# Patient Record
Sex: FEMALE | Race: WHITE | NOT HISPANIC OR LATINO | Employment: OTHER | URBAN - METROPOLITAN AREA
[De-identification: names, ages, dates, MRNs, and addresses within clinical notes are randomized per-mention and may not be internally consistent; named-entity substitution may affect disease eponyms.]

---

## 2017-01-19 ENCOUNTER — GENERIC CONVERSION - ENCOUNTER (OUTPATIENT)
Dept: OTHER | Facility: OTHER | Age: 77
End: 2017-01-19

## 2017-01-21 ENCOUNTER — APPOINTMENT (OUTPATIENT)
Dept: LAB | Facility: HOSPITAL | Age: 77
End: 2017-01-21
Payer: MEDICARE

## 2017-01-21 ENCOUNTER — TRANSCRIBE ORDERS (OUTPATIENT)
Dept: ADMINISTRATIVE | Facility: HOSPITAL | Age: 77
End: 2017-01-21

## 2017-01-21 DIAGNOSIS — E78.5 HYPERLIPIDEMIA: ICD-10-CM

## 2017-01-21 DIAGNOSIS — I25.10 ATHEROSCLEROTIC HEART DISEASE OF NATIVE CORONARY ARTERY WITHOUT ANGINA PECTORIS: ICD-10-CM

## 2017-01-21 DIAGNOSIS — E04.1 NONTOXIC SINGLE THYROID NODULE: ICD-10-CM

## 2017-01-21 DIAGNOSIS — I10 ESSENTIAL (PRIMARY) HYPERTENSION: ICD-10-CM

## 2017-01-21 LAB
ALBUMIN SERPL BCP-MCNC: 3.7 G/DL (ref 3.5–5)
ALP SERPL-CCNC: 43 U/L (ref 46–116)
ALT SERPL W P-5'-P-CCNC: 23 U/L (ref 12–78)
ANION GAP SERPL CALCULATED.3IONS-SCNC: 9 MMOL/L (ref 4–13)
AST SERPL W P-5'-P-CCNC: 29 U/L (ref 5–45)
BASOPHILS # BLD AUTO: 0.03 THOUSANDS/ΜL (ref 0–0.1)
BASOPHILS NFR BLD AUTO: 0 % (ref 0–1)
BILIRUB SERPL-MCNC: 0.4 MG/DL (ref 0.2–1)
BUN SERPL-MCNC: 21 MG/DL (ref 5–25)
CALCIUM SERPL-MCNC: 9.4 MG/DL (ref 8.3–10.1)
CHLORIDE SERPL-SCNC: 104 MMOL/L (ref 100–108)
CHOLEST SERPL-MCNC: 213 MG/DL (ref 50–200)
CO2 SERPL-SCNC: 29 MMOL/L (ref 21–32)
CREAT SERPL-MCNC: 0.97 MG/DL (ref 0.6–1.3)
EOSINOPHIL # BLD AUTO: 0.32 THOUSAND/ΜL (ref 0–0.61)
EOSINOPHIL NFR BLD AUTO: 5 % (ref 0–6)
ERYTHROCYTE [DISTWIDTH] IN BLOOD BY AUTOMATED COUNT: 13.7 % (ref 11.6–15.1)
GFR SERPL CREATININE-BSD FRML MDRD: 56.1 ML/MIN/1.73SQ M
GLUCOSE SERPL-MCNC: 80 MG/DL (ref 65–140)
HCT VFR BLD AUTO: 45.8 % (ref 34.8–46.1)
HDLC SERPL-MCNC: 56 MG/DL (ref 40–60)
HGB BLD-MCNC: 14.5 G/DL (ref 11.5–15.4)
LDLC SERPL CALC-MCNC: 129 MG/DL (ref 0–100)
LYMPHOCYTES # BLD AUTO: 1.69 THOUSANDS/ΜL (ref 0.6–4.47)
LYMPHOCYTES NFR BLD AUTO: 24 % (ref 14–44)
MCH RBC QN AUTO: 28.5 PG (ref 26.8–34.3)
MCHC RBC AUTO-ENTMCNC: 31.7 G/DL (ref 31.4–37.4)
MCV RBC AUTO: 90 FL (ref 82–98)
MONOCYTES # BLD AUTO: 0.76 THOUSAND/ΜL (ref 0.17–1.22)
MONOCYTES NFR BLD AUTO: 11 % (ref 4–12)
NEUTROPHILS # BLD AUTO: 4.29 THOUSANDS/ΜL (ref 1.85–7.62)
NEUTS SEG NFR BLD AUTO: 60 % (ref 43–75)
PLATELET # BLD AUTO: 252 THOUSANDS/UL (ref 149–390)
PMV BLD AUTO: 10 FL (ref 8.9–12.7)
POTASSIUM SERPL-SCNC: 4.5 MMOL/L (ref 3.5–5.3)
PROT SERPL-MCNC: 6.6 G/DL (ref 6.4–8.2)
RBC # BLD AUTO: 5.08 MILLION/UL (ref 3.81–5.12)
SODIUM SERPL-SCNC: 142 MMOL/L (ref 136–145)
TRIGL SERPL-MCNC: 140 MG/DL
TSH SERPL DL<=0.05 MIU/L-ACNC: 3.87 UIU/ML (ref 0.36–3.74)
WBC # BLD AUTO: 7.09 THOUSAND/UL (ref 4.31–10.16)

## 2017-01-21 PROCEDURE — 80061 LIPID PANEL: CPT

## 2017-01-21 PROCEDURE — 80053 COMPREHEN METABOLIC PANEL: CPT

## 2017-01-21 PROCEDURE — 85025 COMPLETE CBC W/AUTO DIFF WBC: CPT

## 2017-01-21 PROCEDURE — 84443 ASSAY THYROID STIM HORMONE: CPT

## 2017-01-21 PROCEDURE — 36415 COLL VENOUS BLD VENIPUNCTURE: CPT

## 2017-01-22 ENCOUNTER — GENERIC CONVERSION - ENCOUNTER (OUTPATIENT)
Dept: OTHER | Facility: OTHER | Age: 77
End: 2017-01-22

## 2017-01-23 ENCOUNTER — ALLSCRIPTS OFFICE VISIT (OUTPATIENT)
Dept: OTHER | Facility: OTHER | Age: 77
End: 2017-01-23

## 2017-01-23 DIAGNOSIS — M79.89 OTHER SPECIFIED SOFT TISSUE DISORDERS: ICD-10-CM

## 2017-01-23 DIAGNOSIS — Z00.00 ENCOUNTER FOR GENERAL ADULT MEDICAL EXAMINATION WITHOUT ABNORMAL FINDINGS: ICD-10-CM

## 2017-01-23 DIAGNOSIS — E78.5 HYPERLIPIDEMIA: ICD-10-CM

## 2017-01-23 DIAGNOSIS — K21.9 GASTRO-ESOPHAGEAL REFLUX DISEASE WITHOUT ESOPHAGITIS: ICD-10-CM

## 2017-01-23 DIAGNOSIS — I10 ESSENTIAL (PRIMARY) HYPERTENSION: ICD-10-CM

## 2017-02-03 ENCOUNTER — GENERIC CONVERSION - ENCOUNTER (OUTPATIENT)
Dept: OTHER | Facility: OTHER | Age: 77
End: 2017-02-03

## 2017-02-08 ENCOUNTER — GENERIC CONVERSION - ENCOUNTER (OUTPATIENT)
Dept: OTHER | Facility: OTHER | Age: 77
End: 2017-02-08

## 2017-02-21 ENCOUNTER — GENERIC CONVERSION - ENCOUNTER (OUTPATIENT)
Dept: OTHER | Facility: OTHER | Age: 77
End: 2017-02-21

## 2017-04-18 ENCOUNTER — GENERIC CONVERSION - ENCOUNTER (OUTPATIENT)
Dept: OTHER | Facility: OTHER | Age: 77
End: 2017-04-18

## 2017-05-21 ENCOUNTER — TRANSCRIBE ORDERS (OUTPATIENT)
Dept: ADMINISTRATIVE | Facility: HOSPITAL | Age: 77
End: 2017-05-21

## 2017-05-21 ENCOUNTER — APPOINTMENT (OUTPATIENT)
Dept: LAB | Facility: HOSPITAL | Age: 77
End: 2017-05-21
Payer: MEDICARE

## 2017-05-21 DIAGNOSIS — M79.89 OTHER DISORDERS OF SOFT TISSUE: ICD-10-CM

## 2017-05-21 DIAGNOSIS — Z00.00 ENCOUNTER FOR GENERAL ADULT MEDICAL EXAMINATION WITHOUT ABNORMAL FINDINGS: ICD-10-CM

## 2017-05-21 DIAGNOSIS — I10 ESSENTIAL (PRIMARY) HYPERTENSION: ICD-10-CM

## 2017-05-21 DIAGNOSIS — K21.9 GASTRO-ESOPHAGEAL REFLUX DISEASE WITHOUT ESOPHAGITIS: ICD-10-CM

## 2017-05-21 DIAGNOSIS — E78.5 HYPERLIPIDEMIA: ICD-10-CM

## 2017-05-21 LAB
ALBUMIN SERPL BCP-MCNC: 3.9 G/DL (ref 3.5–5)
ALP SERPL-CCNC: 50 U/L (ref 46–116)
ALT SERPL W P-5'-P-CCNC: 30 U/L (ref 12–78)
ANION GAP SERPL CALCULATED.3IONS-SCNC: 8 MMOL/L (ref 4–13)
AST SERPL W P-5'-P-CCNC: 34 U/L (ref 5–45)
BASOPHILS # BLD AUTO: 0.03 THOUSANDS/ΜL (ref 0–0.1)
BASOPHILS NFR BLD AUTO: 0 % (ref 0–1)
BILIRUB SERPL-MCNC: 0.4 MG/DL (ref 0.2–1)
BUN SERPL-MCNC: 23 MG/DL (ref 5–25)
CALCIUM SERPL-MCNC: 9.4 MG/DL (ref 8.3–10.1)
CHLORIDE SERPL-SCNC: 105 MMOL/L (ref 100–108)
CHOLEST SERPL-MCNC: 189 MG/DL (ref 50–200)
CO2 SERPL-SCNC: 29 MMOL/L (ref 21–32)
CREAT SERPL-MCNC: 0.9 MG/DL (ref 0.6–1.3)
EOSINOPHIL # BLD AUTO: 0.27 THOUSAND/ΜL (ref 0–0.61)
EOSINOPHIL NFR BLD AUTO: 3 % (ref 0–6)
ERYTHROCYTE [DISTWIDTH] IN BLOOD BY AUTOMATED COUNT: 13.5 % (ref 11.6–15.1)
GFR SERPL CREATININE-BSD FRML MDRD: >60 ML/MIN/1.73SQ M
GLUCOSE P FAST SERPL-MCNC: 91 MG/DL (ref 65–99)
HCT VFR BLD AUTO: 47.5 % (ref 34.8–46.1)
HDLC SERPL-MCNC: 66 MG/DL (ref 40–60)
HGB BLD-MCNC: 15.2 G/DL (ref 11.5–15.4)
LDLC SERPL CALC-MCNC: 106 MG/DL (ref 0–100)
LYMPHOCYTES # BLD AUTO: 1.66 THOUSANDS/ΜL (ref 0.6–4.47)
LYMPHOCYTES NFR BLD AUTO: 21 % (ref 14–44)
MCH RBC QN AUTO: 29.4 PG (ref 26.8–34.3)
MCHC RBC AUTO-ENTMCNC: 32 G/DL (ref 31.4–37.4)
MCV RBC AUTO: 92 FL (ref 82–98)
MONOCYTES # BLD AUTO: 0.78 THOUSAND/ΜL (ref 0.17–1.22)
MONOCYTES NFR BLD AUTO: 10 % (ref 4–12)
NEUTROPHILS # BLD AUTO: 5.24 THOUSANDS/ΜL (ref 1.85–7.62)
NEUTS SEG NFR BLD AUTO: 66 % (ref 43–75)
PLATELET # BLD AUTO: 243 THOUSANDS/UL (ref 149–390)
PMV BLD AUTO: 9.6 FL (ref 8.9–12.7)
POTASSIUM SERPL-SCNC: 4 MMOL/L (ref 3.5–5.3)
PROT SERPL-MCNC: 6.9 G/DL (ref 6.4–8.2)
RBC # BLD AUTO: 5.17 MILLION/UL (ref 3.81–5.12)
SODIUM SERPL-SCNC: 142 MMOL/L (ref 136–145)
TRIGL SERPL-MCNC: 86 MG/DL
TSH SERPL DL<=0.05 MIU/L-ACNC: 3.38 UIU/ML (ref 0.36–3.74)
WBC # BLD AUTO: 7.98 THOUSAND/UL (ref 4.31–10.16)

## 2017-05-21 PROCEDURE — 84443 ASSAY THYROID STIM HORMONE: CPT

## 2017-05-21 PROCEDURE — 80061 LIPID PANEL: CPT

## 2017-05-21 PROCEDURE — 85025 COMPLETE CBC W/AUTO DIFF WBC: CPT

## 2017-05-21 PROCEDURE — 80053 COMPREHEN METABOLIC PANEL: CPT

## 2017-05-21 PROCEDURE — 36415 COLL VENOUS BLD VENIPUNCTURE: CPT

## 2017-05-22 ENCOUNTER — GENERIC CONVERSION - ENCOUNTER (OUTPATIENT)
Dept: OTHER | Facility: OTHER | Age: 77
End: 2017-05-22

## 2017-05-23 ENCOUNTER — ALLSCRIPTS OFFICE VISIT (OUTPATIENT)
Dept: OTHER | Facility: OTHER | Age: 77
End: 2017-05-23

## 2017-05-23 DIAGNOSIS — K21.9 GASTRO-ESOPHAGEAL REFLUX DISEASE WITHOUT ESOPHAGITIS: ICD-10-CM

## 2017-05-23 DIAGNOSIS — I10 ESSENTIAL (PRIMARY) HYPERTENSION: ICD-10-CM

## 2017-05-23 DIAGNOSIS — E78.5 HYPERLIPIDEMIA: ICD-10-CM

## 2017-09-18 DIAGNOSIS — E78.5 HYPERLIPIDEMIA: ICD-10-CM

## 2017-09-18 DIAGNOSIS — K21.9 GASTRO-ESOPHAGEAL REFLUX DISEASE WITHOUT ESOPHAGITIS: ICD-10-CM

## 2017-09-18 DIAGNOSIS — I10 ESSENTIAL (PRIMARY) HYPERTENSION: ICD-10-CM

## 2017-09-18 DIAGNOSIS — Z12.31 ENCOUNTER FOR SCREENING MAMMOGRAM FOR MALIGNANT NEOPLASM OF BREAST: ICD-10-CM

## 2017-09-24 ENCOUNTER — TRANSCRIBE ORDERS (OUTPATIENT)
Dept: ADMINISTRATIVE | Facility: HOSPITAL | Age: 77
End: 2017-09-24

## 2017-09-24 ENCOUNTER — APPOINTMENT (OUTPATIENT)
Dept: LAB | Facility: HOSPITAL | Age: 77
End: 2017-09-24
Payer: MEDICARE

## 2017-09-24 ENCOUNTER — GENERIC CONVERSION - ENCOUNTER (OUTPATIENT)
Dept: OTHER | Facility: OTHER | Age: 77
End: 2017-09-24

## 2017-09-24 DIAGNOSIS — E78.5 HYPERLIPIDEMIA: ICD-10-CM

## 2017-09-24 DIAGNOSIS — I10 ESSENTIAL (PRIMARY) HYPERTENSION: ICD-10-CM

## 2017-09-24 DIAGNOSIS — K21.9 GASTRO-ESOPHAGEAL REFLUX DISEASE WITHOUT ESOPHAGITIS: ICD-10-CM

## 2017-09-24 LAB
ALBUMIN SERPL BCP-MCNC: 3.7 G/DL (ref 3.5–5)
ALP SERPL-CCNC: 44 U/L (ref 46–116)
ALT SERPL W P-5'-P-CCNC: 27 U/L (ref 12–78)
ANION GAP SERPL CALCULATED.3IONS-SCNC: 10 MMOL/L (ref 4–13)
AST SERPL W P-5'-P-CCNC: 29 U/L (ref 5–45)
BASOPHILS # BLD AUTO: 0.04 THOUSANDS/ΜL (ref 0–0.1)
BASOPHILS NFR BLD AUTO: 1 % (ref 0–1)
BILIRUB SERPL-MCNC: 0.4 MG/DL (ref 0.2–1)
BUN SERPL-MCNC: 23 MG/DL (ref 5–25)
CALCIUM SERPL-MCNC: 9.1 MG/DL (ref 8.3–10.1)
CHLORIDE SERPL-SCNC: 103 MMOL/L (ref 100–108)
CHOLEST SERPL-MCNC: 175 MG/DL (ref 50–200)
CO2 SERPL-SCNC: 28 MMOL/L (ref 21–32)
CREAT SERPL-MCNC: 0.93 MG/DL (ref 0.6–1.3)
EOSINOPHIL # BLD AUTO: 0.29 THOUSAND/ΜL (ref 0–0.61)
EOSINOPHIL NFR BLD AUTO: 4 % (ref 0–6)
ERYTHROCYTE [DISTWIDTH] IN BLOOD BY AUTOMATED COUNT: 13.9 % (ref 11.6–15.1)
GFR SERPL CREATININE-BSD FRML MDRD: 60 ML/MIN/1.73SQ M
GLUCOSE P FAST SERPL-MCNC: 95 MG/DL (ref 65–99)
HCT VFR BLD AUTO: 45.1 % (ref 34.8–46.1)
HDLC SERPL-MCNC: 61 MG/DL (ref 40–60)
HGB BLD-MCNC: 14.5 G/DL (ref 11.5–15.4)
LDLC SERPL CALC-MCNC: 94 MG/DL (ref 0–100)
LYMPHOCYTES # BLD AUTO: 1.88 THOUSANDS/ΜL (ref 0.6–4.47)
LYMPHOCYTES NFR BLD AUTO: 28 % (ref 14–44)
MCH RBC QN AUTO: 29 PG (ref 26.8–34.3)
MCHC RBC AUTO-ENTMCNC: 32.2 G/DL (ref 31.4–37.4)
MCV RBC AUTO: 90 FL (ref 82–98)
MONOCYTES # BLD AUTO: 0.85 THOUSAND/ΜL (ref 0.17–1.22)
MONOCYTES NFR BLD AUTO: 13 % (ref 4–12)
NEUTROPHILS # BLD AUTO: 3.61 THOUSANDS/ΜL (ref 1.85–7.62)
NEUTS SEG NFR BLD AUTO: 54 % (ref 43–75)
PLATELET # BLD AUTO: 241 THOUSANDS/UL (ref 149–390)
PMV BLD AUTO: 9.1 FL (ref 8.9–12.7)
POTASSIUM SERPL-SCNC: 4 MMOL/L (ref 3.5–5.3)
PROT SERPL-MCNC: 6.5 G/DL (ref 6.4–8.2)
RBC # BLD AUTO: 5 MILLION/UL (ref 3.81–5.12)
SODIUM SERPL-SCNC: 141 MMOL/L (ref 136–145)
TRIGL SERPL-MCNC: 100 MG/DL
WBC # BLD AUTO: 6.67 THOUSAND/UL (ref 4.31–10.16)

## 2017-09-24 PROCEDURE — 80061 LIPID PANEL: CPT

## 2017-09-24 PROCEDURE — 80053 COMPREHEN METABOLIC PANEL: CPT

## 2017-09-24 PROCEDURE — 85025 COMPLETE CBC W/AUTO DIFF WBC: CPT

## 2017-09-24 PROCEDURE — 36415 COLL VENOUS BLD VENIPUNCTURE: CPT

## 2017-09-25 ENCOUNTER — ALLSCRIPTS OFFICE VISIT (OUTPATIENT)
Dept: OTHER | Facility: OTHER | Age: 77
End: 2017-09-25

## 2018-01-09 ENCOUNTER — ALLSCRIPTS OFFICE VISIT (OUTPATIENT)
Dept: OTHER | Facility: OTHER | Age: 78
End: 2018-01-09

## 2018-01-09 DIAGNOSIS — M85.80 OTHER SPECIFIED DISORDERS OF BONE DENSITY AND STRUCTURE, UNSPECIFIED SITE (CODE): ICD-10-CM

## 2018-01-09 DIAGNOSIS — I10 ESSENTIAL (PRIMARY) HYPERTENSION: ICD-10-CM

## 2018-01-09 DIAGNOSIS — K21.9 GASTRO-ESOPHAGEAL REFLUX DISEASE WITHOUT ESOPHAGITIS: ICD-10-CM

## 2018-01-09 DIAGNOSIS — E78.5 HYPERLIPIDEMIA: ICD-10-CM

## 2018-01-09 DIAGNOSIS — I25.10 ATHEROSCLEROTIC HEART DISEASE OF NATIVE CORONARY ARTERY WITHOUT ANGINA PECTORIS: ICD-10-CM

## 2018-01-10 NOTE — RESULT NOTES
Verified Results  (1) CBC/PLT/DIFF [id number] 07:42AM Eugene Delgado Order Number: [id number]     Test Name Result Flag Reference   WBC COUNT 7 33 Thousand/uL  4 31-10 16   RBC COUNT 4 82 Million/uL  3 81-5 12   HEMOGLOBIN 14 0 g/dL  11 5-15 4   HEMATOCRIT 43 8 %  34 8-46  1   MCV 91 fL  82-98   MCH 29 0 pg  26 8-34 3   MCHC 32 0 g/dL  31 4-37 4   RDW 13 6 %  11 6-15 1   MPV 9 1 fL  8 9-12 7   PLATELET COUNT 489 Thousands/uL  149-390   NEUTROPHILS RELATIVE PERCENT 60 %  43-75   LYMPHOCYTES RELATIVE PERCENT 29 %  14-44   MONOCYTES RELATIVE PERCENT 7 %  4-12   EOSINOPHILS RELATIVE PERCENT 3 %  0-6   BASOPHILS RELATIVE PERCENT 1 %  0-1   NEUTROPHILS ABSOLUTE COUNT 4 39 Thousands/?L  1 85-7 62   LYMPHOCYTES ABSOLUTE COUNT 2 12 Thousands/?L  0 60-4 47   MONOCYTES ABSOLUTE COUNT 0 53 Thousand/?L  0 17-1 22   EOSINOPHILS ABSOLUTE COUNT 0 25 Thousand/?L  0 00-0 61   BASOPHILS ABSOLUTE COUNT 0 04 Thousands/?L  0 00-0 10     (1) COMPREHENSIVE METABOLIC PANEL [id number] 22:04ZB Eugene Delgado Order Number: [id number]     Test Name Result Flag Reference   GLUCOSE,RANDM 81 mg/dL     If the patient is fasting, the ADA then defines impaired fasting glucose as > 100 mg/dL and diabetes as > or equal to 123 mg/dL     SODIUM 140 mmol/L  136-145   POTASSIUM 3 8 mmol/L  3 5-5 3   CHLORIDE 103 mmol/L  100-108   CARBON DIOXIDE 29 mmol/L  21-32   ANION GAP (CALC) 8 mmol/L  4-13   BLOOD UREA NITROGEN 24 mg/dL  5-25   CREATININE 0 88 mg/dL  0 60-1 30   Standardized to IDMS reference method   CALCIUM 9 2 mg/dL  8 3-10 1   BILI, TOTAL 0 50 mg/dL  0 20-1 00   ALK PHOSPHATAS 47 U/L     ALT (SGPT) 30 U/L  12-78   AST(SGOT) 32 U/L  5-45   ALBUMIN 3 6 g/dL  3 5-5 0   TOTAL PROTEIN 6 5 g/dL  6 4-8 2   eGFR Non-African American      >60 0 ml/min/1 73sq m   [place] [employer] Disease Education Program recommendations are as follows:  GFR calculation is accurate only with a steady state creatinine  Chronic Kidney disease less than 60 ml/min/1 73 sq  meters  Kidney failure less than 15 ml/min/1 73 sq  meters  (1) LIPID PANEL, FASTING [id number] 07:42AM Mike Leslie Order Number: [id number]     Test Name Result Flag Reference   CHOLESTEROL 179 mg/dL     HDL,DIRECT 59 mg/dL  40-60   Specimen collection should occur prior to Metamizole administration due to the potential for falsely depressed results  LDL CHOLESTEROL CALCULATED 102 mg/dL H 0-100   Triglyceride:         Normal              <150 mg/dl       Borderline High    150-199 mg/dl       High               200-499 mg/dl       Very High          >499 mg/dl  Cholesterol:         Desirable        <200 mg/dl      Borderline High  200-239 mg/dl      High             >239 mg/dl  HDL Cholesterol:        High    >59 mg/dL      Low     <41 mg/dL  LDL CALCULATED:    This screening LDL is a calculated result  It does not have the accuracy of the Direct Measured LDL in the monitoring of patients with hyperlipidemia and/or statin therapy  Direct Measure LDL (DXP455) must be ordered separately in these patients  TRIGLYCERIDES 92 mg/dL  <=150   Specimen collection should occur prior to N-Acetylcysteine or Metamizole administration due to the potential for falsely depressed results

## 2018-01-12 NOTE — RESULT NOTES
Verified Results  (1) URINALYSIS w URINE C/S REFLEX (will reflex a microscopy if leukocytes, occult blood, or nitrites are not within normal limits) [id number] 01:26PM Salome Long Order Number: [id number]     Test Name Result Flag Reference   COLOR Yellow     CLARITY Cloudy     PH UA 6 0  4 5-8 0   LEUKOCYTE ESTERASE UA Large A Negative   NITRITE UA Negative  Negative   PROTEIN UA Negative mg/dl  Negative   GLUCOSE UA Negative mg/dl  Negative   KETONES UA Negative mg/dl  Negative   UROBILINOGEN UA 0 2 E U /dl  0 2, 1 0 E U /dl   BILIRUBIN UA Negative  Negative   BLOOD UA Moderate A Negative, Trace-Intact   SPECIFIC GRAVITY UA 1 015  1 003-1 030   BACTERIA Moderate /hpf A None Seen, Occasional   EPITHELIAL CELLS Occasional /hpf  None Seen, Occasional   RBC UA 2-4 /hpf A None Seen   WBC UA 20-30 /hpf A None Seen   CLINICAL REPORT (Report)     Test:        Urine culture  Specimen Type:   Urine  Specimen Date:   12/8/2016 1:26 PM  Result Date:    12/10/2016 10:34 AM  Result Status:   Final result  Resulting Lab:   [address] [address]            Tel: [phone]      CULTURE                                       ------------------                                   >100,000 cfu/ml Escherichia coli      SUSCEPTIBILITY                                   ------------------                                                       Escherichia coli  METHOD                 NIMCO  -------------------------------------  --------------------------  AMPICILLIN ($$)             >16 00 ug/ml Resistant  AMPICILLIN + SULBACTAM ($)       16/8 ug/ml  Intermediate  AZTREONAM ($$$)             <=8 ug/ml   Susceptible  CEFAZOLIN ($)              16 00 ug/ml  Intermediate  CEFOTAXIME ($)             <=2 ug/ml   Susceptible  CEFTAZIDIME ($$)            <=1 ug/ml   Susceptible  CEFTRIAXONE ($$)            <=8 00 ug/ml Susceptible  CEFUROXIME ($$)             16 ug/ml   Intermediate  CIPROFLOXACIN ($)            <=1 00 ug/ml Susceptible  GENTAMICIN ($$)             <=4 ug/ml   Susceptible  LEVOFLOXACIN ($)            <=2 00 ug/ml Susceptible  NITROFURANTOIN             <=32 ug/ml  Susceptible  PIPERACILLIN + TAZOBACTAM ($$$)     <=16 ug/ml  Susceptible  TETRACYCLINE              <=4 ug/ml   Susceptible  TOBRAMYCIN ($)             <=4 ug/ml   Susceptible  TRIMETHOPRIM + SULFAMETHOXAZOLE ($$$)  <=2/38 ug/ml Susceptible

## 2018-01-13 VITALS
DIASTOLIC BLOOD PRESSURE: 64 MMHG | RESPIRATION RATE: 16 BRPM | TEMPERATURE: 97.7 F | HEIGHT: 63 IN | HEART RATE: 54 BPM | WEIGHT: 128 LBS | BODY MASS INDEX: 22.68 KG/M2 | SYSTOLIC BLOOD PRESSURE: 110 MMHG | OXYGEN SATURATION: 94 %

## 2018-01-13 VITALS
WEIGHT: 129 LBS | SYSTOLIC BLOOD PRESSURE: 140 MMHG | HEIGHT: 63 IN | BODY MASS INDEX: 22.86 KG/M2 | DIASTOLIC BLOOD PRESSURE: 70 MMHG | HEART RATE: 75 BPM | TEMPERATURE: 98.2 F | OXYGEN SATURATION: 97 % | RESPIRATION RATE: 16 BRPM

## 2018-01-13 NOTE — RESULT NOTES
Verified Results  (1) URINALYSIS w URINE C/S REFLEX (will reflex a microscopy if leukocytes, occult blood, or nitrites are not within normal limits) [id number] 01:26PM Eugene Delgado Order Number: [id number]     Test Name Result Flag Reference   COLOR Yellow     CLARITY Cloudy     PH UA 6 0  4 5-8 0   LEUKOCYTE ESTERASE UA Large A Negative   NITRITE UA Negative  Negative   PROTEIN UA Negative mg/dl  Negative   GLUCOSE UA Negative mg/dl  Negative   KETONES UA Negative mg/dl  Negative   UROBILINOGEN UA 0 2 E U /dl  0 2, 1 0 E U /dl   BILIRUBIN UA Negative  Negative   BLOOD UA Moderate A Negative, Trace-Intact   SPECIFIC GRAVITY UA 1 015  1 003-1 030   BACTERIA Moderate /hpf A None Seen, Occasional   EPITHELIAL CELLS Occasional /hpf  None Seen, Occasional   RBC UA 2-4 /hpf A None Seen   WBC UA 20-30 /hpf A None Seen       Plan  Urinary tract infection    · Ciprofloxacin HCl - 500 MG Oral Tablet; TAKE 1 TABLET TWICE DAILY

## 2018-01-13 NOTE — RESULT NOTES
Verified Results  (1) CBC/PLT/DIFF [id number] 09:28AM [employer] Order Number: [id number]   Order Number: [id number]     Test Name Result Flag Reference   WBC COUNT 6 95 Thousand/uL  4 31-10 16   RBC COUNT 4 92 Million/uL  3 81-5 12   HEMOGLOBIN 14 1 g/dL  11 5-15 4   HEMATOCRIT 44 1 %  34 8-46  1   MCV 90 fL  82-98   MCH 28 7 pg  26 8-34 3   MCHC 32 0 g/dL  31 4-37 4   RDW 13 5 %  11 6-15 1   MPV 9 3 fL  8 9-12 7   PLATELET COUNT 125 Thousands/uL  149-390   NEUTROPHILS RELATIVE PERCENT 66 %  43-75   LYMPHOCYTES RELATIVE PERCENT 20 %  14-44   MONOCYTES RELATIVE PERCENT 11 %  4-12   EOSINOPHILS RELATIVE PERCENT 3 %  0-6   BASOPHILS RELATIVE PERCENT 0 %  0-1   NEUTROPHILS ABSOLUTE COUNT 4 61 Thousands/?L  1 85-7 62   LYMPHOCYTES ABSOLUTE COUNT 1 36 Thousands/?L  0 60-4 47   MONOCYTES ABSOLUTE COUNT 0 76 Thousand/?L  0 17-1 22   EOSINOPHILS ABSOLUTE COUNT 0 19 Thousand/?L  0 00-0 61   BASOPHILS ABSOLUTE COUNT 0 03 Thousands/?L  0 00-0 10     (1) COMPREHENSIVE METABOLIC PANEL [id number] 97:47QR [employer] Order Number: [id number]   Order Number: [id number] Order Number: [id number]     Test Name Result Flag Reference   GLUCOSE,RANDM 85 mg/dL     If the patient is fasting, the ADA then defines impaired fasting glucose as > 100 mg/dL and diabetes as > or equal to 123 mg/dL     SODIUM 140 mmol/L  136-145   POTASSIUM 4 3 mmol/L  3 5-5 3   CHLORIDE 105 mmol/L  100-108   CARBON DIOXIDE 29 mmol/L  21-32   ANION GAP (CALC) 6 mmol/L  4-13   BLOOD UREA NITROGEN 23 mg/dL  5-25   CREATININE 1 20 mg/dL  0 60-1 30   Standardized to IDMS reference method   CALCIUM 9 1 mg/dL  8 3-10 1   BILI, TOTAL 0 40 mg/dL  0 20-1 00   ALK PHOSPHATAS 47 U/L     ALT (SGPT) 31 U/L  12-78   AST(SGOT) 30 U/L  5-45   ALBUMIN 3 5 g/dL  3 5-5 0   TOTAL PROTEIN 6 2 g/dL L 6 4-8 2   eGFR Non-African American 43 9 ml/min/1 73sq m     [facility] [employer] Disease Education Program recommendations are as follows:  GFR calculation is accurate only with a steady state creatinine  Chronic Kidney disease less than 60 ml/min/1 73 sq  meters  Kidney failure less than 15 ml/min/1 73 sq  meters  (1) TSH [id number] 09:28AM [employer] [facility] Order Number: [id number]  TW Order Number: [id number] Order Number: [id number]     Test Name Result Flag Reference   TSH 2 615 uIU/mL  0 358-3 740   The recommended reference ranges for TSH during pregnancy are as follows:  First trimester 0 1 to 2 5 uIU/mL  Second trimester  0 2 to 3 0 uIU/mL  Third trimester 0 3 to 3 0 uIU/m     (1) LIPID PANEL, FASTING [id number] 09:28AM "1,2,3 List[employer]" [facility] Order Number: [id number] Order Number: [id number]     Test Name Result Flag Reference   CHOLESTEROL 170 mg/dL     HDL,DIRECT 52 mg/dL  40-60   Specimen collection should occur prior to Metamizole administration due to the potential for falsely depressed results  LDL CHOLESTEROL CALCULATED 100 mg/dL  0-100   Triglyceride:         Normal              <150 mg/dl       Borderline High    150-199 mg/dl       High               200-499 mg/dl       Very High          >499 mg/dl  Cholesterol:         Desirable        <200 mg/dl      Borderline High  200-239 mg/dl      High             >239 mg/dl  HDL Cholesterol:        High    >59 mg/dL      Low     <41 mg/dL  LDL CALCULATED:    This screening LDL is a calculated result  It does not have the accuracy of the Direct Measured LDL in the monitoring of patients with hyperlipidemia and/or statin therapy  Direct Measure LDL (EYQ285) must be ordered separately in these patients  TRIGLYCERIDES 91 mg/dL  <=150   Specimen collection should occur prior to N-Acetylcysteine or Metamizole administration due to the potential for falsely depressed results       (1) VITAMIN D 25-HYDROXY [id number] 09:28AM "1,2,3 Listo" [facility] Order Number: [id number]  TW Order Number: [id number]     Test Name Result Flag Reference   VIT D 25-HYDROX 37 9 ng/mL 30 0-100 0

## 2018-01-14 VITALS
HEART RATE: 81 BPM | SYSTOLIC BLOOD PRESSURE: 124 MMHG | HEIGHT: 63 IN | WEIGHT: 129 LBS | DIASTOLIC BLOOD PRESSURE: 80 MMHG | OXYGEN SATURATION: 98 % | TEMPERATURE: 97.7 F | BODY MASS INDEX: 22.86 KG/M2

## 2018-01-15 NOTE — RESULT NOTES
Verified Results  (1) CBC/PLT/DIFF [id number] 09:19AM [employer] Order Number: [id number]     Test Name Result Flag Reference   WBC COUNT 7 12 Thousand/uL  4 31-10 16   RBC COUNT 5 18 Million/uL H 3 81-5 12   HEMOGLOBIN 15 0 g/dL  11 5-15 4   HEMATOCRIT 46 3 % H 34 8-46  1   MCV 89 fL  82-98   MCH 29 0 pg  26 8-34 3   MCHC 32 4 g/dL  31 4-37 4   RDW 13 5 %  11 6-15 1   MPV 9 6 fL  8 9-12 7   PLATELET COUNT 219 Thousands/uL  149-390   - Patient Instructions: This bloodwork is non-fasting  Please drink two glasses of water morning of bloodwork  - Patient Instructions: This bloodwork is non-fasting  Please drink two glasses of water morning of bloodwork  This is an appended report  These results have been appended to a previously verified report  NEUTROPHILS - REL 81 % H 43-75   BANDS - REL 1 %  0-8   LYMPHOCYTES - REL 6 % L 14-44   MONOCYTES - REL 6 %  4-12   EOSINOPHILS - REL 6 %  0-6   BASOPHILS - REL 0 %  0-1   NEUTROPHILS ABS 5 84 Thousand/uL  1 85-7 62   LYMPHOTCYTES ABS 0 43 Thousand/uL L 0 60-4 47   MONOCYTES ABS 0 43 Thousand/uL  0 00-1 22   EOSINOPHILS ABS 0 43 Thousand/uL H 0 00-0 40   BASOPHILS ABS 0 00 Thousand/uL  0 00-0 10   TOTAL COUNTED 100     PLT ESTIMATE Adequate  Adequate   Large Platelets Present     - Patient Instructions: This bloodwork is non-fasting  Please drink two glasses of water morning of bloodwork  - Patient Instructions: This bloodwork is non-fasting  Please drink two glasses of water morning of bloodwork  (1) COMPREHENSIVE METABOLIC PANEL [id number] 31:98ZY [employer] Order Number: [id number]     Test Name Result Flag Reference   GLUCOSE,RANDM 90 mg/dL     If the patient is fasting, the ADA then defines impaired fasting glucose as > 100 mg/dL and diabetes as > or equal to 123 mg/dL     SODIUM 136 mmol/L  136-145   POTASSIUM 4 0 mmol/L  3 5-5 3   CHLORIDE 99 mmol/L L 100-108   CARBON DIOXIDE 28 mmol/L  21-32   ANION GAP (CALC) 9 mmol/L  4-13   BLOOD UREA NITROGEN 15 mg/dL  5-25   CREATININE 1 12 mg/dL  0 60-1 30   Standardized to IDMS reference method   CALCIUM 9 1 mg/dL  8 3-10 1   BILI, TOTAL 0 40 mg/dL  0 20-1 00   ALK PHOSPHATAS 57 U/L     ALT (SGPT) 36 U/L  12-78   AST(SGOT) 36 U/L  5-45   ALBUMIN 2 8 g/dL L 3 5-5 0   TOTAL PROTEIN 6 6 g/dL  6 4-8 2   eGFR Non-African American 47 6 ml/min/1 73sq m     [facility] Disease Education Program recommendations are as follows:  GFR calculation is accurate only with a steady state creatinine  Chronic Kidney disease less than 60 ml/min/1 73 sq  meters  Kidney failure less than 15 ml/min/1 73 sq  meters  (1) CK (CPK) [id number] 09:19AM [facility] Order Number: [id number]     Test Name Result Flag Reference   CK (CPK) 58 U/L       (1) C-REACTIVE PROTEIN [id number] 09:19[facility] Order Number: [id number]     Test Name Result Flag Reference   C-REACT PROTEIN 67 9 mg/L H <3 0     (1) LYME ANTIBODY PROFILE [facility] TO WESTERN BLOT [id number] 09:19AM [facility] Order Number: [id number]     Test Name Result Flag Reference   LYME IGG 0 35  0 00-0 79   NEGATIVE(0 00-0 79)-Absence of detectable Borrelia IgG Antibodies  A negative result does not exclude the possibility of Borrelia infection  If early Lyme disease is suspected,a second sample should be collected & tested 4 weeks after initial testing  LYME IGM 0 15  0 00-0 79   NEGATIVE (0 00-0 79)-Absence of detectable Borrelia IgM antibodies  A negative result does not exclude the possibility of Borrelia infection   If early lyme disease is suspected, a second sample should be collected & tested 4 weeks after initial testing      (1) Shari Pont VIRUS [id number] 09:19AM [facility] Order Number: [id number]     Test Name Result Flag Reference   EBV EARLY ANTIGEN AB, IGG 32 7 U/mL H 0 0 - 8 9   Hepatitis A, Hepatitis C and HIV antibodies may cross-react  with this assay  Negative        < 9 0                                   Equivocal  9 0 - 10 9                                   Positive        >10 9   DILLON-BARR VCA IGG >600 0 U/mL H 0 0 - 17 9   Negative        <18 0                                   Equivocal 18 0 - 21 9                                   Positive        >21 9   DILLON-BARR VCA IGM <36 0 U/mL  0 0 - 35 9   Negative        <36 0                                   Equivocal 36 0 - 43 9                                   Positive        >43 9   DILLON-KWONG NUCLEAR ANTIGEN AB IGG 41 8 U/mL H 0 0 - 17 9   Negative        <18 0                                   Equivocal 18 0 - 21 9                                   Positive        >21 9   EBV INTERPRETATION Comment     EBV Interpretation Chart  Interpretation   EBV-IgM  EA(D)-IgG  VCA-IgG  EBNA-IgG  EBV Seronegative    -        -         -          -  Early Phase         +        -         -          -  Acute Primary       +       +or-       +          -  Infection  Convalescence/Past  -       +or-       +          +  Infection  Reactivated        +or-      +         +          +  Infection         + Antibody Present      - Antibody Absent    Performed at:  [facility] [address]  [id number]  : Peggy Adhikari MD, Phone:  [phone]     (1) SED RATE [id number] 09:19AM Jerry Guzman Order Number: [id number]     Test Name Result Flag Reference   SED RATE 34 mm/hour H 0-20     * XR CHEST PA & LATERAL [id number] 09:17AM Jerry Guzman Order Number: [id number]     Test Name Result Flag Reference   XR CHEST PA & LATERAL (Report)     CHEST      INDICATION: Right rib pain     COMPARISON: 9/18/2014     VIEWS: Frontal and lateral projections; 2 images     FINDINGS:        Cardiomediastinal silhouette appears unremarkable  The lungs are clear   No pneumothorax or pleural effusion  Surgical clips in the left axillary region  Visualized osseous structures appear within normal limits for the patient's age  IMPRESSION:     No active pulmonary disease  Workstation performed: [id number]     Signed by:   Shaylee Echavarria MD   12/16/16     XR RIBS 2 VIEW RIGHT [id number] 09:17AM Lloyd Hernandez Order Number: [id number]     Test Name Result Flag Reference   XR RIBS 2 VW RIGHT (Report)     RIGHT RIBS     INDICATION: Right rib pain  COMPARISON: None     VIEWS: 3 views right hemithorax; 4 images     FINDINGS:     There is no fracture or pathologic bone lesion  Soft tissues are unremarkable  The visualized right hemithorax is unremarkable  There is no pneumothorax  IMPRESSION:     1  No fracture         Workstation performed: [id number]     Signed by:   Shaylee Echavarria MD   12/16/16

## 2018-01-15 NOTE — RESULT NOTES
Verified Results  * DXA BONE DENSITY SPINE HIP AND PELVIS [id number] 10:25AM Josias Delgado Order Number: [id number]     Test Name Result Flag Reference   DXA BONE DENSITY SPINE HIP AND PELVIS (Report)     CENTRAL DXA SCAN     CLINICAL HISTORY:  78year old post-menopausal  female risk factors include estrogen deficient, follow-up     TECHNIQUE: Bone densitometry was performed using a [employer] A bone densitometer  Regions of interest appear properly placed  There are no obvious fractures or other confounding variables which could limit the study  COMPARISON: 2014     RESULTS:    LUMBAR SPINE: L1-L4:   BMD 1 127 gm/cm2   T-score 0 7   Z-score 3 1     LEFT TOTAL HIP:   BMD 0 843 gm/cm2   T-score -0 8   Z-score 0 9     LEFT FEMORAL NECK:   BMD 0 672 gm/cm2   T-score -1 6   Z-score 0 4     LEFT FOREARM :   BMD 0 616 gm/sq-cm,   T-score is -1 2   Z-score is 1 4        ASSESSMENT:   1  Based on the WHO classification, the T-score of -1 6 in the left femoral neck is consistent with low bone mineral density  2  Since the prior study, there has been an insignificant increase of the BMD in the lumbar spine of 0 005 gm/cm2 or 0 5%  In the left hip, there has been an insignificant increase in BMD of 0 001 gm/cm2 or 0 1%  This increase in the left femoral neck   and lumbar spine does not exceed our own least significant change and, therefore, is not statistically significant within 95% confidence level  3  The 10 year risk of hip fracture is 3 3 %, with the 10 year risk of major osteoporotic fracture being 15 %, as calculated by the WHO fracture risk assessment tool (FRAX)  The current NOF guidelines recommend treating patients with FRAX 10 year risk   score of >3% for hip fracture and >20% for major osteoporotic fracture  4  Any secondary causes of low bone mineral density should be excluded prior to treatment, if clinically indicated     5  A daily intake of at least 1200 mg calcium and 800 to 1000 IU of Vitamin D, as well as weight bearing and muscle strengthening exercise, fall prevention and avoidance of tobacco and excessive alcohol intake as basic preventive measures are suggested  6  Repeat DXA scan in 18-24 months as clinically indicated           WHO CLASSIFICATION:   Normal (a T-score of -1 0 or higher)   Low bone mineral density (a T-score of less than -1 0 but higher than -2 5)   Osteoporosis (a T-score of -2 5 or less)   Severe osteoporosis (a T-score of -2 5 or less with a fragility fracture)             Workstation performed: [id number]

## 2018-01-16 NOTE — RESULT NOTES
Verified Results  (1) CBC/PLT/DIFF [id number] 08:39AM [employer] Order Number: [id number]     Test Name Result Flag Reference   WBC COUNT 6 67 Thousand/uL  4 31-10 16   RBC COUNT 5 00 Million/uL  3 81-5 12   HEMOGLOBIN 14 5 g/dL  11 5-15 4   HEMATOCRIT 45 1 %  34 8-46  1   MCV 90 fL  82-98   MCH 29 0 pg  26 8-34 3   MCHC 32 2 g/dL  31 4-37 4   RDW 13 9 %  11 6-15 1   MPV 9 1 fL  8 9-12 7   PLATELET COUNT 506 Thousands/uL  149-390   NEUTROPHILS RELATIVE PERCENT 54 %  43-75   LYMPHOCYTES RELATIVE PERCENT 28 %  14-44   MONOCYTES RELATIVE PERCENT 13 % H 4-12   EOSINOPHILS RELATIVE PERCENT 4 %  0-6   BASOPHILS RELATIVE PERCENT 1 %  0-1   NEUTROPHILS ABSOLUTE COUNT 3 61 Thousands/? ??L  1 85-7 62   LYMPHOCYTES ABSOLUTE COUNT 1 88 Thousands/? ??L  0 60-4 47   MONOCYTES ABSOLUTE COUNT 0 85 Thousand/? ??L  0 17-1 22   EOSINOPHILS ABSOLUTE COUNT 0 29 Thousand/? ??L  0 00-0 61   BASOPHILS ABSOLUTE COUNT 0 04 Thousands/? ??L  0 00-0 10     (1) COMPREHENSIVE METABOLIC PANEL [id number] 97:37EZ [employer] Order Number: [id number]     Test Name Result Flag Reference   SODIUM 141 mmol/L  136-145   POTASSIUM 4 0 mmol/L  3 5-5 3   CHLORIDE 103 mmol/L  100-108   CARBON DIOXIDE 28 mmol/L  21-32   ANION GAP (CALC) 10 mmol/L  4-13   BLOOD UREA NITROGEN 23 mg/dL  5-25   CREATININE 0 93 mg/dL  0 60-1 30   Standardized to IDMS reference method   CALCIUM 9 1 mg/dL  8 3-10 1   BILI, TOTAL 0 40 mg/dL  0 20-1 00   ALK PHOSPHATAS 44 U/L L    ALT (SGPT) 27 U/L  12-78   Specimen collection should occur prior to Sulfasalazine administration due to the potential for falsely depressed results  AST(SGOT) 29 U/L  5-45   Specimen collection should occur prior to Sulfasalazine administration due to the potential for falsely depressed results     ALBUMIN 3 7 g/dL  3 5-5 0   TOTAL PROTEIN 6 5 g/dL  6 4-8 2   eGFR 60 ml/min/1 73sq m     [employer] Disease Education Program recommendations are as follows:  GFR calculation is accurate only with a steady state creatinine  Chronic Kidney disease less than 60 ml/min/1 73 sq  meters  Kidney failure less than 15 ml/min/1 73 sq  meters  GLUCOSE FASTING 95 mg/dL  65-99   Specimen collection should occur prior to Sulfasalazine administration due to the potential for falsely depressed results  Specimen collection should occur prior to Sulfapyridine administration due to the potential for falsely elevated results  (1) LIPID PANEL, FASTING [id number] 08:39AM [facility] Order Number: [id number]     Test Name Result Flag Reference   CHOLESTEROL 175 mg/dL     HDL,DIRECT 61 mg/dL H 40-60   Specimen collection should occur prior to Metamizole administration due to the potential for falsley depressed results  LDL CHOLESTEROL CALCULATED 94 mg/dL  0-100   Triglyceride:        Normal <150 mg/dl   Borderline High 150-199 mg/dl   High 200-499 mg/dl   Very High >499 mg/dl      Cholesterol:       Desirable <200 mg/dl    Borderline High 200-239 mg/dl    High >239 mg/dl      HDL Cholesterol:       High>59 mg/dL    Low <41 mg/dL      This screening LDL is a calculated result  It does not have the accuracy of the Direct Measured LDL in the monitoring of patients with hyperlipidemia and/or statin therapy  Direct Measure LDL (CEB649) must be ordered separately in these patients  TRIGLYCERIDES 100 mg/dL  <=150   Specimen collection should occur prior to N-Acetylcysteine or Metamizole administration due to the potential for falsely depressed results

## 2018-01-17 NOTE — RESULT NOTES
Verified Results  US CHEST (LUNG/PLEURAL CAVITY) [id number] 01:37PM Rosi Iverson Order Number: [id number]   Performing Comments: Left upper chest area close to the axillary area with swelling and soreness;  h/o breast cancer s/p bilateral mastectomy   - Patient Instructions: To schedule this appointment, please contact Central Scheduling at [phone]   Order Number: [id number]   Performing Comments: Left upper chest area close to the axillary area with swelling and soreness;  h/o breast cancer s/p bilateral mastectomy   - Patient Instructions: To schedule this appointment, please contact Central Scheduling at [phone]  Test Name Result Flag Reference   US CHEST (Report)     LEFT UPPER CHEST WALL/ANTERIOR AXILLARY SOFT TISSUE ULTRASOUND     INDICATION: Swelling and soreness left upper chest wall/anterior left axillary region status post bilateral mastectomy       COMPARISON: None  TECHNIQUE:  Real-time ultrasound of the left anterior chest wall/left anterior axillary region was performed with a linear transducer with both volumetric sweeps and still imaging techniques  FINDINGS: Minimal asymmetry of soft tissues noted in the left axilla compared to the right but no discrete mass, focal soft tissue swelling or focal fluid collection is appreciated  IMPRESSION:     Mild soft tissue asymmetry, may be postsurgical in origin  Negative for mass or focal fluid collection detection  Workstation performed: [id number]     Signed by:   Amina Puri MD   11/7/16

## 2018-01-17 NOTE — RESULT NOTES
Verified Results  (1) CBC/PLT/DIFF 21Jan2017 08:39AM Alvar[facility] Islas Order Number: [id number]     Test Name Result Flag Reference   WBC COUNT 7 09 Thousand/uL  4 31-10 16   RBC COUNT 5 08 Million/uL  3 81-5 12   HEMOGLOBIN 14 5 g/dL  11 5-15 4   HEMATOCRIT 45 8 %  34 8-46  1   MCV 90 fL  82-98   MCH 28 5 pg  26 8-34 3   MCHC 31 7 g/dL  31 4-37 4   RDW 13 7 %  11 6-15 1   MPV 10 0 fL  8 9-12 7   PLATELET COUNT 812 Thousands/uL  149-390   NEUTROPHILS RELATIVE PERCENT 60 %  43-75   LYMPHOCYTES RELATIVE PERCENT 24 %  14-44   MONOCYTES RELATIVE PERCENT 11 %  4-12   EOSINOPHILS RELATIVE PERCENT 5 %  0-6   BASOPHILS RELATIVE PERCENT 0 %  0-1   NEUTROPHILS ABSOLUTE COUNT 4 29 Thousands/?L  1 85-7 62   LYMPHOCYTES ABSOLUTE COUNT 1 69 Thousands/?L  0 60-4 47   MONOCYTES ABSOLUTE COUNT 0 76 Thousand/?L  0 17-1 22   EOSINOPHILS ABSOLUTE COUNT 0 32 Thousand/?L  0 00-0 61   BASOPHILS ABSOLUTE COUNT 0 03 Thousands/?L  0 00-0 10   - Patient Instructions: This bloodwork is non-fasting  Please drink two glasses of water morning of bloodwork  - Patient Instructions: This bloodwork is non-fasting  Please drink two glasses of water morning of bloodwork  (1) COMPREHENSIVE METABOLIC PANEL [id number] 78:33CF Alvarado Islas Order Number: [id number]     Test Name Result Flag Reference   GLUCOSE,RANDM 80 mg/dL     If the patient is fasting, the ADA then defines impaired fasting glucose as > 100 mg/dL and diabetes as > or equal to 123 mg/dL     SODIUM 142 mmol/L  136-145   POTASSIUM 4 5 mmol/L  3 5-5 3   CHLORIDE 104 mmol/L  100-108   CARBON DIOXIDE 29 mmol/L  21-32   ANION GAP (CALC) 9 mmol/L  4-13   BLOOD UREA NITROGEN 21 mg/dL  5-25   CREATININE 0 97 mg/dL  0 60-1 30   Standardized to IDMS reference method   CALCIUM 9 4 mg/dL  8 3-10 1   BILI, TOTAL 0 40 mg/dL  0 20-1 00   ALK PHOSPHATAS 43 U/L L    ALT (SGPT) 23 U/L  12-78   AST(SGOT) 29 U/L  5-45   ALBUMIN 3 7 g/dL  3 5-5 0   TOTAL PROTEIN 6 6 g/dL  6 4-8 2   eGFR Non-African American 56 1 ml/min/1 73sq m     - Patient Instructions: This is a fasting blood test  Water,black tea or black  coffee only after 9:00pm the night before test Drink 2 glasses of water the morning of test - Patient Instructions: This bloodwork is non-fasting  Please drink two glasses of   water morning of bloodwork  National Kidney Disease Education Program recommendations are as follows:  GFR calculation is accurate only with a steady state creatinine  Chronic Kidney disease less than 60 ml/min/1 73 sq  meters  Kidney failure less than 15 ml/min/1 73 sq  meters  (1) LIPID PANEL, FASTING 21Jan2017 08:39AM [facility] Order Number: [id number]     Test Name Result Flag Reference   CHOLESTEROL 213 mg/dL H    HDL,DIRECT 56 mg/dL  40-60   Specimen collection should occur prior to Metamizole administration due to the potential for falsely depressed results  LDL CHOLESTEROL CALCULATED 129 mg/dL H 0-100   - Patient Instructions: This is a fasting blood test  Water,black tea or black  coffee only after 9:00pm the night before test   Drink 2 glasses of water the morning of test     - Patient Instructions: This is a fasting blood test  Water,black tea or black  coffee only after 9:00pm the night before test Drink 2 glasses of water the morning of test - Patient Instructions: This bloodwork is non-fasting  Please drink two glasses of   water morning of bloodwork  Triglyceride:         Normal              <150 mg/dl       Borderline High    150-199 mg/dl       High               200-499 mg/dl       Very High          >499 mg/dl  Cholesterol:         Desirable        <200 mg/dl      Borderline High  200-239 mg/dl      High             >239 mg/dl  HDL Cholesterol:        High    >59 mg/dL      Low     <41 mg/dL  LDL CALCULATED:    This screening LDL is a calculated result    It does not have the accuracy of the Direct Measured LDL in the monitoring of patients with hyperlipidemia and/or statin therapy  Direct Measure LDL (PGA610) must be ordered separately in these patients  TRIGLYCERIDES 140 mg/dL  <=150   Specimen collection should occur prior to N-Acetylcysteine or Metamizole administration due to the potential for falsely depressed results  (1) TSH 21Jan2017 08:39AM Rosi Iverson Order Number: [id number]     Test Name Result Flag Reference   TSH 3 872 uIU/mL H 0 358-3 740   - Patient Instructions: This bloodwork is non-fasting  Please drink two glasses of water morning of bloodwork  - Patient Instructions: This is a fasting blood test  Water,black tea or black  coffee only after 9:00pm the night before test Drink 2 glasses of water the morning of test - Patient Instructions: This bloodwork is non-fasting  Please drink two glasses of   water morning of bloodwork  Patients undergoing fluorescein dye angiography may retain small amounts of fluorescein in the body for 48-72 hours post procedure  Samples containing fluorescein can produce falsely depressed TSH values  If the patient had this procedure,a specimen should be resubmitted post fluorescein clearance            The recommended reference ranges for TSH during pregnancy are as follows:  First trimester 0 1 to 2 5 uIU/mL  Second trimester  0 2 to 3 0 uIU/mL  Third trimester 0 3 to 3 0 uIU/m

## 2018-01-18 NOTE — RESULT NOTES
Verified Results  (1) CBC/PLT/DIFF [id number] 08:39AM [facility] Order Number: [id number]     Test Name Result Flag Reference   WBC COUNT 7 98 Thousand/uL  4 31-10 16   RBC COUNT 5 17 Million/uL H 3 81-5 12   HEMOGLOBIN 15 2 g/dL  11 5-15 4   HEMATOCRIT 47 5 % H 34 8-46  1   MCV 92 fL  82-98   MCH 29 4 pg  26 8-34 3   MCHC 32 0 g/dL  31 4-37 4   RDW 13 5 %  11 6-15 1   MPV 9 6 fL  8 9-12 7   PLATELET COUNT 251 Thousands/uL  149-390   NEUTROPHILS RELATIVE PERCENT 66 %  43-75   LYMPHOCYTES RELATIVE PERCENT 21 %  14-44   MONOCYTES RELATIVE PERCENT 10 %  4-12   EOSINOPHILS RELATIVE PERCENT 3 %  0-6   BASOPHILS RELATIVE PERCENT 0 %  0-1   NEUTROPHILS ABSOLUTE COUNT 5 24 Thousands/? ??L  1 85-7 62   LYMPHOCYTES ABSOLUTE COUNT 1 66 Thousands/? ??L  0 60-4 47   MONOCYTES ABSOLUTE COUNT 0 78 Thousand/? ??L  0 17-1 22   EOSINOPHILS ABSOLUTE COUNT 0 27 Thousand/? ??L  0 00-0 61   BASOPHILS ABSOLUTE COUNT 0 03 Thousands/? ??L  0 00-0 10   - Patient Instructions: This bloodwork is non-fasting  Please drink two glasses of water morning of bloodwork       (1) COMPREHENSIVE METABOLIC PANEL [id number] 43:62BT [facility] Order Number: [id number]     Test Name Result Flag Reference   SODIUM 142 mmol/L  136-145   POTASSIUM 4 0 mmol/L  3 5-5 3   CHLORIDE 105 mmol/L  100-108   CARBON DIOXIDE 29 mmol/L  21-32   ANION GAP (CALC) 8 mmol/L  4-13   BLOOD UREA NITROGEN 23 mg/dL  5-25   CREATININE 0 90 mg/dL  0 60-1 30   Standardized to IDMS reference method   CALCIUM 9 4 mg/dL  8 3-10 1   BILI, TOTAL 0 40 mg/dL  0 20-1 00   ALK PHOSPHATAS 50 U/L     ALT (SGPT) 30 U/L  12-78   AST(SGOT) 34 U/L  5-45   ALBUMIN 3 9 g/dL  3 5-5 0   TOTAL PROTEIN 6 9 g/dL  6 4-8 2   eGFR Non-African American      >60 0 ml/min/1 73sq m   [facility] Disease Education Program recommendations are as follows:  GFR calculation is accurate only with a steady state creatinine  Chronic Kidney disease less than 60 ml/min/1 73 sq  meters  Kidney failure less than 15 ml/min/1 73 sq  meters  GLUCOSE FASTING 91 mg/dL  65-99     (1) LIPID PANEL, FASTING [id number] 08:39AM [facility] Order Number: [id number]     Test Name Result Flag Reference   CHOLESTEROL 189 mg/dL     HDL,DIRECT 66 mg/dL H 40-60   Specimen collection should occur prior to Metamizole administration due to the potential for falsely depressed results  LDL CHOLESTEROL CALCULATED 106 mg/dL H 0-100   - Patient Instructions: This is a fasting blood test  Water,black tea or black  coffee only after 9:00pm the night before test   Drink 2 glasses of water the morning of test       Triglyceride:         Normal              <150 mg/dl       Borderline High    150-199 mg/dl       High               200-499 mg/dl       Very High          >499 mg/dl  Cholesterol:         Desirable        <200 mg/dl      Borderline High  200-239 mg/dl      High             >239 mg/dl  HDL Cholesterol:        High    >59 mg/dL      Low     <41 mg/dL  LDL CALCULATED:    This screening LDL is a calculated result  It does not have the accuracy of the Direct Measured LDL in the monitoring of patients with hyperlipidemia and/or statin therapy  Direct Measure LDL (SWS032) must be ordered separately in these patients  TRIGLYCERIDES 86 mg/dL  <=150   Specimen collection should occur prior to N-Acetylcysteine or Metamizole administration due to the potential for falsely depressed results  (1) TSH [id number] 08:39AM [facility] Order Number: [id number]     Test Name Result Flag Reference   TSH 3 382 uIU/mL  0 358-3 740   - Patient Instructions: This bloodwork is non-fasting  Please drink two glasses of water morning of bloodwork  Patients undergoing fluorescein dye angiography may retain small amounts of fluorescein in the body for 48-72 hours post procedure  Samples containing fluorescein can produce falsely depressed TSH values   If the patient had this procedure,a specimen should be resubmitted post fluorescein clearance            The recommended reference ranges for TSH during pregnancy are as follows:  First trimester 0 1 to 2 5 uIU/mL  Second trimester  0 2 to 3 0 uIU/mL  Third trimester 0 3 to 3 0 uIU/m

## 2018-01-22 VITALS
WEIGHT: 126.5 LBS | TEMPERATURE: 93.1 F | HEART RATE: 81 BPM | SYSTOLIC BLOOD PRESSURE: 124 MMHG | BODY MASS INDEX: 22.41 KG/M2 | DIASTOLIC BLOOD PRESSURE: 76 MMHG | HEIGHT: 63 IN | OXYGEN SATURATION: 97 %

## 2018-03-09 DIAGNOSIS — E78.5 HYPERLIPIDEMIA, UNSPECIFIED HYPERLIPIDEMIA TYPE: Primary | ICD-10-CM

## 2018-03-09 RX ORDER — FENOFIBRATE 134 MG/1
134 CAPSULE ORAL
Qty: 90 CAPSULE | Refills: 3 | Status: SHIPPED | OUTPATIENT
Start: 2018-03-09 | End: 2019-03-18 | Stop reason: SDUPTHER

## 2018-05-09 DIAGNOSIS — K21.9 GASTROESOPHAGEAL REFLUX DISEASE, ESOPHAGITIS PRESENCE NOT SPECIFIED: Primary | ICD-10-CM

## 2018-05-09 RX ORDER — OMEPRAZOLE 20 MG/1
1 CAPSULE, DELAYED RELEASE ORAL DAILY
COMMUNITY
End: 2018-05-09 | Stop reason: SDUPTHER

## 2018-05-09 RX ORDER — OMEPRAZOLE 20 MG/1
20 CAPSULE, DELAYED RELEASE ORAL DAILY
Qty: 90 CAPSULE | Refills: 3 | Status: SHIPPED | OUTPATIENT
Start: 2018-05-09 | End: 2019-05-02 | Stop reason: SDUPTHER

## 2018-06-10 ENCOUNTER — APPOINTMENT (OUTPATIENT)
Dept: LAB | Facility: HOSPITAL | Age: 78
End: 2018-06-10
Attending: INTERNAL MEDICINE
Payer: MEDICARE

## 2018-06-10 ENCOUNTER — TRANSCRIBE ORDERS (OUTPATIENT)
Dept: ADMINISTRATIVE | Facility: HOSPITAL | Age: 78
End: 2018-06-10

## 2018-06-10 DIAGNOSIS — I10 ESSENTIAL (PRIMARY) HYPERTENSION: ICD-10-CM

## 2018-06-10 DIAGNOSIS — M85.80 OTHER SPECIFIED DISORDERS OF BONE DENSITY AND STRUCTURE, UNSPECIFIED SITE (CODE): ICD-10-CM

## 2018-06-10 DIAGNOSIS — E78.5 HYPERLIPIDEMIA: ICD-10-CM

## 2018-06-10 DIAGNOSIS — K21.9 GASTRO-ESOPHAGEAL REFLUX DISEASE WITHOUT ESOPHAGITIS: ICD-10-CM

## 2018-06-10 DIAGNOSIS — I25.10 ATHEROSCLEROTIC HEART DISEASE OF NATIVE CORONARY ARTERY WITHOUT ANGINA PECTORIS: ICD-10-CM

## 2018-06-10 LAB
25(OH)D3 SERPL-MCNC: 36.4 NG/ML (ref 30–100)
ALBUMIN SERPL BCP-MCNC: 3.6 G/DL (ref 3.5–5)
ALP SERPL-CCNC: 54 U/L (ref 46–116)
ALT SERPL W P-5'-P-CCNC: 35 U/L (ref 12–78)
ANION GAP SERPL CALCULATED.3IONS-SCNC: 6 MMOL/L (ref 4–13)
AST SERPL W P-5'-P-CCNC: 33 U/L (ref 5–45)
BASOPHILS # BLD AUTO: 0.04 THOUSANDS/ΜL (ref 0–0.1)
BASOPHILS NFR BLD AUTO: 1 % (ref 0–1)
BILIRUB SERPL-MCNC: 0.4 MG/DL (ref 0.2–1)
BUN SERPL-MCNC: 21 MG/DL (ref 5–25)
CALCIUM SERPL-MCNC: 9 MG/DL (ref 8.3–10.1)
CHLORIDE SERPL-SCNC: 103 MMOL/L (ref 100–108)
CO2 SERPL-SCNC: 30 MMOL/L (ref 21–32)
CREAT SERPL-MCNC: 0.93 MG/DL (ref 0.6–1.3)
EOSINOPHIL # BLD AUTO: 0.18 THOUSAND/ΜL (ref 0–0.61)
EOSINOPHIL NFR BLD AUTO: 3 % (ref 0–6)
ERYTHROCYTE [DISTWIDTH] IN BLOOD BY AUTOMATED COUNT: 13.8 % (ref 11.6–15.1)
GFR SERPL CREATININE-BSD FRML MDRD: 60 ML/MIN/1.73SQ M
GLUCOSE P FAST SERPL-MCNC: 82 MG/DL (ref 65–99)
HCT VFR BLD AUTO: 45.5 % (ref 34.8–46.1)
HGB BLD-MCNC: 15 G/DL (ref 11.5–15.4)
LDLC SERPL DIRECT ASSAY-MCNC: 112 MG/DL (ref 0–100)
LYMPHOCYTES # BLD AUTO: 1.53 THOUSANDS/ΜL (ref 0.6–4.47)
LYMPHOCYTES NFR BLD AUTO: 27 % (ref 14–44)
MCH RBC QN AUTO: 29.5 PG (ref 26.8–34.3)
MCHC RBC AUTO-ENTMCNC: 33 G/DL (ref 31.4–37.4)
MCV RBC AUTO: 90 FL (ref 82–98)
MONOCYTES # BLD AUTO: 0.67 THOUSAND/ΜL (ref 0.17–1.22)
MONOCYTES NFR BLD AUTO: 12 % (ref 4–12)
NEUTROPHILS # BLD AUTO: 3.31 THOUSANDS/ΜL (ref 1.85–7.62)
NEUTS SEG NFR BLD AUTO: 58 % (ref 43–75)
PLATELET # BLD AUTO: 268 THOUSANDS/UL (ref 149–390)
PMV BLD AUTO: 9.8 FL (ref 8.9–12.7)
POTASSIUM SERPL-SCNC: 4.1 MMOL/L (ref 3.5–5.3)
PROT SERPL-MCNC: 6.7 G/DL (ref 6.4–8.2)
RBC # BLD AUTO: 5.08 MILLION/UL (ref 3.81–5.12)
SODIUM SERPL-SCNC: 139 MMOL/L (ref 136–145)
WBC # BLD AUTO: 5.73 THOUSAND/UL (ref 4.31–10.16)

## 2018-06-10 PROCEDURE — 36415 COLL VENOUS BLD VENIPUNCTURE: CPT

## 2018-06-10 PROCEDURE — 85025 COMPLETE CBC W/AUTO DIFF WBC: CPT

## 2018-06-10 PROCEDURE — 83721 ASSAY OF BLOOD LIPOPROTEIN: CPT

## 2018-06-10 PROCEDURE — 82306 VITAMIN D 25 HYDROXY: CPT

## 2018-06-10 PROCEDURE — 80053 COMPREHEN METABOLIC PANEL: CPT

## 2018-06-12 ENCOUNTER — OFFICE VISIT (OUTPATIENT)
Dept: INTERNAL MEDICINE CLINIC | Facility: CLINIC | Age: 78
End: 2018-06-12
Payer: MEDICARE

## 2018-06-12 VITALS
HEART RATE: 75 BPM | OXYGEN SATURATION: 97 % | HEIGHT: 63 IN | TEMPERATURE: 97.5 F | BODY MASS INDEX: 23.23 KG/M2 | WEIGHT: 131.13 LBS

## 2018-06-12 DIAGNOSIS — Z00.00 MEDICARE ANNUAL WELLNESS VISIT, SUBSEQUENT: Primary | ICD-10-CM

## 2018-06-12 DIAGNOSIS — Z12.39 SCREENING FOR MALIGNANT NEOPLASM OF BREAST: ICD-10-CM

## 2018-06-12 DIAGNOSIS — Z13.820 SCREENING FOR OSTEOPOROSIS: ICD-10-CM

## 2018-06-12 PROCEDURE — G0439 PPPS, SUBSEQ VISIT: HCPCS | Performed by: INTERNAL MEDICINE

## 2018-06-12 NOTE — PROGRESS NOTES
Assessment and Plan:    Problem List Items Addressed This Visit     Medicare annual wellness visit, subsequent      Other Visit Diagnoses     Screening for osteoporosis    -  Primary    Relevant Orders    DXA bone density spine hip and pelvis    Screening for malignant neoplasm of breast          Labs reviewed with patient, no changes  Continue present rx  Increase hydration especially when outdoors  rto 6 month  Health Maintenance Due   Topic Date Due    PNEUMOCOCCAL POLYSACCHARIDE VACCINE AGE 72 AND OVER  01/26/2007    Annual Wellnes Visit (AWV)  01/23/2018    DXA SCAN  05/18/2018         HPI:  Azra East is a 70 y o  female here for her Subsequent Wellness Visit  Pt doing well  No chest pain or sob  No falls      Patient Active Problem List   Diagnosis    Arteriosclerotic heart disease    Esophageal reflux    Hyperlipidemia    Hypertension    Nontoxic single thyroid nodule    Osteopenia    Medicare annual wellness visit, subsequent     Past Medical History:   Diagnosis Date    Arteriosclerotic heart disease     LAST ASSESSED: [id number]    Esophageal reflux     LAST ASSESSED: [id number]    GERD (gastroesophageal reflux disease)     Hyperlipidemia     LAST ASSESSED: [id number]    Hypertension     LAST ASSESSED: [id number]    Nontoxic single thyroid nodule     LAST ASSESSED: [id number]    Osteopenia     LAST ASSESSED: [id number]     Past Surgical History:   Procedure Laterality Date    CYSTOSCOPY  12/04/2013    DIAGNOSTIC    ESOPHAGOGASTRODUODENOSCOPY      MASTECTOMY Bilateral     cancer    OOPHORECTOMY      SALPINGOOPHORECTOMY Bilateral      Family History   Problem Relation Age of Onset    Alzheimer's disease Mother     Hypertension Father     Stroke Father      SYNDROME    Colon cancer Brother      History   Smoking Status    Never Smoker   Smokeless Tobacco    Never Used     History   Alcohol Use No      History   Drug Use No       Current Outpatient Prescriptions   Medication Sig Dispense Refill    amLODIPine (NORVASC) 2 5 mg tablet Take 2 5 mg by mouth daily      aspirin 81 MG tablet Take 81 mg by mouth daily      atorvastatin (LIPITOR) 20 mg tablet Take 20 mg by mouth daily      Calcium Carbonate-Vit D-Min (CALCIUM 1200 PO) Take 2,400 mg by mouth daily      Cholecalciferol (VITAMIN D) 2000 UNITS tablet Take 2,000 Units by mouth daily      fenofibrate micronized (LOFIBRA) 134 MG capsule Take 1 capsule (134 mg total) by mouth daily with breakfast 90 capsule 3    hyoscyamine (ANASPAZ) 0 125 mg Take 0 125 mg by mouth daily      Multiple Vitamins-Minerals (MULTIVITAMIN WOMEN) TABS Take by mouth      omeprazole (PriLOSEC) 20 mg delayed release capsule Take 1 capsule (20 mg total) by mouth daily 90 capsule 3     No current facility-administered medications for this visit        Allergies   Allergen Reactions    Ciprofloxacin Other (See Comments)     Muscle pain    Doxycycline     Nitrofurantoin      Immunization History   Administered Date(s) Administered    Influenza Split High Dose Preservative Free IM 11/07/2016       Patient Care Team:  Alondra Mendoza DO as PCP - Drake Lipa, DO Deadra Spore, MD Tally Leventhal, MD      Medicare Screening Tests and Risk Assessments:  AWV Clinical     ISAR:   Previous hospitalizations?:  No       Once in a Lifetime Medicare Screening:   EKG performed?:  No    AAA screening performed? (if performed, please add date to Health Maintenance):  No       Medicare Screening Tests and Risk Assessment:   AAA Risk Assessment    None Indicated:  Yes    Osteoporosis Risk Assessment    None indicated:  Yes    HIV Risk Assessment    None indicated:  Yes        Drug and Alcohol Use:   Tobacco use    Cigarettes:  never smoker    Smokeless:  never used smokeless tobacco    Tobacco use duration    Tobacco Cessation Readiness    Alcohol use    Alcohol use:  never    Alcohol Treatment Readiness   Illicit Drug Use    Drug use:  never    Drug type: no sedative use       Diet & Exercise:   Diet   What is your diet?:  Regular   How many servings a day of the following:   Fruits and Vegetables:  3-4 Meat:  1-2   Whole Grains:  2 Simple Carbs:  1   Dairy:  2 Soda:  0   Coffee:  0 Tea:  2   Exercise    Do you currently exercise?:  yes    Frequency:  daily   Times per week:  7     Type of exercise:  walking       Cognitive Impairment Screening:   Depression screening preformed:  Yes Depression screen score:  0    PHQ-9 Depression scale score:  0   Depression screening results:  negative for symptoms   Cognitive Impairment Screening    Do you have difficulty learning or retaining new information?:  No Do you have difficulty handling new tasks?:  No   Do you have difficulty with reasoning?:  No Do you have difficulty with spatial ability and orientation?:  No   Do you have difficulty with language?:  No Do you have difficulty with behavior?:  No       Functional Ability/Level of Safety:   Hearing    Hearing difficulties:  Yes    Left:  normal Right:  slightly decreased   Hearing aid:  No    Hearing Impairment Assessment    Hearing status:  Hearing loss in right ear   Do your family members ever complain that you turn on the radio or [employer]  too loudly?:  Yes Do you find that other people have to repeat themselves when talking to you?:  Yes   Do you have difficulty hearing while talking on the phone?:  Yes Has anyone ever told you that you are speaking too loudly when talking with them?:  No   Do you have trouble hearing the doorbell or phone ringing?:  No Do you have difficulty hearing such that you feel frustrated talking to people?:  No   Do you feel sad because you cannot hear well?:  No Do you feel inconvienced due to your hearing problem?:  No   Do you think you would be a happier person if you could hear better?:  No Would you be willing to go for a hearing aid fitting if suggested?:  Yes   Current Activities    Status:  unlimited ADL's, unlimited driving, unlimited IADL's, unlimited social activities   Help needed with the folllowing:    Using the phone:  No Transportation:  No   Shopping:  No Preparing Meals:  No   Doing Housework:  No Doing Laundry:  No   Managing Medications:  No Managing Money:  No   ADL    Feeding:  Independant   Oral hygiene and Facial grooming:  Independant   Bathing:  Independant   Upper Body Dressing:  Independant   Lower Body Dressing:  Independant   Toileting:  Independant   Bed Mobility:  Independant   Fall Risk   Have you fallen in the last 12 months?:  No Are you unsteady on your feet?:  No    Are you taking any medications that may cause fatigue or dizziness?:  No    Do you rush to the bathroom potentially risking a fall?:  No   Injury History   Polypharmacy:  No Antidepressant Use:  No   Sedative Use:  No Antihypertensive Use:  No   Previous Fall:  No Alcohol Use:  No   Deconditioning:  No Visual Impairment:  No   Cogitive Impairment:  No Mmobility Impairment:  No   Postural Hypotension:  No Urinary Incontinence:  No       Home Safety:   Are there hazards in your environment?:  No   If you fell, would you need help to get back up from the ground?:  No Do you have problems or concerns getting in/out of a bed, chair, tub, or toilet?:  No   Do you feel unsteady when walking?:  No Is your activity limited by pain?:  No   Do you have handrails and grab-bars in the home?:  Yes Are emergency numbers kept by the phone and regularly updated?:  Yes   Are you and/or family members aware of the dangers of smoking in bed?:  Yes    Do you have working smoke alarms and fire extinguisher?:  Yes Do all household members know how to use them?:  Yes   Have you left the stove on unsupervised?:  No    Home Safety Risk Factors   Unfamilar with surroundings:  No Uneven floors:  No   Stairs or handrail saftey risk:  No Loose rugs:  No   Household clutter:  No Poor household lighting:  No   No grab bars in bathroom:  No Further evaluation needed:  No Advanced Directives:   Advanced Directives    Living Will:  Yes Durable POA for healthcare: Yes   Advanced directive:  Yes    Patient's End of Life Decisions        Urinary Incontinence:   Do you have urinary incontinence?:  No Do you have incomplete emptying?:  No   Do you urinate frequently?:  No Do you have urinary urgency?:  No   Do you have urinary hesitancy?:  No Do you have dysuria (painful and/or difficult urination)?:  No   Do you have nocturia (waking up to urinate)?:  No Do you strain when urinating (have to push to urinate)?:  No   Do you have a weak stream when urinating?:  No Do you have intermittent streaming when urinating?:  No   Do you dribble urine after finishing?:  No    Do you have vaginal pressure?:  No Do you have vaginal dryness?:  No       Glaucoma:            Provider Screening     Preventative Screening/Counseling:   Cardiovascular Screening/Counseling:   (Labs Q5 years, EKG optional one-time)    Counseling:  Healthy Diet, Healthy Weight          Diabetes Screening/Counseling:   (2 tests/year if Pre-Diabetes or 1 test/year if no Diabetes)    Counseling:  Healthy Diet, Healthy Weight          Colorectal Cancer Screening/Counseling:   (FOBT Q1 yr; Flex Sig Q4 yrs or Q10 yrs after Screening Colonoscopy; Screening Colonoscpy Q2 yrs High Risk or Q10 yrs Low Risk; Barium Enema Q2 yrs High Risk or Q4 yrs Low Risk)   General:  Screening Not Indicated Counseling:  high fiber diet          Prostate Cancer Screening/Counseling:   (Annual)          Breast Cancer Screening/Counseling:   (Baseline Age 28 - 43; Annual Age 36+)   General:  Patient Declines          Cervical Cancer Screening/Counseling:   (Annual for High Risk or Childbearing Age with Abnormal Pap in Last 3 yrs;  Every 2 all others)    Due for: Studies:  Cervical Pap Smear, Vaginal Pap Smear          Osteoporosis Screening/Counseling:   (Every 2 Yrs if at risk or more if medically necessary)    Counseling:  Calcium and Vitamin D Intake, Regular Weightbearing Exercise Due for: Studies:  DXA Axial         AAA Screening/Counseling:   (Once per Lifetime with risk factors)    General:  Screening Not Indicated           Glaucoma Screening/Counseling:   (Annual)   General:  Screening Current          HIV Screening/Counseling:   (Voluntary;  Once annually for high risk OR 3 times for Pregnancy at diagnosis of IUP; 3rd trimester; and at Labor   General:  Screening Not Indicated           Hepatitis C Screening:             Immunizations:   Influenza (annual):  Patient Declines   Pneumococcal (Once in a Lifetime):  Patient Declines   Hepatitis B Series (low risk patients):  Series Not Indicated   Zostavax (Medicare D Coverage, Pt >64 yo):  Patient Declines   TD (Non-Medicare Wellness  Visit required):  Vaccine Status Unknown   Tdap (Non-Medicare Wellness Visit required):  Vaccine Status Unknown       Other Preventative Couseling (Non-Medicare Wellness Visit Required):   Skin self-exam counseling given, Increased physical activity counseling given       Referrals (Non-Medicare Wellness Visit Required):       Medical Equipment/Suppliers:

## 2018-06-18 DIAGNOSIS — K58.9 IRRITABLE BOWEL SYNDROME, UNSPECIFIED TYPE: Primary | ICD-10-CM

## 2018-06-18 RX ORDER — HYOSCYAMINE SULFATE 0.125 MG
0.12 TABLET,DISINTEGRATING ORAL DAILY
Qty: 90 TABLET | Refills: 0 | Status: SHIPPED | OUTPATIENT
Start: 2018-06-18 | End: 2018-09-17 | Stop reason: SDUPTHER

## 2018-07-24 ENCOUNTER — HOSPITAL ENCOUNTER (OUTPATIENT)
Dept: BONE DENSITY | Facility: HOSPITAL | Age: 78
Discharge: HOME | End: 2018-07-24
Attending: INTERNAL MEDICINE
Payer: MEDICARE

## 2018-07-24 DIAGNOSIS — Z13.820 SCREENING FOR OSTEOPOROSIS: ICD-10-CM

## 2018-07-24 PROCEDURE — 77080 DXA BONE DENSITY AXIAL: CPT

## 2018-07-27 DIAGNOSIS — E78.5 HYPERLIPIDEMIA, UNSPECIFIED HYPERLIPIDEMIA TYPE: Primary | ICD-10-CM

## 2018-07-27 RX ORDER — ATORVASTATIN CALCIUM 20 MG/1
20 TABLET, FILM COATED ORAL DAILY
Qty: 90 TABLET | Refills: 3 | Status: SHIPPED | OUTPATIENT
Start: 2018-07-27 | End: 2019-07-29 | Stop reason: SDUPTHER

## 2018-08-07 DIAGNOSIS — I10 HYPERTENSION, UNSPECIFIED TYPE: Primary | ICD-10-CM

## 2018-08-07 RX ORDER — AMLODIPINE BESYLATE 2.5 MG/1
2.5 TABLET ORAL DAILY
Qty: 90 TABLET | Refills: 3 | Status: SHIPPED | OUTPATIENT
Start: 2018-08-07 | End: 2019-07-29 | Stop reason: SDUPTHER

## 2018-08-13 ENCOUNTER — TELEPHONE (OUTPATIENT)
Dept: INTERNAL MEDICINE CLINIC | Facility: CLINIC | Age: 78
End: 2018-08-13

## 2018-08-13 ENCOUNTER — OFFICE VISIT (OUTPATIENT)
Dept: INTERNAL MEDICINE CLINIC | Facility: CLINIC | Age: 78
End: 2018-08-13
Payer: MEDICARE

## 2018-08-13 VITALS
BODY MASS INDEX: 22.57 KG/M2 | TEMPERATURE: 99.1 F | HEART RATE: 83 BPM | HEIGHT: 63 IN | WEIGHT: 127.4 LBS | OXYGEN SATURATION: 97 %

## 2018-08-13 DIAGNOSIS — R19.5 MUCOUS IN STOOLS: ICD-10-CM

## 2018-08-13 DIAGNOSIS — R14.0 ABDOMINAL DISTENSION: Primary | ICD-10-CM

## 2018-08-13 PROCEDURE — 99214 OFFICE O/P EST MOD 30 MIN: CPT | Performed by: INTERNAL MEDICINE

## 2018-08-13 RX ORDER — DICYCLOMINE HYDROCHLORIDE 10 MG/1
10 CAPSULE ORAL
Qty: 20 CAPSULE | Refills: 0 | Status: SHIPPED | OUTPATIENT
Start: 2018-08-13 | End: 2018-12-21 | Stop reason: ALTCHOICE

## 2018-08-13 RX ORDER — AMOXICILLIN AND CLAVULANATE POTASSIUM 875; 125 MG/1; MG/1
1 TABLET, FILM COATED ORAL 2 TIMES DAILY
Qty: 14 TABLET | Refills: 0 | Status: SHIPPED | OUTPATIENT
Start: 2018-08-13 | End: 2018-08-20

## 2018-08-13 NOTE — PROGRESS NOTES
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 and would not want him to be alone               Patient ID: Radha Lopez is a 70 y o  female  HPI   Pt has abdominal distension and discomfort with mucous in stools  No blood  Last normal BM was Saturday  She is eating ok  No n/v  No diarrhea  Tolerating liquids  She said hx of diverticulitis years ago  No fever or chills  Feels lower abdominal pressure  No dysuria  No hematuria or melena  She notes mucous in most stools  No travel or change in diet  Pain is interfering with sleep  She is belching and passing more flatus    Review of Systems   Constitutional: Negative  HENT: Negative  Eyes: Negative  Respiratory: Negative  Cardiovascular: Negative  Gastrointestinal: Positive for abdominal distention and abdominal pain  Negative for anal bleeding, blood in stool, constipation, diarrhea and nausea  Endocrine: Negative  Genitourinary: Negative for difficulty urinating and dysuria  Musculoskeletal: Positive for arthralgias  Skin: Negative  Allergic/Immunologic: Negative  Neurological: Negative  Hematological: Negative  Psychiatric/Behavioral: Negative        Past Medical History:   Diagnosis Date    Arteriosclerotic heart disease     LAST ASSESSED: [id number]    Esophageal reflux     LAST ASSESSED: [id number]    GERD (gastroesophageal reflux disease)     Hyperlipidemia     LAST ASSESSED: [id number]    Hypertension     LAST ASSESSED: [id number]    Nontoxic single thyroid nodule     LAST ASSESSED: 07HXE0904    Osteopenia     LAST ASSESSED: [id number]     Past Surgical History:   Procedure Laterality Date    CYSTOSCOPY  12/04/2013    DIAGNOSTIC    ESOPHAGOGASTRODUODENOSCOPY      MASTECTOMY Bilateral     cancer    OOPHORECTOMY      SALPINGOOPHORECTOMY Bilateral      Social History     Social History    Marital status:      Spouse name: N/A    Number of children: N/A    Years of education: N/A     Occupational History    Not on file  Social History Main Topics    Smoking status: Never Smoker    Smokeless tobacco: Never Used    Alcohol use No    Drug use: No    Sexual activity: Not on file     Other Topics Concern    Not on file     Social History Narrative    Always uses seat belt    Dental care, regularly         Allergies   Allergen Reactions    Ciprofloxacin Other (See Comments)     Muscle pain    Doxycycline     Nitrofurantoin        Vitals:    08/13/18 1538   Pulse: 83   Temp: 99 1 °F (37 3 °C)   TempSrc: Tympanic   SpO2: 97%   Weight: 57 8 kg (127 lb 6 4 oz)   Height: 5' 3" (1 6 m)   /70           Physical Exam   Constitutional: She is oriented to person, place, and time  She appears well-developed and well-nourished  No distress  HENT:   Head: Normocephalic and atraumatic  Mouth/Throat: No oropharyngeal exudate  Eyes: Conjunctivae and EOM are normal  Pupils are equal, round, and reactive to light  No scleral icterus  Neck: Normal range of motion  Neck supple  No JVD present  Cardiovascular: Normal rate and regular rhythm  Pulmonary/Chest: Effort normal and breath sounds normal    Abdominal: Soft  She exhibits distension  She exhibits no mass  There is tenderness  There is no rebound and no guarding  Hypoactive bowels sounds thruout   Musculoskeletal: Normal range of motion  She exhibits no edema, tenderness or deformity     Lymphadenopathy:     She has no cervical adenopathy  Neurological: She is alert and oriented to person, place, and time  She has normal reflexes  She displays normal reflexes  No cranial nerve deficit  She exhibits normal muscle tone  Coordination normal    Skin: Skin is warm and dry  She is not diaphoretic  Psychiatric: She has a normal mood and affect  Her behavior is normal  Judgment and thought content normal    Nursing note and vitals reviewed

## 2018-08-14 ENCOUNTER — HOSPITAL ENCOUNTER (OUTPATIENT)
Dept: CT IMAGING | Facility: HOSPITAL | Age: 78
Discharge: HOME | End: 2018-08-14
Attending: INTERNAL MEDICINE
Payer: MEDICARE

## 2018-08-14 ENCOUNTER — APPOINTMENT (OUTPATIENT)
Dept: LAB | Facility: HOSPITAL | Age: 78
End: 2018-08-14
Attending: INTERNAL MEDICINE
Payer: MEDICARE

## 2018-08-14 DIAGNOSIS — R19.5 MUCOUS IN STOOLS: ICD-10-CM

## 2018-08-14 DIAGNOSIS — R14.0 ABDOMINAL DISTENSION: ICD-10-CM

## 2018-08-14 LAB
ANION GAP SERPL CALCULATED.3IONS-SCNC: 7 MMOL/L (ref 4–13)
BACTERIA UR QL AUTO: ABNORMAL /HPF
BASOPHILS # BLD AUTO: 0.03 THOUSANDS/ΜL (ref 0–0.1)
BASOPHILS NFR BLD AUTO: 0 % (ref 0–1)
BILIRUB UR QL STRIP: NEGATIVE
BUN SERPL-MCNC: 18 MG/DL (ref 5–25)
CALCIUM SERPL-MCNC: 9.6 MG/DL (ref 8.3–10.1)
CHLORIDE SERPL-SCNC: 103 MMOL/L (ref 100–108)
CLARITY UR: CLEAR
CO2 SERPL-SCNC: 29 MMOL/L (ref 21–32)
COLOR UR: YELLOW
CREAT SERPL-MCNC: 1.04 MG/DL (ref 0.6–1.3)
EOSINOPHIL # BLD AUTO: 0.17 THOUSAND/ΜL (ref 0–0.61)
EOSINOPHIL NFR BLD AUTO: 2 % (ref 0–6)
ERYTHROCYTE [DISTWIDTH] IN BLOOD BY AUTOMATED COUNT: 13.2 % (ref 11.6–15.1)
GFR SERPL CREATININE-BSD FRML MDRD: 52 ML/MIN/1.73SQ M
GLUCOSE P FAST SERPL-MCNC: 95 MG/DL (ref 65–99)
GLUCOSE UR STRIP-MCNC: NEGATIVE MG/DL
HCT VFR BLD AUTO: 46.9 % (ref 34.8–46.1)
HGB BLD-MCNC: 14.9 G/DL (ref 11.5–15.4)
HGB UR QL STRIP.AUTO: ABNORMAL
IMM GRANULOCYTES # BLD AUTO: 0.05 THOUSAND/UL (ref 0–0.2)
IMM GRANULOCYTES NFR BLD AUTO: 1 % (ref 0–2)
KETONES UR STRIP-MCNC: ABNORMAL MG/DL
LEUKOCYTE ESTERASE UR QL STRIP: ABNORMAL
LYMPHOCYTES # BLD AUTO: 1.67 THOUSANDS/ΜL (ref 0.6–4.47)
LYMPHOCYTES NFR BLD AUTO: 18 % (ref 14–44)
MCH RBC QN AUTO: 28.3 PG (ref 26.8–34.3)
MCHC RBC AUTO-ENTMCNC: 31.8 G/DL (ref 31.4–37.4)
MCV RBC AUTO: 89 FL (ref 82–98)
MONOCYTES # BLD AUTO: 0.89 THOUSAND/ΜL (ref 0.17–1.22)
MONOCYTES NFR BLD AUTO: 10 % (ref 4–12)
NEUTROPHILS # BLD AUTO: 6.25 THOUSANDS/ΜL (ref 1.85–7.62)
NEUTS SEG NFR BLD AUTO: 69 % (ref 43–75)
NITRITE UR QL STRIP: NEGATIVE
NON-SQ EPI CELLS URNS QL MICRO: ABNORMAL /HPF
NRBC BLD AUTO-RTO: 0 /100 WBCS
PH UR STRIP.AUTO: 5.5 [PH] (ref 4.5–8)
PLATELET # BLD AUTO: 280 THOUSANDS/UL (ref 149–390)
PMV BLD AUTO: 9.1 FL (ref 8.9–12.7)
POTASSIUM SERPL-SCNC: 4.2 MMOL/L (ref 3.5–5.3)
PROT UR STRIP-MCNC: NEGATIVE MG/DL
RBC # BLD AUTO: 5.26 MILLION/UL (ref 3.81–5.12)
RBC #/AREA URNS AUTO: ABNORMAL /HPF
SODIUM SERPL-SCNC: 139 MMOL/L (ref 136–145)
SP GR UR STRIP.AUTO: 1.02 (ref 1–1.03)
UROBILINOGEN UR QL STRIP.AUTO: 0.2 E.U./DL
WBC # BLD AUTO: 9.06 THOUSAND/UL (ref 4.31–10.16)
WBC #/AREA URNS AUTO: ABNORMAL /HPF

## 2018-08-14 PROCEDURE — 85025 COMPLETE CBC W/AUTO DIFF WBC: CPT

## 2018-08-14 PROCEDURE — 80048 BASIC METABOLIC PNL TOTAL CA: CPT

## 2018-08-14 PROCEDURE — 36415 COLL VENOUS BLD VENIPUNCTURE: CPT

## 2018-08-14 PROCEDURE — 81001 URINALYSIS AUTO W/SCOPE: CPT

## 2018-08-14 PROCEDURE — 74176 CT ABD & PELVIS W/O CONTRAST: CPT

## 2018-08-16 ENCOUNTER — TELEPHONE (OUTPATIENT)
Dept: INTERNAL MEDICINE CLINIC | Facility: CLINIC | Age: 78
End: 2018-08-16

## 2018-09-17 DIAGNOSIS — K58.9 IRRITABLE BOWEL SYNDROME, UNSPECIFIED TYPE: ICD-10-CM

## 2018-09-17 RX ORDER — HYOSCYAMINE SULFATE 0.125 MG
0.12 TABLET,DISINTEGRATING ORAL DAILY
Qty: 90 TABLET | Refills: 3 | Status: SHIPPED | OUTPATIENT
Start: 2018-09-17 | End: 2020-09-24 | Stop reason: SDUPTHER

## 2018-09-18 DIAGNOSIS — K58.9 IRRITABLE BOWEL SYNDROME, UNSPECIFIED TYPE: ICD-10-CM

## 2018-09-18 RX ORDER — HYOSCYAMINE SULFATE 0.125 MG
TABLET ORAL
Qty: 90 TABLET | Refills: 0 | Status: SHIPPED | OUTPATIENT
Start: 2018-09-18 | End: 2018-09-19 | Stop reason: SDUPTHER

## 2018-09-19 DIAGNOSIS — K58.9 IRRITABLE BOWEL SYNDROME, UNSPECIFIED TYPE: ICD-10-CM

## 2018-09-19 RX ORDER — HYOSCYAMINE SULFATE 0.125 MG
125 TABLET ORAL DAILY
Qty: 90 TABLET | Refills: 3 | Status: SHIPPED | OUTPATIENT
Start: 2018-09-19 | End: 2018-12-21 | Stop reason: SDUPTHER

## 2018-09-19 NOTE — TELEPHONE ENCOUNTER
Pt states that the pharmacy never received the rx yesterday so I called the pharmacy and they confirmed that they did not receive the rx  I recent it

## 2018-12-21 ENCOUNTER — OFFICE VISIT (OUTPATIENT)
Dept: INTERNAL MEDICINE CLINIC | Facility: CLINIC | Age: 78
End: 2018-12-21
Payer: MEDICARE

## 2018-12-21 VITALS
TEMPERATURE: 98.4 F | SYSTOLIC BLOOD PRESSURE: 124 MMHG | HEIGHT: 63 IN | HEART RATE: 80 BPM | OXYGEN SATURATION: 99 % | DIASTOLIC BLOOD PRESSURE: 72 MMHG | WEIGHT: 125.5 LBS | BODY MASS INDEX: 22.24 KG/M2

## 2018-12-21 DIAGNOSIS — K21.9 GASTROESOPHAGEAL REFLUX DISEASE, ESOPHAGITIS PRESENCE NOT SPECIFIED: ICD-10-CM

## 2018-12-21 DIAGNOSIS — I25.10 ARTERIOSCLEROTIC HEART DISEASE: ICD-10-CM

## 2018-12-21 DIAGNOSIS — I10 ESSENTIAL HYPERTENSION: Primary | ICD-10-CM

## 2018-12-21 DIAGNOSIS — E55.9 VITAMIN D DEFICIENCY: ICD-10-CM

## 2018-12-21 PROCEDURE — 99214 OFFICE O/P EST MOD 30 MIN: CPT | Performed by: INTERNAL MEDICINE

## 2018-12-21 RX ORDER — AMLODIPINE BESYLATE 2.5 MG/1
TABLET ORAL
COMMUNITY
Start: 2018-11-05 | End: 2019-07-29 | Stop reason: SDUPTHER

## 2018-12-21 NOTE — PROGRESS NOTES
Assessment/Plan:         Diagnoses and all orders for this visit:    Essential hypertension  Continue present rx'no added salt diet  Patient is active at home    Arteriosclerotic heart disease  Rx for fbw next visit  Continue present medications, pt stable    Gastroesophageal reflux disease, esophagitis presence not specified  She is fairly well controlled on daily ppi but occasionally has sxs which she feels are diet related  GErd diet     Other orders  -     amLODIPine (NORVASC) 2 5 mg tablet;       Rto 6months with labs     Patient ID: Ronak Chavarria is a 70 y o  female  HPI   Pt feels well  Offers no complaints  No recurrent bowel sxs or pain Bowels are regular without assistance  She is active at home No falls  No chest pain or sob and she does her own yardwork and snow removal   No uri sxs  Vision stable,eye exam utd        Review of Systems   Constitutional: Negative  HENT: Negative  Eyes: Negative  Respiratory: Negative  Cardiovascular: Negative  Gastrointestinal: Negative  Endocrine: Negative  Genitourinary: Negative  Musculoskeletal: Positive for arthralgias  Skin: Negative  Neurological: Negative  Hematological: Negative  Psychiatric/Behavioral: Negative        Past Medical History:   Diagnosis Date    Arteriosclerotic heart disease     LAST ASSESSED: [id number]    Esophageal reflux     LAST ASSESSED: [id number]    GERD (gastroesophageal reflux disease)     Hyperlipidemia     LAST ASSESSED: 10QNH9847    Hypertension     LAST ASSESSED: [id number]    Nontoxic single thyroid nodule     LAST ASSESSED: [id number]    Osteopenia     LAST ASSESSED: [id number]     Past Surgical History:   Procedure Laterality Date    CYSTOSCOPY  12/04/2013    DIAGNOSTIC    ESOPHAGOGASTRODUODENOSCOPY      MASTECTOMY Bilateral     cancer    OOPHORECTOMY      SALPINGOOPHORECTOMY Bilateral      Allergies   Allergen Reactions    Ciprofloxacin Other (See Comments)     Muscle pain    Doxycycline     Nitrofurantoin      Social History     Social History    Marital status:      Spouse name: N/A    Number of children: N/A    Years of education: N/A     Occupational History    Not on file  Social History Main Topics    Smoking status: Never Smoker    Smokeless tobacco: Never Used    Alcohol use No    Drug use: No    Sexual activity: Not on file     Other Topics Concern    Not on file     Social History Narrative    Always uses seat belt    Dental care, regularly             /72   Pulse 80   Temp 98 4 °F (36 9 °C) (Tympanic)   Ht 5' 3" (1 6 m)   Wt 56 9 kg (125 lb 8 oz)   SpO2 99%   BMI 22 23 kg/m²          Physical Exam   Constitutional: She is oriented to person, place, and time  She appears well-developed and well-nourished  No distress  HENT:   Head: Normocephalic and atraumatic  Right Ear: External ear normal    Left Ear: External ear normal    Nose: Nose normal    Mouth/Throat: Oropharynx is clear and moist  No oropharyngeal exudate  Eyes: Pupils are equal, round, and reactive to light  Conjunctivae and EOM are normal  No scleral icterus  Neck: Normal range of motion  Neck supple  No JVD present  Cardiovascular: Normal rate, regular rhythm, normal heart sounds and intact distal pulses  No murmur heard  Pulmonary/Chest: Breath sounds normal  No respiratory distress  She has no wheezes  She has no rales  Abdominal: Soft  Bowel sounds are normal    Musculoskeletal: Normal range of motion  She exhibits tenderness and deformity  She exhibits no edema  Lymphadenopathy:     She has no cervical adenopathy  Neurological: She is alert and oriented to person, place, and time  She displays abnormal reflex  No cranial nerve deficit  She exhibits abnormal muscle tone  Coordination normal    Skin: Skin is warm and dry  She is not diaphoretic  Psychiatric: She has a normal mood and affect   Her behavior is normal  Judgment and thought content normal  Nursing note and vitals reviewed

## 2019-03-18 DIAGNOSIS — E78.5 HYPERLIPIDEMIA, UNSPECIFIED HYPERLIPIDEMIA TYPE: ICD-10-CM

## 2019-03-18 RX ORDER — FENOFIBRATE 134 MG/1
CAPSULE ORAL
Qty: 90 CAPSULE | Refills: 3 | Status: SHIPPED | OUTPATIENT
Start: 2019-03-18 | End: 2020-03-10 | Stop reason: SDUPTHER

## 2019-05-02 DIAGNOSIS — K21.9 GASTROESOPHAGEAL REFLUX DISEASE, ESOPHAGITIS PRESENCE NOT SPECIFIED: ICD-10-CM

## 2019-05-02 RX ORDER — OMEPRAZOLE 20 MG/1
20 CAPSULE, DELAYED RELEASE ORAL DAILY
Qty: 90 CAPSULE | Refills: 3 | Status: SHIPPED | OUTPATIENT
Start: 2019-05-02 | End: 2020-04-20 | Stop reason: SDUPTHER

## 2019-06-18 ENCOUNTER — APPOINTMENT (OUTPATIENT)
Dept: LAB | Facility: HOSPITAL | Age: 79
End: 2019-06-18
Attending: INTERNAL MEDICINE
Payer: MEDICARE

## 2019-06-18 DIAGNOSIS — I25.10 ARTERIOSCLEROTIC HEART DISEASE: ICD-10-CM

## 2019-06-18 DIAGNOSIS — I10 ESSENTIAL HYPERTENSION: ICD-10-CM

## 2019-06-18 DIAGNOSIS — E55.9 VITAMIN D DEFICIENCY: ICD-10-CM

## 2019-06-18 LAB
25(OH)D3 SERPL-MCNC: 37.7 NG/ML (ref 30–100)
ALBUMIN SERPL BCP-MCNC: 3.6 G/DL (ref 3.5–5)
ALP SERPL-CCNC: 54 U/L (ref 46–116)
ALT SERPL W P-5'-P-CCNC: 26 U/L (ref 12–78)
ANION GAP SERPL CALCULATED.3IONS-SCNC: 7 MMOL/L (ref 4–13)
AST SERPL W P-5'-P-CCNC: 31 U/L (ref 5–45)
BILIRUB SERPL-MCNC: 0.6 MG/DL (ref 0.2–1)
BUN SERPL-MCNC: 19 MG/DL (ref 5–25)
CALCIUM SERPL-MCNC: 9.4 MG/DL (ref 8.3–10.1)
CHLORIDE SERPL-SCNC: 105 MMOL/L (ref 100–108)
CHOLEST SERPL-MCNC: 190 MG/DL (ref 50–200)
CO2 SERPL-SCNC: 28 MMOL/L (ref 21–32)
CREAT SERPL-MCNC: 1 MG/DL (ref 0.6–1.3)
GFR SERPL CREATININE-BSD FRML MDRD: 54 ML/MIN/1.73SQ M
GLUCOSE P FAST SERPL-MCNC: 94 MG/DL (ref 65–99)
HDLC SERPL-MCNC: 56 MG/DL (ref 40–60)
LDLC SERPL CALC-MCNC: 116 MG/DL (ref 0–100)
NONHDLC SERPL-MCNC: 134 MG/DL
POTASSIUM SERPL-SCNC: 3.8 MMOL/L (ref 3.5–5.3)
PROT SERPL-MCNC: 7.1 G/DL (ref 6.4–8.2)
SODIUM SERPL-SCNC: 140 MMOL/L (ref 136–145)
TRIGL SERPL-MCNC: 91 MG/DL

## 2019-06-18 PROCEDURE — 82306 VITAMIN D 25 HYDROXY: CPT

## 2019-06-18 PROCEDURE — 80053 COMPREHEN METABOLIC PANEL: CPT

## 2019-06-18 PROCEDURE — 80061 LIPID PANEL: CPT

## 2019-06-18 PROCEDURE — 36415 COLL VENOUS BLD VENIPUNCTURE: CPT

## 2019-06-21 ENCOUNTER — OFFICE VISIT (OUTPATIENT)
Dept: INTERNAL MEDICINE CLINIC | Facility: CLINIC | Age: 79
End: 2019-06-21
Payer: MEDICARE

## 2019-06-21 VITALS
WEIGHT: 126 LBS | OXYGEN SATURATION: 97 % | HEIGHT: 63 IN | DIASTOLIC BLOOD PRESSURE: 74 MMHG | TEMPERATURE: 98 F | HEART RATE: 87 BPM | BODY MASS INDEX: 22.32 KG/M2 | SYSTOLIC BLOOD PRESSURE: 126 MMHG

## 2019-06-21 DIAGNOSIS — Z00.00 MEDICARE ANNUAL WELLNESS VISIT, SUBSEQUENT: Primary | ICD-10-CM

## 2019-06-21 PROCEDURE — G0439 PPPS, SUBSEQ VISIT: HCPCS | Performed by: INTERNAL MEDICINE

## 2019-07-29 DIAGNOSIS — I10 HYPERTENSION, UNSPECIFIED TYPE: ICD-10-CM

## 2019-07-29 DIAGNOSIS — E78.5 HYPERLIPIDEMIA, UNSPECIFIED HYPERLIPIDEMIA TYPE: ICD-10-CM

## 2019-07-29 RX ORDER — AMLODIPINE BESYLATE 2.5 MG/1
2.5 TABLET ORAL DAILY
Qty: 90 TABLET | Refills: 3 | Status: SHIPPED | OUTPATIENT
Start: 2019-07-29 | End: 2020-08-31 | Stop reason: SDUPTHER

## 2019-07-29 RX ORDER — ATORVASTATIN CALCIUM 20 MG/1
20 TABLET, FILM COATED ORAL DAILY
Qty: 90 TABLET | Refills: 3 | Status: SHIPPED | OUTPATIENT
Start: 2019-07-29 | End: 2020-08-24 | Stop reason: SDUPTHER

## 2019-09-23 DIAGNOSIS — K58.9 IRRITABLE BOWEL SYNDROME, UNSPECIFIED TYPE: ICD-10-CM

## 2019-09-23 RX ORDER — HYOSCYAMINE SULFATE 0.125 MG
TABLET ORAL
Qty: 90 TABLET | Refills: 3 | Status: SHIPPED | OUTPATIENT
Start: 2019-09-23 | End: 2020-06-22 | Stop reason: SDUPTHER

## 2019-10-25 ENCOUNTER — APPOINTMENT (OUTPATIENT)
Dept: RADIOLOGY | Facility: MEDICAL CENTER | Age: 79
End: 2019-10-25
Payer: MEDICARE

## 2019-10-25 ENCOUNTER — OFFICE VISIT (OUTPATIENT)
Dept: OBGYN CLINIC | Facility: CLINIC | Age: 79
End: 2019-10-25
Payer: MEDICARE

## 2019-10-25 VITALS
BODY MASS INDEX: 22.63 KG/M2 | SYSTOLIC BLOOD PRESSURE: 146 MMHG | HEIGHT: 62 IN | WEIGHT: 123 LBS | HEART RATE: 86 BPM | DIASTOLIC BLOOD PRESSURE: 78 MMHG

## 2019-10-25 DIAGNOSIS — M17.12 PRIMARY LOCALIZED OSTEOARTHRITIS OF LEFT KNEE: Primary | ICD-10-CM

## 2019-10-25 DIAGNOSIS — M25.562 ACUTE PAIN OF LEFT KNEE: ICD-10-CM

## 2019-10-25 PROCEDURE — 99203 OFFICE O/P NEW LOW 30 MIN: CPT | Performed by: ORTHOPAEDIC SURGERY

## 2019-10-25 PROCEDURE — 20610 DRAIN/INJ JOINT/BURSA W/O US: CPT | Performed by: ORTHOPAEDIC SURGERY

## 2019-10-25 PROCEDURE — 73564 X-RAY EXAM KNEE 4 OR MORE: CPT

## 2019-10-25 RX ORDER — BETAMETHASONE SODIUM PHOSPHATE AND BETAMETHASONE ACETATE 3; 3 MG/ML; MG/ML
6 INJECTION, SUSPENSION INTRA-ARTICULAR; INTRALESIONAL; INTRAMUSCULAR; SOFT TISSUE
Status: COMPLETED | OUTPATIENT
Start: 2019-10-25 | End: 2019-10-25

## 2019-10-25 RX ORDER — LIDOCAINE HYDROCHLORIDE 10 MG/ML
5 INJECTION, SOLUTION INFILTRATION; PERINEURAL
Status: COMPLETED | OUTPATIENT
Start: 2019-10-25 | End: 2019-10-25

## 2019-10-25 RX ADMIN — BETAMETHASONE SODIUM PHOSPHATE AND BETAMETHASONE ACETATE 6 MG: 3; 3 INJECTION, SUSPENSION INTRA-ARTICULAR; INTRALESIONAL; INTRAMUSCULAR; SOFT TISSUE at 09:50

## 2019-10-25 RX ADMIN — LIDOCAINE HYDROCHLORIDE 5 ML: 10 INJECTION, SOLUTION INFILTRATION; PERINEURAL at 09:50

## 2019-10-25 NOTE — PROGRESS NOTES
Assessment:     1  Acute pain of right knee    2  Acute pain of left knee          Plan:     Problem List Items Addressed This Visit        Musculoskeletal and Integument    Primary localized osteoarthritis of left knee - Primary     63-year-old female with moderate to severe osteoarthritis of the left knee with an acute exacerbation  I discussed the diagnosis and reviewed the radiographs with her  We discussed treatment options  I recommended a cortisone injection for her at this time  Please refer to the procedure note  She will follow up with me as needed  Relevant Orders    XR knee 4+ vw left injury           Patient ID: Fior Beebe is a 70 y o  female  Chief Complaint:  Left knee pain    HPI:  63-year-old female here today for evaluation of her left knee  She has been having pain in the knee for approximately a week  She states that it started out anterior medial aspect of the knee but now it is also in the posterior part of the knee  Bothers her with standing or if she twists it  She has trouble going up and down stairs  Walking bothers her the most   She has been using some Aspercreme and taking Tylenol  She had no specific injury  She has not been using ice or heat  Sitting does not bother her  She has not had any injections in the knee  She has not had prior issues with the knee    Patient's medical intake was reviewed        Allergy:  Allergies   Allergen Reactions    Ciprofloxacin Other (See Comments)     Muscle pain    Doxycycline     Nitrofurantoin        Medications:  all current active meds have been reviewed    Past Medical History:  Past Medical History:   Diagnosis Date    Arteriosclerotic heart disease     LAST ASSESSED: 06MNS1251    Esophageal reflux     LAST ASSESSED: [id number]    GERD (gastroesophageal reflux disease)     Hyperlipidemia     LAST ASSESSED: 06QAH9193    Hypertension     LAST ASSESSED: [id number]    Nontoxic single thyroid nodule     LAST ASSESSED: 12CZR0020    Osteopenia     LAST ASSESSED: [id number]       Past Surgical History:  Past Surgical History:   Procedure Laterality Date    CYSTOSCOPY  12/04/2013    DIAGNOSTIC    ESOPHAGOGASTRODUODENOSCOPY      MASTECTOMY Bilateral     cancer    OOPHORECTOMY      SALPINGOOPHORECTOMY Bilateral        Family History:  Family History   Problem Relation Age of Onset    Alzheimer's disease Mother     Hypertension Father     Stroke Father         SYNDROME    Colon cancer Brother        Social History:  Social History     Substance and Sexual Activity   Alcohol Use No     Social History     Substance and Sexual Activity   Drug Use No     Social History     Tobacco Use   Smoking Status Never Smoker   Smokeless Tobacco Never Used           ROS:  Review of Systems   Constitutional: Negative  HENT: Negative  Eyes: Negative  Respiratory: Negative  Cardiovascular: Negative  Gastrointestinal: Negative  Endocrine: Negative  Genitourinary: Negative  Musculoskeletal: Positive for arthralgias  Skin: Negative  Allergic/Immunologic: Negative  Neurological: Negative  Hematological: Negative  Psychiatric/Behavioral: Negative  Objective:  BP Readings from Last 1 Encounters:   10/25/19 146/78      Wt Readings from Last 1 Encounters:   10/25/19 55 8 kg (123 lb)        BMI:   Estimated body mass index is 22 5 kg/m² as calculated from the following:    Height as of this encounter: 5' 2" (1 575 m)  Weight as of this encounter: 55 8 kg (123 lb)  EXAM:   Physical Exam   Constitutional: She appears well-developed and well-nourished  No distress  HENT:   Head: Normocephalic and atraumatic  Eyes: Right eye exhibits no discharge  Left eye exhibits no discharge  Neck: Normal range of motion  Neck supple  No tracheal deviation present  Cardiovascular: Normal rate and regular rhythm  Pulmonary/Chest: Effort normal and breath sounds normal  No respiratory distress   She exhibits no tenderness  Abdominal: Soft  She exhibits no distension  There is no tenderness  Musculoskeletal:        Right knee: She exhibits no effusion  Left knee: She exhibits no effusion  Neurological: She is alert  Skin: Skin is warm and dry  She is not diaphoretic  No erythema  Psychiatric: She has a normal mood and affect  Her behavior is normal    Vitals reviewed  Right Knee Exam   Right knee exam is normal     Muscle Strength   The patient has normal right knee strength  Tenderness   The patient is experiencing no tenderness  Range of Motion   The patient has normal right knee ROM  Tests   Yakov:  Medial - negative Lateral - negative  Varus: negative Valgus: negative  Lachman:  Anterior - negative      Drawer:  Posterior - negative  Pivot shift: negative  Patellar apprehension: negative    Other   Erythema: absent  Sensation: normal  Pulse: present  Swelling: none  Effusion: no effusion present      Left Knee Exam     Muscle Strength   The patient has normal left knee strength  Tenderness   The patient is experiencing tenderness in the medial joint line and patella  Range of Motion   Extension: 5   Flexion: 120     Tests   Yakov:  Medial - negative Lateral - negative  Varus: negative Valgus: negative  Lachman:  Anterior - negative      Drawer:  Posterior - negative  Patellar apprehension: negative    Other   Erythema: absent  Sensation: normal  Pulse: present  Swelling: none  Effusion: no effusion present    Comments:  Crepitus with knee range of motion              Radiographs:  I have personally reviewed pertinent films in PACS and my interpretation is Near complete medial joint space loss with osteophytes the patella and medial compartment, subchondral sclerosis      Large joint arthrocentesis: L knee  Date/Time: 10/25/2019 9:50 AM  Consent given by: patient  Timeout: Immediately prior to procedure a time out was called to verify the correct patient, procedure, equipment, support staff and site/side marked as required   Supporting Documentation  Indications: pain   Procedure Details  Location: knee - L knee  Needle size: 22 G  Ultrasound guidance: no  Approach: superior  Medications administered: 5 mL lidocaine 1 %; 6 mg betamethasone acetate-betamethasone sodium phosphate 6 (3-3) mg/mL    Patient tolerance: patient tolerated the procedure well with no immediate complications  Dressing:  Sterile dressing applied

## 2019-10-25 NOTE — ASSESSMENT & PLAN NOTE
82-year-old female with moderate to severe osteoarthritis of the left knee with an acute exacerbation  I discussed the diagnosis and reviewed the radiographs with her  We discussed treatment options  I recommended a cortisone injection for her at this time  Please refer to the procedure note  She will follow up with me as needed

## 2020-01-24 ENCOUNTER — OFFICE VISIT (OUTPATIENT)
Dept: OBGYN CLINIC | Facility: CLINIC | Age: 80
End: 2020-01-24
Payer: MEDICARE

## 2020-01-24 VITALS
WEIGHT: 123.8 LBS | BODY MASS INDEX: 22.78 KG/M2 | DIASTOLIC BLOOD PRESSURE: 91 MMHG | HEIGHT: 62 IN | SYSTOLIC BLOOD PRESSURE: 170 MMHG | HEART RATE: 82 BPM

## 2020-01-24 DIAGNOSIS — M17.12 PRIMARY LOCALIZED OSTEOARTHRITIS OF LEFT KNEE: Primary | ICD-10-CM

## 2020-01-24 PROCEDURE — 99213 OFFICE O/P EST LOW 20 MIN: CPT | Performed by: ORTHOPAEDIC SURGERY

## 2020-01-24 NOTE — PROGRESS NOTES
Assessment:     1  Primary localized osteoarthritis of left knee          Plan:     Problem List Items Addressed This Visit        Musculoskeletal and Integument    Primary localized osteoarthritis of left knee - Primary     72-year-old female with left knee osteoarthritis now with a Baker cyst   I read discussed the diagnosis with her  We discussed signs and symptoms to monitor and watch for  Her symptoms are already better today and I think she will do very well  Will be happy to see her back in the future if she has any further issues with the cyst or the arthritis  Patient ID: Latosha Neal is a 70 y o  female  Chief Complaint:  Knee mass and pain    Subjective:  72-year-old female with osteoarthritis of her left knee  About three months ago she was given a cortisone injection and has been doing very well following the injection  Yesterday she noticed swelling on the posterior aspect of her knee and had some tingling and swelling of the lower leg  She was worried about a blood clot and came in today for evaluation  Allergy:  Allergies   Allergen Reactions    Ciprofloxacin Other (See Comments)     Muscle pain    Doxycycline     Nitrofurantoin        Medications:  all current active meds have been reviewed    ROS:  Review of Systems   Musculoskeletal: Positive for joint swelling  All other systems reviewed and are negative  Objective:  BP Readings from Last 1 Encounters:   01/24/20 170/91      Wt Readings from Last 1 Encounters:   01/24/20 56 2 kg (123 lb 12 8 oz)        Exam:   Physical Exam  Left Knee Exam     Comments:  Patient's left knee has a well-defined, nontender 7 x 5 cm cyst in the posterior medial aspect of the knee  There is no swelling of the calf or ankle and no tenderness along the calf  The knee is full range of motion with no instability  No effusion of the knee    Flexion and extension of the toes and the ankles intact

## 2020-01-24 NOTE — ASSESSMENT & PLAN NOTE
73-year-old female with left knee osteoarthritis now with a Baker cyst   I read discussed the diagnosis with her  We discussed signs and symptoms to monitor and watch for  Her symptoms are already better today and I think she will do very well  Will be happy to see her back in the future if she has any further issues with the cyst or the arthritis

## 2020-03-10 DIAGNOSIS — E78.5 HYPERLIPIDEMIA, UNSPECIFIED HYPERLIPIDEMIA TYPE: ICD-10-CM

## 2020-03-10 RX ORDER — FENOFIBRATE 134 MG/1
134 CAPSULE ORAL
Qty: 90 CAPSULE | Refills: 3 | Status: SHIPPED | OUTPATIENT
Start: 2020-03-10 | End: 2021-03-29 | Stop reason: SDUPTHER

## 2020-03-19 DIAGNOSIS — J32.9 SINUSITIS, UNSPECIFIED CHRONICITY, UNSPECIFIED LOCATION: Primary | ICD-10-CM

## 2020-03-19 RX ORDER — AMOXICILLIN 500 MG/1
500 CAPSULE ORAL EVERY 8 HOURS SCHEDULED
Qty: 21 CAPSULE | Refills: 0 | Status: SHIPPED | OUTPATIENT
Start: 2020-03-19 | End: 2020-03-26

## 2020-03-19 NOTE — TELEPHONE ENCOUNTER
Pt called with sore throat, heaviness in her chest, No fever, pt hasnt been around anyone sick, no cough, no travel either or around anyone whom has  She has a brother that she cares for in her home and is just worried       Allergies  Ciprofloxacin Other (See Comments) Not Specified  12/16/2016    Muscle pain      Doxycycline  Not Specified  1/23/2017    Nitrofurantoin  Not Specified            [facility]

## 2020-04-20 DIAGNOSIS — K21.9 GASTROESOPHAGEAL REFLUX DISEASE, ESOPHAGITIS PRESENCE NOT SPECIFIED: ICD-10-CM

## 2020-04-20 RX ORDER — OMEPRAZOLE 20 MG/1
20 CAPSULE, DELAYED RELEASE ORAL DAILY
Qty: 90 CAPSULE | Refills: 3 | Status: SHIPPED | OUTPATIENT
Start: 2020-04-20 | End: 2021-05-27 | Stop reason: SDUPTHER

## 2020-06-22 ENCOUNTER — OFFICE VISIT (OUTPATIENT)
Dept: INTERNAL MEDICINE CLINIC | Facility: CLINIC | Age: 80
End: 2020-06-22
Payer: MEDICARE

## 2020-06-22 ENCOUNTER — APPOINTMENT (OUTPATIENT)
Dept: RADIOLOGY | Facility: MEDICAL CENTER | Age: 80
End: 2020-06-22
Payer: MEDICARE

## 2020-06-22 ENCOUNTER — APPOINTMENT (OUTPATIENT)
Dept: LAB | Facility: MEDICAL CENTER | Age: 80
End: 2020-06-22
Payer: MEDICARE

## 2020-06-22 VITALS
SYSTOLIC BLOOD PRESSURE: 124 MMHG | WEIGHT: 124.13 LBS | BODY MASS INDEX: 22.84 KG/M2 | DIASTOLIC BLOOD PRESSURE: 78 MMHG | TEMPERATURE: 99 F | HEART RATE: 96 BPM | HEIGHT: 62 IN | OXYGEN SATURATION: 98 %

## 2020-06-22 DIAGNOSIS — Z00.00 MEDICARE ANNUAL WELLNESS VISIT, SUBSEQUENT: ICD-10-CM

## 2020-06-22 DIAGNOSIS — Z00.00 MEDICARE ANNUAL WELLNESS VISIT, SUBSEQUENT: Primary | ICD-10-CM

## 2020-06-22 DIAGNOSIS — M25.551 HIP PAIN, ACUTE, RIGHT: ICD-10-CM

## 2020-06-22 LAB
25(OH)D3 SERPL-MCNC: 45.6 NG/ML (ref 30–100)
ALBUMIN SERPL BCP-MCNC: 3.7 G/DL (ref 3.5–5)
ALP SERPL-CCNC: 50 U/L (ref 46–116)
ALT SERPL W P-5'-P-CCNC: 25 U/L (ref 12–78)
ANION GAP SERPL CALCULATED.3IONS-SCNC: 7 MMOL/L (ref 4–13)
AST SERPL W P-5'-P-CCNC: 30 U/L (ref 5–45)
BILIRUB SERPL-MCNC: 0.59 MG/DL (ref 0.2–1)
BUN SERPL-MCNC: 21 MG/DL (ref 5–25)
CALCIUM SERPL-MCNC: 10.1 MG/DL (ref 8.3–10.1)
CHLORIDE SERPL-SCNC: 104 MMOL/L (ref 100–108)
CHOLEST SERPL-MCNC: 195 MG/DL (ref 50–200)
CO2 SERPL-SCNC: 27 MMOL/L (ref 21–32)
CREAT SERPL-MCNC: 1.01 MG/DL (ref 0.6–1.3)
GFR SERPL CREATININE-BSD FRML MDRD: 53 ML/MIN/1.73SQ M
GLUCOSE P FAST SERPL-MCNC: 95 MG/DL (ref 65–99)
HDLC SERPL-MCNC: 55 MG/DL
LDLC SERPL CALC-MCNC: 114 MG/DL (ref 0–100)
NONHDLC SERPL-MCNC: 140 MG/DL
POTASSIUM SERPL-SCNC: 3.8 MMOL/L (ref 3.5–5.3)
PROT SERPL-MCNC: 7.4 G/DL (ref 6.4–8.2)
SODIUM SERPL-SCNC: 138 MMOL/L (ref 136–145)
TRIGL SERPL-MCNC: 131 MG/DL

## 2020-06-22 PROCEDURE — 80061 LIPID PANEL: CPT

## 2020-06-22 PROCEDURE — 3074F SYST BP LT 130 MM HG: CPT | Performed by: INTERNAL MEDICINE

## 2020-06-22 PROCEDURE — 73502 X-RAY EXAM HIP UNI 2-3 VIEWS: CPT

## 2020-06-22 PROCEDURE — 1160F RVW MEDS BY RX/DR IN RCRD: CPT | Performed by: INTERNAL MEDICINE

## 2020-06-22 PROCEDURE — G0439 PPPS, SUBSEQ VISIT: HCPCS | Performed by: INTERNAL MEDICINE

## 2020-06-22 PROCEDURE — 1036F TOBACCO NON-USER: CPT | Performed by: INTERNAL MEDICINE

## 2020-06-22 PROCEDURE — 82306 VITAMIN D 25 HYDROXY: CPT

## 2020-06-22 PROCEDURE — 3078F DIAST BP <80 MM HG: CPT | Performed by: INTERNAL MEDICINE

## 2020-06-22 PROCEDURE — 36415 COLL VENOUS BLD VENIPUNCTURE: CPT

## 2020-06-22 PROCEDURE — 1123F ACP DISCUSS/DSCN MKR DOCD: CPT | Performed by: INTERNAL MEDICINE

## 2020-06-22 PROCEDURE — 3008F BODY MASS INDEX DOCD: CPT | Performed by: INTERNAL MEDICINE

## 2020-06-22 PROCEDURE — 80053 COMPREHEN METABOLIC PANEL: CPT

## 2020-06-22 PROCEDURE — 1125F AMNT PAIN NOTED PAIN PRSNT: CPT | Performed by: INTERNAL MEDICINE

## 2020-06-22 PROCEDURE — 1170F FXNL STATUS ASSESSED: CPT | Performed by: INTERNAL MEDICINE

## 2020-06-22 RX ORDER — BACLOFEN 10 MG/1
10 TABLET ORAL
Qty: 10 TABLET | Refills: 0 | Status: SHIPPED | OUTPATIENT
Start: 2020-06-22 | End: 2021-06-25 | Stop reason: ALTCHOICE

## 2020-07-13 ENCOUNTER — OFFICE VISIT (OUTPATIENT)
Dept: OBGYN CLINIC | Facility: CLINIC | Age: 80
End: 2020-07-13
Payer: MEDICARE

## 2020-07-13 VITALS
BODY MASS INDEX: 22.35 KG/M2 | TEMPERATURE: 98.5 F | WEIGHT: 122.2 LBS | SYSTOLIC BLOOD PRESSURE: 163 MMHG | HEART RATE: 85 BPM | DIASTOLIC BLOOD PRESSURE: 83 MMHG

## 2020-07-13 DIAGNOSIS — M25.551 PAIN IN RIGHT HIP: ICD-10-CM

## 2020-07-13 DIAGNOSIS — M16.11 PRIMARY OSTEOARTHRITIS OF ONE HIP, RIGHT: Primary | ICD-10-CM

## 2020-07-13 PROCEDURE — 1160F RVW MEDS BY RX/DR IN RCRD: CPT | Performed by: ORTHOPAEDIC SURGERY

## 2020-07-13 PROCEDURE — 1170F FXNL STATUS ASSESSED: CPT | Performed by: ORTHOPAEDIC SURGERY

## 2020-07-13 PROCEDURE — 3077F SYST BP >= 140 MM HG: CPT | Performed by: ORTHOPAEDIC SURGERY

## 2020-07-13 PROCEDURE — 1036F TOBACCO NON-USER: CPT | Performed by: ORTHOPAEDIC SURGERY

## 2020-07-13 PROCEDURE — 99213 OFFICE O/P EST LOW 20 MIN: CPT | Performed by: ORTHOPAEDIC SURGERY

## 2020-07-13 PROCEDURE — 3079F DIAST BP 80-89 MM HG: CPT | Performed by: ORTHOPAEDIC SURGERY

## 2020-07-13 NOTE — PROGRESS NOTES
Assessment:     1  Primary osteoarthritis of one hip, right    2  Pain in right hip          Plan:     Problem List Items Addressed This Visit        Other    Pain in right hip      Other Visit Diagnoses     Primary osteoarthritis of one hip, right    -  Primary           Patient had mild right hip osteoarthritis with possible labrum tear   Reviewed x-ray for the right with the patient today   Suggested patient to have right IA hip steroid injection: Patient to declined to have injection done   Recommended patient to use a cane when walking  Demonstrated to the patient how to use a cane   Continue taking OTC NSAIDS and Tylenol prn for pain   Patient will call the office she she would like Right IA hip steroid injection under [facility]   Follow up PRN           Patient ID: Ayaka Gayle is a 68 y o  female  Chief Complaint:  Right hip pain      HPI:  68year old female here today for right hip pain  She was referred today by her PCP Dr Edmond Grande  Patient states she has been having right groin pain for 2-3 weeks  Denies any falls or trauma  She is having constant achy pain in the right groin region and radiating soreness down to the anterior knee  Denies leg length discrepancy  Pain is worse with lateral movements, bending forward , and going up and down steps  Denies any numbness or tingling  Denies any low back pain  She is taking Tylenol 500 mg three times a day  She has no history having injections, physical therapy or surgeries on the right hip         Allergy:  Allergies   Allergen Reactions    Ciprofloxacin Other (See Comments)     Muscle pain    Doxycycline     Nitrofurantoin        Medications:  all current active meds have been reviewed    Past Medical History:  Past Medical History:   Diagnosis Date    Arteriosclerotic heart disease     LAST ASSESSED: [id number]    Esophageal reflux     LAST ASSESSED: [id number]    GERD (gastroesophageal reflux disease)     Hyperlipidemia     LAST ASSESSED: [id number]  Hypertension     LAST ASSESSED: 25HMP5451    Nontoxic single thyroid nodule     LAST ASSESSED: [id number]    Osteopenia     LAST ASSESSED: 42CFX2863       Past Surgical History:  Past Surgical History:   Procedure Laterality Date    CYSTOSCOPY  12/04/2013    DIAGNOSTIC    ESOPHAGOGASTRODUODENOSCOPY      MASTECTOMY Bilateral     cancer    OOPHORECTOMY      SALPINGOOPHORECTOMY Bilateral        Family History:  Family History   Problem Relation Age of Onset    Alzheimer's disease Mother     Hypertension Father     Stroke Father         SYNDROME    Colon cancer Brother        Social History:  Social History     Substance and Sexual Activity   Alcohol Use No     Social History     Substance and Sexual Activity   Drug Use No     Social History     Tobacco Use   Smoking Status Never Smoker   Smokeless Tobacco Never Used           ROS:  Review of Systems   Constitutional: Negative  Negative for chills, fever and unexpected weight change  HENT: Negative  Negative for hearing loss, nosebleeds and postnasal drip  Eyes: Negative  Negative for pain, redness and visual disturbance  Respiratory: Negative  Negative for cough, shortness of breath and wheezing  Cardiovascular: Negative  Negative for chest pain, palpitations and leg swelling  Gastrointestinal: Negative  Negative for abdominal pain, nausea and vomiting  Endocrine: Negative for polydipsia and polyuria  Genitourinary: Negative for dysuria  Musculoskeletal: Positive for arthralgias  Skin: Negative for rash and wound  Neurological: Negative for dizziness, numbness and headaches  Hematological: Negative  Psychiatric/Behavioral: Negative for decreased concentration and suicidal ideas  The patient is not nervous/anxious          Objective:  BP Readings from Last 1 Encounters:   07/13/20 163/83      Wt Readings from Last 1 Encounters:   07/13/20 55 4 kg (122 lb 3 2 oz)        BMI:   Estimated body mass index is 22 35 kg/m² as calculated from the following:    Height as of 6/22/20: 5' 2" (1 575 m)  Weight as of this encounter: 55 4 kg (122 lb 3 2 oz)  EXAM:   Physical Exam   Constitutional: She is oriented to person, place, and time  She appears well-developed and well-nourished  HENT:   Head: Normocephalic and atraumatic  Right Ear: External ear normal    Left Ear: External ear normal    Eyes: Pupils are equal, round, and reactive to light  Conjunctivae and EOM are normal    Neck: Normal range of motion  Neck supple  Cardiovascular: Normal rate and regular rhythm  Pulmonary/Chest: Effort normal and breath sounds normal    Musculoskeletal: Normal range of motion  Neurological: She is alert and oriented to person, place, and time  Skin: Skin is warm and dry  Psychiatric: She has a normal mood and affect  Her behavior is normal  Thought content normal      Right Hip Exam     Tenderness   The patient is experiencing no tenderness  Range of Motion   Flexion: 90   External rotation: 40   Internal rotation: 20     Muscle Strength   The patient has normal right hip strength  Other   Erythema: absent  Scars: absent  Sensation: normal  Pulse: present    Comments:  Pain at extremes of motion  Neg SLR              Radiographs:  I have personally reviewed pertinent films in PACS and my interpretation is x-ray right hip shows mild degerative joint space narrowing        Scribe Attestation    I,:   Angel Se am acting as a scribe while in the presence of the attending physician :        I,:   Carmen Arreola MD personally performed the services described in this documentation    as scribed in my presence :

## 2020-08-24 DIAGNOSIS — E78.5 HYPERLIPIDEMIA, UNSPECIFIED HYPERLIPIDEMIA TYPE: ICD-10-CM

## 2020-08-24 RX ORDER — ATORVASTATIN CALCIUM 20 MG/1
20 TABLET, FILM COATED ORAL DAILY
Qty: 90 TABLET | Refills: 3 | Status: SHIPPED | OUTPATIENT
Start: 2020-08-24 | End: 2021-08-16 | Stop reason: SDUPTHER

## 2020-08-31 DIAGNOSIS — I10 HYPERTENSION, UNSPECIFIED TYPE: ICD-10-CM

## 2020-08-31 RX ORDER — AMLODIPINE BESYLATE 2.5 MG/1
2.5 TABLET ORAL DAILY
Qty: 90 TABLET | Refills: 3 | Status: SHIPPED | OUTPATIENT
Start: 2020-08-31 | End: 2021-08-24

## 2020-09-24 DIAGNOSIS — K58.9 IRRITABLE BOWEL SYNDROME, UNSPECIFIED TYPE: ICD-10-CM

## 2020-09-24 RX ORDER — HYOSCYAMINE SULFATE 0.125 MG
0.12 TABLET,DISINTEGRATING ORAL DAILY
Qty: 90 TABLET | Refills: 3 | Status: SHIPPED | OUTPATIENT
Start: 2020-09-24 | End: 2021-09-27

## 2020-12-31 ENCOUNTER — OFFICE VISIT (OUTPATIENT)
Dept: INTERNAL MEDICINE CLINIC | Facility: CLINIC | Age: 80
End: 2020-12-31
Payer: MEDICARE

## 2020-12-31 VITALS
HEART RATE: 91 BPM | OXYGEN SATURATION: 98 % | HEIGHT: 62 IN | BODY MASS INDEX: 23.55 KG/M2 | DIASTOLIC BLOOD PRESSURE: 78 MMHG | TEMPERATURE: 97.5 F | WEIGHT: 128 LBS | SYSTOLIC BLOOD PRESSURE: 126 MMHG

## 2020-12-31 DIAGNOSIS — E55.9 VITAMIN D DEFICIENCY: ICD-10-CM

## 2020-12-31 DIAGNOSIS — R07.9 CHEST PAIN, UNSPECIFIED TYPE: ICD-10-CM

## 2020-12-31 DIAGNOSIS — I25.10 ARTERIOSCLEROTIC HEART DISEASE: ICD-10-CM

## 2020-12-31 DIAGNOSIS — I10 ESSENTIAL HYPERTENSION: ICD-10-CM

## 2020-12-31 DIAGNOSIS — E78.2 MIXED HYPERLIPIDEMIA: ICD-10-CM

## 2020-12-31 DIAGNOSIS — K21.9 GASTROESOPHAGEAL REFLUX DISEASE, UNSPECIFIED WHETHER ESOPHAGITIS PRESENT: Primary | ICD-10-CM

## 2020-12-31 PROCEDURE — 99214 OFFICE O/P EST MOD 30 MIN: CPT | Performed by: INTERNAL MEDICINE

## 2021-01-04 DIAGNOSIS — Z12.11 COLON CANCER SCREENING: Primary | ICD-10-CM

## 2021-01-12 ENCOUNTER — HOSPITAL ENCOUNTER (OUTPATIENT)
Dept: NON INVASIVE DIAGNOSTICS | Facility: HOSPITAL | Age: 81
Discharge: HOME | End: 2021-01-12
Attending: INTERNAL MEDICINE
Payer: MEDICARE

## 2021-01-12 DIAGNOSIS — R07.9 CHEST PAIN, UNSPECIFIED TYPE: ICD-10-CM

## 2021-01-12 PROCEDURE — 93005 ELECTROCARDIOGRAM TRACING: CPT

## 2021-01-13 LAB
ATRIAL RATE: 84 BPM
P AXIS: 85 DEGREES
PR INTERVAL: 146 MS
QRS AXIS: 75 DEGREES
QRSD INTERVAL: 124 MS
QT INTERVAL: 396 MS
QTC INTERVAL: 467 MS
T WAVE AXIS: 91 DEGREES
VENTRICULAR RATE: 84 BPM

## 2021-01-13 PROCEDURE — 93010 ELECTROCARDIOGRAM REPORT: CPT | Performed by: INTERNAL MEDICINE

## 2021-02-12 ENCOUNTER — IMMUNIZATIONS (OUTPATIENT)
Dept: FAMILY MEDICINE CLINIC | Facility: HOSPITAL | Age: 81
End: 2021-02-12

## 2021-02-12 DIAGNOSIS — Z23 ENCOUNTER FOR IMMUNIZATION: Primary | ICD-10-CM

## 2021-02-12 PROCEDURE — 91301 SARS-COV-2 / COVID-19 MRNA VACCINE (MODERNA) 100 MCG: CPT

## 2021-02-12 PROCEDURE — 0011A SARS-COV-2 / COVID-19 MRNA VACCINE (MODERNA) 100 MCG: CPT

## 2021-03-10 ENCOUNTER — IMMUNIZATIONS (OUTPATIENT)
Dept: FAMILY MEDICINE CLINIC | Facility: HOSPITAL | Age: 81
End: 2021-03-10

## 2021-03-10 DIAGNOSIS — Z23 ENCOUNTER FOR IMMUNIZATION: Primary | ICD-10-CM

## 2021-03-10 PROCEDURE — 91301 SARS-COV-2 / COVID-19 MRNA VACCINE (MODERNA) 100 MCG: CPT

## 2021-03-10 PROCEDURE — 0012A SARS-COV-2 / COVID-19 MRNA VACCINE (MODERNA) 100 MCG: CPT

## 2021-03-29 DIAGNOSIS — E78.5 HYPERLIPIDEMIA, UNSPECIFIED HYPERLIPIDEMIA TYPE: ICD-10-CM

## 2021-03-29 RX ORDER — FENOFIBRATE 134 MG/1
134 CAPSULE ORAL
Qty: 90 CAPSULE | Refills: 3 | Status: SHIPPED | OUTPATIENT
Start: 2021-03-29 | End: 2022-03-28

## 2021-05-27 DIAGNOSIS — K21.9 GASTROESOPHAGEAL REFLUX DISEASE: ICD-10-CM

## 2021-05-27 RX ORDER — OMEPRAZOLE 20 MG/1
20 CAPSULE, DELAYED RELEASE ORAL DAILY
Qty: 90 CAPSULE | Refills: 3 | Status: SHIPPED | OUTPATIENT
Start: 2021-05-27 | End: 2022-05-17

## 2021-06-23 ENCOUNTER — APPOINTMENT (OUTPATIENT)
Dept: LAB | Facility: HOSPITAL | Age: 81
End: 2021-06-23
Attending: INTERNAL MEDICINE
Payer: MEDICARE

## 2021-06-23 DIAGNOSIS — I10 ESSENTIAL HYPERTENSION: ICD-10-CM

## 2021-06-23 DIAGNOSIS — I25.10 ARTERIOSCLEROTIC HEART DISEASE: ICD-10-CM

## 2021-06-23 DIAGNOSIS — E55.9 VITAMIN D DEFICIENCY: ICD-10-CM

## 2021-06-23 LAB
25(OH)D3 SERPL-MCNC: 37.6 NG/ML (ref 30–100)
ALBUMIN SERPL BCP-MCNC: 3.9 G/DL (ref 3.5–5)
ALP SERPL-CCNC: 55 U/L (ref 46–116)
ALT SERPL W P-5'-P-CCNC: 22 U/L (ref 12–78)
ANION GAP SERPL CALCULATED.3IONS-SCNC: 7 MMOL/L (ref 4–13)
AST SERPL W P-5'-P-CCNC: 29 U/L (ref 5–45)
BILIRUB SERPL-MCNC: 0.49 MG/DL (ref 0.2–1)
BUN SERPL-MCNC: 17 MG/DL (ref 5–25)
CALCIUM SERPL-MCNC: 9.6 MG/DL (ref 8.3–10.1)
CHLORIDE SERPL-SCNC: 106 MMOL/L (ref 100–108)
CHOLEST SERPL-MCNC: 203 MG/DL (ref 50–200)
CO2 SERPL-SCNC: 28 MMOL/L (ref 21–32)
CREAT SERPL-MCNC: 1.06 MG/DL (ref 0.6–1.3)
GFR SERPL CREATININE-BSD FRML MDRD: 50 ML/MIN/1.73SQ M
GLUCOSE P FAST SERPL-MCNC: 107 MG/DL (ref 65–99)
HDLC SERPL-MCNC: 53 MG/DL
LDLC SERPL CALC-MCNC: 119 MG/DL (ref 0–100)
NONHDLC SERPL-MCNC: 150 MG/DL
POTASSIUM SERPL-SCNC: 4.4 MMOL/L (ref 3.5–5.3)
PROT SERPL-MCNC: 7.3 G/DL (ref 6.4–8.2)
SODIUM SERPL-SCNC: 141 MMOL/L (ref 136–145)
TRIGL SERPL-MCNC: 157 MG/DL

## 2021-06-23 PROCEDURE — 80061 LIPID PANEL: CPT

## 2021-06-23 PROCEDURE — 36415 COLL VENOUS BLD VENIPUNCTURE: CPT

## 2021-06-23 PROCEDURE — 80053 COMPREHEN METABOLIC PANEL: CPT

## 2021-06-23 PROCEDURE — 82306 VITAMIN D 25 HYDROXY: CPT

## 2021-06-25 ENCOUNTER — OFFICE VISIT (OUTPATIENT)
Dept: INTERNAL MEDICINE CLINIC | Facility: CLINIC | Age: 81
End: 2021-06-25
Payer: MEDICARE

## 2021-06-25 VITALS
HEART RATE: 85 BPM | WEIGHT: 130.13 LBS | TEMPERATURE: 97.7 F | SYSTOLIC BLOOD PRESSURE: 126 MMHG | BODY MASS INDEX: 23.95 KG/M2 | HEIGHT: 62 IN | DIASTOLIC BLOOD PRESSURE: 78 MMHG | OXYGEN SATURATION: 98 %

## 2021-06-25 DIAGNOSIS — Z13.820 ENCOUNTER FOR OSTEOPOROSIS SCREENING IN ASYMPTOMATIC POSTMENOPAUSAL PATIENT: ICD-10-CM

## 2021-06-25 DIAGNOSIS — Z00.00 MEDICARE ANNUAL WELLNESS VISIT, SUBSEQUENT: Primary | ICD-10-CM

## 2021-06-25 DIAGNOSIS — Z78.0 ENCOUNTER FOR OSTEOPOROSIS SCREENING IN ASYMPTOMATIC POSTMENOPAUSAL PATIENT: ICD-10-CM

## 2021-06-25 PROCEDURE — G0439 PPPS, SUBSEQ VISIT: HCPCS | Performed by: INTERNAL MEDICINE

## 2021-06-25 PROCEDURE — 1123F ACP DISCUSS/DSCN MKR DOCD: CPT | Performed by: INTERNAL MEDICINE

## 2021-06-25 NOTE — PROGRESS NOTES
Assessment and Plan:     Problem List Items Addressed This Visit        Other    Medicare annual wellness visit, subsequent - Primary      Other Visit Diagnoses     Encounter for osteoporosis screening in asymptomatic postmenopausal patient        Relevant Orders    DXA bone density spine hip and pelvis      Rx for Dexa and pt will schedule   Increase hydration  Exercise encouraged  GERD diet   Rto 6 months      Preventive health issues were discussed with patient, and age appropriate screening tests were ordered as noted in patient's After Visit Summary  Personalized health advice and appropriate referrals for health education or preventive services given if needed, as noted in patient's After Visit Summary       History of Present Illness:     Patient presents for Medicare Annual Wellness visit    Patient Care Team:  Donnell Stone DO as PCP - DO Rock John Cruz MD Loa Solomons, MD     Problem List:     Patient Active Problem List   Diagnosis    Arteriosclerotic heart disease    Esophageal reflux    Hyperlipidemia    Hypertension    Nontoxic single thyroid nodule    Osteopenia    Medicare annual wellness visit, subsequent    Mucous in stools    Primary localized osteoarthritis of left knee    Pain in right hip      Past Medical and Surgical History:     Past Medical History:   Diagnosis Date    Arteriosclerotic heart disease     LAST ASSESSED: [id number]    Esophageal reflux     LAST ASSESSED: [id number]    GERD (gastroesophageal reflux disease)     Hyperlipidemia     LAST ASSESSED: [id number]    Hypertension     LAST ASSESSED: [id number]    Nontoxic single thyroid nodule     LAST ASSESSED: [id number]    Osteopenia     LAST ASSESSED: [id number]     Past Surgical History:   Procedure Laterality Date    CYSTOSCOPY  12/04/2013    DIAGNOSTIC    ESOPHAGOGASTRODUODENOSCOPY      MASTECTOMY Bilateral     cancer    OOPHORECTOMY      SALPINGOOPHORECTOMY Bilateral Family History:     Family History   Problem Relation Age of Onset    Alzheimer's disease Mother     Hypertension Father     Stroke Father         SYNDROME    Colon cancer Brother       Social History:     Social History     Socioeconomic History    Marital status:      Spouse name: None    Number of children: None    Years of education: None    Highest education level: None   Occupational History    None   Tobacco Use    Smoking status: Never Smoker    Smokeless tobacco: Never Used   Vaping Use    Vaping Use: Never used   Substance and Sexual Activity    Alcohol use: No    Drug use: No    Sexual activity: None   Other Topics Concern    None   Social History Narrative    Always uses seat belt    Dental care, regularly     Social Determinants of Health     Financial Resource Strain:     Difficulty of Paying Living Expenses:    Food Insecurity:     Worried About Running Out of Food in the Last Year:     Ran Out of Food in the Last Year:    Transportation Needs:     Lack of Transportation (Medical):      Lack of Transportation (Non-Medical):    Physical Activity:     Days of Exercise per Week:     Minutes of Exercise per Session:    Stress:     Feeling of Stress :    Social Connections:     Frequency of Communication with Friends and Family:     Frequency of Social Gatherings with Friends and Family:     Attends Buddhist Services:     Active Member of Clubs or Organizations:     Attends Club or Organization Meetings:     Marital Status:    Intimate Partner Violence:     Fear of Current or Ex-Partner:     Emotionally Abused:     Physically Abused:     Sexually Abused:       Medications and Allergies:     Current Outpatient Medications   Medication Sig Dispense Refill    amLODIPine (NORVASC) 2 5 mg tablet Take 1 tablet (2 5 mg total) by mouth daily 90 tablet 3    aspirin 81 MG tablet Take 81 mg by mouth daily      atorvastatin (LIPITOR) 20 mg tablet Take 1 tablet (20 mg total) by mouth daily 90 tablet 3    Calcium Carbonate-Vit D-Min (CALCIUM 1200 PO) Take 2,400 mg by mouth daily      Cholecalciferol (VITAMIN D) 2000 UNITS tablet Take 2,000 Units by mouth daily      fenofibrate micronized (LOFIBRA) 134 MG capsule Take 1 capsule (134 mg total) by mouth daily with breakfast 90 capsule 3    hyoscyamine (ANASPAZ) 0 125 mg Take 1 tablet (0 125 mg total) by mouth daily 90 tablet 3    Multiple Vitamins-Minerals (MULTIVITAMIN WOMEN) TABS Take by mouth      omeprazole (PriLOSEC) 20 mg delayed release capsule Take 1 capsule (20 mg total) by mouth daily 90 capsule 3     No current facility-administered medications for this visit  Allergies   Allergen Reactions    Ciprofloxacin Other (See Comments)     Muscle pain    Doxycycline     Nitrofurantoin       Immunizations:     Immunization History   Administered Date(s) Administered    Influenza Split High Dose Preservative Free IM 11/07/2016    SARS-CoV-2 / COVID-19 mRNA IM ([facility]) 02/12/2021, 03/10/2021      Health Maintenance:         Topic Date Due    Hepatitis C Screening  Never done    DXA SCAN  07/24/2020    Colorectal Cancer Screening  02/03/2021         Topic Date Due    DTaP,Tdap,and Td Vaccines (1 - Tdap) Never done    Pneumococcal Vaccine: 65+ Years (1 of 1 - PPSV23) Never done    Influenza Vaccine (Season Ended) 09/01/2021      Medicare Health Risk Assessment:     /78   Pulse 85   Temp 97 7 °F (36 5 °C) (Temporal)   Ht 5' 2" (1 575 m)   Wt 59 kg (130 lb 2 oz)   SpO2 98%   BMI 23 80 kg/m²      Josue Presume is here for her Subsequent Wellness visit  Last Medicare Wellness visit information reviewed, patient interviewed and updates made to the record today  Health Risk Assessment:   Patient rates overall health as good  Patient feels that their physical health rating is same  Patient is satisfied with their life  Eyesight was rated as same  Hearing was rated as same   Patient feels that their emotional and mental health rating is same  Patients states they are never, rarely angry  Patient states they are sometimes unusually tired/fatigued  Pain experienced in the last 7 days has been none  Patient states that she has experienced no weight loss or gain in last 6 months  Depression Screening:   PHQ-2 Score: 0      Fall Risk Screening: In the past year, patient has experienced: no history of falling in past year      Urinary Incontinence Screening:   Patient has not leaked urine accidently in the last six months  Home Safety:  Patient has trouble with stairs inside or outside of their home  Patient has working smoke alarms and has working carbon monoxide detector  Home safety hazards include: none  Nutrition:   Current diet is Regular  Medications:   Patient is currently taking over-the-counter supplements  OTC medications include: see medication list  Patient is able to manage medications  Activities of Daily Living (ADLs)/Instrumental Activities of Daily Living (IADLs):   Walk and transfer into and out of bed and chair?: Yes  Dress and groom yourself?: Yes    Bathe or shower yourself?: Yes    Feed yourself? Yes  Do your laundry/housekeeping?: Yes  Manage your money, pay your bills and track your expenses?: Yes  Make your own meals?: Yes    Do your own shopping?: Yes    Previous Hospitalizations:   Any hospitalizations or ED visits within the last 12 months?: No      Advance Care Planning:   Living will: Yes    Durable POA for healthcare:  Yes    Advanced directive: Yes    End of Life Decisions reviewed with patient: Yes    Provider agrees with end of life decisions: Yes      Cognitive Screening:   Provider or family/friend/caregiver concerned regarding cognition?: No    PREVENTIVE SCREENINGS      Cardiovascular Screening:    General: Screening Not Indicated and History Lipid Disorder      Diabetes Screening:     General: Screening Current      Breast Cancer Screening:     General: Screening Not Indicated      Cervical Cancer Screening:    General: Screening Not Indicated      Osteoporosis Screening:    General: Risks and Benefits Discussed    Due for: DXA Axial      Abdominal Aortic Aneurysm (AAA) Screening:        General: Screening Not Indicated      Lung Cancer Screening:     General: Screening Not Indicated      Hepatitis C Screening:    General: Risks and Benefits Discussed    Hep C Screening Accepted: Yes      Screening, Brief Intervention, and Referral to Treatment (SBIRT)    Screening  Typical number of drinks in a day: 0  Typical number of drinks in a week: 0  Interpretation: Low risk drinking behavior  AUDIT-C Screenin) How often did you have a drink containing alcohol in the past year? never  2) How many drinks did you have on a typical day when you were drinking in the past year? 0  3) How often did you have 6 or more drinks on one occasion in the past year? never    AUDIT-C Score: 0  Interpretation: Score 0-2 (female): Negative screen for alcohol misuse    Single Item Drug Screening:  How often have you used an illegal drug (including marijuana) or a prescription medication for non-medical reasons in the past year? never    Single Item Drug Screen Score: 0  Interpretation: Negative screen for possible drug use disorder    Brief Intervention  Alcohol & drug use screenings were reviewed  No concerns regarding substance use disorder identified  Other Counseling Topics:   Regular weightbearing exercise and calcium and vitamin D intake         Aram Daugherty, DO

## 2021-06-30 ENCOUNTER — HOSPITAL ENCOUNTER (OUTPATIENT)
Dept: BONE DENSITY | Facility: HOSPITAL | Age: 81
Discharge: HOME | End: 2021-06-30
Attending: INTERNAL MEDICINE
Payer: MEDICARE

## 2021-06-30 DIAGNOSIS — Z78.0 ENCOUNTER FOR OSTEOPOROSIS SCREENING IN ASYMPTOMATIC POSTMENOPAUSAL PATIENT: ICD-10-CM

## 2021-06-30 DIAGNOSIS — Z13.820 ENCOUNTER FOR OSTEOPOROSIS SCREENING IN ASYMPTOMATIC POSTMENOPAUSAL PATIENT: ICD-10-CM

## 2021-06-30 PROCEDURE — 77080 DXA BONE DENSITY AXIAL: CPT

## 2021-08-16 DIAGNOSIS — E78.5 HYPERLIPIDEMIA, UNSPECIFIED HYPERLIPIDEMIA TYPE: ICD-10-CM

## 2021-08-16 RX ORDER — ATORVASTATIN CALCIUM 20 MG/1
20 TABLET, FILM COATED ORAL DAILY
Qty: 90 TABLET | Refills: 3 | Status: SHIPPED | OUTPATIENT
Start: 2021-08-16 | End: 2022-08-09

## 2022-01-14 ENCOUNTER — OFFICE VISIT (OUTPATIENT)
Dept: FAMILY MEDICINE CLINIC | Facility: CLINIC | Age: 82
End: 2022-01-14
Payer: MEDICARE

## 2022-01-14 VITALS — TEMPERATURE: 97.7 F | HEIGHT: 62 IN | WEIGHT: 129.8 LBS | BODY MASS INDEX: 23.89 KG/M2

## 2022-01-14 DIAGNOSIS — E55.9 VITAMIN D DEFICIENCY: ICD-10-CM

## 2022-01-14 DIAGNOSIS — M17.12 PRIMARY LOCALIZED OSTEOARTHRITIS OF LEFT KNEE: ICD-10-CM

## 2022-01-14 DIAGNOSIS — I10 PRIMARY HYPERTENSION: Primary | ICD-10-CM

## 2022-01-14 DIAGNOSIS — E78.2 MIXED HYPERLIPIDEMIA: ICD-10-CM

## 2022-01-14 PROBLEM — R19.5 MUCOUS IN STOOLS: Status: RESOLVED | Noted: 2018-08-13 | Resolved: 2022-01-14

## 2022-01-14 PROBLEM — M25.551 PAIN IN RIGHT HIP: Status: RESOLVED | Noted: 2020-07-13 | Resolved: 2022-01-14

## 2022-01-14 PROCEDURE — 99214 OFFICE O/P EST MOD 30 MIN: CPT | Performed by: INTERNAL MEDICINE

## 2022-01-14 NOTE — PROGRESS NOTES
Assessment/Plan:         Diagnoses and all orders for this visit:    Primary hypertension  Pt bp stable and she is compliant   Lo sodium diet She cooks for herself generally and does not eat out     Vitamin D deficiency  -     Vitamin D 25 hydroxy; Future  She continues on supplement and will get labs next visit     Mixed hyperlipidemia  -     Comprehensive metabolic panel; Future  -     Lipid panel; Future  Low fat diet She stays active managing her house     Primary localized osteoarthritis of left knee  Sxs stable and she is able to do her regular activities without limitation       Pt did have covid booster      Rto 6months/awv      Patient ID: Foster Sherwood is a 80 y o  female  HPI        Review of Systems   Constitutional: Negative for activity change, chills and fever  HENT: Negative  Eyes: Negative for visual disturbance  No recent eye exam   Respiratory: Negative for cough and shortness of breath  Cardiovascular: Negative for chest pain, palpitations and leg swelling  Gastrointestinal: Negative for abdominal distention, abdominal pain, constipation and diarrhea  Genitourinary: Negative  Musculoskeletal: Negative for arthralgias, gait problem and joint swelling  Neurological: Negative for dizziness, light-headedness and headaches  Psychiatric/Behavioral: Negative for sleep disturbance  The patient is not nervous/anxious          Past Medical History:   Diagnosis Date    Arteriosclerotic heart disease     LAST ASSESSED: [id number]    Esophageal reflux     LAST ASSESSED: [id number]    GERD (gastroesophageal reflux disease)     Hyperlipidemia     LAST ASSESSED: [id number]    Hypertension     LAST ASSESSED: [id number]    Nontoxic single thyroid nodule     LAST ASSESSED: [id number]    Osteopenia     LAST ASSESSED: [id number]     Past Surgical History:   Procedure Laterality Date    CYSTOSCOPY  12/04/2013    DIAGNOSTIC    ESOPHAGOGASTRODUODENOSCOPY      MASTECTOMY Bilateral cancer    OOPHORECTOMY      SALPINGOOPHORECTOMY Bilateral      Social History     Socioeconomic History    Marital status:      Spouse name: Not on file    Number of children: Not on file    Years of education: Not on file    Highest education level: Not on file   Occupational History    Not on file   Tobacco Use    Smoking status: Never Smoker    Smokeless tobacco: Never Used   Vaping Use    Vaping Use: Never used   Substance and Sexual Activity    Alcohol use: No    Drug use: No    Sexual activity: Not on file   Other Topics Concern    Not on file   Social History Narrative    Always uses seat belt    Dental care, regularly     Social Determinants of Health     Financial Resource Strain: Not on file   Food Insecurity: Not on file   Transportation Needs: Not on file   Physical Activity: Not on file   Stress: Not on file   Social Connections: Not on file   Intimate Partner Violence: Not on file   Housing Stability: Not on file     Allergies   Allergen Reactions    Ciprofloxacin Other (See Comments)     Muscle pain    Doxycycline     Nitrofurantoin            Temp 97 7 °F (36 5 °C) (Temporal)   Ht 5' 2" (1 575 m)   Wt 58 9 kg (129 lb 12 8 oz)   BMI 23 74 kg/m²          Physical Exam  Constitutional:       General: She is not in acute distress  Appearance: Normal appearance  She is not ill-appearing, toxic-appearing or diaphoretic  HENT:      Head: Normocephalic and atraumatic  Right Ear: External ear normal       Left Ear: External ear normal       Nose: Nose normal       Mouth/Throat:      Mouth: Mucous membranes are dry  Eyes:      General: No scleral icterus  Extraocular Movements: Extraocular movements intact  Conjunctiva/sclera: Conjunctivae normal       Pupils: Pupils are equal, round, and reactive to light  Cardiovascular:      Rate and Rhythm: Normal rate and regular rhythm  Pulses: Normal pulses  Heart sounds: Normal heart sounds     Pulmonary: Effort: Pulmonary effort is normal  No respiratory distress  Breath sounds: Normal breath sounds  No wheezing  Abdominal:      General: Bowel sounds are normal  There is no distension  Palpations: Abdomen is soft  Tenderness: There is no abdominal tenderness  Musculoskeletal:      Cervical back: Normal range of motion and neck supple  No rigidity  Right lower leg: No edema  Left lower leg: No edema  Lymphadenopathy:      Cervical: No cervical adenopathy  Skin:     General: Skin is dry  Coloration: Skin is not jaundiced or pale  Neurological:      General: No focal deficit present  Mental Status: She is alert and oriented to person, place, and time  Mental status is at baseline  Psychiatric:         Mood and Affect: Mood normal          Behavior: Behavior normal          Thought Content:  Thought content normal          Judgment: Judgment normal

## 2022-03-28 ENCOUNTER — TELEPHONE (OUTPATIENT)
Dept: FAMILY MEDICINE CLINIC | Facility: CLINIC | Age: 82
End: 2022-03-28

## 2022-07-06 ENCOUNTER — RA CDI HCC (OUTPATIENT)
Dept: OTHER | Facility: HOSPITAL | Age: 82
End: 2022-07-06

## 2022-07-06 ENCOUNTER — APPOINTMENT (OUTPATIENT)
Dept: LAB | Facility: HOSPITAL | Age: 82
End: 2022-07-06
Attending: INTERNAL MEDICINE
Payer: MEDICARE

## 2022-07-06 DIAGNOSIS — E55.9 VITAMIN D DEFICIENCY: ICD-10-CM

## 2022-07-06 DIAGNOSIS — E78.2 MIXED HYPERLIPIDEMIA: ICD-10-CM

## 2022-07-06 LAB
25(OH)D3 SERPL-MCNC: 57 NG/ML (ref 30–100)
ALBUMIN SERPL BCP-MCNC: 3.8 G/DL (ref 3.5–5)
ALP SERPL-CCNC: 53 U/L (ref 46–116)
ALT SERPL W P-5'-P-CCNC: 25 U/L (ref 12–78)
ANION GAP SERPL CALCULATED.3IONS-SCNC: 6 MMOL/L (ref 4–13)
AST SERPL W P-5'-P-CCNC: 25 U/L (ref 5–45)
BILIRUB SERPL-MCNC: 0.54 MG/DL (ref 0.2–1)
BUN SERPL-MCNC: 18 MG/DL (ref 5–25)
CALCIUM SERPL-MCNC: 9.3 MG/DL (ref 8.3–10.1)
CHLORIDE SERPL-SCNC: 104 MMOL/L (ref 100–108)
CHOLEST SERPL-MCNC: 192 MG/DL
CO2 SERPL-SCNC: 30 MMOL/L (ref 21–32)
CREAT SERPL-MCNC: 1.05 MG/DL (ref 0.6–1.3)
GFR SERPL CREATININE-BSD FRML MDRD: 50 ML/MIN/1.73SQ M
GLUCOSE P FAST SERPL-MCNC: 107 MG/DL (ref 65–99)
HDLC SERPL-MCNC: 56 MG/DL
LDLC SERPL CALC-MCNC: 112 MG/DL (ref 0–100)
NONHDLC SERPL-MCNC: 136 MG/DL
POTASSIUM SERPL-SCNC: 3.9 MMOL/L (ref 3.5–5.3)
PROT SERPL-MCNC: 7.1 G/DL (ref 6.4–8.2)
SODIUM SERPL-SCNC: 140 MMOL/L (ref 136–145)
TRIGL SERPL-MCNC: 120 MG/DL

## 2022-07-06 PROCEDURE — 80061 LIPID PANEL: CPT

## 2022-07-06 PROCEDURE — 36415 COLL VENOUS BLD VENIPUNCTURE: CPT

## 2022-07-06 PROCEDURE — 82306 VITAMIN D 25 HYDROXY: CPT

## 2022-07-06 PROCEDURE — 80053 COMPREHEN METABOLIC PANEL: CPT

## 2022-07-06 NOTE — PROGRESS NOTES
[address]  coding opportunities       Chart reviewed, no opportunity found: CHART REVIEWED, NO OPPORTUNITY FOUND        Patients Insurance     Medicare Insurance: Medicare

## 2022-07-08 ENCOUNTER — OFFICE VISIT (OUTPATIENT)
Dept: FAMILY MEDICINE CLINIC | Facility: CLINIC | Age: 82
End: 2022-07-08
Payer: MEDICARE

## 2022-07-08 VITALS
DIASTOLIC BLOOD PRESSURE: 70 MMHG | TEMPERATURE: 97.6 F | WEIGHT: 129.2 LBS | BODY MASS INDEX: 23.77 KG/M2 | HEIGHT: 62 IN | RESPIRATION RATE: 18 BRPM | HEART RATE: 78 BPM | SYSTOLIC BLOOD PRESSURE: 128 MMHG

## 2022-07-08 DIAGNOSIS — Z00.00 MEDICARE ANNUAL WELLNESS VISIT, SUBSEQUENT: Primary | ICD-10-CM

## 2022-07-08 DIAGNOSIS — N18.31 STAGE 3A CHRONIC KIDNEY DISEASE (HCC): ICD-10-CM

## 2022-07-08 DIAGNOSIS — R07.89 CHEST TIGHTNESS: ICD-10-CM

## 2022-07-08 PROCEDURE — G0439 PPPS, SUBSEQ VISIT: HCPCS | Performed by: INTERNAL MEDICINE

## 2022-07-08 NOTE — PROGRESS NOTES
Assessment and Plan:     Problem List Items Addressed This Visit        Genitourinary    Stage 3[facility] chronic kidney disease [facility]       Other    Medicare annual wellness visit, subsequent - Primary      Other Visit Diagnoses     Chest tightness        Relevant Orders    Stress test only, exercise          Depression Screening and Follow-up Plan: Patient was screened for depression during today's encounter  They screened negative with a PHQ-2 score of 0  Preventive health issues were discussed with patient, and age appropriate screening tests were ordered as noted in patient's After Visit Summary  Personalized health advice and appropriate referrals for health education or preventive services given if needed, as noted in patient's After Visit Summary  History of Present Illness:     Patient presents for a Medicare Wellness Visit    HPI   Patient Care Team:  Josemanuel Lanier DO as PCP - MD Megan Hirsch MD     Review of Systems:     Review of Systems   Constitutional: Negative for chills and fever  HENT: Negative  Respiratory: Negative for cough and shortness of breath  Cardiovascular: Positive for chest pain  Gastrointestinal: Negative for abdominal distention and abdominal pain  Genitourinary: Negative  Musculoskeletal: Negative  Neurological: Negative for dizziness, light-headedness and headaches  Psychiatric/Behavioral: Negative for sleep disturbance  The patient is not nervous/anxious           Problem List:     Patient Active Problem List   Diagnosis    Arteriosclerotic heart disease    Esophageal reflux    Hyperlipidemia    Hypertension    Nontoxic single thyroid nodule    Osteopenia    Medicare annual wellness visit, subsequent    Primary localized osteoarthritis of left knee    Vitamin D deficiency    Stage 3a chronic kidney disease [facility]      Past Medical and Surgical History:     Past Medical History:   Diagnosis Date  Arteriosclerotic heart disease     LAST ASSESSED: [id number]    Esophageal reflux     LAST ASSESSED: [id number]    GERD (gastroesophageal reflux disease)     Hyperlipidemia     LAST ASSESSED: [id number]    Hypertension     LAST ASSESSED: [id number]    Nontoxic single thyroid nodule     LAST ASSESSED: [id number]    Osteopenia     LAST ASSESSED: [id number]     Past Surgical History:   Procedure Laterality Date    CYSTOSCOPY  12/04/2013    DIAGNOSTIC    ESOPHAGOGASTRODUODENOSCOPY      MASTECTOMY Bilateral     cancer    OOPHORECTOMY      SALPINGOOPHORECTOMY Bilateral       Family History:     Family History   Problem Relation Age of Onset    Alzheimer's disease Mother     Hypertension Father     Stroke Father         SYNDROME    Colon cancer Brother       Social History:     Social History     Socioeconomic History    Marital status:       Spouse name: None    Number of children: None    Years of education: None    Highest education level: None   Occupational History    None   Tobacco Use    Smoking status: Never Smoker    Smokeless tobacco: Never Used   Vaping Use    Vaping Use: Never used   Substance and Sexual Activity    Alcohol use: No    Drug use: No    Sexual activity: None   Other Topics Concern    None   Social History Narrative    Always uses seat belt    Dental care, regularly     Social Determinants of Health     Financial Resource Strain: Not on file   Food Insecurity: Not on file   Transportation Needs: Not on file   Physical Activity: Not on file   Stress: Not on file   Social Connections: Not on file   Intimate Partner Violence: Not on file   Housing Stability: Not on file      Medications and Allergies:     Current Outpatient Medications   Medication Sig Dispense Refill    amLODIPine (NORVASC) 2 5 mg tablet Take 1 tablet by mouth once daily 90 tablet 3    aspirin 81 MG tablet Take 81 mg by mouth daily      atorvastatin (LIPITOR) 20 mg tablet Take 1 tablet (20 mg total) by mouth daily 90 tablet 3    Calcium Carbonate-Vit D-Min (CALCIUM 1200 PO) Take 2,400 mg by mouth daily      Cholecalciferol (VITAMIN D) 2000 UNITS tablet Take 2,000 Units by mouth daily      fenofibrate micronized (LOFIBRA) 134 MG capsule TAKE 1 CAPSULE BY MOUTH ONCE DAILY WITH BREAKFAST 90 capsule 3    hyoscyamine (ANASPAZ,LEVSIN) 0 125 MG tablet Take 1 tablet by mouth once daily 90 tablet 3    Multiple Vitamins-Minerals (MULTIVITAMIN WOMEN) TABS Take by mouth      omeprazole (PriLOSEC) 20 mg delayed release capsule Take 1 capsule by mouth once daily 90 capsule 3     No current facility-administered medications for this visit  Allergies   Allergen Reactions    Ciprofloxacin Other (See Comments)     Muscle pain    Doxycycline     Nitrofurantoin       Immunizations:     Immunization History   Administered Date(s) Administered    COVID-19 MODERNA VACC 0 5 ML IM 02/12/2021, 03/10/2021    Influenza Split High Dose Preservative Free IM 11/07/2016      Health Maintenance:         Topic Date Due    Colorectal Cancer Screening  02/03/2021    DXA SCAN  06/30/2023         Topic Date Due    DTaP,Tdap,and Td Vaccines (1 - Tdap) Never done    Pneumococcal Vaccine: 65+ Years (1 - PCV) Never done    COVID-19 Vaccine (3 - Booster for Moderna series) 08/10/2021    Influenza Vaccine (1) 09/01/2022      Medicare Screening Tests and Risk Assessments:     Jose Roth is here for her Subsequent Wellness visit  Last Medicare Wellness visit information reviewed, patient interviewed, no change since last AWV  Health Risk Assessment:   Patient rates overall health as good  Patient feels that their physical health rating is same  Patient is very satisfied with their life  Eyesight was rated as same  Hearing was rated as same  Patient feels that their emotional and mental health rating is same  Patients states they are never, rarely angry  Patient states they are never, rarely unusually tired/fatigued   Pain experienced in the last 7 days has been none  Patient states that she has experienced no weight loss or gain in last 6 months  Depression Screening:   PHQ-2 Score: 0      Fall Risk Screening: In the past year, patient has experienced: no history of falling in past year      Urinary Incontinence Screening:   Patient has not leaked urine accidently in the last six months  Home Safety:  Patient does not have trouble with stairs inside or outside of their home  Patient has working smoke alarms and has working carbon monoxide detector  Home safety hazards include: none  Nutrition:   Current diet is Regular  Medications:   Patient is currently taking over-the-counter supplements  OTC medications include: see medication list  Patient is able to manage medications  Activities of Daily Living (ADLs)/Instrumental Activities of Daily Living (IADLs):   Walk and transfer into and out of bed and chair?: Yes  Dress and groom yourself?: Yes    Bathe or shower yourself?: Yes    Feed yourself? Yes  Do your laundry/housekeeping?: Yes  Manage your money, pay your bills and track your expenses?: Yes  Make your own meals?: Yes    Do your own shopping?: Yes    Previous Hospitalizations:   Any hospitalizations or ED visits within the last 12 months?: No      Advance Care Planning:   Living will: Yes    Durable POA for healthcare:  Yes    Advanced directive: Yes    End of Life Decisions reviewed with patient: Yes    Provider agrees with end of life decisions: Yes      Cognitive Screening:   Provider or family/friend/caregiver concerned regarding cognition?: No    PREVENTIVE SCREENINGS      Cardiovascular Screening:    General: History Lipid Disorder and Screening Current      Diabetes Screening:     General: Screening Current      Colorectal Cancer Screening:     General: Patient Declines      Breast Cancer Screening:     General: Screening Not Indicated      Cervical Cancer Screening:    General: Screening Not Indicated Osteoporosis Screening:    General: Screening Current      Abdominal Aortic Aneurysm (AAA) Screening:        General: Screening Not Indicated      Lung Cancer Screening:     General: Screening Not Indicated      Hepatitis C Screening:    General: Screening Not Indicated    Screening, Brief Intervention, and Referral to Treatment (SBIRT)    Screening  Typical number of drinks in a day: 0  Typical number of drinks in a week: 0  Interpretation: Low risk drinking behavior  AUDIT-C Screenin) How often did you have a drink containing alcohol in the past year? never  2) How many drinks did you have on a typical day when you were drinking in the past year? 0  3) How often did you have 6 or more drinks on one occasion in the past year? never    AUDIT-C Score: 0  Interpretation: Score 0-2 (female): Negative screen for alcohol misuse    Single Item Drug Screening:  How often have you used an illegal drug (including marijuana) or a prescription medication for non-medical reasons in the past year? never    Single Item Drug Screen Score: 0  Interpretation: Negative screen for possible drug use disorder    Brief Intervention  Alcohol & drug use screenings were reviewed  No concerns regarding substance use disorder identified  Other Counseling Topics:   Calcium and vitamin D intake and regular weightbearing exercise  No exam data present     Physical Exam:     /70   Pulse 78   Temp 97 6 °F (36 4 °C) (Temporal)   Resp 18   Ht 5' 2" (1 575 m)   Wt 58 6 kg (129 lb 3 2 oz)   BMI 23 63 kg/m²     Physical Exam  Vitals reviewed  Constitutional:       General: She is not in acute distress  Appearance: Normal appearance  She is not ill-appearing, toxic-appearing or diaphoretic  HENT:      Head: Normocephalic and atraumatic  Right Ear: External ear normal       Left Ear: External ear normal       Nose: Nose normal       Mouth/Throat:      Mouth: Mucous membranes are dry     Eyes:      General: No scleral icterus  Extraocular Movements: Extraocular movements intact  Conjunctiva/sclera: Conjunctivae normal       Pupils: Pupils are equal, round, and reactive to light  Cardiovascular:      Rate and Rhythm: Normal rate and regular rhythm  Pulses: Normal pulses  Heart sounds: Normal heart sounds  Pulmonary:      Effort: Pulmonary effort is normal  No respiratory distress  Breath sounds: Normal breath sounds  No wheezing  Abdominal:      General: Bowel sounds are normal  There is no distension  Palpations: Abdomen is soft  Tenderness: There is no abdominal tenderness  Musculoskeletal:      Cervical back: Normal range of motion and neck supple  No rigidity  Right lower leg: No edema  Left lower leg: No edema  Lymphadenopathy:      Cervical: No cervical adenopathy  Skin:     General: Skin is dry  Coloration: Skin is not jaundiced or pale  Neurological:      General: No focal deficit present  Mental Status: She is alert and oriented to person, place, and time  Mental status is at baseline  Cranial Nerves: No cranial nerve deficit  Psychiatric:         Mood and Affect: Mood normal          Behavior: Behavior normal          Thought Content:  Thought content normal          Judgment: Judgment normal           Pervis Primrose, DO

## 2022-07-08 NOTE — PATIENT INSTRUCTIONS
Medicare Preventive Visit Patient Instructions  Thank you for completing your Welcome to Medicare Visit or Medicare Annual Wellness Visit today  Your next wellness visit will be due in one year (7/9/2023)  The screening/preventive services that you may require over the next 5-10 years are detailed below  Some tests may not apply to you based off risk factors and/or age  Screening tests ordered at today's visit but not completed yet may show as past due  Also, please note that scanned in results may not display below  Preventive Screenings:  Service Recommendations Previous Testing/Comments   Colorectal Cancer Screening  * Colonoscopy    * Fecal Occult Blood Test (FOBT)/Fecal Immunochemical Test (FIT)  * Fecal DNA/Cologuard Test  * Flexible Sigmoidoscopy Age: 54-65 years old   Colonoscopy: every 10 years (may be performed more frequently if at higher risk)  OR  FOBT/FIT: every 1 year  OR  Cologuard: every 3 years  OR  Sigmoidoscopy: every 5 years  Screening may be recommended earlier than age 48 if at higher risk for colorectal cancer  Also, an individualized decision between you and your healthcare provider will decide whether screening between the ages of 74-80 would be appropriate  Colonoscopy: 02/03/2017  FOBT/FIT: Not on file  Cologuard: Not on file  Sigmoidoscopy: Not on file          Breast Cancer Screening Age: 36 years old  Frequency: every 1-2 years  Not required if history of left and right mastectomy Mammogram: Not on file    Screening Not Indicated   Cervical Cancer Screening Between the ages of 21-29, pap smear recommended once every 3 years  Between the ages of 33-67, can perform pap smear with HPV co-testing every 5 years     Recommendations may differ for women with a history of total hysterectomy, cervical cancer, or abnormal pap smears in past  Pap Smear: Not on file    Screening Not Indicated   Hepatitis C Screening Once for adults born between 1945 and 1965  More frequently in patients at high risk for Hepatitis C Hep C Antibody: Not on file        Diabetes Screening 1-2 times per year if you're at risk for diabetes or have pre-diabetes Fasting glucose: 107 mg/dL   A1C: No results in last 5 years    Screening Current   Cholesterol Screening Once every 5 years if you don't have a lipid disorder  May order more often based on risk factors  Lipid panel: 07/06/2022    Screening Not Indicated  History Lipid Disorder     Other Preventive Screenings Covered by Medicare:  1  Abdominal Aortic Aneurysm (AAA) Screening: covered once if your at risk  You're considered to be at risk if you have a family history of AAA  2  Lung Cancer Screening: covers low dose CT scan once per year if you meet all of the following conditions: (1) Age 50-69; (2) No signs or symptoms of lung cancer; (3) Current smoker or have quit smoking within the last 15 years; (4) You have a tobacco smoking history of at least 30 pack years (packs per day multiplied by number of years you smoked); (5) You get a written order from a healthcare provider  3  Glaucoma Screening: covered annually if you're considered high risk: (1) You have diabetes OR (2) Family history of glaucoma OR (3)  aged 48 and older OR (3)  American aged 72 and older  3  Osteoporosis Screening: covered every 2 years if you meet one of the following conditions: (1) You're estrogen deficient and at risk for osteoporosis based off medical history and other findings; (2) Have a vertebral abnormality; (3) On glucocorticoid therapy for more than 3 months; (4) Have primary hyperparathyroidism; (5) On osteoporosis medications and need to assess response to drug therapy  · Last bone density test (DXA Scan): 06/30/2021   5  HIV Screening: covered annually if you're between the age of 15-65  Also covered annually if you are younger than 13 and older than 72 with risk factors for HIV infection   For pregnant patients, it is covered up to 3 times per pregnancy  Immunizations:  Immunization Recommendations   Influenza Vaccine Annual influenza vaccination during flu season is recommended for all persons aged >= 6 months who do not have contraindications   Pneumococcal Vaccine (Prevnar and Pneumovax)  * Prevnar = PCV13  * Pneumovax = PPSV23   Adults 25-60 years old: 1-3 doses may be recommended based on certain risk factors  Adults 72 years old: Prevnar (PCV13) vaccine recommended followed by Pneumovax (PPSV23) vaccine  If already received PPSV23 since turning 65, then PCV13 recommended at least one year after PPSV23 dose  Hepatitis B Vaccine 3 dose series if at intermediate or high risk (ex: diabetes, end stage renal disease, liver disease)   Tetanus (Td) Vaccine - COST NOT COVERED BY MEDICARE PART B Following completion of primary series, a booster dose should be given every 10 years to maintain immunity against tetanus  Td may also be given as tetanus wound prophylaxis  Tdap Vaccine - COST NOT COVERED BY MEDICARE PART B Recommended at least once for all adults  For pregnant patients, recommended with each pregnancy  Shingles Vaccine (Shingrix) - COST NOT COVERED BY MEDICARE PART B  2 shot series recommended in those aged 48 and above     Health Maintenance Due:      Topic Date Due    Colorectal Cancer Screening  02/03/2021    DXA SCAN  06/30/2023     Immunizations Due:      Topic Date Due    DTaP,Tdap,and Td Vaccines (1 - Tdap) Never done    Pneumococcal Vaccine: 65+ Years (1 - PCV) Never done    COVID-19 Vaccine (3 - Booster for Moderna series) 08/10/2021    Influenza Vaccine (1) 09/01/2022     Advance Directives   What are advance directives? Advance directives are legal documents that state your wishes and plans for medical care  These plans are made ahead of time in case you lose your ability to make decisions for yourself   Advance directives can apply to any medical decision, such as the treatments you want, and if you want to donate organs  What are the types of advance directives? There are many types of advance directives, and each state has rules about how to use them  You may choose a combination of any of the following:  · Living will: This is a written record of the treatment you want  You can also choose which treatments you do not want, which to limit, and which to stop at a certain time  This includes surgery, medicine, IV fluid, and tube feedings  · Durable power of  for healthcare [facility]): This is a written record that states who you want to make healthcare choices for you when you are unable to make them for yourself  This person, called a proxy, is usually a family member or a friend  You may choose more than 1 proxy  · Do not resuscitate (DNR) order:  A DNR order is used in case your heart stops beating or you stop breathing  It is a request not to have certain forms of treatment, such as CPR  A DNR order may be included in other types of advance directives  · Medical directive: This covers the care that you want if you are in a coma, near death, or unable to make decisions for yourself  You can list the treatments you want for each condition  Treatment may include pain medicine, surgery, blood transfusions, dialysis, IV or tube feedings, and a ventilator (breathing machine)  · Values history: This document has questions about your views, beliefs, and how you feel and think about life  This information can help others choose the care that you would choose  Why are advance directives important? An advance directive helps you control your care  Although spoken wishes may be used, it is better to have your wishes written down  Spoken wishes can be misunderstood, or not followed  Treatments may be given even if you do not want them  An advance directive may make it easier for your family to make difficult choices about your care        © Copyright [employer] 2018 Information is for End User's use only and may not be sold, redistributed or otherwise used for commercial purposes   All illustrations and images included in CareNotes® are the copyrighted property of A D A M , Inc  or [facility] Yenifer Herrera

## 2022-07-13 ENCOUNTER — TELEPHONE (OUTPATIENT)
Dept: FAMILY MEDICINE CLINIC | Facility: CLINIC | Age: 82
End: 2022-07-13

## 2022-07-13 NOTE — TELEPHONE ENCOUNTER
Pt was looking through her discharge summary, asking where the stage 3 chronic kidney disease came from? Said she doesn't remember if this was mentioned during the visit

## 2022-07-13 NOTE — TELEPHONE ENCOUNTER
Mentioned to her to increase fluids - age and hydration will decrease kidney function  She is not in a danger zone there are 5 phases and she is close to the higher level but the computer gets her labs and generates that code

## 2022-07-14 ENCOUNTER — HOSPITAL ENCOUNTER (OUTPATIENT)
Dept: NON INVASIVE DIAGNOSTICS | Facility: HOSPITAL | Age: 82
Discharge: HOME | End: 2022-07-14
Attending: INTERNAL MEDICINE

## 2022-07-14 ENCOUNTER — DOCUMENTATION (OUTPATIENT)
Dept: CARDIOLOGY CLINIC | Facility: HOSPITAL | Age: 82
End: 2022-07-14

## 2022-07-14 DIAGNOSIS — R07.89 CHEST TIGHTNESS: ICD-10-CM

## 2022-07-14 DIAGNOSIS — R06.02 SOB (SHORTNESS OF BREATH): ICD-10-CM

## 2022-07-14 DIAGNOSIS — I44.7 LBBB (LEFT BUNDLE BRANCH BLOCK): Primary | ICD-10-CM

## 2022-07-14 NOTE — PROGRESS NOTES
Pt scheduled for EST as ordered by Dr King Drivers  Resting EKG found to be in a LBBB pattern  Explained to pt that EST cannot be done with this type of rhythm     EKG faxed to Dr Sophie Abdi' Office with request to order a Lexiscan Myoview Stress Test

## 2022-07-15 ENCOUNTER — TELEPHONE (OUTPATIENT)
Dept: FAMILY MEDICINE CLINIC | Facility: CLINIC | Age: 82
End: 2022-07-15

## 2022-08-10 ENCOUNTER — HOSPITAL ENCOUNTER (OUTPATIENT)
Dept: NUCLEAR MEDICINE | Facility: HOSPITAL | Age: 82
Discharge: HOME | End: 2022-08-10
Attending: INTERNAL MEDICINE
Payer: MEDICARE

## 2022-08-10 DIAGNOSIS — R06.02 SOB (SHORTNESS OF BREATH): ICD-10-CM

## 2022-08-10 DIAGNOSIS — I44.7 LBBB (LEFT BUNDLE BRANCH BLOCK): ICD-10-CM

## 2022-08-10 LAB
CHEST PAIN STATEMENT: NORMAL
MAX DIASTOLIC BP: 74 MMHG
MAX HEART RATE: 102 BPM
MAX HR PERCENT: 72 %
MAX HR: 102 BPM
MAX PREDICTED HEART RATE: 140 BPM
MAX. SYSTOLIC BP: 140 MMHG
NUC STRESS EJECTION FRACTION: 57 %
PROTOCOL NAME: NORMAL
RATE PRESSURE PRODUCT: NORMAL
REASON FOR TERMINATION: NORMAL
SL CV REST NUCLEAR ISOTOPE DOSE: 11 MCI
SL CV STRESS NUCLEAR ISOTOPE DOSE: 32.8 MCI
STRESS ANGINA INDEX: 0
STRESS BASELINE HR: 77 BPM
STRESS PEAK HR: 102 BPM
STRESS POST PEAK BP: 134 MMHG
STRESS ST DEPRESSION: 0 MM
STRESS/REST PERFUSION RATIO: 1.27
TARGET HR FORMULA: NORMAL
TEST INDICATION: NORMAL
TIME IN EXERCISE PHASE: NORMAL

## 2022-08-10 PROCEDURE — 93016 CV STRESS TEST SUPVJ ONLY: CPT | Performed by: INTERNAL MEDICINE

## 2022-08-10 PROCEDURE — 93018 CV STRESS TEST I&R ONLY: CPT | Performed by: INTERNAL MEDICINE

## 2022-08-10 PROCEDURE — 78452 HT MUSCLE IMAGE SPECT MULT: CPT | Performed by: INTERNAL MEDICINE

## 2022-08-10 PROCEDURE — 93017 CV STRESS TEST TRACING ONLY: CPT

## 2022-08-10 PROCEDURE — 78452 HT MUSCLE IMAGE SPECT MULT: CPT

## 2022-08-10 PROCEDURE — A9502 TC99M TETROFOSMIN: HCPCS

## 2022-08-10 RX ADMIN — REGADENOSON 0.4 MG: 0.08 INJECTION, SOLUTION INTRAVENOUS at 09:55

## 2022-11-04 ENCOUNTER — APPOINTMENT (OUTPATIENT)
Dept: RADIOLOGY | Facility: MEDICAL CENTER | Age: 82
End: 2022-11-04

## 2022-11-04 ENCOUNTER — OFFICE VISIT (OUTPATIENT)
Dept: OBGYN CLINIC | Facility: CLINIC | Age: 82
End: 2022-11-04

## 2022-11-04 VITALS
HEIGHT: 62 IN | BODY MASS INDEX: 23.92 KG/M2 | SYSTOLIC BLOOD PRESSURE: 143 MMHG | WEIGHT: 130 LBS | DIASTOLIC BLOOD PRESSURE: 86 MMHG | HEART RATE: 99 BPM

## 2022-11-04 DIAGNOSIS — M25.562 LEFT KNEE PAIN, UNSPECIFIED CHRONICITY: ICD-10-CM

## 2022-11-04 DIAGNOSIS — M25.561 RIGHT KNEE PAIN, UNSPECIFIED CHRONICITY: ICD-10-CM

## 2022-11-04 DIAGNOSIS — M17.0 PRIMARY OSTEOARTHRITIS OF BOTH KNEES: Primary | ICD-10-CM

## 2022-11-04 RX ORDER — LIDOCAINE HYDROCHLORIDE 10 MG/ML
3 INJECTION, SOLUTION INFILTRATION; PERINEURAL
Status: COMPLETED | OUTPATIENT
Start: 2022-11-04 | End: 2022-11-04

## 2022-11-04 RX ORDER — TRIAMCINOLONE ACETONIDE 40 MG/ML
40 INJECTION, SUSPENSION INTRA-ARTICULAR; INTRAMUSCULAR
Status: COMPLETED | OUTPATIENT
Start: 2022-11-04 | End: 2022-11-04

## 2022-11-04 RX ADMIN — LIDOCAINE HYDROCHLORIDE 3 ML: 10 INJECTION, SOLUTION INFILTRATION; PERINEURAL at 09:22

## 2022-11-04 RX ADMIN — TRIAMCINOLONE ACETONIDE 40 MG: 40 INJECTION, SUSPENSION INTRA-ARTICULAR; INTRAMUSCULAR at 09:22

## 2022-11-04 RX ADMIN — TRIAMCINOLONE ACETONIDE 40 MG: 40 INJECTION, SUSPENSION INTRA-ARTICULAR; INTRAMUSCULAR at 09:21

## 2022-11-04 RX ADMIN — LIDOCAINE HYDROCHLORIDE 3 ML: 10 INJECTION, SOLUTION INFILTRATION; PERINEURAL at 09:21

## 2022-11-04 NOTE — PROGRESS NOTES
Large joint arthrocentesis: L knee  Universal Protocol:  Consent: Verbal consent obtained  Risks and benefits: risks, benefits and alternatives were discussed  Consent given by: patient    Supporting Documentation  Indications: pain   Procedure Details  Location: knee - L knee  Needle size: 22 G  Ultrasound guidance: no  Approach: anterolateral  Medications administered: 3 mL lidocaine 1 %; 40 mg triamcinolone acetonide 40 mg/mL    Patient tolerance: patient tolerated the procedure well with no immediate complications    Risks and benefits of CSI were discussed with patient extensively  Risks were highlighted which included but were not limited to infection, pain, local site swelling, and chance that injection may not be effective  Patient was also counseled regarding glucose elevation days after receiving CSI and to be mindful of diet and check sugars daily  Patient agreeable to proceed with CSI after counseling

## 2022-11-04 NOTE — PROGRESS NOTES
Large joint arthrocentesis: R knee  Universal Protocol:  Consent: Verbal consent obtained  Risks and benefits: risks, benefits and alternatives were discussed  Consent given by: patient    Supporting Documentation  Indications: pain   Procedure Details  Location: knee - R knee  Needle size: 22 G  Ultrasound guidance: no  Approach: anterolateral  Medications administered: 3 mL lidocaine 1 %; 40 mg triamcinolone acetonide 40 mg/mL    Patient tolerance: patient tolerated the procedure well with no immediate complications    Risks and benefits of CSI were discussed with patient extensively  Risks were highlighted which included but were not limited to infection, pain, local site swelling, and chance that injection may not be effective  Patient was also counseled regarding glucose elevation days after receiving CSI and to be mindful of diet and check sugars daily  Patient agreeable to proceed with CSI after counseling

## 2022-11-04 NOTE — PROGRESS NOTES
Subjective:    Chief Complaint   Patient presents with   • Left Knee - Pain   • Right Knee - Pain       David Henley is a [de-identified] y o  female complains of bilateral knee pain  Onset of the symptoms was several weeks ago  Mechanism of injury: none  Aggravating factors: direct pressure, walking  and weight bearing  Treatment to date: corticosteroid injection which was effective  Symptoms have gradually worsened  The following portions of the patient's history were reviewed and updated as appropriate: allergies, current medications, past family history, past medical history, past social history, past surgical history and problem list     Occupation:   retired    Review of Systems   Constitutional: Negative for fever  Respiratory: Negative for cough and shortness of breath  Cardiovascular: Negative for leg swelling and palpitations  Gastrointestinal: Negative for constipation and diarrhea  Genitourinary: Negative for bladder incontinence and difficulty urinating  Skin: Negative for rash and ulcer  Neurological: Negative for dizziness and headaches  Hem/Lymph/Immuno: Negative for blood clots  Does not bruise/bleed easily  Psychiatric/Behavioral: Negative for confusion  Objective:  /86   Pulse 99   Ht 5' 2" (1 575 m)   Wt 59 kg (130 lb)   BMI 23 78 kg/m²   Skin: no rashes, lesions, skin discolorations, lacerations  Vasculature: normal popliteal and pedal pulse, normal skin color, normal capillary refill in extremity, no lower extremity edema  Neurologic: Neurologic exam is normal throughout lower extremities, Awake, alert, and oriented x3, no apparent distress  Musculoskeletal: Bilateral KNEE EXAM  Gait: limping gait positive   Inspection:No erythema, no induration  There is no gross deformity  Palpation: Swelling: negative  Effusion: negative   Medial joint line TTP:  Positive  Lateral joint line TTP: negative  ROM: Full flexion and extension  Special tests: Jayden's: negative  Instability to varus/valgus stress: negative  Anterior Drawer: negative Lachman's test: negative  Posterior Drawer: negative  Crepitus:  Positive        Imaging:  See final report     Assessment/Plan:  1  Left knee pain, unspecified chronicity    - XR knee 3 vw left non injury; Future    2  Right knee pain, unspecified chronicity    - XR knee 3 vw right non injury; Future    3  Primary osteoarthritis of both knees          > 40 min devoted to review of previous, pertinent medical records, imaging, discussion of treatment options, counseling and documentation  Bilateral knee radiographs independently reviewed and discussed with patient  Moderate degenerative changes noted, no acute fractures appreciated  Follow-up official reading  We discussed the nature of knee OA at length and detailed the treatment approach  Directed to ice the area daily for 20 minutes at a time using a barrier to protect the skin- stressed specifically icing after activity to address inflammation  CSI performed in office today without complication for bilateral knees  Reading information provided for Euflexxa injections    Advised to return if pain recurs

## 2023-01-10 ENCOUNTER — OFFICE VISIT (OUTPATIENT)
Dept: FAMILY MEDICINE CLINIC | Facility: CLINIC | Age: 83
End: 2023-01-10

## 2023-01-10 VITALS
HEART RATE: 78 BPM | SYSTOLIC BLOOD PRESSURE: 124 MMHG | WEIGHT: 128.6 LBS | DIASTOLIC BLOOD PRESSURE: 78 MMHG | TEMPERATURE: 97.5 F | RESPIRATION RATE: 18 BRPM | HEIGHT: 62 IN | BODY MASS INDEX: 23.66 KG/M2

## 2023-01-10 DIAGNOSIS — I10 PRIMARY HYPERTENSION: ICD-10-CM

## 2023-01-10 DIAGNOSIS — E78.2 MIXED HYPERLIPIDEMIA: ICD-10-CM

## 2023-01-10 DIAGNOSIS — E55.9 VITAMIN D DEFICIENCY: ICD-10-CM

## 2023-01-10 DIAGNOSIS — K21.9 GASTROESOPHAGEAL REFLUX DISEASE, UNSPECIFIED WHETHER ESOPHAGITIS PRESENT: Primary | ICD-10-CM

## 2023-01-10 NOTE — PROGRESS NOTES
Name: Beatrice Gomez      : 1940      MRN: [id number]  Encounter Provider: Alia Araya DO  Encounter Date: 1/10/2023   Encounter department: [address] [place] [address] Road     1  Gastroesophageal reflux disease, unspecified whether esophagitis present    2  Primary hypertension    3  Mixed hyperlipidemia  -     Lipid panel; Future  -     Comprehensive metabolic panel; Future    4  Vitamin D deficiency  -     Vitamin D 25 hydroxy; Future        Depression Screening and Follow-up Plan: Patient was screened for depression during today's encounter  They screened negative with a PHQ-2 score of 0  Subjective      HPI  Review of Systems   Constitutional: Negative for chills and fever  HENT: Negative  Eyes: Negative for visual disturbance  Respiratory: Negative for cough and shortness of breath  Cardiovascular: Negative for chest pain, palpitations and leg swelling  Gastrointestinal: Negative for abdominal distention and abdominal pain  Genitourinary: Negative  Musculoskeletal: Positive for arthralgias and gait problem  Neurological: Negative for dizziness, light-headedness and headaches  Psychiatric/Behavioral: Negative for sleep disturbance  The patient is not nervous/anxious          Current Outpatient Medications on File Prior to Visit   Medication Sig   • amLODIPine (NORVASC) 2 5 mg tablet Take 1 tablet by mouth once daily   • aspirin 81 MG tablet Take 81 mg by mouth daily   • atorvastatin (LIPITOR) 20 mg tablet Take 1 tablet by mouth once daily   • Calcium Carbonate-Vit D-Min (CALCIUM 1200 PO) Take 2,400 mg by mouth daily   • Cholecalciferol (VITAMIN D) 2000 UNITS tablet Take 2,000 Units by mouth daily   • fenofibrate micronized (LOFIBRA) 134 MG capsule TAKE 1 CAPSULE BY MOUTH ONCE DAILY WITH BREAKFAST   • hyoscyamine (ANASPAZ,LEVSIN) 0 125 MG tablet Take 1 tablet by mouth once daily   • Multiple Vitamins-Minerals (MULTIVITAMIN WOMEN) TABS Take by mouth   • omeprazole (PriLOSEC) 20 mg delayed release capsule Take 1 capsule by mouth once daily       Objective     /78   Pulse 78   Temp 97 5 °F (36 4 °C) (Temporal)   Resp 18   Ht 5' 2" (1 575 m)   Wt 58 3 kg (128 lb 9 6 oz)   BMI 23 52 kg/m²     Physical Exam  Vitals reviewed  Constitutional:       General: She is not in acute distress  Appearance: Normal appearance  She is not ill-appearing, toxic-appearing or diaphoretic  HENT:      Head: Normocephalic and atraumatic  Right Ear: External ear normal       Left Ear: External ear normal       Nose: Nose normal       Mouth/Throat:      Mouth: Mucous membranes are dry  Eyes:      General: No scleral icterus  Extraocular Movements: Extraocular movements intact  Conjunctiva/sclera: Conjunctivae normal       Pupils: Pupils are equal, round, and reactive to light  Cardiovascular:      Rate and Rhythm: Normal rate and regular rhythm  Pulses: Normal pulses  Heart sounds: Normal heart sounds  Pulmonary:      Effort: Pulmonary effort is normal  No respiratory distress  Breath sounds: Normal breath sounds  No wheezing  Abdominal:      General: Bowel sounds are normal  There is no distension  Palpations: Abdomen is soft  Tenderness: There is no abdominal tenderness  Musculoskeletal:      Cervical back: Normal range of motion and neck supple  Right lower leg: No edema  Left lower leg: No edema  Skin:     General: Skin is dry  Coloration: Skin is not jaundiced or pale  Neurological:      General: No focal deficit present  Mental Status: She is alert and oriented to person, place, and time  Mental status is at baseline  Psychiatric:         Mood and Affect: Mood normal          Behavior: Behavior normal          Thought Content:  Thought content normal          Judgment: Judgment normal        Kelsey Dela Cruz DO

## 2023-02-04 DIAGNOSIS — E78.5 HYPERLIPIDEMIA, UNSPECIFIED HYPERLIPIDEMIA TYPE: ICD-10-CM

## 2023-02-05 RX ORDER — FENOFIBRATE 134 MG/1
CAPSULE ORAL
Qty: 90 CAPSULE | Refills: 0 | Status: SHIPPED | OUTPATIENT
Start: 2023-02-05

## 2023-02-20 ENCOUNTER — OFFICE VISIT (OUTPATIENT)
Dept: FAMILY MEDICINE CLINIC | Facility: CLINIC | Age: 83
End: 2023-02-20

## 2023-02-20 VITALS
DIASTOLIC BLOOD PRESSURE: 62 MMHG | BODY MASS INDEX: 23.74 KG/M2 | WEIGHT: 129 LBS | HEART RATE: 79 BPM | SYSTOLIC BLOOD PRESSURE: 116 MMHG | OXYGEN SATURATION: 98 % | HEIGHT: 62 IN | TEMPERATURE: 97.2 F

## 2023-02-20 DIAGNOSIS — R35.0 URINARY FREQUENCY: Primary | ICD-10-CM

## 2023-02-20 DIAGNOSIS — R30.9 PAINFUL URINATION: ICD-10-CM

## 2023-02-20 LAB
SL AMB  POCT GLUCOSE, UA: NEGATIVE
SL AMB LEUKOCYTE ESTERASE,UA: NEGATIVE
SL AMB POCT BILIRUBIN,UA: NEGATIVE
SL AMB POCT BLOOD,UA: ABNORMAL
SL AMB POCT CLARITY,UA: CLEAR
SL AMB POCT COLOR,UA: YELLOW
SL AMB POCT KETONES,UA: NEGATIVE
SL AMB POCT NITRITE,UA: NEGATIVE
SL AMB POCT PH,UA: 6
SL AMB POCT SPECIFIC GRAVITY,UA: 1.02
SL AMB POCT URINE PROTEIN: NEGATIVE
SL AMB POCT UROBILINOGEN: 0.2

## 2023-02-20 RX ORDER — SULFAMETHOXAZOLE AND TRIMETHOPRIM 800; 160 MG/1; MG/1
1 TABLET ORAL EVERY 12 HOURS SCHEDULED
Qty: 10 TABLET | Refills: 0 | Status: SHIPPED | OUTPATIENT
Start: 2023-02-20 | End: 2023-02-25

## 2023-02-20 NOTE — PROGRESS NOTES
Assessment/Plan:    Problem List Items Addressed This Visit    None  Visit Diagnoses     Urinary frequency    -  Primary    Relevant Medications    sulfamethoxazole-trimethoprim (BACTRIM DS) 800-160 mg per tablet    Other Relevant Orders    Urine culture    POCT urine dip (Completed)    Painful urination        Relevant Medications    sulfamethoxazole-trimethoprim (BACTRIM DS) 800-160 mg per tablet           Diagnoses and all orders for this visit:    Urinary frequency  -     Urine culture; Future  -     POCT urine dip  -     Cancel: Urine culture  -     sulfamethoxazole-trimethoprim (BACTRIM DS) 800-160 mg per tablet; Take 1 tablet by mouth every 12 (twelve) hours for 5 days    Painful urination  -     sulfamethoxazole-trimethoprim (BACTRIM DS) 800-160 mg per tablet; Take 1 tablet by mouth every 12 (twelve) hours for 5 days      Pt likely with UTI due to findings of hematuria  She did not leave enough of a urine sample to send for urine culture  I did let her know that if her symptoms persist beyond taking the antibiotic, she will need to go to the lab and leave another sample for culture  Advised to increase water intake  Subjective:      Patient ID: Ronak Chavarria is a 82 y o  female  Cortez Bhat is here today complaining of painful, frequent urination since yesterday  The following portions of the patient's history were reviewed and updated as appropriate:   She has a past medical history of Arteriosclerotic heart disease, Esophageal reflux, GERD (gastroesophageal reflux disease), Hyperlipidemia, Hypertension, Nontoxic single thyroid nodule, and Osteopenia ,  does not have any pertinent problems on file  ,   has a past surgical history that includes Oophorectomy; Mastectomy (Bilateral); Cystoscopy (12/04/2013); Esophagogastroduodenoscopy; and Salpingoophorectomy (Bilateral)  ,  family history includes Alzheimer's disease in her mother; Colon cancer in her brother; Hypertension in her father; Stroke in her father  ,   reports that she has never smoked  She has never used smokeless tobacco  She reports that she does not drink alcohol and does not use drugs  ,  is allergic to ciprofloxacin, doxycycline, and nitrofurantoin     Current Outpatient Medications   Medication Sig Dispense Refill   • amLODIPine (NORVASC) 2 5 mg tablet Take 1 tablet by mouth once daily 90 tablet 3   • aspirin 81 MG tablet Take 81 mg by mouth daily     • atorvastatin (LIPITOR) 20 mg tablet Take 1 tablet by mouth once daily 90 tablet 3   • Calcium Carbonate-Vit D-Min (CALCIUM 1200 PO) Take 2,400 mg by mouth daily     • Cholecalciferol (VITAMIN D) 2000 UNITS tablet Take 2,000 Units by mouth daily     • fenofibrate micronized (LOFIBRA) 134 MG capsule TAKE 1 CAPSULE BY MOUTH ONCE DAILY WITH BREAKFAST 90 capsule 0   • hyoscyamine (ANASPAZ,LEVSIN) 0 125 MG tablet Take 1 tablet by mouth once daily 90 tablet 3   • Multiple Vitamins-Minerals (MULTIVITAMIN WOMEN) TABS Take by mouth     • omeprazole (PriLOSEC) 20 mg delayed release capsule Take 1 capsule by mouth once daily 90 capsule 3   • sulfamethoxazole-trimethoprim (BACTRIM DS) 800-160 mg per tablet Take 1 tablet by mouth every 12 (twelve) hours for 5 days 10 tablet 0     No current facility-administered medications for this visit  Review of Systems   Constitutional: Negative for activity change, appetite change, chills, diaphoresis, fatigue, fever and unexpected weight change  HENT: Negative for congestion, ear pain, postnasal drip, rhinorrhea, sinus pressure, sinus pain, sneezing, sore throat, tinnitus and voice change  Eyes: Negative for pain, redness and visual disturbance  Respiratory: Negative for cough, chest tightness, shortness of breath and wheezing  Cardiovascular: Negative for chest pain, palpitations and leg swelling  Gastrointestinal: Negative for abdominal pain, blood in stool, constipation, diarrhea, nausea and vomiting     Genitourinary: Positive for frequency and urgency  Negative for difficulty urinating, dysuria and hematuria  +pain with urination   Musculoskeletal: Negative for arthralgias, back pain, gait problem, joint swelling, myalgias, neck pain and neck stiffness  Skin: Negative for color change, pallor, rash and wound  Neurological: Negative for dizziness, tremors, weakness, light-headedness and headaches  Psychiatric/Behavioral: Negative for dysphoric mood, self-injury, sleep disturbance and suicidal ideas  The patient is not nervous/anxious  Objective:  Vitals:    02/20/23 1317   BP: 116/62   Pulse: 79   Temp: (!) 97 2 °F (36 2 °C)   SpO2: 98%   Weight: 58 5 kg (129 lb)   Height: 5' 2" (1 575 m)     Body mass index is 23 59 kg/m²  Physical Exam  Vitals reviewed  Constitutional:       General: She is not in acute distress  Appearance: She is well-developed  She is not diaphoretic  HENT:      Head: Normocephalic and atraumatic  Right Ear: Hearing, tympanic membrane, ear canal and external ear normal       Left Ear: Hearing, tympanic membrane, ear canal and external ear normal       Mouth/Throat:      Pharynx: Uvula midline  No oropharyngeal exudate  Eyes:      General: No scleral icterus  Right eye: No discharge  Left eye: No discharge  Conjunctiva/sclera: Conjunctivae normal    Neck:      Thyroid: No thyromegaly  Vascular: No carotid bruit  Cardiovascular:      Rate and Rhythm: Normal rate and regular rhythm  Heart sounds: Normal heart sounds  No murmur heard  Pulmonary:      Effort: Pulmonary effort is normal  No respiratory distress  Breath sounds: Normal breath sounds  No wheezing  Abdominal:      General: Bowel sounds are normal  There is no distension  Palpations: Abdomen is soft  There is no mass  Tenderness: There is no abdominal tenderness  There is no guarding or rebound  Musculoskeletal:         General: No tenderness  Normal range of motion        Cervical back: Neck supple  Lymphadenopathy:      Cervical: No cervical adenopathy  Skin:     General: Skin is warm and dry  Findings: No erythema or rash  Neurological:      Mental Status: She is alert and oriented to person, place, and time  Psychiatric:         Behavior: Behavior normal          Thought Content:  Thought content normal          Judgment: Judgment normal

## 2023-02-27 ENCOUNTER — OFFICE VISIT (OUTPATIENT)
Dept: FAMILY MEDICINE CLINIC | Facility: CLINIC | Age: 83
End: 2023-02-27

## 2023-02-27 ENCOUNTER — TELEPHONE (OUTPATIENT)
Dept: FAMILY MEDICINE CLINIC | Facility: CLINIC | Age: 83
End: 2023-02-27

## 2023-02-27 ENCOUNTER — APPOINTMENT (OUTPATIENT)
Dept: LAB | Facility: MEDICAL CENTER | Age: 83
End: 2023-02-27

## 2023-02-27 VITALS — BODY MASS INDEX: 23.59 KG/M2 | TEMPERATURE: 97 F | HEIGHT: 62 IN | WEIGHT: 128.2 LBS

## 2023-02-27 DIAGNOSIS — R30.0 DYSURIA: Primary | ICD-10-CM

## 2023-02-27 DIAGNOSIS — N76.1 SUBACUTE VAGINITIS: ICD-10-CM

## 2023-02-27 LAB
BACTERIA UR QL AUTO: ABNORMAL /HPF
BILIRUB UR QL STRIP: NEGATIVE
CLARITY UR: CLEAR
COLOR UR: YELLOW
GLUCOSE UR STRIP-MCNC: NEGATIVE MG/DL
HGB UR QL STRIP.AUTO: NEGATIVE
KETONES UR STRIP-MCNC: NEGATIVE MG/DL
LEUKOCYTE ESTERASE UR QL STRIP: ABNORMAL
NITRITE UR QL STRIP: NEGATIVE
NON-SQ EPI CELLS URNS QL MICRO: ABNORMAL /HPF
PH UR STRIP.AUTO: 6 [PH]
PROT UR STRIP-MCNC: NEGATIVE MG/DL
RBC #/AREA URNS AUTO: ABNORMAL /HPF
SP GR UR STRIP.AUTO: 1.01 (ref 1–1.03)
UROBILINOGEN UR STRIP-ACNC: <2 MG/DL
WBC #/AREA URNS AUTO: ABNORMAL /HPF

## 2023-02-27 RX ORDER — CLOBETASOL PROPIONATE 0.5 MG/G
OINTMENT TOPICAL 2 TIMES DAILY
Qty: 30 G | Refills: 0 | Status: SHIPPED | OUTPATIENT
Start: 2023-02-27

## 2023-02-27 NOTE — PROGRESS NOTES
Name: Yuriy Pryor      : 1940      MRN: [id number]  Encounter Provider: Wanda oRbert DO  Encounter Date: 2023   Encounter department: [address] [place] [address] Road     1  Dysuria  Pt will submit urine for culture today  Stay hydrated Limit caffeine  Await culture results for possible antibx    2  Subacute vaginitis  -     clobetasol (TEMOVATE) 0 05 % ointment; Apply topically 2 (two) times a day  Use externally prn itch/discomfort        Depression Screening and Follow-up Plan: Patient was screened for depression during today's encounter  They screened negative with a PHQ-2 score of 0  Subjective      HPI   Pt was seen by PA for dysuria and at that time was unable to give urine specimen Pt states dysuria improved after the antibiotic but still has itch and external discomfort No n/v No fever or chills No diarrhea Denies abdominal pain No hematuria Pt states can give urine sample today   Review of Systems   Constitutional: Negative for chills and fever  HENT: Negative  Eyes: Negative for visual disturbance  Respiratory: Negative for cough and shortness of breath  Cardiovascular: Negative for chest pain, palpitations and leg swelling  Gastrointestinal: Positive for abdominal distention  Negative for abdominal pain  Genitourinary: Negative for hematuria  Musculoskeletal: Positive for arthralgias  Neurological: Negative for dizziness, light-headedness and headaches  Psychiatric/Behavioral: Negative for sleep disturbance  The patient is not nervous/anxious          Current Outpatient Medications on File Prior to Visit   Medication Sig   • amLODIPine (NORVASC) 2 5 mg tablet Take 1 tablet by mouth once daily   • aspirin 81 MG tablet Take 81 mg by mouth daily   • atorvastatin (LIPITOR) 20 mg tablet Take 1 tablet by mouth once daily   • Calcium Carbonate-Vit D-Min (CALCIUM 1200 PO) Take 2,400 mg by mouth daily   • Cholecalciferol (VITAMIN D) 2000 UNITS tablet Take 2,000 Units by mouth daily   • fenofibrate micronized (LOFIBRA) 134 MG capsule TAKE 1 CAPSULE BY MOUTH ONCE DAILY WITH BREAKFAST   • hyoscyamine (ANASPAZ,LEVSIN) 0 125 MG tablet Take 1 tablet by mouth once daily   • Multiple Vitamins-Minerals (MULTIVITAMIN WOMEN) TABS Take by mouth   • omeprazole (PriLOSEC) 20 mg delayed release capsule Take 1 capsule by mouth once daily       Objective     Temp (!) 97 °F (36 1 °C) (Temporal)   Ht 5' 2" (1 575 m)   Wt 58 2 kg (128 lb 3 2 oz)   BMI 23 45 kg/m²     Physical Exam  Valiant DO Trino

## 2023-03-22 ENCOUNTER — APPOINTMENT (EMERGENCY)
Dept: CT IMAGING | Facility: HOSPITAL | Age: 83
End: 2023-03-22

## 2023-03-22 ENCOUNTER — HOSPITAL ENCOUNTER (OUTPATIENT)
Facility: HOSPITAL | Age: 83
Setting detail: OBSERVATION
LOS: 1 days | Discharge: HOME | End: 2023-03-23
Attending: EMERGENCY MEDICINE | Admitting: INTERNAL MEDICINE

## 2023-03-22 DIAGNOSIS — I25.10 ARTERIOSCLEROTIC HEART DISEASE: ICD-10-CM

## 2023-03-22 DIAGNOSIS — R10.9 ABDOMINAL PAIN: Primary | ICD-10-CM

## 2023-03-22 DIAGNOSIS — E55.9 VITAMIN D DEFICIENCY: ICD-10-CM

## 2023-03-22 DIAGNOSIS — K52.9 COLITIS: ICD-10-CM

## 2023-03-22 DIAGNOSIS — R79.89 ELEVATED TSH: ICD-10-CM

## 2023-03-22 DIAGNOSIS — E03.9 HYPOTHYROIDISM: ICD-10-CM

## 2023-03-22 DIAGNOSIS — K57.92 ACUTE DIVERTICULITIS: ICD-10-CM

## 2023-03-22 DIAGNOSIS — I10 ACCELERATED HYPERTENSION: ICD-10-CM

## 2023-03-22 LAB
ALBUMIN SERPL BCP-MCNC: 4.2 G/DL (ref 3.5–5)
ALP SERPL-CCNC: 46 U/L (ref 34–104)
ALT SERPL W P-5'-P-CCNC: 15 U/L (ref 7–52)
ANION GAP SERPL CALCULATED.3IONS-SCNC: 8 MMOL/L (ref 4–13)
AST SERPL W P-5'-P-CCNC: 29 U/L (ref 13–39)
BACTERIA UR QL AUTO: NORMAL /HPF
BASOPHILS # BLD AUTO: 0.05 THOUSANDS/ÂΜL (ref 0–0.1)
BASOPHILS NFR BLD AUTO: 1 % (ref 0–1)
BILIRUB DIRECT SERPL-MCNC: 0.13 MG/DL (ref 0–0.2)
BILIRUB SERPL-MCNC: 0.57 MG/DL (ref 0.2–1)
BILIRUB UR QL STRIP: NEGATIVE
BUN SERPL-MCNC: 25 MG/DL (ref 5–25)
CALCIUM SERPL-MCNC: 9.8 MG/DL (ref 8.4–10.2)
CARDIAC TROPONIN I PNL SERPL HS: 8 NG/L (ref 8–18)
CHLORIDE SERPL-SCNC: 103 MMOL/L (ref 96–108)
CLARITY UR: CLEAR
CO2 SERPL-SCNC: 26 MMOL/L (ref 21–32)
COLOR UR: YELLOW
CREAT SERPL-MCNC: 1.1 MG/DL (ref 0.6–1.3)
EOSINOPHIL # BLD AUTO: 0.12 THOUSAND/ÂΜL (ref 0–0.61)
EOSINOPHIL NFR BLD AUTO: 1 % (ref 0–6)
ERYTHROCYTE [DISTWIDTH] IN BLOOD BY AUTOMATED COUNT: 13.1 % (ref 11.6–15.1)
FLUAV RNA RESP QL NAA+PROBE: NEGATIVE
FLUBV RNA RESP QL NAA+PROBE: NEGATIVE
GFR SERPL CREATININE-BSD FRML MDRD: 47 ML/MIN/1.73SQ M
GLUCOSE SERPL-MCNC: 158 MG/DL (ref 65–140)
GLUCOSE UR STRIP-MCNC: NEGATIVE MG/DL
HCT VFR BLD AUTO: 47.3 % (ref 34.8–46.1)
HGB BLD-MCNC: 15.4 G/DL (ref 11.5–15.4)
HGB UR QL STRIP.AUTO: ABNORMAL
IMM GRANULOCYTES # BLD AUTO: 0.09 THOUSAND/UL (ref 0–0.2)
IMM GRANULOCYTES NFR BLD AUTO: 1 % (ref 0–2)
KETONES UR STRIP-MCNC: NEGATIVE MG/DL
LACTATE SERPL-SCNC: 1.3 MMOL/L (ref 0.5–2)
LEUKOCYTE ESTERASE UR QL STRIP: ABNORMAL
LIPASE SERPL-CCNC: 59 U/L (ref 11–82)
LYMPHOCYTES # BLD AUTO: 1.72 THOUSANDS/ÂΜL (ref 0.6–4.47)
LYMPHOCYTES NFR BLD AUTO: 19 % (ref 14–44)
MAGNESIUM SERPL-MCNC: 2.3 MG/DL (ref 1.9–2.7)
MCH RBC QN AUTO: 30 PG (ref 26.8–34.3)
MCHC RBC AUTO-ENTMCNC: 32.6 G/DL (ref 31.4–37.4)
MCV RBC AUTO: 92 FL (ref 82–98)
MONOCYTES # BLD AUTO: 0.8 THOUSAND/ÂΜL (ref 0.17–1.22)
MONOCYTES NFR BLD AUTO: 9 % (ref 4–12)
NEUTROPHILS # BLD AUTO: 6.49 THOUSANDS/ÂΜL (ref 1.85–7.62)
NEUTS SEG NFR BLD AUTO: 69 % (ref 43–75)
NITRITE UR QL STRIP: NEGATIVE
NON-SQ EPI CELLS URNS QL MICRO: NORMAL /HPF
NRBC BLD AUTO-RTO: 0 /100 WBCS
PH UR STRIP.AUTO: 7 [PH]
PLATELET # BLD AUTO: 299 THOUSANDS/UL (ref 149–390)
PMV BLD AUTO: 9.3 FL (ref 8.9–12.7)
POTASSIUM SERPL-SCNC: 3.9 MMOL/L (ref 3.5–5.3)
PROT SERPL-MCNC: 6.4 G/DL (ref 6.4–8.4)
PROT UR STRIP-MCNC: NEGATIVE MG/DL
RBC # BLD AUTO: 5.14 MILLION/UL (ref 3.81–5.12)
RBC #/AREA URNS AUTO: NORMAL /HPF
RSV RNA RESP QL NAA+PROBE: NEGATIVE
SARS-COV-2 RNA RESP QL NAA+PROBE: NEGATIVE
SODIUM SERPL-SCNC: 137 MMOL/L (ref 135–147)
SP GR UR STRIP.AUTO: 1.01 (ref 1–1.03)
TSH SERPL DL<=0.05 MIU/L-ACNC: 9.16 UIU/ML (ref 0.45–4.5)
UROBILINOGEN UR QL STRIP.AUTO: 0.2 E.U./DL
WBC # BLD AUTO: 9.27 THOUSAND/UL (ref 4.31–10.16)
WBC #/AREA URNS AUTO: NORMAL /HPF

## 2023-03-22 RX ORDER — FENTANYL CITRATE 50 UG/ML
25 INJECTION, SOLUTION INTRAMUSCULAR; INTRAVENOUS ONCE
Status: COMPLETED | OUTPATIENT
Start: 2023-03-22 | End: 2023-03-22

## 2023-03-22 RX ORDER — CEFTRIAXONE 1 G/50ML
1000 INJECTION, SOLUTION INTRAVENOUS ONCE
Status: COMPLETED | OUTPATIENT
Start: 2023-03-22 | End: 2023-03-23

## 2023-03-22 RX ORDER — METRONIDAZOLE 500 MG/100ML
500 INJECTION, SOLUTION INTRAVENOUS ONCE
Status: COMPLETED | OUTPATIENT
Start: 2023-03-22 | End: 2023-03-23

## 2023-03-22 RX ORDER — ONDANSETRON 2 MG/ML
4 INJECTION INTRAMUSCULAR; INTRAVENOUS ONCE
Status: COMPLETED | OUTPATIENT
Start: 2023-03-22 | End: 2023-03-22

## 2023-03-22 RX ORDER — SODIUM CHLORIDE, SODIUM LACTATE, POTASSIUM CHLORIDE, CALCIUM CHLORIDE 600; 310; 30; 20 MG/100ML; MG/100ML; MG/100ML; MG/100ML
125 INJECTION, SOLUTION INTRAVENOUS CONTINUOUS
Status: DISCONTINUED | OUTPATIENT
Start: 2023-03-22 | End: 2023-03-23

## 2023-03-22 RX ADMIN — FENTANYL CITRATE 25 MCG: 50 INJECTION INTRAMUSCULAR; INTRAVENOUS at 20:58

## 2023-03-22 RX ADMIN — IOHEXOL 85 ML: 350 INJECTION, SOLUTION INTRAVENOUS at 21:22

## 2023-03-22 RX ADMIN — SODIUM CHLORIDE, SODIUM LACTATE, POTASSIUM CHLORIDE, AND CALCIUM CHLORIDE 125 ML/HR: .6; .31; .03; .02 INJECTION, SOLUTION INTRAVENOUS at 21:03

## 2023-03-22 RX ADMIN — ONDANSETRON 4 MG: 2 INJECTION INTRAMUSCULAR; INTRAVENOUS at 20:34

## 2023-03-23 ENCOUNTER — TRANSITIONAL CARE MANAGEMENT (OUTPATIENT)
Dept: FAMILY MEDICINE CLINIC | Facility: CLINIC | Age: 83
End: 2023-03-23

## 2023-03-23 VITALS
DIASTOLIC BLOOD PRESSURE: 93 MMHG | HEART RATE: 93 BPM | TEMPERATURE: 98.3 F | SYSTOLIC BLOOD PRESSURE: 194 MMHG | OXYGEN SATURATION: 95 % | RESPIRATION RATE: 18 BRPM

## 2023-03-23 PROBLEM — Q61.02 MULTIPLE RENAL CYSTS: Status: ACTIVE | Noted: 2023-03-23

## 2023-03-23 PROBLEM — E27.8 ADRENAL HYPERPLASIA (HCC): Status: ACTIVE | Noted: 2023-03-23

## 2023-03-23 PROBLEM — R10.9 ABDOMINAL PAIN: Status: ACTIVE | Noted: 2023-03-23

## 2023-03-23 PROBLEM — K80.20 CHOLELITHIASIS: Status: ACTIVE | Noted: 2023-03-23

## 2023-03-23 PROBLEM — K52.9 COLITIS: Status: ACTIVE | Noted: 2023-03-23

## 2023-03-23 PROBLEM — R79.89 ELEVATED TSH: Status: ACTIVE | Noted: 2023-03-23

## 2023-03-23 PROBLEM — I44.7 LBBB (LEFT BUNDLE BRANCH BLOCK): Status: ACTIVE | Noted: 2023-03-23

## 2023-03-23 PROBLEM — K57.92 ACUTE DIVERTICULITIS: Status: ACTIVE | Noted: 2023-03-23

## 2023-03-23 PROBLEM — E03.9 HYPOTHYROIDISM: Status: ACTIVE | Noted: 2023-03-23

## 2023-03-23 PROBLEM — N18.32 STAGE 3B CHRONIC KIDNEY DISEASE (HCC): Status: ACTIVE | Noted: 2022-07-08

## 2023-03-23 LAB
ATRIAL RATE: 82 BPM
P AXIS: 40 DEGREES
PLATELET # BLD AUTO: 255 THOUSANDS/UL (ref 149–390)
PMV BLD AUTO: 9.3 FL (ref 8.9–12.7)
PR INTERVAL: 154 MS
PROCALCITONIN SERPL-MCNC: <0.05 NG/ML
QRS AXIS: 54 DEGREES
QRSD INTERVAL: 140 MS
QT INTERVAL: 426 MS
QTC INTERVAL: 497 MS
T WAVE AXIS: 110 DEGREES
T4 FREE SERPL-MCNC: 1.09 NG/DL (ref 0.76–1.46)
VENTRICULAR RATE: 82 BPM

## 2023-03-23 RX ORDER — METRONIDAZOLE 500 MG/100ML
500 INJECTION, SOLUTION INTRAVENOUS EVERY 8 HOURS
Status: DISCONTINUED | OUTPATIENT
Start: 2023-03-23 | End: 2023-03-23 | Stop reason: HOSPADM

## 2023-03-23 RX ORDER — ATORVASTATIN CALCIUM 40 MG/1
20 TABLET, FILM COATED ORAL
Status: DISCONTINUED | OUTPATIENT
Start: 2023-03-23 | End: 2023-03-23 | Stop reason: HOSPADM

## 2023-03-23 RX ORDER — AMLODIPINE BESYLATE 2.5 MG/1
2.5 TABLET ORAL DAILY
Status: DISCONTINUED | OUTPATIENT
Start: 2023-03-23 | End: 2023-03-23

## 2023-03-23 RX ORDER — METRONIDAZOLE 500 MG/1
500 TABLET ORAL EVERY 12 HOURS SCHEDULED
Qty: 10 TABLET | Refills: 0 | Status: SHIPPED | OUTPATIENT
Start: 2023-03-23 | End: 2023-03-28

## 2023-03-23 RX ORDER — ACETAMINOPHEN 325 MG/1
650 TABLET ORAL EVERY 6 HOURS PRN
Status: DISCONTINUED | OUTPATIENT
Start: 2023-03-23 | End: 2023-03-23 | Stop reason: HOSPADM

## 2023-03-23 RX ORDER — LANOLIN ALCOHOL/MO/W.PET/CERES
2 CREAM (GRAM) TOPICAL
Status: DISCONTINUED | OUTPATIENT
Start: 2023-03-23 | End: 2023-03-23 | Stop reason: HOSPADM

## 2023-03-23 RX ORDER — ACETAMINOPHEN 325 MG/1
650 TABLET ORAL EVERY 6 HOURS PRN
Refills: 0
Start: 2023-03-23

## 2023-03-23 RX ORDER — ACETAMINOPHEN 500 MG
1 TABLET ORAL DAILY
Refills: 0
Start: 2023-03-23

## 2023-03-23 RX ORDER — CEFTRIAXONE 1 G/50ML
1000 INJECTION, SOLUTION INTRAVENOUS EVERY 24 HOURS
Status: DISCONTINUED | OUTPATIENT
Start: 2023-03-23 | End: 2023-03-23 | Stop reason: HOSPADM

## 2023-03-23 RX ORDER — AMLODIPINE BESYLATE 5 MG/1
5 TABLET ORAL DAILY
Qty: 30 TABLET | Refills: 0 | Status: SHIPPED | OUTPATIENT
Start: 2023-03-23

## 2023-03-23 RX ORDER — MAGNESIUM HYDROXIDE/ALUMINUM HYDROXICE/SIMETHICONE 120; 1200; 1200 MG/30ML; MG/30ML; MG/30ML
30 SUSPENSION ORAL EVERY 6 HOURS PRN
Status: DISCONTINUED | OUTPATIENT
Start: 2023-03-23 | End: 2023-03-23 | Stop reason: HOSPADM

## 2023-03-23 RX ORDER — LEVOTHYROXINE SODIUM 0.03 MG/1
25 TABLET ORAL
Qty: 30 TABLET | Refills: 0 | Status: SHIPPED | OUTPATIENT
Start: 2023-03-23

## 2023-03-23 RX ORDER — SODIUM CHLORIDE, SODIUM LACTATE, POTASSIUM CHLORIDE, CALCIUM CHLORIDE 600; 310; 30; 20 MG/100ML; MG/100ML; MG/100ML; MG/100ML
100 INJECTION, SOLUTION INTRAVENOUS CONTINUOUS
Status: DISCONTINUED | OUTPATIENT
Start: 2023-03-23 | End: 2023-03-23

## 2023-03-23 RX ORDER — MELATONIN
2000 DAILY
Status: DISCONTINUED | OUTPATIENT
Start: 2023-03-23 | End: 2023-03-23 | Stop reason: HOSPADM

## 2023-03-23 RX ORDER — PANTOPRAZOLE SODIUM 20 MG/1
20 TABLET, DELAYED RELEASE ORAL
Status: DISCONTINUED | OUTPATIENT
Start: 2023-03-23 | End: 2023-03-23 | Stop reason: HOSPADM

## 2023-03-23 RX ORDER — FENOFIBRATE 145 MG/1
145 TABLET, COATED ORAL DAILY
Status: DISCONTINUED | OUTPATIENT
Start: 2023-03-23 | End: 2023-03-23 | Stop reason: HOSPADM

## 2023-03-23 RX ORDER — ASPIRIN 81 MG/1
81 TABLET ORAL DAILY
Status: DISCONTINUED | OUTPATIENT
Start: 2023-03-23 | End: 2023-03-23 | Stop reason: HOSPADM

## 2023-03-23 RX ORDER — AMLODIPINE BESYLATE 5 MG/1
5 TABLET ORAL DAILY
Status: DISCONTINUED | OUTPATIENT
Start: 2023-03-23 | End: 2023-03-23 | Stop reason: HOSPADM

## 2023-03-23 RX ORDER — CEFPODOXIME PROXETIL 200 MG/1
200 TABLET, FILM COATED ORAL DAILY
Qty: 5 TABLET | Refills: 0 | Status: SHIPPED | OUTPATIENT
Start: 2023-03-23 | End: 2023-03-28

## 2023-03-23 RX ORDER — ENOXAPARIN SODIUM 100 MG/ML
40 INJECTION SUBCUTANEOUS DAILY
Status: DISCONTINUED | OUTPATIENT
Start: 2023-03-23 | End: 2023-03-23 | Stop reason: HOSPADM

## 2023-03-23 RX ORDER — ONDANSETRON 2 MG/ML
4 INJECTION INTRAMUSCULAR; INTRAVENOUS EVERY 6 HOURS PRN
Status: DISCONTINUED | OUTPATIENT
Start: 2023-03-23 | End: 2023-03-23 | Stop reason: HOSPADM

## 2023-03-23 RX ADMIN — METRONIDAZOLE 500 MG: 500 INJECTION, SOLUTION INTRAVENOUS at 00:41

## 2023-03-23 RX ADMIN — AMLODIPINE BESYLATE 5 MG: 5 TABLET ORAL at 11:45

## 2023-03-23 RX ADMIN — CEFTRIAXONE 1000 MG: 1 INJECTION, SOLUTION INTRAVENOUS at 00:05

## 2023-03-23 RX ADMIN — ASPIRIN 81 MG: 81 TABLET, COATED ORAL at 08:14

## 2023-03-23 RX ADMIN — MORPHINE SULFATE 2 MG: 2 INJECTION, SOLUTION INTRAMUSCULAR; INTRAVENOUS at 00:03

## 2023-03-23 RX ADMIN — MULTIPLE VITAMINS W/ MINERALS TAB 1 TABLET: TAB at 08:14

## 2023-03-23 RX ADMIN — FENOFIBRATE 145 MG: 145 TABLET, FILM COATED ORAL at 08:14

## 2023-03-23 RX ADMIN — PANTOPRAZOLE SODIUM 20 MG: 20 TABLET, DELAYED RELEASE ORAL at 05:15

## 2023-03-23 RX ADMIN — Medication 2 TABLET: at 08:14

## 2023-03-23 RX ADMIN — AMLODIPINE BESYLATE 2.5 MG: 2.5 TABLET ORAL at 08:13

## 2023-03-23 RX ADMIN — METRONIDAZOLE 500 MG: 500 INJECTION, SOLUTION INTRAVENOUS at 06:11

## 2023-03-23 RX ADMIN — ENOXAPARIN SODIUM 40 MG: 40 INJECTION SUBCUTANEOUS at 08:16

## 2023-03-23 RX ADMIN — SODIUM CHLORIDE, SODIUM LACTATE, POTASSIUM CHLORIDE, AND CALCIUM CHLORIDE 100 ML/HR: .6; .31; .03; .02 INJECTION, SOLUTION INTRAVENOUS at 08:19

## 2023-03-23 RX ADMIN — Medication 2000 UNITS: at 08:13

## 2023-03-23 NOTE — ASSESSMENT & PLAN NOTE
· Outpatient surveillance imaging with PCP    CT scan of the abdomen/pelvis (03/22/2023):  KIDNEYS/URETERS:  No hydronephrosis or urinary tract calculus  One or more sharply circumscribed subcentimeter renal hypodensities are present, too small to accurately characterize, and statistically most likely benign findings  According to recent literature (Radiology 2019) no further workup of these findings is recommended

## 2023-03-23 NOTE — ASSESSMENT & PLAN NOTE
· Continue aspirin 81 mg PO Qdaily and atorvastatin 20 mg PO Qdaily  · Outpatient Cardiology evaluation

## 2023-03-23 NOTE — ASSESSMENT & PLAN NOTE
· Initiate levothyroxine 25 mcg PO Qdaily in the early morning  · Recheck a TSH level in 4-6 weeks with her PCP  · Outpatient Endocrinology evaluation     Latest Reference Range & Units 03/22/23 20:37   TSH 3RD GENERATON 0 450 - 4 500 uIU/mL 9 161 (H)   Free T4 0 76 - 1 46 ng/dL 1 09   (H): Data is abnormally high

## 2023-03-23 NOTE — ED PROVIDER NOTES
History  Chief Complaint   Patient presents with   • Abdominal Pain     Patient reports abd pain starting about an hour ago and also has some rectal pressure  Patient states her last bowel movement was today  83 yo female with history of hypertension hyperlipidemia GERD chronic kidney disease osteoarthritis presents by ambulance with abdominal pain which she describes as a heaviness to the suprapubic region that is nonradiating that started an hour prior to arrival to triage she also complained of rectal pressure she had 2 normal bowel movements today denies any melena or hematochezia but has had dry heaves she has persistent nausea  She does not feel bloated  There is no dysuria or increased urinary frequency no fever chills no sick contacts no dysuria or increased urinary frequency  She denies any chest pain shortness of breath or lightheadedness she was slightly clammy when she was lightheaded  She has no pleuritic pain  No leg swelling  No fever chills cough or upper respiratory complaints  No falls or trauma  No prior hx of this type of pain          Prior to Admission Medications   Prescriptions Last Dose Informant Patient Reported? Taking?    Calcium Carbonate-Vit D-Min (CALCIUM 1200 PO)   Yes No   Sig: Take 2,400 mg by mouth daily   Cholecalciferol (VITAMIN D) 2000 UNITS tablet   Yes No   Sig: Take 2,000 Units by mouth daily   Multiple Vitamins-Minerals (MULTIVITAMIN WOMEN) TABS   Yes No   Sig: Take by mouth   amLODIPine (NORVASC) 2 5 mg tablet   No No   Sig: Take 1 tablet by mouth once daily   aspirin 81 MG tablet   Yes No   Sig: Take 81 mg by mouth daily   atorvastatin (LIPITOR) 20 mg tablet   No No   Sig: Take 1 tablet by mouth once daily   clobetasol (TEMOVATE) 0 05 % ointment   No No   Sig: Apply topically 2 (two) times a day   fenofibrate micronized (LOFIBRA) 134 MG capsule   No No   Sig: TAKE 1 CAPSULE BY MOUTH ONCE DAILY WITH BREAKFAST   hyoscyamine (ANASPAZ,LEVSIN) 0 125 MG tablet   No No Sig: Take 1 tablet by mouth once daily   omeprazole (PriLOSEC) 20 mg delayed release capsule   No No   Sig: Take 1 capsule by mouth once daily      Facility-Administered Medications: None       Past Medical History:   Diagnosis Date   • Arteriosclerotic heart disease     LAST ASSESSED: [id number]   • Esophageal reflux     LAST ASSESSED: [id number]   • GERD (gastroesophageal reflux disease)    • Hyperlipidemia     LAST ASSESSED: [id number]   • Hypertension     LAST ASSESSED: [id number]   • Nontoxic single thyroid nodule     LAST ASSESSED: [id number]   • Osteopenia     LAST ASSESSED: [id number]       Past Surgical History:   Procedure Laterality Date   • CYSTOSCOPY  12/04/2013    DIAGNOSTIC   • ESOPHAGOGASTRODUODENOSCOPY     • MASTECTOMY Bilateral     cancer   • OOPHORECTOMY     • SALPINGOOPHORECTOMY Bilateral        Family History   Problem Relation Age of Onset   • Alzheimer's disease Mother    • Hypertension Father    • Stroke Father         SYNDROME   • Colon cancer Brother      I have reviewed and agree with the history as documented  E-Cigarette/Vaping   • E-Cigarette Use Never User      E-Cigarette/Vaping Substances   • Nicotine No    • THC No    • CBD No    • Flavoring No    • Other No    • Unknown No      Social History     Tobacco Use   • Smoking status: Never   • Smokeless tobacco: Never   Vaping Use   • Vaping Use: Never used   Substance Use Topics   • Alcohol use: No   • Drug use: No       Review of Systems   Constitutional: Positive for activity change and appetite change  Negative for chills, diaphoresis and fever  HENT: Negative for congestion, ear pain, rhinorrhea, sneezing and sore throat  Eyes: Negative for discharge  Respiratory: Negative for cough and shortness of breath  Cardiovascular: Negative for chest pain and leg swelling  Gastrointestinal: Positive for abdominal pain, nausea and vomiting  Negative for blood in stool, constipation and diarrhea  Endocrine: Negative for polyuria  Genitourinary: Negative for difficulty urinating, dysuria, flank pain, frequency and urgency  Musculoskeletal: Negative for back pain, myalgias, neck pain and neck stiffness  Skin: Negative for rash  Neurological: Negative for dizziness, weakness, light-headedness, numbness and headaches  Hematological: Negative for adenopathy  Psychiatric/Behavioral: Negative for confusion  All other systems reviewed and are negative  Physical Exam  Physical Exam  Vitals and nursing note reviewed  Exam conducted with a chaperone present  Constitutional:       General: She is in acute distress (nausea)  Appearance: She is well-developed  She is not ill-appearing, toxic-appearing or diaphoretic  Comments: Appears younger than stated age   HENT:      Head: Normocephalic and atraumatic  Right Ear: Tympanic membrane and external ear normal       Left Ear: Tympanic membrane and external ear normal       Nose: Nose normal       Mouth/Throat:      Mouth: Mucous membranes are moist       Pharynx: No oropharyngeal exudate or posterior oropharyngeal erythema  Eyes:      General:         Right eye: No discharge  Left eye: No discharge  Extraocular Movements: Extraocular movements intact  Conjunctiva/sclera: Conjunctivae normal       Pupils: Pupils are equal, round, and reactive to light  Neck:      Comments: No midline or paraspinous tenderness  Cardiovascular:      Rate and Rhythm: Normal rate and regular rhythm  Pulses: Normal pulses  Heart sounds: Normal heart sounds  Pulmonary:      Effort: Pulmonary effort is normal  No respiratory distress  Breath sounds: Normal breath sounds  Abdominal:      General: Bowel sounds are normal  There is no distension  Palpations: Abdomen is soft  Tenderness: There is abdominal tenderness (suprapubic)  There is no right CVA tenderness, left CVA tenderness, guarding or rebound        Comments: Hyperactive BS Back no midline T or L spine tenderness   Genitourinary:     Comments: Rectal exam: chaparoned by Kimberli Delaney RN external hemorroids non thrombosed; normal tone; rectal vault without fecal impaction scant brown tool on guiac neg controls intact  Musculoskeletal:         General: No tenderness or deformity  Normal range of motion  Cervical back: Normal range of motion and neck supple  No rigidity or tenderness  Right lower leg: No edema  Left lower leg: No edema  Skin:     General: Skin is warm and dry  Capillary Refill: Capillary refill takes less than 2 seconds  Neurological:      Mental Status: She is alert and oriented to person, place, and time  Cranial Nerves: No cranial nerve deficit  Sensory: No sensory deficit  Motor: No weakness or abnormal muscle tone        Coordination: Coordination normal    Psychiatric:         Mood and Affect: Mood normal          Vital Signs  ED Triage Vitals   Temperature Pulse Respirations Blood Pressure SpO2   03/22/23 2017 03/22/23 2017 03/22/23 2017 03/22/23 2017 03/22/23 2017   98 3 °F (36 8 °C) 80 18 (!) 194/88 97 %      Temp src Heart Rate Source Patient Position - Orthostatic VS BP Location FiO2 (%)   -- 03/22/23 2017 03/22/23 2017 03/22/23 2017 --    Monitor Lying Right arm       Pain Score       03/22/23 2058       9           Vitals:    03/22/23 2017 03/22/23 2330 03/23/23 0230   BP: (!) 194/88 (!) 187/79 (!) 173/72   Pulse: 80 83 75   Patient Position - Orthostatic VS: Lying           Visual Acuity      ED Medications  Medications   lactated ringers infusion (125 mL/hr Intravenous New Bag 3/22/23 2103)   cefTRIAXone (ROCEPHIN) IVPB (premix in dextrose) 1,000 mg 50 mL (has no administration in time range)   metroNIDAZOLE (FLAGYL) IVPB (premix) 500 mg 100 mL (has no administration in time range)   ondansetron (ZOFRAN) injection 4 mg (has no administration in time range)   acetaminophen (TYLENOL) tablet 650 mg (has no administration in time range) enoxaparin (LOVENOX) subcutaneous injection 40 mg (has no administration in time range)   aluminum-magnesium hydroxide-simethicone (MYLANTA) oral suspension 30 mL (has no administration in time range)   amLODIPine (NORVASC) tablet 2 5 mg (has no administration in time range)   aspirin (ECOTRIN LOW STRENGTH) EC tablet 81 mg (has no administration in time range)   atorvastatin (LIPITOR) tablet 20 mg (has no administration in time range)   calcium carbonate-vitamin D 500 mg-5 mcg tablet 2 tablet (has no administration in time range)   cholecalciferol (VITAMIN D3) tablet 2,000 Units (has no administration in time range)   fenofibrate (TRICOR) tablet 145 mg (has no administration in time range)   multivitamin-minerals (CENTRUM) tablet 1 tablet (has no administration in time range)   pantoprazole (PROTONIX) EC tablet 20 mg (20 mg Oral Given 3/23/23 0515)   morphine injection 2 mg (has no administration in time range)   ondansetron (ZOFRAN) injection 4 mg (4 mg Intravenous Given 3/22/23 2034)   fentanyl citrate (PF) 100 MCG/2ML 25 mcg (25 mcg Intravenous Given 3/22/23 2058)   iohexol (OMNIPAQUE) 350 MG/ML injection (SINGLE-DOSE) 85 mL (85 mL Intravenous Given 3/22/23 2122)   cefTRIAXone (ROCEPHIN) IVPB (premix in dextrose) 1,000 mg 50 mL (0 mg Intravenous Stopped 3/23/23 0043)   metroNIDAZOLE (FLAGYL) IVPB (premix) 500 mg 100 mL (0 mg Intravenous Stopped 3/23/23 0140)   morphine injection 2 mg (2 mg Intravenous Given 3/23/23 0003)       Diagnostic Studies  Results Reviewed     Procedure Component Value Units Date/Time    Procalcitonin [[id number]] Collected: 03/23/23 0517    Lab Status: In process Specimen: Blood from Arm, Left Updated: 03/23/23 0521    Platelet count [[id number]]  (Normal) Collected: 03/23/23 0144    Lab Status: Final result Specimen: Blood from Arm, Left Updated: 03/23/23 0152     Platelets 202 Thousands/uL      MPV 9 3 fL     Blood culture #1 [[id number]] Collected: 03/22/23 2353    Lab Status:  In process Specimen: Blood from Arm, Left Updated: 03/23/23 0003    Blood culture #2 [[id number]] Collected: 03/22/23 2353    Lab Status: In process Specimen: Blood from Arm, Right Updated: 03/23/23 0003    Urine Microscopic [[id number]]  (Normal) Collected: 03/22/23 2154    Lab Status: Final result Specimen: Urine, Other Updated: 03/22/23 2222     RBC, UA 1-2 /hpf      WBC, UA 1-2 /hpf      Epithelial Cells Occasional /hpf      Bacteria, UA Occasional /hpf     UA w Reflex to Microscopic w Reflex to Culture [[id number]]  (Abnormal) Collected: 03/22/23 2154    Lab Status: Final result Specimen: Urine, Other Updated: 03/22/23 2201     Color, UA Yellow     Clarity, UA Clear     Specific Gravity, UA 1 010     pH, UA 7 0     Leukocytes, UA Trace     Nitrite, UA Negative     Protein, UA Negative mg/dl      Glucose, UA Negative mg/dl      Ketones, UA Negative mg/dl      Urobilinogen, UA 0 2 E U /dl      Bilirubin, UA Negative     Occult Blood, UA Small    FLU/RSV/COVID - if FLU/RSV clinically relevant [[id number]]  (Normal) Collected: 03/22/23 2037    Lab Status: Final result Specimen: Nares from Nose Updated: 03/22/23 2129     SARS-CoV-2 Negative     INFLUENZA A PCR Negative     INFLUENZA B PCR Negative     RSV PCR Negative    Narrative:      FOR PEDIATRIC PATIENTS - copy/paste COVID Guidelines URL to browser: [email or web address]    SARS-CoV-2 assay is a Nucleic Acid Amplification assay intended for the  qualitative detection of nucleic acid from SARS-CoV-2 in nasopharyngeal  swabs  Results are for the presumptive identification of SARS-CoV-2 RNA  Positive results are indicative of infection with SARS-CoV-2, the virus  causing COVID-19, but do not rule out bacterial infection or co-infection  with other viruses  Laboratories within the [place] and its  territories are required to report all positive results to the appropriate  public health authorities   Negative results do not preclude SARS-CoV-2  infection and should not be used as the sole basis for treatment or other  patient management decisions  Negative results must be combined with  clinical observations, patient history, and epidemiological information  This test has not been FDA cleared or approved  This test has been authorized by FDA under an Emergency Use Authorization  (EUA)  This test is only authorized for the duration of time the  declaration that circumstances exist justifying the authorization of the  emergency use of an in vitro diagnostic tests for detection of SARS-CoV-2  virus and/or diagnosis of COVID-19 infection under section 564(b)(1) of  the Act, 21 U  S C  928NLE-7(C)(3), unless the authorization is terminated  or revoked sooner  The test has been validated but independent review by FDA  and CLIA is pending  Test performed using [employer] GeneXpert: This RT-PCR assay targets N2,  a region unique to SARS-CoV-2  A conserved region in the E-gene was chosen  for pan-Sarbecovirus detection which includes SARS-CoV-2  According to CMS-2020-01-R, this platform meets the definition of high-throughput technology  TSH, 3rd generation with Free T4 reflex [[id number]]  (Abnormal) Collected: 03/22/23 2037    Lab Status: Final result Specimen: Blood from Arm, Left Updated: 03/22/23 2122     TSH 3RD GENERATON 9 161 uIU/mL     T4, free [[id number]] Collected: 03/22/23 2037    Lab Status: In process Specimen: Blood from Arm, Left Updated: 03/22/23 2122    High Sensitivity Troponin I Random [[id number]]  (Normal) Collected: 03/22/23 2037    Lab Status: Final result Specimen: Blood from Arm, Left Updated: 03/22/23 2113     HS TnI random 8 ng/L     Lactic acid [[id number]]  (Normal) Collected: 03/22/23 2037    Lab Status: Final result Specimen: Blood from Arm, Left Updated: 03/22/23 2107     LACTIC ACID 1 3 mmol/L     Narrative:      Result may be elevated if tourniquet was used during collection      Hepatic function panel [[id number]]  (Normal) Collected: 03/22/23 2037    Lab Status: Final result Specimen: Blood from Arm, Left Updated: 03/22/23 2107     Total Bilirubin 0 57 mg/dL      Bilirubin, Direct 0 13 mg/dL      Alkaline Phosphatase 46 U/L      AST 29 U/L      ALT 15 U/L      Total Protein 6 4 g/dL      Albumin 4 2 g/dL     Lipase [[id number]]  (Normal) Collected: 03/22/23 2037    Lab Status: Final result Specimen: Blood from Arm, Left Updated: 03/22/23 2107     Lipase 59 u/L     Basic metabolic panel [[id number]]  (Abnormal) Collected: 03/22/23 2037    Lab Status: Final result Specimen: Blood from Arm, Left Updated: 03/22/23 2107     Sodium 137 mmol/L      Potassium 3 9 mmol/L      Chloride 103 mmol/L      CO2 26 mmol/L      ANION GAP 8 mmol/L      BUN 25 mg/dL      Creatinine 1 10 mg/dL      Glucose 158 mg/dL      Calcium 9 8 mg/dL      eGFR 47 ml/min/1 73sq m     Narrative:      [place] guidelines for Chronic Kidney Disease (CKD):   •  Stage 1 with normal or high GFR (GFR > 90 mL/min/1 73 square meters)  •  Stage 2 Mild CKD (GFR = 60-89 mL/min/1 73 square meters)  •  Stage 3A Moderate CKD (GFR = 45-59 mL/min/1 73 square meters)  •  Stage 3B Moderate CKD (GFR = 30-44 mL/min/1 73 square meters)  •  Stage 4 Severe CKD (GFR = 15-29 mL/min/1 73 square meters)  •  Stage 5 End Stage CKD (GFR <15 mL/min/1 73 square meters)  Note: GFR calculation is accurate only with a steady state creatinine    Magnesium [[id number]]  (Normal) Collected: 03/22/23 2037    Lab Status: Final result Specimen: Blood from Arm, Left Updated: 03/22/23 2107     Magnesium 2 3 mg/dL     CBC and differential [[id number]]  (Abnormal) Collected: 03/22/23 2037    Lab Status: Final result Specimen: Blood from Arm, Left Updated: 03/22/23 2050     WBC 9 27 Thousand/uL      RBC 5 14 Million/uL      Hemoglobin 15 4 g/dL      Hematocrit 47 3 %      MCV 92 fL      MCH 30 0 pg      MCHC 32 6 g/dL      RDW 13 1 %      MPV 9 3 fL Platelets 263 Thousands/uL      nRBC 0 /100 WBCs      Neutrophils Relative 69 %      Immat GRANS % 1 %      Lymphocytes Relative 19 %      Monocytes Relative 9 %      Eosinophils Relative 1 %      Basophils Relative 1 %      Neutrophils Absolute 6 49 Thousands/µL      Immature Grans Absolute 0 09 Thousand/uL      Lymphocytes Absolute 1 72 Thousands/µL      Monocytes Absolute 0 80 Thousand/µL      Eosinophils Absolute 0 12 Thousand/µL      Basophils Absolute 0 05 Thousands/µL                  CT abdomen pelvis with contrast   Final Result by Bevely Reason, DO (03/22 1504)      Thickening of the descending colon and sigmoid colon wall which may represent colitis  Follow-up with gastroenterology is recommended  Colonic diverticulosis with minimal inflammatory changes around the sigmoid colon which may represent acute diverticulitis  No drainable fluid collection  Recommend posttreatment colonoscopy to rule out underlying neoplasm  Limited evaluation of the transverse colon due to underdistention  Follow-up at the same time as the above process can be obtained  The study was marked in [facility] for immediate notification              Workstation performed: [id number]                    Procedures  ECG 12 Lead Documentation Only    Date/Time: 3/22/2023 8:19 PM  Performed by: Chris Dennis MD  Authorized by: Chris Dennis MD     Indications / Diagnosis:  Abdm pain  ECG reviewed by me, the ED Provider: yes    Patient location:  ED  Previous ECG:     Previous ECG:  Compared to current    Comparison ECG info:  8/10/22    Similarity:  No change  Rate:     ECG rate:  82    ECG rate assessment: normal    Rhythm:     Rhythm: sinus rhythm    QRS:     QRS axis:  Normal  Comments:      LBBB seen previously              ED Course  ED Course as of 03/23/23 0532   Wed Mar 22, 2023   2345 OK to hold LR with administration of ceftriaxone   Thu Mar 23, 2023   0000 Patient updated with CT a/p findings continues to complain of pain will rx rocephin and flagyl recommend hospitalization   0054 Case discussed with Dr Oscar Kim of [facility] recommends obs to hospitalist service                               SBIRT 20yo+    Flowsheet Row Most Recent Value   SBIRT (23 yo +)    In order to provide better care to our patients, we are screening all of our patients for alcohol and drug use  Would it be okay to ask you these screening questions? Yes Filed at: 03/22/2023 2021   Initial Alcohol Screen: US AUDIT-C     1  How often do you have a drink containing alcohol? 0 Filed at: 03/22/2023 2021   2  How many drinks containing alcohol do you have on a typical day you are drinking? 0 Filed at: 03/22/2023 2021   3a  Male UNDER 65: How often do you have five or more drinks on one occasion? 0 Filed at: 03/22/2023 2021   3b  FEMALE Any Age, or MALE 65+: How often do you have 4 or more drinks on one occassion? 0 Filed at: 03/22/2023 2021   Audit-C Score 0 Filed at: 03/22/2023 2021   RONNY: How many times in the past year have you    Used an illegal drug or used a prescription medication for non-medical reasons? Never Filed at: 03/22/2023 2021                    Medical Decision Making  Mdm: 68-year-old female in usual state of health with acute onset of suprapubic abdominal pain and rectal pressure nausea  She does not having pain out of proportion clinical index of suspicion for ischemic colitis is low she is heme-negative on rectal exam   Will evaluate for appendicitis diverticulitis UTI pancreatitis colitis initiate symptomatic management with Zofran and fentanyl maintenance fluids proceed with CT abdomen pelvis with contrast and reevaluate  Amount and/or Complexity of Data Reviewed  Labs: ordered  Radiology: ordered  Risk  Prescription drug management  Decision regarding hospitalization            Disposition  Final diagnoses:   Abdominal pain - acute colitis and/or diverticulitis   Elevated TSH     Time reflects when diagnosis was documented in both MDM as applicable and the Disposition within this note     Time User Action Codes Description Comment    3/22/2023 11:44 PM [place] Add [K52 9] Acute colitis     3/22/2023 11:44 PM Ene Adh[place]arion [K52 9] Acute colitis     3/22/2023 11:44 PM [place] Add [R10 9] Abdominal pain     3/22/2023 11:45 PM [place] Modify [R10 9] Abdominal pain colitis and or diverticulitis    3/22/2023 11:45 PM [place] Modify [R10 9] Abdominal pain acute colitis and/or diverticulitis    3/23/2023 12:23 AM [place] Add [R79 89] Elevated TSH       ED Disposition     ED Disposition   Admit    Condition   Stable    Date/Time   Thu Mar 23, 2023 12:00 AM    Comment   Case was discussed with Dr Murray Goldman and the patient's admission status was agreed to be Admission Status: observation status to the service of Dr Murray Goldman    Follow-up Information    None         Patient's Medications   Discharge Prescriptions    No medications on file       No discharge procedures on file      PDMP Review     None          ED Provider  Electronically Signed by           Re Kevin MD  03/23/23 8272

## 2023-03-23 NOTE — ASSESSMENT & PLAN NOTE
Lab Results   Component Value Date    EGFR 47 03/22/2023    EGFR 50 07/06/2022    EGFR 50 06/23/2021    CREATININE 1 10 03/22/2023    CREATININE 1 05 07/06/2022    CREATININE 1 06 06/23/2021     · At her renal function baseline of 0 9-1 1 mg/dl  · Avoid all nephrotoxic agents  · Monitor the patient's renal function after discharge with her PCP

## 2023-03-23 NOTE — ASSESSMENT & PLAN NOTE
· Continue cholecalciferol supplementation  • Outpatient follow-up with PCP in regards to this matter

## 2023-03-23 NOTE — ASSESSMENT & PLAN NOTE
· Outpatient surveillance imaging with PCP    CT scan of the abdomen/pelvis (03/22/2023):  ADRENAL GLANDS:  There is low density lobulated thickening of adrenal glands bilaterally statistically most likely to represent adenomatous hyperplasia

## 2023-03-23 NOTE — ASSESSMENT & PLAN NOTE
· Outpatient General Surgery evaluation if she becomes symptomatic    CT scan of the abdomen/pelvis (03/22/2023):  GALLBLADDER:  There are gallstone(s) within the gallbladder, without pericholecystic inflammatory changes

## 2023-03-23 NOTE — CASE MANAGEMENT
Case Management Discharge Planning Note    Patient name Natalia Noble  Location [facility] MRN [id number]  : 1940 Date 3/23/2023       Current Admission Date: 3/22/2023  Current Admission Diagnosis:Abdominal pain   Patient Active Problem List    Diagnosis Date Noted   • Abdominal pain 2023   • Elevated TSH 2023   • Multiple renal cysts 2023   • Cholelithiasis 2023   • Colitis 2023   • Acute diverticulitis 2023   • Dysuria 2023   • Stage 3a chronic kidney disease ([facility] [address] ) 2022   • Vitamin D deficiency 2022   • Primary localized osteoarthritis of left knee 10/25/2019   • Medicare annual wellness visit, subsequent 2018   • Arteriosclerotic heart disease 2013   • Hyperlipidemia 2013   • Osteopenia 2012   • Esophageal reflux 2012   • Accelerated hypertension 2012   • Nontoxic single thyroid nodule 2012      LOS (days): 1  Geometric Mean LOS (GMLOS) (days): 2 60  Days to GMLOS:     OBJECTIVE:            Current admission status: Observation   Preferred Pharmacy:   [facility] [place] [id number]  Phone: [phone] Fax: [phone]    Primary Care Provider: Mirella Leslie DO    Primary Insurance: MEDICARE  Secondary Insurance: BLUE CROSS    DISCHARGE DETAILS:    Discharge planning discussed with[de-identified] Pt  Freedom of Choice: Yes     CM contacted family/caregiver?: Yes  Were Treatment Team discharge recommendations reviewed with patient/caregiver?: Yes  Did patient/caregiver verbalize understanding of patient care needs?: Yes  Were patient/caregiver advised of the risks associated with not following Treatment Team discharge recommendations?: Yes         Requested  Link To [address]         Is the patient interested in [facility] at discharge?: No    DME Referral Provided  Referral made for DME?: No         Would you like to participate in our [address] service program?  : No - Declined    Treatment Team Recommendation: Home  Discharge Destination Plan[de-identified] Home  Transport at Discharge : Automobile, Other (Comment)           Discussed with patient preferences on discharge : Understanding how to manage health at home , purpose of taking meds, importance of follow up appointments and symptoms to watch out for  Nurse to review AVS with patient  All follow up providers listed

## 2023-03-23 NOTE — ASSESSMENT & PLAN NOTE
· Treated with IV ceftriaxone and IV metronidazole during the hospitalization  · Discharge on cefpodoxime 200 mg PO Qdaily for 5 days and metronidazole 500 mg PO every 12 hours for 5 days  · Outpatient Gastroenterology evaluation  · We will also need an outpatient colonoscopy with Gastroenterology

## 2023-03-23 NOTE — H&P
H&P Exam - Theo Lisa 82 y o  female MRN: [id number]    Unit/Bed#: RM16 Encounter: [id number]      CC-Abdominal Pain-Patient reports abd pain starting about an hour ago and also has some rectal pressure  Patient states her last bowel movement was today  HPI:  Theo Lisa is a 82 y o  female with pmhx of HTN, HLD, GERD, CKD stage 3, presents to the ED today with complaints of abdominal pain that started 1 hour prior to presentation  Pt also states she had some rectal pressure and her last BM was yesterday  She states abdominal pain more in the periumbilical and bilateral LQ  No abdominal distension  She has mild nausea but no vomiting  No diarrhea  No fevers or chills  No flank pain  No urinary symptoms  No hematuria, no passage of bright red blood per rectum or melanotic stools  Pain is not radiating  At the time of my evaluation, pt states pain has improved though still present  She had a CT scan of the abdomen which showed possible colitis and diverticulitis  Her WBC wnl  Lipase wnl   Lactic acid 1 3      Historical Information   Past Medical History:   Diagnosis Date   • Arteriosclerotic heart disease     LAST ASSESSED: [id number]   • Esophageal reflux     LAST ASSESSED: [id number]   • GERD (gastroesophageal reflux disease)    • Hyperlipidemia     LAST ASSESSED: [id number]   • Hypertension     LAST ASSESSED: [id number]   • Nontoxic single thyroid nodule     LAST ASSESSED: [id number]   • Osteopenia     LAST ASSESSED: [id number]     Patient Active Problem List   Diagnosis   • Arteriosclerotic heart disease   • Esophageal reflux   • Hyperlipidemia   • Hypertension   • Nontoxic single thyroid nodule   • Osteopenia   • Medicare annual wellness visit, subsequent   • Primary localized osteoarthritis of left knee   • Vitamin D deficiency   • Stage 3a chronic kidney disease ([facility] [address] )   • Dysuria   • Abdominal pain     Past Surgical History:   Procedure Laterality Date   • CYSTOSCOPY  12/04/2013    DIAGNOSTIC   • ESOPHAGOGASTRODUODENOSCOPY     • MASTECTOMY Bilateral     cancer   • OOPHORECTOMY     • SALPINGOOPHORECTOMY Bilateral        Social History   Social History     Substance and Sexual Activity   Alcohol Use No     Social History     Substance and Sexual Activity   Drug Use No     Social History     Tobacco Use   Smoking Status Never   Smokeless Tobacco Never       Family History:   Family History   Problem Relation Age of Onset   • Alzheimer's disease Mother    • Hypertension Father    • Stroke Father         SYNDROME   • Colon cancer Brother        Meds/Allergies       Current Facility-Administered Medications:   •  acetaminophen (TYLENOL) tablet 650 mg, 650 mg, Oral, Q6H PRN, Piter Castillo MD  •  aluminum-magnesium hydroxide-simethicone (MYLANTA) oral suspension 30 mL, 30 mL, Oral, Q6H PRN, Piter Castillo MD  •  cefTRIAXone (ROCEPHIN) IVPB (premix in dextrose) 1,000 mg 50 mL, 1,000 mg, Intravenous, Q24H, Piter Castillo MD  •  enoxaparin (LOVENOX) subcutaneous injection 40 mg, 40 mg, Subcutaneous, Daily, Piter Castillo MD  •  lactated ringers infusion, 125 mL/hr, Intravenous, Continuous, Piter Castillo MD, Last Rate: 125 mL/hr at 03/22/23 2103, 125 mL/hr at 03/22/23 2103  •  metroNIDAZOLE (FLAGYL) IVPB (premix) 500 mg 100 mL, 500 mg, Intravenous, Q8H, Piter Castillo MD  •  morphine injection 2 mg, 2 mg, Intravenous, Q4H PRN, Piter Castillo MD  •  ondansetron (ZOFRAN) injection 4 mg, 4 mg, Intravenous, Q6H PRN, Piter Castillo MD    Current Outpatient Medications:   •  amLODIPine (NORVASC) 2 5 mg tablet, Take 1 tablet by mouth once daily, Disp: 90 tablet, Rfl: 3  •  aspirin 81 MG tablet, Take 81 mg by mouth daily, Disp: , Rfl:   •  atorvastatin (LIPITOR) 20 mg tablet, Take 1 tablet by mouth once daily, Disp: 90 tablet, Rfl: 3  •  Calcium Carbonate-Vit D-Min (CALCIUM 1200 PO), Take 2,400 mg by mouth daily, Disp: , Rfl:   •  Cholecalciferol (VITAMIN D) 2000 UNITS tablet, Take 2,000 Units by mouth daily, Disp: , Rfl:   •  clobetasol (TEMOVATE) 0 05 % ointment, Apply topically 2 (two) times a day, Disp: 30 g, Rfl: 0  •  fenofibrate micronized (LOFIBRA) 134 MG capsule, TAKE 1 CAPSULE BY MOUTH ONCE DAILY WITH BREAKFAST, Disp: 90 capsule, Rfl: 0  •  hyoscyamine (ANASPAZ,LEVSIN) 0 125 MG tablet, Take 1 tablet by mouth once daily, Disp: 90 tablet, Rfl: 3  •  Multiple Vitamins-Minerals (MULTIVITAMIN WOMEN) TABS, Take by mouth, Disp: , Rfl:   •  omeprazole (PriLOSEC) 20 mg delayed release capsule, Take 1 capsule by mouth once daily, Disp: 90 capsule, Rfl: 3    Allergies   Allergen Reactions   • Ciprofloxacin Other (See Comments)     Muscle pain   • Doxycycline    • Nitrofurantoin        Review of Systems   Constitutional: Positive for appetite change  Negative for activity change, chills and fever  HENT: Negative for congestion, ear discharge, ear pain, mouth sores, rhinorrhea, sore throat and trouble swallowing  Eyes: Negative for photophobia, pain, discharge and itching  Respiratory: Negative for apnea, cough, chest tightness, shortness of breath and wheezing  Cardiovascular: Negative for chest pain, palpitations and leg swelling  Gastrointestinal: Positive for abdominal pain and nausea  Negative for abdominal distention, anal bleeding, blood in stool, constipation and diarrhea  Endocrine: Negative for cold intolerance, heat intolerance, polydipsia and polyphagia  Genitourinary: Negative for decreased urine volume, difficulty urinating, dysuria, flank pain, frequency and hematuria  Musculoskeletal: Positive for arthralgias  Negative for joint swelling and neck pain  Skin: Negative for color change  Neurological: Negative for dizziness, tremors, syncope, speech difficulty, numbness and headaches  Hematological: Does not bruise/bleed easily  Psychiatric/Behavioral: Negative for agitation, confusion and hallucinations  The patient is not nervous/anxious          Objective Vitals: Blood pressure (!) 187/79, pulse 83, temperature 98 3 °F (36 8 °C), resp  rate 18, SpO2 96 %  Physical Exam   General- Awake and alert, NAD  HEENT: PERRLA, EOMI, sclera anicteric, moist mucous membranes, tongue mucosa without lesions  Neck: supple, no JVD, lymphadenopathy, thyromegaly  Heart: Regular rate and rhythm, S1S2 present  No murmur, rub or gallop  Lungs; Clear to auscultation bilaterally  No wheezing, crackles or rhonchi  No accessory muscle use or respiratory distress  Abdomen: soft, non-tender, non-distended, NABS  No guarding or rebound  No peritoneal sound or mass  Extremities: no clubbing, cyanosis, or edema  2+ pedal pulses bilaterally  Full range of motion  Neurologic:  Cranial nerves II-XII intact  Strength and sensation globally intact  Speech fluent and goal directed  Awake, alert and oriented x 3  Skin: warm and dry  No petechiae, purpura or rash  Lab Results:   Results from last 7 days   Lab Units 03/22/23 2037   WBC Thousand/uL 9 27   HEMOGLOBIN g/dL 15 4   HEMATOCRIT % 47 3*   PLATELETS Thousands/uL 299     Results from last 7 days   Lab Units 03/22/23 2037   POTASSIUM mmol/L 3 9   CHLORIDE mmol/L 103   CO2 mmol/L 26   BUN mg/dL 25   CREATININE mg/dL 1 10   CALCIUM mg/dL 9 8         Imaging:  CT abdomen pelvis with contrast   Final Result by Kanu Zarate DO (03/22 2252)      Thickening of the descending colon and sigmoid colon wall which may represent colitis  Follow-up with gastroenterology is recommended  Colonic diverticulosis with minimal inflammatory changes around the sigmoid colon which may represent acute diverticulitis  No drainable fluid collection  Recommend posttreatment colonoscopy to rule out underlying neoplasm  Limited evaluation of the transverse colon due to underdistention  Follow-up at the same time as the above process can be obtained  The study was marked in [facility] for immediate notification              Workstation performed: [id number]               Assessment/Plan     Sasha Laughlin is a 82 y o  female with pmhx of HTN, HLD, GERD, CKD stage 3, presents to the ED today with complaints of abdominal pain that started 1 hour prior to presentation with CT scan showing findings suggestive of possible colitis vs diverticulitis    1  Abdominal pain  This has now improved compared to when she presented  Her abdominal examination at this time benign  CT scan showing findings below  Thickening of the descending colon and sigmoid colon wall which may represent colitis  Follow-up with gastroenterology is recommended  Colonic diverticulosis with minimal inflammatory changes around the sigmoid colon which may represent acute diverticulitis  No drainable fluid collection  Recommend posttreatment colonoscopy to rule out underlying neoplasm  Continue with empiric IV antibiotics  Possible transition to oral if symptoms continues to improve  Will keep her NPO for now  Continue on IV hydration with ringer's lactate  Consult GI, may need colonoscopy at some point  Analgesia for pain control    2  Hypertension  Pt's blood pressure was very high on presentation likely due to pain  Now BP improving  Will restart her home meds    3  GERD  Continue with PPI    4  Mixed hyperlipidemia  Continue with statin and fenofibrate    5  CKD stage 3  Avoid nephrotoxins  Code Status: Level 1 - Full Code    Counseling / Coordination of Care  Total floor / unit time spent today 60 minutes  Greater than 50% of total time was spent with the patient and / or family counseling and / or coordination of care  Portions of the record may have been created with voice recognition software  Occasional wrong word or "sound a like" substitutions may have occurred due to the inherent limitations of voice recognition software  Read the chart carefully and recognize, using context, where substitutions have occurred

## 2023-03-23 NOTE — ED NOTES
Patient okay to be discharged per Dr Bill Early  Patient dressed and IV removed; patient provided with discharge paperwork and awaiting pickup by her sister at this time       Brandee Barajas RN  03/23/23 2088

## 2023-03-23 NOTE — DISCHARGE SUMMARY
[address] [facility] [address] [place]  Discharge- Juju Padilla 1940, 82 y o  female MRN: [id number]  Unit/Bed#: RM13 Encounter: [id number]  Primary Care Provider: Emilio Roger DO   Date and time admitted to hospital: 3/22/2023  8:12 PM    * Colitis  Assessment & Plan  · Treated with IV ceftriaxone and IV metronidazole during the hospitalization  · Discharge on cefpodoxime 200 mg PO Qdaily for 5 days and metronidazole 500 mg PO every 12 hours for 5 days  · Outpatient Gastroenterology evaluation  · We will also need an outpatient colonoscopy with Gastroenterology    Acute diverticulitis  Assessment & Plan  · Treated with IV ceftriaxone and IV metronidazole during the hospitalization  · Discharge on cefpodoxime 200 mg PO Qdaily for 5 days and metronidazole 500 mg PO every 12 hours for 5 days  · Outpatient Gastroenterology evaluation  · We will also need an outpatient colonoscopy with Gastroenterology    LBBB (left bundle branch block)  Assessment & Plan  · Outpatient Cardiology evaluation    Adrenal hyperplasia [facility])  Assessment & Plan  · Outpatient surveillance imaging with PCP    CT scan of the abdomen/pelvis (03/22/2023):  ADRENAL GLANDS:  There is low density lobulated thickening of adrenal glands bilaterally statistically most likely to represent adenomatous hyperplasia  Hypothyroidism  Assessment & Plan  · Initiate levothyroxine 25 mcg PO Qdaily in the early morning  · Recheck a TSH level in 4-6 weeks with her PCP  · Outpatient Endocrinology evaluation     Latest Reference Range & Units 03/22/23 20:37   TSH 3RD GENERATON 0 450 - 4 500 uIU/mL 9 161 (H)   Free T4 0 76 - 1 46 ng/dL 1 09   (H): Data is abnormally high    Cholelithiasis  Assessment & Plan  · Outpatient General Surgery evaluation if she becomes symptomatic    CT scan of the abdomen/pelvis (03/22/2023):  GALLBLADDER:  There are gallstone(s) within the gallbladder, without pericholecystic inflammatory changes      Multiple renal cysts  Assessment & Plan  · Outpatient surveillance imaging with PCP    CT scan of the abdomen/pelvis (03/22/2023):  KIDNEYS/URETERS:  No hydronephrosis or urinary tract calculus  One or more sharply circumscribed subcentimeter renal hypodensities are present, too small to accurately characterize, and statistically most likely benign findings  According to recent literature (Radiology 2019) no further workup of these findings is recommended      Elevated TSH  Assessment & Plan  · Please see the assessment and plan for "Hypothyroidism"    Stage 3b chronic kidney disease [facility])  Assessment & Plan  Lab Results   Component Value Date    EGFR 47 03/22/2023    EGFR 50 07/06/2022    EGFR 50 06/23/2021    CREATININE 1 10 03/22/2023    CREATININE 1 05 07/06/2022    CREATININE 1 06 06/23/2021     · At her renal function baseline of 0 9-1 1 mg/dl  · Avoid all nephrotoxic agents  · Monitor the patient's renal function after discharge with her PCP    Vitamin D deficiency  Assessment & Plan  · Continue cholecalciferol supplementation  • Outpatient follow-up with PCP in regards to this matter    Accelerated hypertension  Assessment & Plan  · Increase amlodipine to 5 mg PO Qdaily with elevated blood pressure readings  • Outpatient follow-up with PCP in regards to this matter    Hypercholesteremia  Assessment & Plan  · Continue statin therapy    Arteriosclerotic heart disease  Assessment & Plan  · Continue aspirin 81 mg PO Qdaily and atorvastatin 20 mg PO Qdaily  · Outpatient Cardiology evaluation    Discharging Physician / Practitioner: Lulu Eid DO  PCP: Lisa Cobos DO  Admission Date:   Admission Orders (From admission, onward)     Ordered        03/23/23 1127  Place in Observation  Once            03/23/23 0856  Inpatient Admission  Once            03/23/23 0053  Place in Observation  Once                      Discharge Date: 03/23/23     Please note that the patient was changed to INPATIENT status when I thought she was going to remain hospitalized, but she had home issues with her coal stove  I decided to then discharge her home, so she was changed back to OBSERVATION status  Consultations During Hospital Stay:  · None    Procedures Performed:   · None    Significant Findings / Test Results:   CT abdomen pelvis with contrast  Status: Final result     PACS Images     Show images for CT abdomen pelvis with contrast  Study Result    Narrative & Impression   CT ABDOMEN AND PELVIS WITH IV CONTRAST     INDICATION:   vomiting abdominal pain supraopubic      COMPARISON:  None      TECHNIQUE:  CT examination of the abdomen and pelvis was performed  Axial, sagittal, and coronal 2D reformatted images were created from the source data and submitted for interpretation      Radiation dose length product (DLP) for this visit:  612 97 mGy-cm   This examination, like all CT scans performed in the [facility], was performed utilizing techniques to minimize radiation dose exposure, including the use of iterative   reconstruction and automated exposure control      IV Contrast:  85 mL of iohexol (OMNIPAQUE)  Enteric Contrast:  Enteric contrast was not administered      FINDINGS:     ABDOMEN     LOWER CHEST:  Atelectasis seen within the lung bases  The heart is mildly enlarged      LIVER/BILIARY TREE:  Unremarkable      GALLBLADDER:  There are gallstone(s) within the gallbladder, without pericholecystic inflammatory changes      SPLEEN:  Unremarkable      PANCREAS:  Unremarkable      ADRENAL GLANDS:  There is low density lobulated thickening of adrenal glands bilaterally statistically most likely to represent adenomatous hyperplasia      KIDNEYS/URETERS:  No hydronephrosis or urinary tract calculus  One or more sharply circumscribed subcentimeter renal hypodensities are present, too small to accurately characterize, and statistically most likely benign findings   According to recent   literature (Radiology 2019) no further workup of these findings is recommended      STOMACH AND BOWEL:  Thickening of the descending colon and sigmoid colon  Colonic diverticulosis with minimal inflammatory changes of the sigmoid colon  Large amount of fecal material in the colon  Underdistention of the transverse colon is seen      APPENDIX:  No findings to suggest appendicitis      ABDOMINOPELVIC CAVITY:  No ascites  No pneumoperitoneum  No lymphadenopathy      VESSELS:  Atherosclerotic changes are present  No evidence of aneurysm      PELVIS     REPRODUCTIVE ORGANS:  Unremarkable for patient's age      URINARY BLADDER:  Unremarkable      ABDOMINAL WALL/INGUINAL REGIONS:  Unremarkable      OSSEOUS STRUCTURES:  No acute fracture or destructive osseous lesion      IMPRESSION:     Thickening of the descending colon and sigmoid colon wall which may represent colitis  Follow-up with gastroenterology is recommended      Colonic diverticulosis with minimal inflammatory changes around the sigmoid colon which may represent acute diverticulitis  No drainable fluid collection  Recommend posttreatment colonoscopy to rule out underlying neoplasm      Limited evaluation of the transverse colon due to underdistention  Follow-up at the same time as the above process can be obtained       Narrative & Impression    Normal sinus rhythm  Left bundle branch block  Abnormal ECG  Confirmed by Nabeel Baker ([id number]) on 3/23/2023 11:14:29 AM      Specimen Collected: 03/22/23 20:17 Last Resulted: 03/23/23 11:14        Results from last 7 days   Lab Units 03/23/23  0144 03/22/23 2037   WBC Thousand/uL  --  9 27   HEMOGLOBIN g/dL  --  15 4   HEMATOCRIT %  --  47 3*   PLATELETS Thousands/uL 255 299     Results from last 7 days   Lab Units 03/22/23 2037   SODIUM mmol/L 137   POTASSIUM mmol/L 3 9   CHLORIDE mmol/L 103   CO2 mmol/L 26   BUN mg/dL 25   CREATININE mg/dL 1 10   CALCIUM mg/dL 9 8     Results from last 7 days   Lab Units 03/22/23 2037   MAGNESIUM mg/dL 2 3     Results from last 7 days   Lab Units 03/22/23 2037   ALK PHOS U/L 46   BILIRUBIN DIRECT mg/dL 0 13   ALT U/L 15   AST U/L 29     Microbiology Results (last 21 days)    Collected Updated Procedure Result Status Patient Facility Result Comment    03/23/2023 0928 03/23/2023 0928 Urine culture [[id number]]   Urine, Clean Catch    In process [address]  Component Value   No component results             03/22/2023 2353 03/23/2023 0901 Blood culture #1 [[id number]]   Blood from Arm, Left    Preliminary result [address]  Component Value   Blood Culture Received in Microbiology Lab  Culture in Progress  P             03/22/2023 2353 03/23/2023 0901 Blood culture #2 [[id number]]   Blood from Arm, Right    Preliminary result [address]  Component Value   Blood Culture Received in Microbiology Lab  Culture in Progress  P             03/22/2023 2037 03/22/2023 2129 FLU/RSV/COVID - if FLU/RSV clinically relevant [[id number]]    Nares from Nose    Final result [address] - copy/paste COVID Guidelines URL to browser: [email or web address]   SARS-CoV-2 assay is a Nucleic Acid Amplification assay intended for the   qualitative detection of nucleic acid from SARS-CoV-2 in nasopharyngeal   swabs  Results are for the presumptive identification of SARS-CoV-2 RNA  Positive results are indicative of infection with SARS-CoV-2, the virus   causing COVID-19, but do not rule out bacterial infection or co-infection   with other viruses  Laboratories within the [place] and its   territories are required to report all positive results to the appropriate   public health authorities  Negative results do not preclude SARS-CoV-2   infection and should not be used as the sole basis for treatment or other   patient management decisions   Negative results must be combined with   clinical observations, patient history, and epidemiological information  This test has not been FDA cleared or approved  This test has been authorized by FDA under an Emergency Use Authorization   (EUA)  This test is only authorized for the duration of time the   declaration that circumstances exist justifying the authorization of the   emergency use of an in vitro diagnostic tests for detection of SARS-CoV-2   virus and/or diagnosis of COVID-19 infection under section 564(b)(1) of   the Act, 21 U  S C  143NYE-1(S)(9), unless the authorization is terminated   or revoked sooner  The test has been validated but independent review by FDA   and CLIA is pending  Test performed using [employer] GeneXpert: This RT-PCR assay targets N2,   a region unique to SARS-CoV-2  A conserved region in the E-gene was chosen   for pan-Sarbecovirus detection which includes SARS-CoV-2  According to CMS-2020-01-R, this platform meets the definition of high-throughput technology  Component Value   SARS-CoV-2 Negative   INFLUENZA A PCR Negative   INFLUENZA B PCR Negative   RSV PCR Negative                Incidental Findings:   · As above   · I reviewed the above mentioned incidental findings with the patient and/or family and they expressed understanding  Test Results Pending at Discharge (will require follow-up): · Blood cultures x 2 sets from 03/22/2023  · Urine culture form 03/23/2023     Outpatient Tests Requested:  · CBC with diff , CMP, Magnesium level, and Phosphorus level in 5 days  · TSH level in 4-6 weeks    Complications:  None    Reason for Admission:  Acute colitis and acute diverticulitis    Hospital Course:   James Mahmood is a 82 y o  female patient who originally presented to the hospital on 3/22/2023 due to intractable abdominal pain  Please see above list of diagnoses and related plan for additional information       Condition at Discharge: good    Discharge Day Visit / Exam:   Subjective: The patient was seen and examined  The patient is doing better  The abdominal pain has resolved  No chest pain  No shortness of breath  No nausea or vomiting  Vitals: Blood Pressure: (!) 194/93 (03/23/23 0930)  Pulse: 93 (03/23/23 0930)  Temperature: 98 3 °F (36 8 °C) (03/22/23 2017)  Respirations: 18 (03/23/23 0930)  SpO2: 95 % (03/23/23 0930)  Exam:   Physical Exam   General:  NAD, follows commands  HEENT:  NC/AT, mucous membranes moist  Neck:  Supple, No JVP elevation  CV:  + S1, + S2, RRR  Pulm:  Lung fields are CTA bilaterally  Abd:  Soft, Non-tender, Non-distended  Ext:  No clubbing/cyanosis/edema  Skin:  No rashes  Neuro:  Awake, alert, oriented  Psych:  Normal mood and affect      Discharge instructions/Information to patient and family:  See after visit summary for information provided to patient and family  Provisions for Follow-Up Care:  See after visit summary for information related to follow-up care and any pertinent home health orders  Disposition:   Home    Planned Readmission:  No     Discharge Statement:  I spent 45 minutes discharging the patient  This time was spent on the day of discharge  I had direct contact with the patient on the day of discharge  Greater than 50% of the total time was spent examining patient, answering all patient questions, arranging and discussing plan of care with patient as well as directly providing post-discharge instructions  Additional time then spent on discharge activities  Discharge Medications:  See after visit summary for reconciled discharge medications provided to patient and/or family        **Please Note: This note may have been constructed using a voice recognition system**

## 2023-03-23 NOTE — CASE MANAGEMENT
Case Management Assessment & Discharge Planning Note    Patient name Sasha Laughlin  Location JF72/AE36 MRN [id number]  : 1940 Date 3/23/2023       Current Admission Date: 3/22/2023  Current Admission Diagnosis:Abdominal pain   Patient Active Problem List    Diagnosis Date Noted   • Abdominal pain 2023   • Elevated TSH 2023   • Multiple renal cysts 2023   • Cholelithiasis 2023   • Dysuria 2023   • Stage 3a chronic kidney disease ([address] ) 2022   • Vitamin D deficiency 2022   • Primary localized osteoarthritis of left knee 10/25/2019   • Medicare annual wellness visit, subsequent 2018   • Arteriosclerotic heart disease 2013   • Hyperlipidemia 2013   • Osteopenia 2012   • Esophageal reflux 2012   • Accelerated hypertension 2012   • Nontoxic single thyroid nodule 2012      LOS (days): 0  Geometric Mean LOS (GMLOS) (days):   Days to GMLOS:     OBJECTIVE:              Current admission status: Inpatient       [facility] [id number]  Phone: [phone] Fax: [phone]    Primary Care Provider: Antonina Arcos DO    Primary Insurance: MEDICARE  Secondary Insurance: BLUE CROSS    ASSESSMENT:  Active Health Care Proxies    There are no active Health Care Proxies on file  Readmission Root Cause  30 Day Readmission: No    Patient Information  Admitted from[de-identified] Home  Mental Status: Alert  During Assessment patient was accompanied by: Not accompanied during assessment  Assessment information provided by[de-identified] Patient  Primary Caregiver: Self  Support Systems: Son  [place] of Residence: [facility]  do you live in?: [address] [place] [address] entry access options   Select all that apply : Stairs  Number of steps to enter home : 2  Do the steps have railings?: Yes  Type of Current Residence: 2 story home  Upon entering residence, is there a bedroom on the main floor (no further steps)?: No  A bedroom is located on the following floor levels of residence (select all that apply):: 2nd Floor  Upon entering residence, is there a bathroom on the main floor (no further steps)?: No  Indicate which floors of current residence have a bathroom (select all the apply):: 2nd Floor  Number of steps to 2nd floor from main floor: One Flight  In the last 12 months, was there a time when you were not able to pay the mortgage or rent on time?: No  In the last 12 months, how many places have you lived?: 1  In the last 12 months, was there a time when you did not have a steady place to sleep or slept in a shelter (including now)?: No  Homeless/housing insecurity resource given?: N/A  Living Arrangements: Lives Alone  Is patient a ?: No    Activities of Daily Living Prior to Admission  Functional Status: Independent  Completes ADLs independently?: Yes  Ambulates independently?: Yes  Does patient use assisted devices?: No  Does patient currently own DME?: Yes  What DME does the patient currently own?: [employer]  Does patient have a history of Outpatient Therapy (PT/OT)?: No  Does the patient have a history of Short-Term Rehab?: No  Does patient have a history of HHC?: No  Does patient currently have [facility]?: No         Patient Information Continued  Income Source: Pension/correction  Does patient have prescription coverage?: Yes  Within the past 12 months, you worried that your food would run out before you got the money to buy more : Never true  Within the past 12 months, the food you bought just didn't last and you didn't have money to get more : Never true  Food insecurity resource given?: N/A  Does patient receive dialysis treatments?: No  Does patient have a history of substance abuse?: No  Does patient have a history of Mental Health Diagnosis?: No         Means of Transportation  Means of Transport to [facility][de-identified] Drives Self  In the past 12 months, has lack of transportation kept you from medical appointments or from getting medications?: No  In the past 12 months, has lack of transportation kept you from meetings, work, or from getting things needed for daily living?: No  Was application for public transport provided?: N/A        DISCHARGE DETAILS:    Discharge planning discussed with[de-identified] Pt  Freedom of Choice: Yes     CM contacted family/caregiver?: No- see comments (Pt is alert and oriented x3)   I will continue to follow for any Case Management needs

## 2023-03-23 NOTE — ASSESSMENT & PLAN NOTE
· Increase amlodipine to 5 mg PO Qdaily with elevated blood pressure readings  • Outpatient follow-up with PCP in regards to this matter

## 2023-03-24 ENCOUNTER — TELEPHONE (OUTPATIENT)
Dept: FAMILY MEDICINE CLINIC | Facility: CLINIC | Age: 83
End: 2023-03-24

## 2023-03-24 ENCOUNTER — TELEPHONE (OUTPATIENT)
Dept: ENDOCRINOLOGY | Facility: CLINIC | Age: 83
End: 2023-03-24

## 2023-03-24 NOTE — TELEPHONE ENCOUNTER
Pt called very upset stating she has been getting multiple calls from different doctors offices  Most recent call was from Endo pt stated " I do not have diabetes" I told pt it was to follow up with her Hypothyroidism  Pt stated "I take medication for that there is no need for me to go see a doctor " I then explained how her labs were out of range and that is why they would like her to see and endocrinologist  Pt continued to state " this is why I am on medications, I do not wish to see any endocrinologists " pt has appt with pcp on April 4th and will discuss this with her at that time

## 2023-03-25 LAB
BACTERIA UR CULT: ABNORMAL
BACTERIA UR CULT: ABNORMAL

## 2023-03-26 ENCOUNTER — APPOINTMENT (OUTPATIENT)
Dept: LAB | Facility: HOSPITAL | Age: 83
End: 2023-03-26
Attending: INTERNAL MEDICINE

## 2023-03-26 DIAGNOSIS — E03.9 HYPOTHYROIDISM: ICD-10-CM

## 2023-03-26 DIAGNOSIS — R79.89 ELEVATED TSH: ICD-10-CM

## 2023-03-26 DIAGNOSIS — K57.92 ACUTE DIVERTICULITIS: ICD-10-CM

## 2023-03-26 DIAGNOSIS — K52.9 COLITIS: ICD-10-CM

## 2023-03-26 DIAGNOSIS — R10.9 ABDOMINAL PAIN: ICD-10-CM

## 2023-03-26 LAB
ALBUMIN SERPL BCP-MCNC: 4.2 G/DL (ref 3.5–5)
ALP SERPL-CCNC: 41 U/L (ref 34–104)
ALT SERPL W P-5'-P-CCNC: 30 U/L (ref 7–52)
ANION GAP SERPL CALCULATED.3IONS-SCNC: 9 MMOL/L (ref 4–13)
AST SERPL W P-5'-P-CCNC: 53 U/L (ref 13–39)
BACTERIA BLD CULT: NORMAL
BACTERIA BLD CULT: NORMAL
BASOPHILS # BLD AUTO: 0.06 THOUSANDS/ÂΜL (ref 0–0.1)
BASOPHILS NFR BLD AUTO: 1 % (ref 0–1)
BILIRUB SERPL-MCNC: 0.61 MG/DL (ref 0.2–1)
BUN SERPL-MCNC: 18 MG/DL (ref 5–25)
CALCIUM SERPL-MCNC: 9.5 MG/DL (ref 8.4–10.2)
CHLORIDE SERPL-SCNC: 102 MMOL/L (ref 96–108)
CO2 SERPL-SCNC: 27 MMOL/L (ref 21–32)
CREAT SERPL-MCNC: 1.03 MG/DL (ref 0.6–1.3)
EOSINOPHIL # BLD AUTO: 0.29 THOUSAND/ÂΜL (ref 0–0.61)
EOSINOPHIL NFR BLD AUTO: 4 % (ref 0–6)
ERYTHROCYTE [DISTWIDTH] IN BLOOD BY AUTOMATED COUNT: 13.2 % (ref 11.6–15.1)
GFR SERPL CREATININE-BSD FRML MDRD: 51 ML/MIN/1.73SQ M
GLUCOSE P FAST SERPL-MCNC: 96 MG/DL (ref 65–99)
HCT VFR BLD AUTO: 47 % (ref 34.8–46.1)
HGB BLD-MCNC: 15.2 G/DL (ref 11.5–15.4)
IMM GRANULOCYTES # BLD AUTO: 0.08 THOUSAND/UL (ref 0–0.2)
IMM GRANULOCYTES NFR BLD AUTO: 1 % (ref 0–2)
LYMPHOCYTES # BLD AUTO: 1.99 THOUSANDS/ÂΜL (ref 0.6–4.47)
LYMPHOCYTES NFR BLD AUTO: 25 % (ref 14–44)
MAGNESIUM SERPL-MCNC: 2 MG/DL (ref 1.9–2.7)
MCH RBC QN AUTO: 29.6 PG (ref 26.8–34.3)
MCHC RBC AUTO-ENTMCNC: 32.3 G/DL (ref 31.4–37.4)
MCV RBC AUTO: 92 FL (ref 82–98)
MONOCYTES # BLD AUTO: 0.73 THOUSAND/ÂΜL (ref 0.17–1.22)
MONOCYTES NFR BLD AUTO: 9 % (ref 4–12)
NEUTROPHILS # BLD AUTO: 4.78 THOUSANDS/ÂΜL (ref 1.85–7.62)
NEUTS SEG NFR BLD AUTO: 60 % (ref 43–75)
NRBC BLD AUTO-RTO: 0 /100 WBCS
PHOSPHATE SERPL-MCNC: 3.4 MG/DL (ref 2.3–4.1)
PLATELET # BLD AUTO: 321 THOUSANDS/UL (ref 149–390)
PMV BLD AUTO: 9 FL (ref 8.9–12.7)
POTASSIUM SERPL-SCNC: 3.6 MMOL/L (ref 3.5–5.3)
PROT SERPL-MCNC: 6.6 G/DL (ref 6.4–8.4)
RBC # BLD AUTO: 5.13 MILLION/UL (ref 3.81–5.12)
SODIUM SERPL-SCNC: 138 MMOL/L (ref 135–147)
TSH SERPL DL<=0.05 MIU/L-ACNC: 5.99 UIU/ML (ref 0.45–4.5)
WBC # BLD AUTO: 7.93 THOUSAND/UL (ref 4.31–10.16)

## 2023-03-26 NOTE — DISCHARGE SUMMARY
After the patient was discharged, her urine culture resulted, which is detailed below  She was already prescribed cefpodoxime 200 mg PO Qdaily for 5 days, which is adequate treatment for acute cystitis      03/23/2023 0928 03/25/2023 1234 Urine culture [[id number]]    (Abnormal)   Urine, Clean Catch    Final result [address]  Component Value   Urine Culture >100,000 cfu/ml Alpha Hemolytic Streptococcus NOT Enterococcus Abnormal     <10,000 cfu/ml Gram Negative Pravin Enteric Like Abnormal        Susceptibility    Alpha Hemolytic Streptococcus NOT Enterococcus (1)    Antibiotic Interpretation Microscan Method Status    ZID Performed  Yes NIMCO Final

## 2023-03-28 LAB
BACTERIA BLD CULT: NORMAL
BACTERIA BLD CULT: NORMAL

## 2023-04-04 ENCOUNTER — OFFICE VISIT (OUTPATIENT)
Dept: FAMILY MEDICINE CLINIC | Facility: CLINIC | Age: 83
End: 2023-04-04

## 2023-04-04 VITALS
SYSTOLIC BLOOD PRESSURE: 130 MMHG | TEMPERATURE: 98 F | WEIGHT: 126.4 LBS | RESPIRATION RATE: 18 BRPM | DIASTOLIC BLOOD PRESSURE: 76 MMHG | BODY MASS INDEX: 23.26 KG/M2 | HEART RATE: 70 BPM | HEIGHT: 62 IN

## 2023-04-04 DIAGNOSIS — K57.92 ACUTE DIVERTICULITIS: Primary | ICD-10-CM

## 2023-04-04 DIAGNOSIS — I10 PRIMARY HYPERTENSION: ICD-10-CM

## 2023-04-04 DIAGNOSIS — R79.89 ABNORMAL TSH: ICD-10-CM

## 2023-04-04 DIAGNOSIS — R94.6 ABNORMAL RESULTS OF THYROID FUNCTION STUDIES: ICD-10-CM

## 2023-04-04 DIAGNOSIS — N18.32 STAGE 3B CHRONIC KIDNEY DISEASE (HCC): ICD-10-CM

## 2023-04-04 PROBLEM — E03.9 HYPOTHYROIDISM: Status: RESOLVED | Noted: 2023-03-23 | Resolved: 2023-04-04

## 2023-04-04 PROBLEM — R30.0 DYSURIA: Status: RESOLVED | Noted: 2023-02-27 | Resolved: 2023-04-04

## 2023-04-04 NOTE — PROGRESS NOTES
Name: Roddy Beltran      : 1940      MRN: [id number]  Encounter Provider: Richard Obando DO  Encounter Date: 2023   Encounter department: [facility] [address]     1  Acute diverticulitis  Pt completed antibx rx and is feeling better  She has Gi appt tomorrow with Dr Kiki Snellen   2  Abnormal TSH  -     TSH, 3rd generation with Free T4 reflex; Future  -     Thyroid Antibodies Panel; Future  -     Basic metabolic panel; FutureTSh was elevated but decreased within 24 so unclear if true thyroid dz  Recheck levels in 3-4 weeks with thyroid antibodies   Pt has Rx but will hold starting for now     3  Abnormal results of thyroid function studies  -     TSH, 3rd generation with Free T4 reflex; Future    4  Stage 3b chronic kidney disease ([address] )  Stay hydrated recheck bmp end of month     5  Primary hypertension  Bp stable and will continue on amlodipine 5mg daily and monitor BP No added salt diet         Depression Screening and Follow-up Plan: Patient was screened for depression during today's encounter  They screened negative with a PHQ-2 score of 0  Rto as scheduled     Subjective      HPI   Pt feeling better post hospital Admitted for acute onset abdominal pain and vomiting Dx diverticulitis Pt is feeling better and did complete antibx rx No further n/v/d No fever or chills No abdominal pain Appetite is getting back to normal She has GI appt tomorrow She did not start thyroid rx as prescribed She denies chest pain,palpitations, nervousness She has seen endocrine in past for thyroid nodule but not on rx in past BP better on 5mg amlodipine   TCM Call     Date and time call was made  3/23/2023 12:20 PM    Hospital care reviewed  Records reviewed    Patient was hospitialized at  [facility]    Date of Admission  23    Date of discharge  23    Diagnosis  abdominal pain, GERD  CKD    Disposition  Home  self care    Were the patients medications reviewed and updated  No Current Symptoms  Fatigue    Fatigue severity  Mild      TCM Call     Post hospital issues  Reduced activity    Scheduled for follow up? Yes    Patients specialists  Cardiologist  gastroenterology    Did you obtain your prescribed medications  Yes    Do you need help managing your prescriptions or medications  No    I have advised the patient to call PCP with any new or worsening symptoms  Colton López, medical receptioinist II    Counseling  Patient          Review of Systems   Constitutional: Negative for activity change, chills and fever  HENT: Negative  Eyes: Negative for visual disturbance  Respiratory: Negative for cough and shortness of breath  Cardiovascular: Negative for chest pain, palpitations and leg swelling  Gastrointestinal: Negative for abdominal distention, abdominal pain, blood in stool, constipation and diarrhea  Genitourinary: Negative  Negative for dysuria, flank pain and frequency  Musculoskeletal: Negative  Neurological: Negative for dizziness, light-headedness and headaches  Psychiatric/Behavioral: Negative for sleep disturbance  The patient is not nervous/anxious          Current Outpatient Medications on File Prior to Visit   Medication Sig   • acetaminophen (TYLENOL) 325 mg tablet Take 2 tablets (650 mg total) by mouth every 6 (six) hours as needed for mild pain, headaches or fever   • amLODIPine (NORVASC) 5 mg tablet Take 1 tablet (5 mg total) by mouth daily This is an increased dose to better treat your blood pressure   • aspirin 81 MG tablet Take 81 mg by mouth daily   • atorvastatin (LIPITOR) 20 mg tablet Take 1 tablet by mouth once daily   • Calcium Carbonate-Vit D-Min (Calcium 1200) 9713-9327 MG-UNIT CHEW Chew 1 tablet daily   • Cholecalciferol (VITAMIN D) 2000 UNITS tablet Take 2,000 Units by mouth daily   • clobetasol (TEMOVATE) 0 05 % ointment Apply topically 2 (two) times a day   • fenofibrate micronized (LOFIBRA) 134 MG capsule TAKE 1 CAPSULE BY MOUTH "ONCE DAILY WITH BREAKFAST   • hyoscyamine (ANASPAZ,LEVSIN) 0 125 MG tablet Take 1 tablet by mouth once daily   • levothyroxine (Levoxyl) 25 mcg tablet Take 1 tablet (25 mcg total) by mouth daily in the early morning Take on an empty stomach for hypothyroidism   • Multiple Vitamins-Minerals (MULTIVITAMIN WOMEN) TABS Take by mouth   • omeprazole (PriLOSEC) 20 mg delayed release capsule Take 1 capsule by mouth once daily       Objective     /76   Pulse 70   Temp 98 °F (36 7 °C) (Temporal)   Resp 18   Ht 5' 2\" (1 575 m)   Wt 57 3 kg (126 lb 6 4 oz)   BMI 23 12 kg/m²     Physical Exam  Vitals reviewed  Constitutional:       General: She is not in acute distress  Appearance: Normal appearance  She is not ill-appearing, toxic-appearing or diaphoretic  HENT:      Head: Normocephalic and atraumatic  Right Ear: External ear normal       Left Ear: External ear normal       Nose: Nose normal       Mouth/Throat:      Mouth: Mucous membranes are dry  Eyes:      General: No scleral icterus  Extraocular Movements: Extraocular movements intact  Conjunctiva/sclera: Conjunctivae normal       Pupils: Pupils are equal, round, and reactive to light  Cardiovascular:      Rate and Rhythm: Normal rate and regular rhythm  Pulses: Normal pulses  Heart sounds: Normal heart sounds  Pulmonary:      Effort: Pulmonary effort is normal  No respiratory distress  Breath sounds: Normal breath sounds  No wheezing  Abdominal:      General: Bowel sounds are normal  There is no distension  Palpations: Abdomen is soft  Tenderness: There is no abdominal tenderness  Musculoskeletal:      Cervical back: Normal range of motion and neck supple  No tenderness  Right lower leg: No edema  Left lower leg: No edema  Lymphadenopathy:      Cervical: No cervical adenopathy  Skin:     General: Skin is warm and dry  Coloration: Skin is not jaundiced or pale     Neurological:      " General: No focal deficit present  Mental Status: She is alert and oriented to person, place, and time  Mental status is at baseline  Psychiatric:         Mood and Affect: Mood normal          Behavior: Behavior normal          Thought Content:  Thought content normal          Judgment: Judgment normal        Julian Joe DO

## 2023-04-23 ENCOUNTER — APPOINTMENT (OUTPATIENT)
Dept: LAB | Facility: HOSPITAL | Age: 83
End: 2023-04-23
Attending: INTERNAL MEDICINE

## 2023-04-23 DIAGNOSIS — R79.89 ABNORMAL TSH: ICD-10-CM

## 2023-04-23 DIAGNOSIS — R94.6 ABNORMAL RESULTS OF THYROID FUNCTION STUDIES: ICD-10-CM

## 2023-04-23 LAB
ANION GAP SERPL CALCULATED.3IONS-SCNC: 7 MMOL/L (ref 4–13)
BUN SERPL-MCNC: 18 MG/DL (ref 5–25)
CALCIUM SERPL-MCNC: 9.8 MG/DL (ref 8.4–10.2)
CHLORIDE SERPL-SCNC: 103 MMOL/L (ref 96–108)
CO2 SERPL-SCNC: 29 MMOL/L (ref 21–32)
CREAT SERPL-MCNC: 1.13 MG/DL (ref 0.6–1.3)
GFR SERPL CREATININE-BSD FRML MDRD: 45 ML/MIN/1.73SQ M
GLUCOSE SERPL-MCNC: 119 MG/DL (ref 65–140)
POTASSIUM SERPL-SCNC: 4 MMOL/L (ref 3.5–5.3)
SODIUM SERPL-SCNC: 139 MMOL/L (ref 135–147)
TSH SERPL DL<=0.05 MIU/L-ACNC: 2.93 UIU/ML (ref 0.45–4.5)

## 2023-04-25 LAB
THYROGLOB AB SERPL-ACNC: <1 IU/ML (ref 0–0.9)
THYROPEROXIDASE AB SERPL-ACNC: <9 IU/ML (ref 0–34)

## 2023-05-02 DIAGNOSIS — E78.5 HYPERLIPIDEMIA, UNSPECIFIED HYPERLIPIDEMIA TYPE: ICD-10-CM

## 2023-05-02 DIAGNOSIS — K21.9 GASTROESOPHAGEAL REFLUX DISEASE: ICD-10-CM

## 2023-05-02 RX ORDER — FENOFIBRATE 134 MG/1
CAPSULE ORAL
Qty: 90 CAPSULE | Refills: 0 | Status: SHIPPED | OUTPATIENT
Start: 2023-05-02

## 2023-05-02 RX ORDER — OMEPRAZOLE 20 MG/1
CAPSULE, DELAYED RELEASE ORAL
Qty: 90 CAPSULE | Refills: 0 | Status: SHIPPED | OUTPATIENT
Start: 2023-05-02 | End: 2023-05-03

## 2023-05-03 DIAGNOSIS — K21.9 GASTROESOPHAGEAL REFLUX DISEASE: ICD-10-CM

## 2023-05-03 RX ORDER — OMEPRAZOLE 20 MG/1
CAPSULE, DELAYED RELEASE ORAL
Qty: 90 CAPSULE | Refills: 0 | Status: SHIPPED | OUTPATIENT
Start: 2023-05-03

## 2023-05-18 ENCOUNTER — TELEPHONE (OUTPATIENT)
Dept: FAMILY MEDICINE CLINIC | Facility: CLINIC | Age: 83
End: 2023-05-18

## 2023-05-18 NOTE — TELEPHONE ENCOUNTER
Patient called with a concern regarding a back and knee brace which she thought Dr Kaykay Lugo ordered. I reviewed pts chart and did not see documentation for this also called billing dept and there are no pending charges for braces. I did tell patient not to accept anything unless we call her from office and speak to her personally. She did not accept package.

## 2023-06-07 ENCOUNTER — OFFICE VISIT (OUTPATIENT)
Dept: OBGYN CLINIC | Facility: CLINIC | Age: 83
End: 2023-06-07
Payer: MEDICARE

## 2023-06-07 ENCOUNTER — APPOINTMENT (OUTPATIENT)
Dept: RADIOLOGY | Facility: MEDICAL CENTER | Age: 83
End: 2023-06-07
Payer: MEDICARE

## 2023-06-07 VITALS
DIASTOLIC BLOOD PRESSURE: 82 MMHG | WEIGHT: 127 LBS | HEIGHT: 62 IN | SYSTOLIC BLOOD PRESSURE: 148 MMHG | RESPIRATION RATE: 16 BRPM | BODY MASS INDEX: 23.37 KG/M2 | HEART RATE: 87 BPM

## 2023-06-07 DIAGNOSIS — M17.0 PRIMARY OSTEOARTHRITIS OF BOTH KNEES: Primary | ICD-10-CM

## 2023-06-07 DIAGNOSIS — M25.561 RIGHT KNEE PAIN, UNSPECIFIED CHRONICITY: ICD-10-CM

## 2023-06-07 DIAGNOSIS — M25.562 LEFT KNEE PAIN, UNSPECIFIED CHRONICITY: ICD-10-CM

## 2023-06-07 PROCEDURE — 99214 OFFICE O/P EST MOD 30 MIN: CPT | Performed by: FAMILY MEDICINE

## 2023-06-07 PROCEDURE — 20610 DRAIN/INJ JOINT/BURSA W/O US: CPT | Performed by: FAMILY MEDICINE

## 2023-06-07 PROCEDURE — 73562 X-RAY EXAM OF KNEE 3: CPT

## 2023-06-07 RX ORDER — TRIAMCINOLONE ACETONIDE 40 MG/ML
40 INJECTION, SUSPENSION INTRA-ARTICULAR; INTRAMUSCULAR
Status: COMPLETED | OUTPATIENT
Start: 2023-06-07 | End: 2023-06-07

## 2023-06-07 RX ORDER — BUPIVACAINE HYDROCHLORIDE 2.5 MG/ML
4 INJECTION, SOLUTION INFILTRATION; PERINEURAL
Status: COMPLETED | OUTPATIENT
Start: 2023-06-07 | End: 2023-06-07

## 2023-06-07 RX ADMIN — TRIAMCINOLONE ACETONIDE 40 MG: 40 INJECTION, SUSPENSION INTRA-ARTICULAR; INTRAMUSCULAR at 08:15

## 2023-06-07 RX ADMIN — BUPIVACAINE HYDROCHLORIDE 4 ML: 2.5 INJECTION, SOLUTION INFILTRATION; PERINEURAL at 08:15

## 2023-06-07 NOTE — PROGRESS NOTES
"    Subjective:    Chief Complaint   Patient presents with   • Right Knee - Pain   • Left Knee - Pain       Joni Hernandez is a 82 y o  female complains of bilateral knee pain  Onset of the symptoms was about a month ago  Mechanism of injury: none  Aggravating factors: direct pressure, walking  and weight bearing  Treatment to date: corticosteroid injection which was effective  Symptoms have gradually worsened  Cortisone injection was provided in November  Reports that cortisone injection gave her about 4 to 5 months relief  Would like repeat injection in office today    The following portions of the patient's history were reviewed and updated as appropriate: allergies, current medications, past family history, past medical history, past social history, past surgical history and problem list     Occupation:   retired    Review of Systems   Constitutional: Negative for fever  Respiratory: Negative for cough and shortness of breath  Cardiovascular: Negative for leg swelling and palpitations  Gastrointestinal: Negative for constipation and diarrhea  Genitourinary: Negative for bladder incontinence and difficulty urinating  Skin: Negative for rash and ulcer  Neurological: Negative for dizziness and headaches  Hem/Lymph/Immuno: Negative for blood clots  Does not bruise/bleed easily  Psychiatric/Behavioral: Negative for confusion  Objective:  /82 (BP Location: Left arm, Patient Position: Sitting, Cuff Size: Adult)   Pulse 87   Resp 16   Ht 5' 2\" (1 575 m)   Wt 57 6 kg (127 lb)   BMI 23 23 kg/m²   Skin: no rashes, lesions, skin discolorations, lacerations  Vasculature: normal popliteal and pedal pulse, normal skin color, normal capillary refill in extremity, no lower extremity edema  Neurologic: Neurologic exam is normal throughout lower extremities, Awake, alert, and oriented x3, no apparent distress      Musculoskeletal: Bilateral KNEE EXAM  Gait: limping gait positive " Inspection:No erythema, no induration  There is no gross deformity  Palpation: Swelling: negative  Effusion: negative  Medial joint line TTP:  Positive  Lateral joint line TTP: negative  ROM: Full flexion and extension  Special tests: Jayden's: negative  Instability to varus/valgus stress: negative  Anterior Drawer: negative Lachman's test: negative  Posterior Drawer: negative  Crepitus:  Positive        Imaging:  See final report     Assessment/Plan:  1  Left knee pain, unspecified chronicity    - XR knee 3 vw left non injury; Future    2  Right knee pain, unspecified chronicity    - XR knee 3 vw right non injury; Future    3  Primary osteoarthritis of both knees          Bilateral cortisone injections provided for knees  Updated x-rays were obtained which reveals advanced degenerative changes most notable in the medial compartment bilaterally  No acute fractures or dislocations noted  Follow-up on official radiology report  We did discuss the patient would be eligible for possible knee replacement however she declines surgical consideration  We will continue with cortisone injections and advised that she can return in 4 months for another repeat  I have provided her with reading information about Visco injections however she is not interested in pursuing Visco injections currently

## 2023-06-07 NOTE — PROGRESS NOTES
Large joint arthrocentesis: bilateral knee  Universal Protocol:  Consent: Verbal consent obtained  Risks and benefits: risks, benefits and alternatives were discussed  Consent given by: patient    Procedure Details  Location: knee - bilateral knee  Approach: anterolateral    Medications (Right): 4 mL bupivacaine 0 25 %; 40 mg triamcinolone acetonide 40 mg/mLMedications (Left): 4 mL bupivacaine 0 25 %; 40 mg triamcinolone acetonide 40 mg/mL   Patient tolerance: patient tolerated the procedure well with no immediate complications    Risks and benefits of CSI were discussed with patient extensively  Risks were highlighted which included but were not limited to infection, pain, local site swelling, and chance that injection may not be effective  Patient was also counseled regarding glucose elevation days after receiving CSI and to be mindful of diet and check sugars daily  Patient agreeable to proceed with CSI after counseling

## 2023-06-08 DIAGNOSIS — R10.9 ABDOMINAL PAIN, UNSPECIFIED ABDOMINAL LOCATION: ICD-10-CM

## 2023-06-08 RX ORDER — HYOSCYAMINE SULFATE 0.125 MG
0.12 TABLET ORAL DAILY
Qty: 30 TABLET | Refills: 0 | Status: SHIPPED | OUTPATIENT
Start: 2023-06-08

## 2023-07-10 ENCOUNTER — OFFICE VISIT (OUTPATIENT)
Dept: FAMILY MEDICINE CLINIC | Facility: CLINIC | Age: 83
End: 2023-07-10
Payer: MEDICARE

## 2023-07-10 VITALS
RESPIRATION RATE: 18 BRPM | SYSTOLIC BLOOD PRESSURE: 124 MMHG | TEMPERATURE: 98 F | HEIGHT: 62 IN | HEART RATE: 78 BPM | WEIGHT: 124 LBS | DIASTOLIC BLOOD PRESSURE: 78 MMHG | BODY MASS INDEX: 22.82 KG/M2

## 2023-07-10 DIAGNOSIS — M85.852 OSTEOPENIA OF BOTH HIPS: ICD-10-CM

## 2023-07-10 DIAGNOSIS — R10.9 ABDOMINAL PAIN, UNSPECIFIED ABDOMINAL LOCATION: ICD-10-CM

## 2023-07-10 DIAGNOSIS — E55.9 VITAMIN D DEFICIENCY: ICD-10-CM

## 2023-07-10 DIAGNOSIS — I10 PRIMARY HYPERTENSION: ICD-10-CM

## 2023-07-10 DIAGNOSIS — M85.851 OSTEOPENIA OF BOTH HIPS: ICD-10-CM

## 2023-07-10 DIAGNOSIS — Z00.00 MEDICARE ANNUAL WELLNESS VISIT, SUBSEQUENT: Primary | ICD-10-CM

## 2023-07-10 PROCEDURE — G0439 PPPS, SUBSEQ VISIT: HCPCS | Performed by: INTERNAL MEDICINE

## 2023-07-10 RX ORDER — HYOSCYAMINE SULFATE 0.125 MG
0.12 TABLET ORAL DAILY
Qty: 90 TABLET | Refills: 3 | Status: SHIPPED | OUTPATIENT
Start: 2023-07-10 | End: 2023-07-13 | Stop reason: SDUPTHER

## 2023-07-10 NOTE — PROGRESS NOTES
Assessment and Plan:     Problem List Items Addressed This Visit        Musculoskeletal and Integument    Osteopenia    Relevant Orders    DXA bone density spine hip and pelvis       Other    Medicare annual wellness visit, subsequent - Primary   Other Visit Diagnoses     Abdominal pain, unspecified abdominal location        Relevant Medications    hyoscyamine (Levsin) 0.125 MG tablet      Stay hydrated  Pt will schedule Dexa  She has acp documents and requested copy   She no longer needs to see Cardio unless sxs develop  RTo 6months/prn     Depression Screening and Follow-up Plan: Patient was screened for depression during today's encounter. They screened negative with a PHQ-2 score of 0. Preventive health issues were discussed with patient, and age appropriate screening tests were ordered as noted in patient's After Visit Summary. Personalized health advice and appropriate referrals for health education or preventive services given if needed, as noted in patient's After Visit Summary. History of Present Illness:     Patient presents for a Medicare Wellness Visit    HPI   Pt managing ok She is own her on since her brother passed 2 years ago No acute issues She still does her own yardwork and house mgmt No falls No chest pain or sob She feels still managing well on her own   Patient Care Team:  Herlinda Mercedes DO as PCP - MD Bianca Tyson MD     Review of Systems:     Review of Systems   Constitutional: Negative for chills and fever. HENT: Negative. Eyes: Negative for visual disturbance. Respiratory: Negative for cough and shortness of breath. Cardiovascular: Negative for chest pain, palpitations and leg swelling. Gastrointestinal: Negative for abdominal distention and abdominal pain. Genitourinary: Negative. Musculoskeletal: Negative. Neurological: Negative for dizziness, light-headedness and headaches.    Psychiatric/Behavioral: Negative for sleep disturbance. The patient is not nervous/anxious. Problem List:     Patient Active Problem List   Diagnosis   • Hypercholesteremia   • Primary hypertension   • Nontoxic single thyroid nodule   • Osteopenia   • Medicare annual wellness visit, subsequent   • Primary localized osteoarthritis of left knee   • Vitamin D deficiency   • Stage 3b chronic kidney disease ([address])   • Elevated TSH   • Multiple renal cysts   • Cholelithiasis   • Colitis   • Acute diverticulitis   • Adrenal hyperplasia ([address])   • LBBB (left bundle branch block)      Past Medical and Surgical History:     Past Medical History:   Diagnosis Date   • Arteriosclerotic heart disease     LAST ASSESSED: [id number]   • Esophageal reflux     LAST ASSESSED: [id number]   • GERD (gastroesophageal reflux disease)    • Hyperlipidemia     LAST ASSESSED: [id number]   • Hypertension     LAST ASSESSED: [id number]   • Nontoxic single thyroid nodule     LAST ASSESSED: [id number]   • Osteopenia     LAST ASSESSED: [id number]     Past Surgical History:   Procedure Laterality Date   • CYSTOSCOPY  12/04/2013    DIAGNOSTIC   • ESOPHAGOGASTRODUODENOSCOPY     • MASTECTOMY Bilateral     cancer   • OOPHORECTOMY     • SALPINGOOPHORECTOMY Bilateral       Family History:     Family History   Problem Relation Age of Onset   • Alzheimer's disease Mother    • Hypertension Father    • Stroke Father         SYNDROME   • Colon cancer Brother       Social History:     Social History     Socioeconomic History   • Marital status:       Spouse name: None   • Number of children: None   • Years of education: None   • Highest education level: None   Occupational History   • None   Tobacco Use   • Smoking status: Never   • Smokeless tobacco: Never   Vaping Use   • Vaping Use: Never used   Substance and Sexual Activity   • Alcohol use: No   • Drug use: No   • Sexual activity: None   Other Topics Concern   • None   Social History Narrative    Always uses seat belt Dental care, regularly     Social Determinants of Health     Financial Resource Strain: Not on file   Food Insecurity: No Food Insecurity (3/23/2023)    Hunger Vital Sign    • Worried About Running Out of Food in the Last Year: Never true    • Ran Out of Food in the Last Year: Never true   Transportation Needs: No Transportation Needs (3/23/2023)    PRAPARE - Transportation    • Lack of Transportation (Medical): No    • Lack of Transportation (Non-Medical):  No   Physical Activity: Not on file   Stress: Not on file   Social Connections: Not on file   Intimate Partner Violence: Not on file   Housing Stability: Low Risk  (3/23/2023)    Housing Stability Vital Sign    • Unable to Pay for Housing in the Last Year: No    • Number of Places Lived in the Last Year: 1    • Unstable Housing in the Last Year: No      Medications and Allergies:     Current Outpatient Medications   Medication Sig Dispense Refill   • acetaminophen (TYLENOL) 325 mg tablet Take 2 tablets (650 mg total) by mouth every 6 (six) hours as needed for mild pain, headaches or fever  0   • amLODIPine (NORVASC) 5 mg tablet Take 1 tablet (5 mg total) by mouth daily This is an increased dose to better treat your blood pressure 30 tablet 0   • aspirin 81 MG tablet Take 81 mg by mouth daily     • atorvastatin (LIPITOR) 20 mg tablet Take 1 tablet by mouth once daily 90 tablet 3   • Calcium Carbonate-Vit D-Min (Calcium 1200) 7822-8111 MG-UNIT CHEW Chew 1 tablet daily  0   • Cholecalciferol (VITAMIN D) 2000 UNITS tablet Take 2,000 Units by mouth daily     • fenofibrate micronized (LOFIBRA) 134 MG capsule TAKE 1 CAPSULE BY MOUTH ONCE DAILY WITH BREAKFAST 90 capsule 0   • hyoscyamine (Levsin) 0.125 MG tablet Take 1 tablet (0.125 mg total) by mouth in the morning 90 tablet 3   • Multiple Vitamins-Minerals (MULTIVITAMIN WOMEN) TABS Take by mouth     • omeprazole (PriLOSEC) 20 mg delayed release capsule Take 1 capsule by mouth once daily 90 capsule 0   • clobetasol (TEMOVATE) 0.05 % ointment Apply topically 2 (two) times a day (Patient not taking: Reported on 7/10/2023) 30 g 0   • dicyclomine (BENTYL) 10 mg capsule Take 1 capsule (10 mg total) by mouth daily as needed (abdominal pain) (Patient not taking: Reported on 7/10/2023) 90 capsule 0   • levothyroxine (Levoxyl) 25 mcg tablet Take 1 tablet (25 mcg total) by mouth daily in the early morning Take on an empty stomach for hypothyroidism (Patient not taking: Reported on 7/10/2023) 30 tablet 0     No current facility-administered medications for this visit. Allergies   Allergen Reactions   • Ciprofloxacin Other (See Comments)     Muscle pain   • Doxycycline    • Nitrofurantoin       Immunizations:     Immunization History   Administered Date(s) Administered   • COVID-19 MODERNA VACC 0.5 ML IM 02/12/2021, 03/10/2021   • Influenza Split High Dose Preservative Free IM 11/07/2016      Health Maintenance:         Topic Date Due   • DXA SCAN  06/30/2023   • Colorectal Cancer Screening  04/26/2027         Topic Date Due   • Pneumococcal Vaccine: 65+ Years (1 - PCV) Never done   • COVID-19 Vaccine (3 - Moderna series) 05/05/2021   • Influenza Vaccine (1) 09/01/2023      Medicare Screening Tests and Risk Assessments:     Terrance Cedeño is here for her Subsequent Wellness visit. Last Medicare Wellness visit information reviewed, patient interviewed, no change since last AWV. Health Risk Assessment:   Patient rates overall health as fair. Patient feels that their physical health rating is same. Patient is satisfied with their life. Eyesight was rated as same. Hearing was rated as same. Patient feels that their emotional and mental health rating is same. Patients states they are sometimes angry. Patient states they are sometimes unusually tired/fatigued. Pain experienced in the last 7 days has been none. Patient states that she has experienced no weight loss or gain in last 6 months.      Depression Screening:   PHQ-2 Score: 0      Fall Risk Screening: In the past year, patient has experienced: no history of falling in past year      Urinary Incontinence Screening:   Patient has not leaked urine accidently in the last six months. Home Safety:  Patient does not have trouble with stairs inside or outside of their home. Patient has working smoke alarms and has no working carbon monoxide detector. Home safety hazards include: none. Nutrition:   Current diet is Regular. Medications:   Patient is currently taking over-the-counter supplements. OTC medications include: see medication list. Patient is able to manage medications. Activities of Daily Living (ADLs)/Instrumental Activities of Daily Living (IADLs):   Walk and transfer into and out of bed and chair?: Yes  Dress and groom yourself?: Yes    Bathe or shower yourself?: Yes    Feed yourself? Yes  Do your laundry/housekeeping?: Yes  Manage your money, pay your bills and track your expenses?: Yes  Make your own meals?: Yes    Do your own shopping?: Yes    Previous Hospitalizations:   Any hospitalizations or ED visits within the last 12 months?: Yes    How many hospitalizations have you had in the last year?: 1-2    Advance Care Planning:   Living will: Yes    Durable POA for healthcare:  Yes    Advanced directive: Yes    End of Life Decisions reviewed with patient: Yes    Provider agrees with end of life decisions: Yes      Cognitive Screening:   Provider or family/friend/caregiver concerned regarding cognition?: No    PREVENTIVE SCREENINGS      Cardiovascular Screening:    General: Screening Not Indicated and History Lipid Disorder      Diabetes Screening:     General: Screening Current      Colorectal Cancer Screening:     General: Screening Current      Breast Cancer Screening:     General: Screening Not Indicated      Cervical Cancer Screening:    General: Screening Not Indicated      Osteoporosis Screening:    General: Screening Current      Abdominal Aortic Aneurysm (AAA) Screening:        General: Screening Not Indicated      Lung Cancer Screening:     General: Screening Not Indicated      Hepatitis C Screening:    General: Risks and Benefits Discussed    Hep C Screening Accepted: Yes      Screening, Brief Intervention, and Referral to Treatment (SBIRT)    Screening  Typical number of drinks in a day: 0  Typical number of drinks in a week: 0  Interpretation: Low risk drinking behavior. AUDIT-C Screenin) How often did you have a drink containing alcohol in the past year? never  2) How many drinks did you have on a typical day when you were drinking in the past year? 0  3) How often did you have 6 or more drinks on one occasion in the past year? never    AUDIT-C Score: 0  Interpretation: Score 0-2 (female): Negative screen for alcohol misuse    Single Item Drug Screening:  How often have you used an illegal drug (including marijuana) or a prescription medication for non-medical reasons in the past year? never    Single Item Drug Screen Score: 0  Interpretation: Negative screen for possible drug use disorder    Brief Intervention  Alcohol & drug use screenings were reviewed. No concerns regarding substance use disorder identified. Other Counseling Topics:   Regular weightbearing exercise. No results found. Physical Exam:     /78   Pulse 78   Temp 98 °F (36.7 °C) (Temporal)   Resp 18   Ht 5' 2" (1.575 m)   Wt 56.2 kg (124 lb)   BMI 22.68 kg/m²     Physical Exam  Vitals reviewed. Constitutional:       General: She is not in acute distress. Appearance: Normal appearance. She is not ill-appearing, toxic-appearing or diaphoretic. HENT:      Head: Normocephalic and atraumatic. Right Ear: External ear normal.      Left Ear: External ear normal.      Nose: Nose normal.      Mouth/Throat:      Mouth: Mucous membranes are moist.   Eyes:      General: No scleral icterus. Extraocular Movements: Extraocular movements intact.       Conjunctiva/sclera: Conjunctivae normal.      Pupils: Pupils are equal, round, and reactive to light. Cardiovascular:      Rate and Rhythm: Normal rate and regular rhythm. Pulses: Normal pulses. Pulmonary:      Effort: Pulmonary effort is normal. No respiratory distress. Breath sounds: Normal breath sounds. No wheezing. Abdominal:      General: Bowel sounds are normal. There is no distension. Palpations: Abdomen is soft. Tenderness: There is no abdominal tenderness. Musculoskeletal:      Cervical back: Normal range of motion and neck supple. Right lower leg: No edema. Left lower leg: No edema. Lymphadenopathy:      Cervical: No cervical adenopathy. Skin:     General: Skin is warm and dry. Coloration: Skin is not jaundiced or pale. Neurological:      General: No focal deficit present. Mental Status: She is alert and oriented to person, place, and time. Mental status is at baseline. Cranial Nerves: No cranial nerve deficit. Sensory: No sensory deficit. Psychiatric:         Mood and Affect: Mood normal.         Behavior: Behavior normal.         Thought Content:  Thought content normal.         Judgment: Judgment normal.          Lucía Dang,

## 2023-07-10 NOTE — PATIENT INSTRUCTIONS
Medicare Preventive Visit Patient Instructions  Thank you for completing your Welcome to Medicare Visit or Medicare Annual Wellness Visit today. Your next wellness visit will be due in one year (7/10/2024). The screening/preventive services that you may require over the next 5-10 years are detailed below. Some tests may not apply to you based off risk factors and/or age. Screening tests ordered at today's visit but not completed yet may show as past due. Also, please note that scanned in results may not display below. Preventive Screenings:  Service Recommendations Previous Testing/Comments   Colorectal Cancer Screening  * Colonoscopy    * Fecal Occult Blood Test (FOBT)/Fecal Immunochemical Test (FIT)  * Fecal DNA/Cologuard Test  * Flexible Sigmoidoscopy Age: 43-73 years old   Colonoscopy: every 10 years (may be performed more frequently if at higher risk)  OR  FOBT/FIT: every 1 year  OR  Cologuard: every 3 years  OR  Sigmoidoscopy: every 5 years  Screening may be recommended earlier than age 39 if at higher risk for colorectal cancer. Also, an individualized decision between you and your healthcare provider will decide whether screening between the ages of 77-80 would be appropriate. Colonoscopy: 04/26/2023  FOBT/FIT: Not on file  Cologuard: Not on file  Sigmoidoscopy: Not on file    Screening Current     Breast Cancer Screening Age: 36 years old  Frequency: every 1-2 years  Not required if history of left and right mastectomy Mammogram: Not on file    Screening Not Indicated   Cervical Cancer Screening Between the ages of 21-29, pap smear recommended once every 3 years. Between the ages of 32-69, can perform pap smear with HPV co-testing every 5 years.    Recommendations may differ for women with a history of total hysterectomy, cervical cancer, or abnormal pap smears in past. Pap Smear: Not on file    Screening Not Indicated   Hepatitis C Screening Once for adults born between 1945 and 1965  More frequently in patients at high risk for Hepatitis C Hep C Antibody: Not on file        Diabetes Screening 1-2 times per year if you're at risk for diabetes or have pre-diabetes Fasting glucose: 96 mg/dL (3/26/2023)  A1C: No results in last 5 years (No results in last 5 years)  Screening Current   Cholesterol Screening Once every 5 years if you don't have a lipid disorder. May order more often based on risk factors. Lipid panel: 07/06/2022    Screening Not Indicated  History Lipid Disorder     Other Preventive Screenings Covered by Medicare:  1. Abdominal Aortic Aneurysm (AAA) Screening: covered once if your at risk. You're considered to be at risk if you have a family history of AAA. 2. Lung Cancer Screening: covers low dose CT scan once per year if you meet all of the following conditions: (1) Age 48-67; (2) No signs or symptoms of lung cancer; (3) Current smoker or have quit smoking within the last 15 years; (4) You have a tobacco smoking history of at least 20 pack years (packs per day multiplied by number of years you smoked); (5) You get a written order from a healthcare provider. 3. Glaucoma Screening: covered annually if you're considered high risk: (1) You have diabetes OR (2) Family history of glaucoma OR (3)  aged 48 and older OR (3)  American aged 72 and older  3. Osteoporosis Screening: covered every 2 years if you meet one of the following conditions: (1) You're estrogen deficient and at risk for osteoporosis based off medical history and other findings; (2) Have a vertebral abnormality; (3) On glucocorticoid therapy for more than 3 months; (4) Have primary hyperparathyroidism; (5) On osteoporosis medications and need to assess response to drug therapy. · Last bone density test (DXA Scan): 06/30/2021.  5. HIV Screening: covered annually if you're between the age of 15-65. Also covered annually if you are younger than 13 and older than 72 with risk factors for HIV infection.  For pregnant patients, it is covered up to 3 times per pregnancy. Immunizations:  Immunization Recommendations   Influenza Vaccine Annual influenza vaccination during flu season is recommended for all persons aged >= 6 months who do not have contraindications   Pneumococcal Vaccine   * Pneumococcal conjugate vaccine = PCV13 (Prevnar 13), PCV15 (Vaxneuvance), PCV20 (Prevnar 20)  * Pneumococcal polysaccharide vaccine = PPSV23 (Pneumovax) Adults 20-63 years old: 1-3 doses may be recommended based on certain risk factors  Adults 72 years old: 1-2 doses may be recommended based off what pneumonia vaccine you previously received   Hepatitis B Vaccine 3 dose series if at intermediate or high risk (ex: diabetes, end stage renal disease, liver disease)   Tetanus (Td) Vaccine - COST NOT COVERED BY MEDICARE PART B Following completion of primary series, a booster dose should be given every 10 years to maintain immunity against tetanus. Td may also be given as tetanus wound prophylaxis. Tdap Vaccine - COST NOT COVERED BY MEDICARE PART B Recommended at least once for all adults. For pregnant patients, recommended with each pregnancy. Shingles Vaccine (Shingrix) - COST NOT COVERED BY MEDICARE PART B  2 shot series recommended in those aged 48 and above     Health Maintenance Due:      Topic Date Due   • DXA SCAN  06/30/2023   • Colorectal Cancer Screening  04/26/2027     Immunizations Due:      Topic Date Due   • Pneumococcal Vaccine: 65+ Years (1 - PCV) Never done   • COVID-19 Vaccine (3 - Moderna series) 05/05/2021   • Influenza Vaccine (1) 09/01/2023     Advance Directives   What are advance directives? Advance directives are legal documents that state your wishes and plans for medical care. These plans are made ahead of time in case you lose your ability to make decisions for yourself. Advance directives can apply to any medical decision, such as the treatments you want, and if you want to donate organs.    What are the types of advance directives? There are many types of advance directives, and each state has rules about how to use them. You may choose a combination of any of the following:  · Living will: This is a written record of the treatment you want. You can also choose which treatments you do not want, which to limit, and which to stop at a certain time. This includes surgery, medicine, IV fluid, and tube feedings. · Durable power of  for healthcare [facility]): This is a written record that states who you want to make healthcare choices for you when you are unable to make them for yourself. This person, called a proxy, is usually a family member or a friend. You may choose more than 1 proxy. · Do not resuscitate (DNR) order:  A DNR order is used in case your heart stops beating or you stop breathing. It is a request not to have certain forms of treatment, such as CPR. A DNR order may be included in other types of advance directives. · Medical directive: This covers the care that you want if you are in a coma, near death, or unable to make decisions for yourself. You can list the treatments you want for each condition. Treatment may include pain medicine, surgery, blood transfusions, dialysis, IV or tube feedings, and a ventilator (breathing machine). · Values history: This document has questions about your views, beliefs, and how you feel and think about life. This information can help others choose the care that you would choose. Why are advance directives important? An advance directive helps you control your care. Although spoken wishes may be used, it is better to have your wishes written down. Spoken wishes can be misunderstood, or not followed. Treatments may be given even if you do not want them. An advance directive may make it easier for your family to make difficult choices about your care.        © Copyright [employer] 2018 Information is for End User's use only and may not be sold, redistributed or otherwise used for commercial purposes.  All illustrations and images included in CareNotes® are the copyrighted property of [employer].D.A[employer] Inc. or [address] [place] [address]

## 2023-07-13 DIAGNOSIS — R10.9 ABDOMINAL PAIN, UNSPECIFIED ABDOMINAL LOCATION: ICD-10-CM

## 2023-07-13 RX ORDER — HYOSCYAMINE SULFATE 0.125 MG
0.12 TABLET ORAL DAILY
Qty: 90 TABLET | Refills: 3 | Status: SHIPPED | OUTPATIENT
Start: 2023-07-13

## 2023-07-17 DIAGNOSIS — I10 ACCELERATED HYPERTENSION: Primary | ICD-10-CM

## 2023-07-17 RX ORDER — AMLODIPINE BESYLATE 2.5 MG/1
2.5 TABLET ORAL DAILY
COMMUNITY
End: 2023-07-17 | Stop reason: SDUPTHER

## 2023-07-17 RX ORDER — AMLODIPINE BESYLATE 2.5 MG/1
2.5 TABLET ORAL DAILY
Qty: 90 TABLET | Refills: 3 | Status: SHIPPED | OUTPATIENT
Start: 2023-07-17

## 2023-07-29 DIAGNOSIS — E78.5 HYPERLIPIDEMIA, UNSPECIFIED HYPERLIPIDEMIA TYPE: ICD-10-CM

## 2023-07-29 RX ORDER — ATORVASTATIN CALCIUM 20 MG/1
TABLET, FILM COATED ORAL
Qty: 90 TABLET | Refills: 0 | Status: SHIPPED | OUTPATIENT
Start: 2023-07-29 | End: 2023-07-30

## 2023-07-29 RX ORDER — FENOFIBRATE 134 MG/1
CAPSULE ORAL
Qty: 90 CAPSULE | Refills: 0 | Status: SHIPPED | OUTPATIENT
Start: 2023-07-29

## 2023-07-30 DIAGNOSIS — E78.5 HYPERLIPIDEMIA, UNSPECIFIED HYPERLIPIDEMIA TYPE: ICD-10-CM

## 2023-07-30 RX ORDER — ATORVASTATIN CALCIUM 20 MG/1
TABLET, FILM COATED ORAL
Qty: 90 TABLET | Refills: 0 | Status: SHIPPED | OUTPATIENT
Start: 2023-07-30

## 2023-10-23 DIAGNOSIS — K21.9 GASTROESOPHAGEAL REFLUX DISEASE: ICD-10-CM

## 2023-10-23 DIAGNOSIS — E78.5 HYPERLIPIDEMIA, UNSPECIFIED HYPERLIPIDEMIA TYPE: ICD-10-CM

## 2023-10-23 RX ORDER — FENOFIBRATE 134 MG/1
CAPSULE ORAL
Qty: 90 CAPSULE | Refills: 0 | Status: SHIPPED | OUTPATIENT
Start: 2023-10-23

## 2023-10-23 RX ORDER — OMEPRAZOLE 20 MG/1
CAPSULE, DELAYED RELEASE ORAL
Qty: 90 CAPSULE | Refills: 0 | Status: SHIPPED | OUTPATIENT
Start: 2023-10-23

## 2023-10-26 ENCOUNTER — APPOINTMENT (EMERGENCY)
Dept: NON INVASIVE DIAGNOSTICS | Facility: HOSPITAL | Age: 83
End: 2023-10-26
Payer: MEDICARE

## 2023-10-26 ENCOUNTER — HOSPITAL ENCOUNTER (EMERGENCY)
Facility: HOSPITAL | Age: 83
Discharge: HOME | End: 2023-10-26
Attending: EMERGENCY MEDICINE
Payer: MEDICARE

## 2023-10-26 ENCOUNTER — TELEPHONE (OUTPATIENT)
Age: 83
End: 2023-10-26

## 2023-10-26 VITALS
SYSTOLIC BLOOD PRESSURE: 152 MMHG | DIASTOLIC BLOOD PRESSURE: 67 MMHG | OXYGEN SATURATION: 99 % | BODY MASS INDEX: 23.92 KG/M2 | HEART RATE: 84 BPM | WEIGHT: 130 LBS | TEMPERATURE: 98 F | HEIGHT: 62 IN | RESPIRATION RATE: 18 BRPM

## 2023-10-26 DIAGNOSIS — M71.21 BAKER CYST, RIGHT: Primary | ICD-10-CM

## 2023-10-26 PROCEDURE — 99285 EMERGENCY DEPT VISIT HI MDM: CPT

## 2023-10-26 PROCEDURE — 99284 EMERGENCY DEPT VISIT MOD MDM: CPT | Performed by: EMERGENCY MEDICINE

## 2023-10-26 PROCEDURE — 93971 EXTREMITY STUDY: CPT

## 2023-10-26 PROCEDURE — 93971 EXTREMITY STUDY: CPT | Performed by: SURGERY

## 2023-10-26 RX ORDER — ACETAMINOPHEN 325 MG/1
650 TABLET ORAL ONCE
Status: COMPLETED | OUTPATIENT
Start: 2023-10-26 | End: 2023-10-26

## 2023-10-26 RX ADMIN — ACETAMINOPHEN 650 MG: 325 TABLET, FILM COATED ORAL at 17:36

## 2023-10-26 NOTE — TELEPHONE ENCOUNTER
Caller: Patient     Doctor: Alex Burns     Reason for call: Patient is scheduled with Satish Calderón on 11/8, Patient states she has calf pain and is concerned with blood clot, I did offer other appointment with different Physicians but patient declined     Call back#: [phone]

## 2023-10-26 NOTE — DISCHARGE INSTRUCTIONS
The swelling in your leg and knee is due to a Baker's cyst.  There is no blood clot in your leg. You can continue to treat the pain with acetaminophen as you have been doing. You can put as much weight on your leg as you are able to tolerate. It may help to stretch your calf and hamstring to decrease pain. You can see your orthopedic surgeon for further treatment. The cyst may require drainage due to its size.

## 2023-10-26 NOTE — TELEPHONE ENCOUNTER
Sent TT to Dr Edwina Rand to inform since late in the day of pt's concern RE: Right calf pain and hardness w/pain upon walking. Thank you.

## 2023-10-26 NOTE — ED PROVIDER NOTES
History  Chief Complaint   Patient presents with    Leg Pain     Pt has right sided calf/behind the knee that started a few days ago. Pt reports hx of baker cyst on same side. Called ortho office who recommended ER eval. No thinner hx     82year-old woman with noted past medical history presents to the emergency department with about 4 days of posterior right knee and proximal right calf pain, present when she is walking or placing weight on her foot but not present otherwise at rest.  Does not feel that the limb is especially swollen. She has had a prior Baker's cyst in the right knee that was treated conservatively; she contacted her orthopedic surgeon regarding current sx and was advised to be evaluated the ED for concern of VTE. Undergoes intra-articular corticosteroid injections bilaterally in the knees due to osteoarthritis. Did not undergo drainage procedure or corticosteroid injection in the setting of the previous Baker's cyst.  Has been taking acetaminophen for pain control with moderate effect. No prior fracture or surgery in right lower extremity. Does not take any anticoagulant medications. No history of VTE and no surgery/immobilization in past 30 days. No history of active or recent malignancy. A/p: Highest suspicion for Baker's cyst but VTE still possible and cannot be discriminated simply upon basis of exam.  Will obtain venous duplex of right lower extremity. Assuming no VTE present--and particularly if Baker's cyst is present--will have her follow with her orthopedic surgeon for further evaluation. History provided by:  Patient and medical records  Leg Pain      Prior to Admission Medications   Prescriptions Last Dose Informant Patient Reported? Taking?    Calcium Carbonate-Vit D-Min (Calcium 1200) 1747-0168 MG-UNIT CHEW   No No   Sig: Chew 1 tablet daily   Cholecalciferol (VITAMIN D) 2000 UNITS tablet   Yes No   Sig: Take 2,000 Units by mouth daily   Multiple Vitamins-Minerals (MULTIVITAMIN WOMEN) TABS   Yes No   Sig: Take by mouth   acetaminophen (TYLENOL) 325 mg tablet   No No   Sig: Take 2 tablets (650 mg total) by mouth every 6 (six) hours as needed for mild pain, headaches or fever   amLODIPine (NORVASC) 2.5 mg tablet   No No   Sig: Take 1 tablet (2.5 mg total) by mouth daily   aspirin 81 MG tablet   Yes No   Sig: Take 81 mg by mouth daily   atorvastatin (LIPITOR) 20 mg tablet   No No   Sig: Take 1 tablet by mouth once daily   clobetasol (TEMOVATE) 0.05 % ointment   No No   Sig: Apply topically 2 (two) times a day   Patient not taking: Reported on 7/10/2023   dicyclomine (BENTYL) 10 mg capsule   No No   Sig: Take 1 capsule (10 mg total) by mouth daily as needed (abdominal pain)   Patient not taking: Reported on 7/10/2023   fenofibrate micronized (LOFIBRA) 134 MG capsule   No No   Sig: TAKE 1 CAPSULE BY MOUTH ONCE DAILY WITH BREAKFAST   hyoscyamine (Levsin) 0.125 MG tablet   No No   Sig: Take 1 tablet (0.125 mg total) by mouth in the morning   levothyroxine (Levoxyl) 25 mcg tablet   No No   Sig: Take 1 tablet (25 mcg total) by mouth daily in the early morning Take on an empty stomach for hypothyroidism   Patient not taking: Reported on 7/10/2023   omeprazole (PriLOSEC) 20 mg delayed release capsule   No No   Sig: Take 1 capsule by mouth once daily      Facility-Administered Medications: None       Past Medical History:   Diagnosis Date    Arteriosclerotic heart disease     LAST ASSESSED: [id number]    Esophageal reflux     LAST ASSESSED: [id number]    GERD (gastroesophageal reflux disease)     Hyperlipidemia     LAST ASSESSED: [id number]    Hypertension     LAST ASSESSED: [id number]    Nontoxic single thyroid nodule     LAST ASSESSED: [id number]    Osteopenia     LAST ASSESSED: [id number]       Past Surgical History:   Procedure Laterality Date    CYSTOSCOPY  12/04/2013    DIAGNOSTIC    ESOPHAGOGASTRODUODENOSCOPY      MASTECTOMY Bilateral     cancer    OOPHORECTOMY      SALPINGOOPHORECTOMY Bilateral        Family History   Problem Relation Age of Onset    Alzheimer's disease Mother     Hypertension Father     Stroke Father         SYNDROME    Colon cancer Brother      I have reviewed and agree with the history as documented. E-Cigarette/Vaping    E-Cigarette Use Never User      E-Cigarette/Vaping Substances    Nicotine No     THC No     CBD No     Flavoring No     Other No     Unknown No      Social History     Tobacco Use    Smoking status: Never    Smokeless tobacco: Never   Vaping Use    Vaping Use: Never used   Substance Use Topics    Alcohol use: No    Drug use: No       Review of Systems   Musculoskeletal:  Positive for arthralgias and myalgias. Negative for joint swelling. Skin:  Negative for color change and wound. Neurological:  Negative for weakness and numbness. Hematological: Negative. Physical Exam  Physical Exam  Vitals and nursing note reviewed. Constitutional:       General: She is awake. She is not in acute distress. Appearance: Normal appearance. HENT:      Head: Normocephalic and atraumatic. Right Ear: Hearing and external ear normal.      Left Ear: Hearing and external ear normal.   Neck:      Trachea: Trachea and phonation normal.   Cardiovascular:      Rate and Rhythm: Normal rate and regular rhythm. Pulses: Normal pulses. Popliteal pulses are 2+ on the right side and 2+ on the left side. Dorsalis pedis pulses are 2+ on the right side and 2+ on the left side. Posterior tibial pulses are 2+ on the right side and 2+ on the left side. Pulmonary:      Effort: Pulmonary effort is normal. No tachypnea, accessory muscle usage or respiratory distress. Musculoskeletal:      Comments: Moderate tenderness of the right popliteal fossa with mild fullness of soft tissue in the fossa as well as tenderness in the posterior aspect of the proximal calf with exacerbation of pain upon passive dorsiflexion of the right calf.   No significant swelling in right lower extremity   Skin:     General: Skin is warm and dry. Comments: No distention of the superficial venous system of the right lower extremity. Neurological:      Mental Status: She is alert and oriented to person, place, and time. GCS: GCS eye subscore is 4. GCS verbal subscore is 5. GCS motor subscore is 6. Sensory: Sensation is intact. Motor: Motor function is intact. Comments: Intact sensation bilateral lower extremities to light touch. Strength 5/5 bilateral lower extremity at hip/knee/ankle all planes of motion         Vital Signs  ED Triage Vitals [10/26/23 1713]   Temperature Pulse Respirations Blood Pressure SpO2   98 °F (36.7 °C) 84 18 152/67 99 %      Temp Source Heart Rate Source Patient Position - Orthostatic VS BP Location FiO2 (%)   Tympanic Monitor Sitting Right arm --      Pain Score       6           Vitals:    10/26/23 1713   BP: 152/67   Pulse: 84   Patient Position - Orthostatic VS: Sitting         Visual Acuity      ED Medications  Medications   acetaminophen (TYLENOL) tablet 650 mg (650 mg Oral Given 10/26/23 1736)       Diagnostic Studies  Results Reviewed       None                   VAS lower limb venous duplex study, unilateral/limited    (Results Pending)              Procedures  Procedures         ED Course  ED Course as of 10/26/23 1837   Thu Oct 26, 2023   1821 VAS lower limb venous duplex study, unilateral/limited  Discussed with vascular technician at completion of study: No DVT in right lower extremity. There is an 11 cm Baker's cyst in the right popliteal fossa extending to some degree into the proximal calf. [id number] Patient has appointment with her orthopedic physician on 8 November 2023 for regularly scheduled intra-articular steroid injection. I recommend her to keep this appointment and to discuss the Baker's cyst with her physician prior to the appointment.   The large size may prompt drainage or some other kind of intervention beyond expectant management. Otherwise, can be active as tolerated. Can continue use of acetaminophen as needed for pain control. All questions were answered to patient's satisfaction prior to discharge. She expressed understanding and agreed to plan. SBIRT 22yo+      Flowsheet Row Most Recent Value   Initial Alcohol Screen: US AUDIT-C     1. How often do you have a drink containing alcohol? 0 Filed at: 10/26/2023 1716   Audit-C Score 0 Filed at: 10/26/2023 1716   RONNY: How many times in the past year have you. .. Used an illegal drug or used a prescription medication for non-medical reasons? Never Filed at: 10/26/2023 1716            Medical Decision Making  Amount and/or Complexity of Data Reviewed  Radiology:  Decision-making details documented in ED Course. Risk  OTC drugs. Disposition  Final diagnoses:   Baker cyst, right     Time reflects when diagnosis was documented in both MDM as applicable and the Disposition within this note       Time User Action Codes Description Comment    10/26/2023  6:29 PM Theola Kawasaki Add [M71.21] Baker cyst, right           ED Disposition       ED Disposition   Discharge    Condition   Stable    Date/Time   u Oct 26, 2023 [address] discharge to home/self care. Follow-up Information       Follow up With Specialties Details Why Contact Info    Trudi Dixon DO Orthopedics, Sports Medicine Schedule an appointment as soon as possible for a visit  As needed for further treatment [address]  [phone]              Patient's Medications   Discharge Prescriptions    No medications on file       No discharge procedures on file.     PDMP Review       None            ED Provider  Electronically Signed by             Willie Kramer DO  10/26/23 3299

## 2023-10-26 NOTE — TELEPHONE ENCOUNTER
Called and spoke w/pt and she is experiencing right calf pain and is hard like. No swelling. She has pain in the big calf muscle with walking. No fever, redness, SOB, chest pain or difficulty breathing. . Denies hx of DVT. Pt had a brother that had a blood clot and complained in the same manner. Last OV w/Dr Ortiz on 6/7/23 for OA B/L knees w/B/L CSI given. Please review and advise. So, if you develop any SOB, difficulty breathing, chest pain for fever call 911 and go ED. If you feel that this a blood clot and do not hear back from us in a timely manner, can go ED. Also advised pt to call her PCP after we get off the phone and inform them.

## 2023-10-26 NOTE — TELEPHONE ENCOUNTER
Called and spoke w/pt and relayed Dr Alverto Love from [facility] Would recommend Ed visit if concern for dvt and experiencing calf pain. Pt states that she will go to [facility] ED this evening for evaluation.

## 2023-10-27 NOTE — TELEPHONE ENCOUNTER
Spoke with this patient this am did go to the ED no Blood clot was found.  Does have Bakers Cyst she will keep her [place] for 11[place]08/2023 [place] [facility] [place]

## 2023-11-08 ENCOUNTER — OFFICE VISIT (OUTPATIENT)
Dept: OBGYN CLINIC | Facility: CLINIC | Age: 83
End: 2023-11-08
Payer: MEDICARE

## 2023-11-08 VITALS
DIASTOLIC BLOOD PRESSURE: 82 MMHG | HEART RATE: 84 BPM | BODY MASS INDEX: 23.19 KG/M2 | WEIGHT: 126 LBS | SYSTOLIC BLOOD PRESSURE: 146 MMHG | RESPIRATION RATE: 16 BRPM | HEIGHT: 62 IN

## 2023-11-08 DIAGNOSIS — M17.0 PRIMARY OSTEOARTHRITIS OF BOTH KNEES: Primary | ICD-10-CM

## 2023-11-08 PROCEDURE — 20610 DRAIN/INJ JOINT/BURSA W/O US: CPT | Performed by: FAMILY MEDICINE

## 2023-11-08 PROCEDURE — 99213 OFFICE O/P EST LOW 20 MIN: CPT | Performed by: FAMILY MEDICINE

## 2023-11-08 RX ORDER — TRIAMCINOLONE ACETONIDE 40 MG/ML
40 INJECTION, SUSPENSION INTRA-ARTICULAR; INTRAMUSCULAR
Status: COMPLETED | OUTPATIENT
Start: 2023-11-08 | End: 2023-11-08

## 2023-11-08 RX ADMIN — TRIAMCINOLONE ACETONIDE 40 MG: 40 INJECTION, SUSPENSION INTRA-ARTICULAR; INTRAMUSCULAR at 09:30

## 2023-11-08 NOTE — PROGRESS NOTES
Large joint arthrocentesis: bilateral knee  Universal Protocol:  Consent: Verbal consent obtained. Risks and benefits: risks, benefits and alternatives were discussed  Consent given by: patient  Supporting Documentation  Indications: pain   Procedure Details  Location: knee - bilateral knee  Preparation: Patient was prepped and draped in the usual sterile fashion  Needle size: 22 G  Ultrasound guidance: no  Approach: anterolateral    Medications (Right): 40 mg triamcinolone acetonide 40 mg/mL(4 ml ropivicaine)  Medications (Left): 40 mg triamcinolone acetonide 40 mg/mL (4 ml ropivicaine)    Patient tolerance: patient tolerated the procedure well with no immediate complications    Risks and benefits of CSI were discussed with patient extensively. Risks were highlighted which included but were not limited to infection, pain, local site swelling, and chance that injection may not be effective. Patient was also counseled regarding glucose elevation days after receiving CSI and to be mindful of diet and check sugars daily. Patient agreeable to proceed with CSI after counseling.

## 2023-11-08 NOTE — PROGRESS NOTES
Subjective:    Chief Complaint   Patient presents with    Right Knee - Follow-up    Left Knee - Follow-up       Adrienne Quezada is a 82 y.o. female is presenting today for 5-month follow-up visit in regards to bilateral knee pain. Patient was diagnosed with osteoarthritis of the bilateral knees and initial evaluation and provided with a corticosteroid injection for bilateral knees. She reports significant pain relief with corticosteroid injection which lasted up until this past month. She does report visit to the emergency room due to swelling and pain in the posterior aspect of the calf. Ultrasound was performed which revealed Baker's cyst on the right knee. She states that over the past week the swelling has resolved. The following portions of the patient's history were reviewed and updated as appropriate: allergies, current medications, past family history, past medical history, past social history, past surgical history and problem list.    Occupation:   retired    Review of Systems   Constitutional: Negative for fever. Respiratory: Negative for cough and shortness of breath. Cardiovascular: Negative for leg swelling and palpitations. Gastrointestinal: Negative for constipation and diarrhea. Genitourinary: Negative for bladder incontinence and difficulty urinating. Skin: Negative for rash and ulcer. Neurological: Negative for dizziness and headaches. Hem/Lymph/Immuno: Negative for blood clots. Does not bruise/bleed easily. Psychiatric/Behavioral: Negative for confusion.          Objective:  /82   Pulse 84   Resp 16   Ht 5' 2" (1.575 m)   Wt 57.2 kg (126 lb)   BMI 23.05 kg/m²   Skin: no rashes, lesions, skin discolorations, lacerations  Vasculature: normal popliteal and pedal pulse, normal skin color, normal capillary refill in extremity, no lower extremity edema  Neurologic: Neurologic exam is normal throughout lower extremities, Awake, alert, and oriented x3, no apparent distress. Musculoskeletal: Bilateral KNEE EXAM  Gait: limping gait positive   Inspection:No erythema, no induration. There is no gross deformity. There is mild popliteal fullness consistent with Baker's cyst  Palpation: Swelling: negative. Effusion: negative. Medial joint line TTP:  Positive  Lateral joint line TTP: negative  ROM: Full flexion and extension  Special tests: [facility]'s: negative  Instability to varus/valgus stress: negative  Anterior Drawer: negative Lachman's test: negative  Posterior Drawer: negative  Crepitus:  Positive        Imaging:  See final report     Assessment/Plan:  1. Primary osteoarthritis of both knees  Patient is presenting for recurrence of bilateral knee pain symptoms secondary to underlying knee osteoarthritis. We discussed repeating corticosteroid injection for bilateral knees and patient is agreeable. CSI performed in office today without complication. Her physical examination today does reveal mild fullness in the popliteal space consistent with Baker's cyst however fluid collection is minimal as per today's examination. We discussed the nature of Baker's cyst and the recommended treatment options.   Given the minimal fluid collection I advised against cyst aspiration and instead recommended intra-articular corticosteroid injection which should help alleviate and prevent recurrence of Baker's cyst.  I would like to see patient back in 4 months for repeat corticosteroid injections to help prevent recurrence of Baker's cyst.  - Large joint arthrocentesis: bilateral knee

## 2023-11-14 ENCOUNTER — HOSPITAL ENCOUNTER (OUTPATIENT)
Dept: BONE DENSITY | Facility: HOSPITAL | Age: 83
Discharge: HOME | End: 2023-11-14
Attending: INTERNAL MEDICINE
Payer: MEDICARE

## 2023-11-14 VITALS — BODY MASS INDEX: 23.03 KG/M2 | HEIGHT: 61 IN | WEIGHT: 122 LBS

## 2023-11-14 DIAGNOSIS — M85.852 OSTEOPENIA OF BOTH HIPS: ICD-10-CM

## 2023-11-14 DIAGNOSIS — M85.851 OSTEOPENIA OF BOTH HIPS: ICD-10-CM

## 2023-11-14 PROCEDURE — 77080 DXA BONE DENSITY AXIAL: CPT

## 2023-11-24 ENCOUNTER — TELEPHONE (OUTPATIENT)
Dept: OBGYN CLINIC | Facility: CLINIC | Age: 83
End: 2023-11-24

## 2023-11-24 NOTE — TELEPHONE ENCOUNTER
Caller: Patient    Doctor: Alex Burns    Reason for call: Patient had a cortisone shot on November 4th and is now having a lot of pain in her right calf and the sole of her foot.   She would like someone to call her back    Call back#: [phone]

## 2023-11-27 NOTE — TELEPHONE ENCOUNTER
I spoke to the patient on the telephone and discussed her current symptoms. She has improved in terms of symptoms. Seems like she may be experiencing pain related to Baker's cyst.  Advised to continue with Tylenol and can begin short course of anti-inflammatory however would not recommend greater than 2 weeks of anti-inflammatory use. If continuing to persist with symptoms advised that we can make a return follow-up appointment to discuss symptoms.

## 2023-12-06 ENCOUNTER — OFFICE VISIT (OUTPATIENT)
Dept: OBGYN CLINIC | Facility: CLINIC | Age: 83
End: 2023-12-06
Payer: MEDICARE

## 2023-12-06 VITALS
HEIGHT: 61 IN | SYSTOLIC BLOOD PRESSURE: 154 MMHG | HEART RATE: 92 BPM | WEIGHT: 125.6 LBS | BODY MASS INDEX: 23.71 KG/M2 | DIASTOLIC BLOOD PRESSURE: 84 MMHG | RESPIRATION RATE: 16 BRPM

## 2023-12-06 DIAGNOSIS — M17.0 PRIMARY OSTEOARTHRITIS OF BOTH KNEES: Primary | ICD-10-CM

## 2023-12-06 PROCEDURE — 99213 OFFICE O/P EST LOW 20 MIN: CPT | Performed by: FAMILY MEDICINE

## 2023-12-06 NOTE — PROGRESS NOTES
Subjective:    Chief Complaint   Patient presents with    Right Knee - Pain    Left Knee - Pain       Roman Sterling is a 82 y.o. female is presenting for follow-up visit in regards to bilateral knee pain. Patient was provided with a corticosteroid injection of her bilateral knees approximately 1 month ago and states that the injection only lasted about 3 weeks. This is now her second corticosteroid injection for bilateral knees. She states that the right knee is more painful than the left and currently rates the pain  8 out of 10 in severity      The following portions of the patient's history were reviewed and updated as appropriate: allergies, current medications, past family history, past medical history, past social history, past surgical history and problem list.    Occupation:   retired    Review of Systems   Constitutional: Negative for fever. Objective:  /84   Pulse 92   Resp 16   Ht 5' 1" (1.549 m)   Wt 57 kg (125 lb 9.6 oz)   BMI 23.73 kg/m²   Skin: no rashes, lesions, skin discolorations, lacerations  Vasculature: normal popliteal and pedal pulse, normal skin color, normal capillary refill in extremity, no lower extremity edema  Neurologic: Neurologic exam is normal throughout lower extremities, Awake, alert, and oriented x3, no apparent distress. Musculoskeletal: Bilateral KNEE EXAM  Gait: limping gait positive   Inspection:No erythema, no induration. There is no gross deformity. There is mild popliteal fullness consistent with Baker's cyst  Palpation: Swelling: negative. Effusion: negative. Medial joint line TTP:  Positive  Lateral joint line TTP: negative  ROM: Full flexion and extension  Special tests: [facility]'s: negative  Instability to varus/valgus stress: negative  Anterior Drawer: negative Lachman's test: negative  Posterior Drawer: negative  Crepitus:  Positive        Imaging:  See final report     Assessment/Plan:  1.  Primary osteoarthritis of both knees  We have submitted for prior authorization for hyaluronic acid injection for bilateral knees.   I did discuss the severity of patient's knee osteoarthritis and the discussed that if no improvement with hyaluronic acid injection therapy surgical consultation would be indicated

## 2023-12-08 ENCOUNTER — PROCEDURE VISIT (OUTPATIENT)
Dept: OBGYN CLINIC | Facility: CLINIC | Age: 83
End: 2023-12-08
Payer: MEDICARE

## 2023-12-08 VITALS
BODY MASS INDEX: 23.6 KG/M2 | DIASTOLIC BLOOD PRESSURE: 93 MMHG | HEART RATE: 89 BPM | RESPIRATION RATE: 16 BRPM | SYSTOLIC BLOOD PRESSURE: 175 MMHG | WEIGHT: 125 LBS | HEIGHT: 61 IN

## 2023-12-08 DIAGNOSIS — M17.0 PRIMARY OSTEOARTHRITIS OF BOTH KNEES: Primary | ICD-10-CM

## 2023-12-08 PROCEDURE — 20610 DRAIN/INJ JOINT/BURSA W/O US: CPT | Performed by: FAMILY MEDICINE

## 2023-12-08 RX ORDER — HYALURONATE SODIUM 10 MG/ML
20 SYRINGE (ML) INTRAARTICULAR
Status: COMPLETED | OUTPATIENT
Start: 2023-12-08 | End: 2023-12-08

## 2023-12-08 RX ADMIN — Medication 20 MG: at 11:30

## 2023-12-08 NOTE — PROGRESS NOTES
Large joint arthrocentesis: bilateral knee  Universal Protocol:  Consent: Verbal consent obtained.   Risks and benefits: risks, benefits and alternatives were discussed  Consent given by: patient  Procedure Details  Location: knee - bilateral knee  Needle size: 22 G  Ultrasound guidance: no  Approach: anterolateral    Medications (Right): 20 mg Sodium Hyaluronate (Viscosup) 20 MG/2MLMedications (Left): 20 mg Sodium Hyaluronate (Viscosup) 20 MG/2ML   Patient tolerance: patient tolerated the procedure well with no immediate complications

## 2023-12-20 ENCOUNTER — PROCEDURE VISIT (OUTPATIENT)
Dept: OBGYN CLINIC | Facility: CLINIC | Age: 83
End: 2023-12-20
Payer: MEDICARE

## 2023-12-20 VITALS
SYSTOLIC BLOOD PRESSURE: 136 MMHG | RESPIRATION RATE: 16 BRPM | HEIGHT: 61 IN | WEIGHT: 125 LBS | DIASTOLIC BLOOD PRESSURE: 80 MMHG | HEART RATE: 96 BPM | BODY MASS INDEX: 23.6 KG/M2

## 2023-12-20 DIAGNOSIS — M17.0 PRIMARY OSTEOARTHRITIS OF BOTH KNEES: Primary | ICD-10-CM

## 2023-12-20 PROCEDURE — 20610 DRAIN/INJ JOINT/BURSA W/O US: CPT | Performed by: FAMILY MEDICINE

## 2023-12-20 RX ORDER — HYALURONATE SODIUM 10 MG/ML
20 SYRINGE (ML) INTRAARTICULAR
Status: COMPLETED | OUTPATIENT
Start: 2023-12-20 | End: 2023-12-20

## 2023-12-20 RX ADMIN — Medication 20 MG: at 10:15

## 2023-12-20 NOTE — PROGRESS NOTES
Large joint arthrocentesis: bilateral knee  Universal Protocol:  Consent: Verbal consent obtained.  Risks and benefits: risks, benefits and alternatives were discussed  Consent given by: patient  Supporting Documentation  Indications: pain   Procedure Details  Location: knee - bilateral knee  Preparation: Patient was prepped and draped in the usual sterile fashion  Needle size: 22 G  Ultrasound guidance: no  Approach: anterolateral    Medications (Right): 20 mg Sodium Hyaluronate (Viscosup) 20 MG/2MLMedications (Left): 20 mg Sodium Hyaluronate (Viscosup) 20 MG/2ML   Patient tolerance: patient tolerated the procedure well with no immediate complications    Reports improvement in pain from initial injection visit

## 2023-12-27 ENCOUNTER — PROCEDURE VISIT (OUTPATIENT)
Dept: OBGYN CLINIC | Facility: CLINIC | Age: 83
End: 2023-12-27
Payer: MEDICARE

## 2023-12-27 VITALS
SYSTOLIC BLOOD PRESSURE: 150 MMHG | HEIGHT: 61 IN | BODY MASS INDEX: 23.6 KG/M2 | HEART RATE: 93 BPM | WEIGHT: 125 LBS | DIASTOLIC BLOOD PRESSURE: 81 MMHG

## 2023-12-27 DIAGNOSIS — M17.0 PRIMARY OSTEOARTHRITIS OF BOTH KNEES: Primary | ICD-10-CM

## 2023-12-27 PROCEDURE — 20610 DRAIN/INJ JOINT/BURSA W/O US: CPT | Performed by: FAMILY MEDICINE

## 2023-12-27 RX ORDER — HYALURONATE SODIUM 10 MG/ML
20 SYRINGE (ML) INTRAARTICULAR
Status: COMPLETED | OUTPATIENT
Start: 2023-12-27 | End: 2023-12-27

## 2023-12-27 RX ADMIN — Medication 20 MG: at 10:15

## 2023-12-27 NOTE — PROGRESS NOTES
Large joint arthrocentesis: bilateral knee  Universal Protocol:  Consent: Verbal consent obtained.  Risks and benefits: risks, benefits and alternatives were discussed  Consent given by: patient  Supporting Documentation  Indications: pain   Procedure Details  Location: knee - bilateral knee  Preparation: Patient was prepped and draped in the usual sterile fashion  Needle size: 22 G  Ultrasound guidance: no  Approach: anterolateral    Medications (Right): 20 mg Sodium Hyaluronate (Viscosup) 20 MG/2MLMedications (Left): 20 mg Sodium Hyaluronate (Viscosup) 20 MG/2ML   Patient tolerance: patient tolerated the procedure well with no immediate complications

## 2024-01-03 ENCOUNTER — APPOINTMENT (OUTPATIENT)
Dept: LAB | Facility: HOSPITAL | Age: 84
End: 2024-01-03
Payer: MEDICARE

## 2024-01-03 DIAGNOSIS — M85.852 OSTEOPENIA OF BOTH HIPS: ICD-10-CM

## 2024-01-03 DIAGNOSIS — I10 PRIMARY HYPERTENSION: ICD-10-CM

## 2024-01-03 DIAGNOSIS — E55.9 VITAMIN D DEFICIENCY: ICD-10-CM

## 2024-01-03 DIAGNOSIS — M85.851 OSTEOPENIA OF BOTH HIPS: ICD-10-CM

## 2024-01-03 LAB
25(OH)D3 SERPL-MCNC: 68.8 NG/ML (ref 30–100)
ALBUMIN SERPL BCP-MCNC: 4.1 G/DL (ref 3.5–5)
ALP SERPL-CCNC: 39 U/L (ref 34–104)
ALT SERPL W P-5'-P-CCNC: 16 U/L (ref 7–52)
ANION GAP SERPL CALCULATED.3IONS-SCNC: 7 MMOL/L
AST SERPL W P-5'-P-CCNC: 29 U/L (ref 13–39)
BILIRUB SERPL-MCNC: 0.52 MG/DL (ref 0.2–1)
BUN SERPL-MCNC: 20 MG/DL (ref 5–25)
CALCIUM SERPL-MCNC: 9.8 MG/DL (ref 8.4–10.2)
CHLORIDE SERPL-SCNC: 103 MMOL/L (ref 96–108)
CHOLEST SERPL-MCNC: 189 MG/DL
CO2 SERPL-SCNC: 29 MMOL/L (ref 21–32)
CREAT SERPL-MCNC: 1.13 MG/DL (ref 0.6–1.3)
GFR SERPL CREATININE-BSD FRML MDRD: 45 ML/MIN/1.73SQ M
GLUCOSE P FAST SERPL-MCNC: 101 MG/DL (ref 65–99)
HDLC SERPL-MCNC: 52 MG/DL
LDLC SERPL CALC-MCNC: 107 MG/DL (ref 0–100)
NONHDLC SERPL-MCNC: 137 MG/DL
POTASSIUM SERPL-SCNC: 3.9 MMOL/L (ref 3.5–5.3)
PROT SERPL-MCNC: 6.5 G/DL (ref 6.4–8.4)
SODIUM SERPL-SCNC: 139 MMOL/L (ref 135–147)
TRIGL SERPL-MCNC: 150 MG/DL

## 2024-01-03 PROCEDURE — 80053 COMPREHEN METABOLIC PANEL: CPT

## 2024-01-03 PROCEDURE — 82306 VITAMIN D 25 HYDROXY: CPT

## 2024-01-03 PROCEDURE — 80061 LIPID PANEL: CPT

## 2024-01-03 PROCEDURE — 36415 COLL VENOUS BLD VENIPUNCTURE: CPT

## 2024-01-18 ENCOUNTER — OFFICE VISIT (OUTPATIENT)
Dept: FAMILY MEDICINE CLINIC | Facility: CLINIC | Age: 84
End: 2024-01-18
Payer: MEDICARE

## 2024-01-18 VITALS
TEMPERATURE: 97.7 F | OXYGEN SATURATION: 99 % | DIASTOLIC BLOOD PRESSURE: 70 MMHG | SYSTOLIC BLOOD PRESSURE: 124 MMHG | WEIGHT: 126.4 LBS | BODY MASS INDEX: 23.86 KG/M2 | HEART RATE: 75 BPM | RESPIRATION RATE: 18 BRPM | HEIGHT: 61 IN

## 2024-01-18 DIAGNOSIS — K21.9 GASTROESOPHAGEAL REFLUX DISEASE: ICD-10-CM

## 2024-01-18 DIAGNOSIS — E78.5 HYPERLIPIDEMIA, UNSPECIFIED HYPERLIPIDEMIA TYPE: ICD-10-CM

## 2024-01-18 DIAGNOSIS — E27.8 OTHER SPECIFIED DISORDERS OF ADRENAL GLAND (HCC): ICD-10-CM

## 2024-01-18 DIAGNOSIS — N18.32 STAGE 3B CHRONIC KIDNEY DISEASE (HCC): ICD-10-CM

## 2024-01-18 PROCEDURE — 99214 OFFICE O/P EST MOD 30 MIN: CPT | Performed by: INTERNAL MEDICINE

## 2024-01-18 RX ORDER — OMEPRAZOLE 20 MG/1
20 CAPSULE, DELAYED RELEASE ORAL DAILY
Qty: 90 CAPSULE | Refills: 0 | Status: SHIPPED | OUTPATIENT
Start: 2024-01-18

## 2024-01-18 RX ORDER — ATORVASTATIN CALCIUM 20 MG/1
20 TABLET, FILM COATED ORAL DAILY
Qty: 90 TABLET | Refills: 0 | Status: SHIPPED | OUTPATIENT
Start: 2024-01-18

## 2024-01-18 RX ORDER — FENOFIBRATE 134 MG/1
134 CAPSULE ORAL
Qty: 90 CAPSULE | Refills: 0 | Status: SHIPPED | OUTPATIENT
Start: 2024-01-18

## 2024-01-18 NOTE — PROGRESS NOTES
Name: Nohemi Arcos      : 1940      MRN: [id number]  Encounter Provider: Gissell Graham DO  Encounter Date: 2024   Encounter department: [facility]    Assessment & Plan     1. Hyperlipidemia, unspecified hyperlipidemia type  -     atorvastatin (LIPITOR) 20 mg tablet; Take 1 tablet (20 mg total) by mouth daily  -     fenofibrate micronized (LOFIBRA) 134 MG capsule; Take 1 capsule (134 mg total) by mouth daily with breakfast  Pt to continue current rx Labs stable Lo fat diet She remains active but caution with snow removal     2. Gastroesophageal reflux disease  -     omeprazole (PriLOSEC) 20 mg delayed release capsule; Take 1 capsule (20 mg total) by mouth daily    3. Other specified disorders of adrenal gland (HCC)  Stable No findings on CT 3/23 of concern    4. Stage 3b chronic kidney disease (HCC)  GFR stage 3 and stable for pt encouraged hydration and avoid nsaids         Depression Screening and Follow-up Plan: Patient was screened for depression during today's encounter. They screened negative with a PHQ-2 score of 0.  Rto 6 months     Subjective      HPI  Pt doing ok overall No acute issues She is managing ok in her home and does her ADLs independently No falls She does have a neighbor who will do snow removal so encouraged her to let them Appetite stable and no change in bowels No bladder sxs She is trying to stay hydrated Knee sxs are better after gel shots and has ortho followup She does not want surgery at this time   Review of Systems   Constitutional:  Negative for chills and fever.   HENT: Negative.     Eyes:  Negative for visual disturbance.   Respiratory:  Negative for cough and shortness of breath.    Cardiovascular:  Negative for chest pain, palpitations and leg swelling.   Gastrointestinal:  Negative for abdominal distention and abdominal pain.   Genitourinary: Negative.    Musculoskeletal:  Positive for arthralgias.   Neurological:  Negative for dizziness,  "light-headedness and headaches.   Psychiatric/Behavioral:  Negative for sleep disturbance. The patient is not nervous/anxious.        Current Outpatient Medications on File Prior to Visit   Medication Sig   • amLODIPine (NORVASC) 2.5 mg tablet Take 1 tablet (2.5 mg total) by mouth daily   • aspirin 81 MG tablet Take 81 mg by mouth daily   • Calcium Carbonate-Vit D-Min (Calcium 1200) 4446-5229 MG-UNIT CHEW Chew 1 tablet daily   • Cholecalciferol (VITAMIN D) 2000 UNITS tablet Take 2,000 Units by mouth daily   • hyoscyamine (Levsin) 0.125 MG tablet Take 1 tablet (0.125 mg total) by mouth in the morning   • Multiple Vitamins-Minerals (MULTIVITAMIN WOMEN) TABS Take by mouth   • [DISCONTINUED] atorvastatin (LIPITOR) 20 mg tablet Take 1 tablet by mouth once daily   • [DISCONTINUED] fenofibrate micronized (LOFIBRA) 134 MG capsule TAKE 1 CAPSULE BY MOUTH ONCE DAILY WITH BREAKFAST   • [DISCONTINUED] omeprazole (PriLOSEC) 20 mg delayed release capsule Take 1 capsule by mouth once daily   • acetaminophen (TYLENOL) 325 mg tablet Take 2 tablets (650 mg total) by mouth every 6 (six) hours as needed for mild pain, headaches or fever (Patient not taking: Reported on 1/18/2024)   • dicyclomine (BENTYL) 10 mg capsule Take 1 capsule (10 mg total) by mouth daily as needed (abdominal pain) (Patient not taking: Reported on 7/10/2023)   • [DISCONTINUED] clobetasol (TEMOVATE) 0.05 % ointment Apply topically 2 (two) times a day (Patient not taking: Reported on 7/10/2023)   • [DISCONTINUED] levothyroxine (Levoxyl) 25 mcg tablet Take 1 tablet (25 mcg total) by mouth daily in the early morning Take on an empty stomach for hypothyroidism (Patient not taking: Reported on 1/18/2024)       Objective     /70   Pulse 75   Temp 97.7 °F (36.5 °C) (Temporal)   Resp 18   Ht 5' 1\" (1.549 m)   Wt 57.3 kg (126 lb 6.4 oz)   SpO2 99%   BMI 23.88 kg/m²     Physical Exam  Vitals and nursing note reviewed.   Constitutional:       General: She is not " in acute distress.     Appearance: Normal appearance. She is not ill-appearing, toxic-appearing or diaphoretic.   HENT:      Head: Normocephalic and atraumatic.      Right Ear: External ear normal.      Left Ear: External ear normal.      Nose: Nose normal.      Mouth/Throat:      Mouth: Mucous membranes are dry.   Eyes:      General: No scleral icterus.  Cardiovascular:      Rate and Rhythm: Normal rate and regular rhythm.      Pulses: Normal pulses.      Heart sounds: Normal heart sounds.   Pulmonary:      Effort: Pulmonary effort is normal. No respiratory distress.      Breath sounds: Normal breath sounds. No wheezing.   Abdominal:      General: Bowel sounds are normal. There is no distension.      Palpations: Abdomen is soft.      Tenderness: There is no abdominal tenderness.   Musculoskeletal:      Cervical back: Normal range of motion and neck supple.      Right lower leg: No edema.      Left lower leg: No edema.   Lymphadenopathy:      Cervical: No cervical adenopathy.   Skin:     General: Skin is warm and dry.      Coloration: Skin is not jaundiced or pale.   Neurological:      General: No focal deficit present.      Mental Status: She is alert and oriented to person, place, and time. Mental status is at baseline.   Psychiatric:         Mood and Affect: Mood normal.         Behavior: Behavior normal.         Thought Content: Thought content normal.         Judgment: Judgment normal.   Gissell Graham DO

## 2024-01-24 ENCOUNTER — OFFICE VISIT (OUTPATIENT)
Dept: OBGYN CLINIC | Facility: CLINIC | Age: 84
End: 2024-01-24
Payer: MEDICARE

## 2024-01-24 VITALS
DIASTOLIC BLOOD PRESSURE: 83 MMHG | WEIGHT: 127 LBS | HEIGHT: 61 IN | HEART RATE: 90 BPM | BODY MASS INDEX: 23.98 KG/M2 | SYSTOLIC BLOOD PRESSURE: 166 MMHG | RESPIRATION RATE: 16 BRPM

## 2024-01-24 DIAGNOSIS — M17.0 PRIMARY OSTEOARTHRITIS OF BOTH KNEES: Primary | ICD-10-CM

## 2024-01-24 PROCEDURE — 99213 OFFICE O/P EST LOW 20 MIN: CPT | Performed by: FAMILY MEDICINE

## 2024-01-24 NOTE — PROGRESS NOTES
"    Subjective:    Chief Complaint   Patient presents with    Left Knee - Follow-up    Right Knee - Follow-up       Nohemi Arcos is a 83 y.o. female who is presenting for a 1 month follow-up visit to discuss bilateral knee pain symptoms.  She has been treated for bilateral knee osteoarthritis which is severe in nature.  She has been treated with corticosteroid injections and most recently hyaluronic acid injections which were performed approximately 1 month ago.  She feels much improvement following the hyaluronic acid injections and has been having less pain with day-to-day activity.        The following portions of the patient's history were reviewed and updated as appropriate: allergies, current medications, past family history, past medical history, past social history, past surgical history and problem list.    Occupation:   retired    Review of Systems   Constitutional: Negative for fever.          Objective:  /83   Pulse 90   Resp 16   Ht 5' 1\" (1.549 m)   Wt 57.6 kg (127 lb)   BMI 24.00 kg/m²   Skin: no rashes, lesions, skin discolorations, lacerations  Vasculature: normal popliteal and pedal pulse, normal skin color, normal capillary refill in extremity, no lower extremity edema  Neurologic: Neurologic exam is normal throughout lower extremities, Awake, alert, and oriented x3, no apparent distress.    Musculoskeletal: Bilateral KNEE EXAM  Gait: limping gait positive   Inspection:No erythema, no induration. There is no gross deformity.   There is mild popliteal fullness consistent with Baker's cyst  Palpation: Swelling: negative. Effusion: negative. Medial joint line TTP:  Positive  Lateral joint line TTP: negative  ROM: Full flexion and extension  Special tests: [facility]'s: negative  Instability to varus/valgus stress: negative  Anterior Drawer: negative Lachman's test: negative  Posterior Drawer: negative  Crepitus:  Positive        Imaging:  See final report     Assessment/Plan:  1. Primary " osteoarthritis of both knees  I encouraged patient to keep the follow-up appointments at 3 months if she would need a corticosteroid injection for additional pain relief.  I advised that if she is still doing well in regards to pain symptoms by that visits time she can cancel the appointment.  I advised patient that it is certainly reassuring that she is receiving positive response with hyaluronic acid injection and we can repeat these injections every 6 months for relief.

## 2024-02-21 PROBLEM — Z00.00 MEDICARE ANNUAL WELLNESS VISIT, SUBSEQUENT: Status: RESOLVED | Noted: 2018-06-12 | Resolved: 2024-02-21

## 2024-03-06 ENCOUNTER — OFFICE VISIT (OUTPATIENT)
Dept: OBGYN CLINIC | Facility: CLINIC | Age: 84
End: 2024-03-06
Payer: MEDICARE

## 2024-03-06 VITALS
BODY MASS INDEX: 24.47 KG/M2 | DIASTOLIC BLOOD PRESSURE: 79 MMHG | HEART RATE: 93 BPM | HEIGHT: 61 IN | RESPIRATION RATE: 16 BRPM | SYSTOLIC BLOOD PRESSURE: 151 MMHG | WEIGHT: 129.6 LBS

## 2024-03-06 DIAGNOSIS — M17.0 PRIMARY OSTEOARTHRITIS OF BOTH KNEES: Primary | ICD-10-CM

## 2024-03-06 PROCEDURE — 20610 DRAIN/INJ JOINT/BURSA W/O US: CPT | Performed by: FAMILY MEDICINE

## 2024-03-06 PROCEDURE — 99213 OFFICE O/P EST LOW 20 MIN: CPT | Performed by: FAMILY MEDICINE

## 2024-03-06 RX ORDER — LIDOCAINE HYDROCHLORIDE 10 MG/ML
4 INJECTION, SOLUTION INFILTRATION; PERINEURAL
Status: COMPLETED | OUTPATIENT
Start: 2024-03-06 | End: 2024-03-06

## 2024-03-06 RX ORDER — TRIAMCINOLONE ACETONIDE 40 MG/ML
40 INJECTION, SUSPENSION INTRA-ARTICULAR; INTRAMUSCULAR
Status: COMPLETED | OUTPATIENT
Start: 2024-03-06 | End: 2024-03-06

## 2024-03-06 RX ADMIN — TRIAMCINOLONE ACETONIDE 40 MG: 40 INJECTION, SUSPENSION INTRA-ARTICULAR; INTRAMUSCULAR at 10:15

## 2024-03-06 RX ADMIN — LIDOCAINE HYDROCHLORIDE 4 ML: 10 INJECTION, SOLUTION INFILTRATION; PERINEURAL at 10:15

## 2024-03-06 NOTE — PROGRESS NOTES
Large joint arthrocentesis: bilateral knee  Universal Protocol:  Consent: Verbal consent obtained.  Risks and benefits: risks, benefits and alternatives were discussed  Consent given by: patient  Patient identity confirmed: verbally with patient  Supporting Documentation  Indications: pain   Procedure Details  Location: knee - bilateral knee  Preparation: Patient was prepped and draped in the usual sterile fashion  Needle size: 22 G  Ultrasound guidance: no  Approach: anterolateral    Medications (Right): 4 mL lidocaine 1 %; 40 mg triamcinolone acetonide 40 mg/mLMedications (Left): 4 mL lidocaine 1 %; 40 mg triamcinolone acetonide 40 mg/mL   Patient tolerance: patient tolerated the procedure well with no immediate complications    Risks and benefits of CSI were discussed with patient extensively. Risks were highlighted which included but were not limited to infection, pain, local site swelling, and chance that injection may not be effective. Patient was also counseled regarding glucose elevation days after receiving CSI and to be mindful of diet and check sugars daily. Patient agreeable to proceed with CSI after counseling.

## 2024-03-06 NOTE — PROGRESS NOTES
"    Subjective:    Chief Complaint   Patient presents with    Left Knee - Pain    Right Knee - Pain       Nohemi Arcos is a 84 y.o. female presenting for follow-up visit for bilateral knee osteoarthritis.  Would like to have corticosteroid injection for bilateral knees.  We had completed hyaluronic acid injection therapy approximately 3 months ago which only provided patient temporary relief for about a month.  Corticosteroid injections do provide about 2 to 3 months of relief.        The following portions of the patient's history were reviewed and updated as appropriate: allergies, current medications, past family history, past medical history, past social history, past surgical history and problem list.    Occupation:   retired    Review of Systems   Constitutional: Negative for fever.          Objective:  /79   Pulse 93   Resp 16   Ht 5' 1\" (1.549 m)   Wt 58.8 kg (129 lb 9.6 oz)   BMI 24.49 kg/m²   Skin: no rashes, lesions, skin discolorations, lacerations  Vasculature: normal popliteal and pedal pulse, normal skin color, normal capillary refill in extremity, no lower extremity edema  Neurologic: Neurologic exam is normal throughout lower extremities, Awake, alert, and oriented x3, no apparent distress.    Musculoskeletal: Bilateral KNEE EXAM  Gait: limping gait positive   Inspection:No erythema, no induration. There is no gross deformity.   There is mild popliteal fullness consistent with Baker's cyst  Palpation: Swelling: negative. Effusion: negative. Medial joint line TTP:  Positive  Lateral joint line TTP: negative  ROM: Full flexion and extension  Special tests: [facility]'s: negative  Instability to varus/valgus stress: negative  Anterior Drawer: negative Lachman's test: negative  Posterior Drawer: negative  Crepitus:  Positive        Imaging:  See final report     Assessment/Plan:  1. Primary osteoarthritis of both knees  Repeat corticosteroid injections were provided for bilateral knee " joints.  She will contact me if symptoms recur.  I discussed with the patient that if her symptoms are returning less than 3 months following the corticosteroid injection we may need to have her have consultation with orthopedic surgery versus pain management for possible genicular nerve block.

## 2024-04-18 DIAGNOSIS — K21.9 GASTROESOPHAGEAL REFLUX DISEASE: ICD-10-CM

## 2024-04-18 DIAGNOSIS — E78.5 HYPERLIPIDEMIA, UNSPECIFIED HYPERLIPIDEMIA TYPE: ICD-10-CM

## 2024-04-18 RX ORDER — ATORVASTATIN CALCIUM 20 MG/1
20 TABLET, FILM COATED ORAL DAILY
Qty: 90 TABLET | Refills: 0 | Status: SHIPPED | OUTPATIENT
Start: 2024-04-18

## 2024-04-18 RX ORDER — OMEPRAZOLE 20 MG/1
20 CAPSULE, DELAYED RELEASE ORAL DAILY
Qty: 90 CAPSULE | Refills: 0 | Status: SHIPPED | OUTPATIENT
Start: 2024-04-18

## 2024-04-18 RX ORDER — FENOFIBRATE 134 MG/1
CAPSULE ORAL
Qty: 90 CAPSULE | Refills: 0 | Status: SHIPPED | OUTPATIENT
Start: 2024-04-18

## 2024-05-01 ENCOUNTER — HOSPITAL ENCOUNTER (EMERGENCY)
Facility: HOSPITAL | Age: 84
Discharge: HOME | End: 2024-05-01
Attending: EMERGENCY MEDICINE | Admitting: EMERGENCY MEDICINE
Payer: MEDICARE

## 2024-05-01 ENCOUNTER — APPOINTMENT (EMERGENCY)
Dept: RADIOLOGY | Facility: HOSPITAL | Age: 84
End: 2024-05-01
Payer: MEDICARE

## 2024-05-01 VITALS
SYSTOLIC BLOOD PRESSURE: 183 MMHG | WEIGHT: 130 LBS | DIASTOLIC BLOOD PRESSURE: 80 MMHG | RESPIRATION RATE: 18 BRPM | TEMPERATURE: 97.9 F | HEART RATE: 98 BPM | OXYGEN SATURATION: 98 % | BODY MASS INDEX: 24.56 KG/M2

## 2024-05-01 DIAGNOSIS — M54.31 SCIATIC NERVE PAIN, RIGHT: Primary | ICD-10-CM

## 2024-05-01 PROCEDURE — 99283 EMERGENCY DEPT VISIT LOW MDM: CPT

## 2024-05-01 PROCEDURE — 73502 X-RAY EXAM HIP UNI 2-3 VIEWS: CPT

## 2024-05-01 PROCEDURE — 99284 EMERGENCY DEPT VISIT MOD MDM: CPT | Performed by: EMERGENCY MEDICINE

## 2024-05-01 NOTE — ED PROVIDER NOTES
History  Chief Complaint   Patient presents with    Leg Pain     Patient arrives with complaints of right sided hip pain that extends down leg. Denies trauma.      HPI      Is a very pleasant, nontoxic-appearing, 84-year-old female presents emergency department with her sister via private vehicle both of which are reliable comp and historians with a chief complaint of less than 12-hour history of right buttocks pain.  Patient denies history of trauma, claudication, could be, chest pain, flank pain, dysuria.  Reports that she had right buttocks pain with radiation down her posterior leg with no numbness and tingling in the distal expected extremities.  Patient took Tylenol at 12 noon with minimal relief.  Patient was offered medications for pain control in the emergency department, patient declined.    Patient was has a history of the pain in her right buttocks is worse with flexion of the hip and ambulation, denies any resting pain.  Remaining 12 point review of systems is unremarkable.  Prior to Admission Medications   Prescriptions Last Dose Informant Patient Reported? Taking?   Calcium Carbonate-Vit D-Min (Calcium 1200) 5898-6707 MG-UNIT CHEW  Self No No   Sig: Chew 1 tablet daily   Cholecalciferol (VITAMIN D) 2000 UNITS tablet  Self Yes No   Sig: Take 2,000 Units by mouth daily   Multiple Vitamins-Minerals (MULTIVITAMIN WOMEN) TABS  Self Yes No   Sig: Take by mouth   acetaminophen (TYLENOL) 325 mg tablet  Self No No   Sig: Take 2 tablets (650 mg total) by mouth every 6 (six) hours as needed for mild pain, headaches or fever   Patient not taking: Reported on 1/18/2024   amLODIPine (NORVASC) 2.5 mg tablet  Self No No   Sig: Take 1 tablet (2.5 mg total) by mouth daily   aspirin 81 MG tablet  Self Yes No   Sig: Take 81 mg by mouth daily   atorvastatin (LIPITOR) 20 mg tablet   No No   Sig: Take 1 tablet by mouth once daily   dicyclomine (BENTYL) 10 mg capsule  Self No No   Sig: Take 1 capsule (10 mg total) by mouth  daily as needed (abdominal pain)   Patient not taking: Reported on 7/10/2023   fenofibrate micronized (LOFIBRA) 134 MG capsule   No No   Sig: TAKE 1 CAPSULE BY MOUTH ONCE DAILY WITH BREAKFAST   hyoscyamine (Levsin) 0.125 MG tablet  Self No No   Sig: Take 1 tablet (0.125 mg total) by mouth in the morning   omeprazole (PriLOSEC) 20 mg delayed release capsule   No No   Sig: Take 1 capsule by mouth once daily      Facility-Administered Medications: None       Past Medical History:   Diagnosis Date    Arteriosclerotic heart disease     LAST ASSESSED: 09SEP2015    Esophageal reflux     LAST ASSESSED: 09JAN2018    GERD (gastroesophageal reflux disease)     Hyperlipidemia     LAST ASSESSED: 09JAN2018    Hypertension     LAST ASSESSED: 09JAN2018    Nontoxic single thyroid nodule     LAST ASSESSED: 09MAY2014    Osteopenia     LAST ASSESSED: 11MAY2016       Past Surgical History:   Procedure Laterality Date    CYSTOSCOPY  12/04/2013    DIAGNOSTIC    ESOPHAGOGASTRODUODENOSCOPY      MASTECTOMY Bilateral     cancer    OOPHORECTOMY      SALPINGOOPHORECTOMY Bilateral        Family History   Problem Relation Age of Onset    Alzheimer's disease Mother     Hypertension Father     Stroke Father         SYNDROME    Colon cancer Brother      I have reviewed and agree with the history as documented.    E-Cigarette/Vaping    E-Cigarette Use Never User      E-Cigarette/Vaping Substances    Nicotine No     THC No     CBD No     Flavoring No     Other No     Unknown No      Social History     Tobacco Use    Smoking status: Never    Smokeless tobacco: Never   Vaping Use    Vaping status: Never Used   Substance Use Topics    Alcohol use: No    Drug use: No       Review of Systems   Constitutional: Negative.  Negative for chills, fatigue and fever.   HENT: Negative.     Eyes: Negative.  Negative for redness.   Respiratory: Negative.  Negative for choking, chest tightness and shortness of breath.    Cardiovascular: Negative.  Negative for chest  pain.   Gastrointestinal: Negative.  Negative for nausea.   Endocrine: Negative.    Genitourinary: Negative.  Negative for frequency, hematuria and urgency.   Musculoskeletal: Negative.  Negative for back pain, gait problem, joint swelling and myalgias.        R buttocks pain.   Skin: Negative.    Allergic/Immunologic: Negative.    Neurological: Negative.  Negative for dizziness, seizures, syncope and headaches.   Hematological: Negative.    Psychiatric/Behavioral: Negative.         Physical Exam  Physical Exam  Vitals and nursing note reviewed.   Constitutional:       General: She is not in acute distress.     Appearance: Normal appearance. She is normal weight. She is not ill-appearing, toxic-appearing or diaphoretic.   HENT:      Head: Normocephalic and atraumatic.      Right Ear: External ear normal.      Left Ear: External ear normal.      Nose: Nose normal.      Mouth/Throat:      Mouth: Mucous membranes are moist.      Pharynx: Oropharynx is clear.   Eyes:      Extraocular Movements: Extraocular movements intact.      Conjunctiva/sclera: Conjunctivae normal.      Pupils: Pupils are equal, round, and reactive to light.   Cardiovascular:      Rate and Rhythm: Normal rate and regular rhythm.      Pulses: Normal pulses.      Heart sounds: Normal heart sounds. No murmur heard.     No friction rub.   Pulmonary:      Effort: Pulmonary effort is normal. No respiratory distress.      Breath sounds: Normal breath sounds. No stridor.   Abdominal:      General: Abdomen is flat. Bowel sounds are normal.      Palpations: Abdomen is soft.   Musculoskeletal:         General: No swelling, tenderness, deformity or signs of injury. Normal range of motion.      Cervical back: Normal range of motion.      Right lower leg: No edema.      Left lower leg: No edema.      Comments: Positive bounding right femoral pulses, + R sided dorsalis pedis / posterior tibial pulses intact, distal extremities warm and well perfused, no breakdown  of the skin.  Point tender over R buttocks near the sciatic nerve distribution. No crepitus, no induration, no erythema, no rash noted.   Skin:     General: Skin is warm.      Capillary Refill: Capillary refill takes less than 2 seconds.      Coloration: Skin is not jaundiced or pale.      Findings: No bruising.   Neurological:      General: No focal deficit present.      Mental Status: She is alert and oriented to person, place, and time. Mental status is at baseline.   Psychiatric:         Mood and Affect: Mood normal.         Behavior: Behavior normal.         Thought Content: Thought content normal.         Judgment: Judgment normal.         Vital Signs  ED Triage Vitals [05/01/24 1547]   Temperature Pulse Respirations Blood Pressure SpO2   97.9 °F (36.6 °C) 98 18 (!) 194/94 98 %      Temp Source Heart Rate Source Patient Position - Orthostatic VS BP Location FiO2 (%)   Temporal -- -- -- --      Pain Score       6           Vitals:    05/01/24 1547 05/01/24 1607 05/01/24 1646   BP: (!) 194/94 (!) 185/79 (!) 183/80   Pulse: 98           Visual Acuity      ED Medications  Medications - No data to display    Diagnostic Studies  Results Reviewed       None                   XR hip/pelv 2-3 vws right if performed   ED Interpretation by Franky Parnell III, DO (05/01 1641)   2 view x-ray of the right hip with pelvic x-ray shows no acute fractures or osseous abnormalities, patient has significant amount of osteoarthritic changes within the acetabular version of the hip joint.                 Procedures  Procedures         ED Course  ED Course as of 05/01/24 1709   Wed May 01, 2024   1600 Patient seen evaluated, orders placed, 1 day history of right buttocks pain without any history of trauma, dysuria, flank pain, upper back pain, lower back pain or bowel or bladder incontinence.      Brief focused differential dx in this patient is as follows: Sciatic nerve pain with radicular component to patient's complaint of  "R posterior leg \"tingling\" vs. Early onset of herpes zoster pain w/o presence of a rash vs. Fracture of R hip / pelvis.                               SBIRT 20yo+      Flowsheet Row Most Recent Value   Initial Alcohol Screen: US AUDIT-C     1. How often do you have a drink containing alcohol? 0 Filed at: 05/01/2024 1550   2. How many drinks containing alcohol do you have on a typical day you are drinking?  0 Filed at: 05/01/2024 1550   3a. Male UNDER 65: How often do you have five or more drinks on one occasion? 0 Filed at: 05/01/2024 1550   3b. FEMALE Any Age, or MALE 65+: How often do you have 4 or more drinks on one occassion? 0 Filed at: 05/01/2024 1550   Audit-C Score 0 Filed at: 05/01/2024 1550   RONNY: How many times in the past year have you...    Used an illegal drug or used a prescription medication for non-medical reasons? Never Filed at: 05/01/2024 1550                      Medical Decision Making  Patient is point tender over the right buttocks area, no evidence of infection or crepitus, exam is consistent with sciatic nerve pain, x-rays unremarkable, refer patient to [facility] for further evaluation.  Patient had full range of motion of the hip knee and foot, distal extremity was well-perfused, patient was able to ambulate here to room 24 without difficulty.    Denies history of saddle anesthesia/perineal anesthesia. Denies bowel or bladder incontinence/retention.  History does not suggest diagnosis of cauda equina syndrome.  Patient denies history of IVDA, denies history of fevers, no recent surgeries or any procedures to suggest a transient bacteremia leading to a diagnosis of subdural abscess. Denies history of blood thinner use with recent history of lumbar puncture or any violation of the epidural space to suggest history of epidural hematoma. Therefore these above diagnoses (cauda equina syndrome, epidural abscess, epidural hematoma) were not pursued with diagnostic " "imaging.    Portions of the record may have been created with voice recognition software. Occasional wrong word or \"sound a like\" substitutions may have occurred due to the inherent limitations of voice recognition software. Read the chart carefully and recognize, using context, where substitutions have occurred.          Amount and/or Complexity of Data Reviewed  Radiology: ordered and independent interpretation performed.             Disposition  Final diagnoses:   Sciatic nerve pain, right     Time reflects when diagnosis was documented in both MDM as applicable and the Disposition within this note       Time User Action Codes Description Comment    5/1/2024  4:42 PM Franky Parnell Add [M54.31] Sciatic nerve pain, right           ED Disposition       ED Disposition   Discharge    Condition   Stable    Date/Time   Wed May 1, 2024 1642    Comment   Nohemi Arcos discharge to home/self care.                   Follow-up Information       Follow up With Specialties Details Why Contact Info    Gissell Graham, DO Internal Medicine, Hospice Services   [address] [facility] [address]  [phone]              Discharge Medication List as of 5/1/2024  4:56 PM        CONTINUE these medications which have NOT CHANGED    Details   acetaminophen (TYLENOL) 325 mg tablet Take 2 tablets (650 mg total) by mouth every 6 (six) hours as needed for mild pain, headaches or fever, Starting Thu 3/23/2023, No Print      amLODIPine (NORVASC) 2.5 mg tablet Take 1 tablet (2.5 mg total) by mouth daily, Starting Mon 7/17/2023, Normal      aspirin 81 MG tablet Take 81 mg by mouth daily, Historical Med      atorvastatin (LIPITOR) 20 mg tablet Take 1 tablet by mouth once daily, Starting Thu 4/18/2024, Normal      Calcium Carbonate-Vit D-Min (Calcium 1200) 8780-5665 MG-UNIT CHEW Chew 1 tablet daily, Starting Thu 3/23/2023, No Print      Cholecalciferol (VITAMIN D) 2000 UNITS tablet Take 2,000 Units by mouth daily, Historical Med    "   dicyclomine (BENTYL) 10 mg capsule Take 1 capsule (10 mg total) by mouth daily as needed (abdominal pain), Starting Tue 4/11/2023, Normal      fenofibrate micronized (LOFIBRA) 134 MG capsule TAKE 1 CAPSULE BY MOUTH ONCE DAILY WITH BREAKFAST, Normal      hyoscyamine (Levsin) 0.125 MG tablet Take 1 tablet (0.125 mg total) by mouth in the morning, Starting Thu 7/13/2023, Normal      Multiple Vitamins-Minerals (MULTIVITAMIN WOMEN) TABS Take by mouth, Historical Med      omeprazole (PriLOSEC) 20 mg delayed release capsule Take 1 capsule by mouth once daily, Starting Thu 4/18/2024, Normal                 PDMP Review       None            ED Provider  Electronically Signed by             Franky Parnell III, DO  05/01/24 1830

## 2024-05-03 ENCOUNTER — TELEPHONE (OUTPATIENT)
Dept: PHYSICAL THERAPY | Facility: OTHER | Age: 84
End: 2024-05-03

## 2024-05-03 ENCOUNTER — TELEPHONE (OUTPATIENT)
Age: 84
End: 2024-05-03

## 2024-05-03 NOTE — TELEPHONE ENCOUNTER
SOHEILA sosa hip xray@[facility] no sx Medicare     Declined force on  Will contact PCP as she can't wait until 5/15 to see Dr Ortiz

## 2024-05-03 NOTE — TELEPHONE ENCOUNTER
Call placed to the patient per [facility] referral.    Spoke with patient, explained csp and reason for the call.    Patient declined services for now.    [facility] phone number and hours of  business provided in case patient would need our services in the future.    Will close referral per protocol.

## 2024-05-29 ENCOUNTER — DOCUMENTATION (OUTPATIENT)
Dept: PAIN MEDICINE | Facility: CLINIC | Age: 84
End: 2024-05-29

## 2024-05-29 NOTE — PROGRESS NOTES
"Patient came to consultation appt today. When she was being escorted to the exam room, patient states \"I do not know why I am here, I was referrred by the ED and I feel fine.\" I asked patient if she still wanted to be seen and she replied \"no, I would like to cancel , I feel fine.\" Patient was escorted back to waiting area and her appt was cancelled per her request.    "

## 2024-06-06 ENCOUNTER — TELEPHONE (OUTPATIENT)
Dept: FAMILY MEDICINE CLINIC | Facility: CLINIC | Age: 84
End: 2024-06-06

## 2024-07-08 DIAGNOSIS — R10.9 ABDOMINAL PAIN, UNSPECIFIED ABDOMINAL LOCATION: ICD-10-CM

## 2024-07-08 DIAGNOSIS — I10 ACCELERATED HYPERTENSION: ICD-10-CM

## 2024-07-08 RX ORDER — AMLODIPINE BESYLATE 2.5 MG/1
2.5 TABLET ORAL DAILY
Qty: 100 TABLET | Refills: 1 | Status: SHIPPED | OUTPATIENT
Start: 2024-07-08

## 2024-07-08 RX ORDER — HYOSCYAMINE SULFATE 0.125 MG
125 TABLET ORAL EVERY MORNING
Qty: 100 TABLET | Refills: 1 | Status: SHIPPED | OUTPATIENT
Start: 2024-07-08

## 2024-07-18 DIAGNOSIS — E78.5 HYPERLIPIDEMIA, UNSPECIFIED HYPERLIPIDEMIA TYPE: ICD-10-CM

## 2024-07-18 DIAGNOSIS — K21.9 GASTROESOPHAGEAL REFLUX DISEASE: ICD-10-CM

## 2024-07-18 RX ORDER — ATORVASTATIN CALCIUM 20 MG/1
20 TABLET, FILM COATED ORAL DAILY
Qty: 100 TABLET | Refills: 1 | Status: SHIPPED | OUTPATIENT
Start: 2024-07-18

## 2024-07-18 RX ORDER — FENOFIBRATE 134 MG/1
CAPSULE ORAL
Qty: 90 CAPSULE | Refills: 0 | Status: SHIPPED | OUTPATIENT
Start: 2024-07-18

## 2024-07-18 RX ORDER — OMEPRAZOLE 20 MG/1
20 CAPSULE, DELAYED RELEASE ORAL DAILY
Qty: 100 CAPSULE | Refills: 1 | Status: SHIPPED | OUTPATIENT
Start: 2024-07-18

## 2024-07-26 ENCOUNTER — HOSPITAL ENCOUNTER (EMERGENCY)
Facility: HOSPITAL | Age: 84
Discharge: HOME | End: 2024-07-26
Attending: EMERGENCY MEDICINE
Payer: MEDICARE

## 2024-07-26 ENCOUNTER — APPOINTMENT (EMERGENCY)
Dept: RADIOLOGY | Facility: HOSPITAL | Age: 84
End: 2024-07-26
Payer: MEDICARE

## 2024-07-26 VITALS
DIASTOLIC BLOOD PRESSURE: 75 MMHG | OXYGEN SATURATION: 96 % | HEART RATE: 109 BPM | SYSTOLIC BLOOD PRESSURE: 112 MMHG | TEMPERATURE: 97.7 F | RESPIRATION RATE: 20 BRPM

## 2024-07-26 DIAGNOSIS — N39.0 UTI (URINARY TRACT INFECTION): Primary | ICD-10-CM

## 2024-07-26 LAB
ANION GAP SERPL CALCULATED.3IONS-SCNC: 8 MMOL/L (ref 4–13)
ATRIAL RATE: 85 BPM
BACTERIA UR QL AUTO: ABNORMAL /HPF
BASOPHILS # BLD AUTO: 0.04 THOUSANDS/ÂΜL (ref 0–0.1)
BASOPHILS NFR BLD AUTO: 1 % (ref 0–1)
BILIRUB UR QL STRIP: NEGATIVE
BUN SERPL-MCNC: 21 MG/DL (ref 5–25)
CALCIUM SERPL-MCNC: 10.1 MG/DL (ref 8.4–10.2)
CHLORIDE SERPL-SCNC: 104 MMOL/L (ref 96–108)
CLARITY UR: CLEAR
CO2 SERPL-SCNC: 27 MMOL/L (ref 21–32)
COLOR UR: YELLOW
CREAT SERPL-MCNC: 1.19 MG/DL (ref 0.6–1.3)
EOSINOPHIL # BLD AUTO: 0.09 THOUSAND/ÂΜL (ref 0–0.61)
EOSINOPHIL NFR BLD AUTO: 1 % (ref 0–6)
ERYTHROCYTE [DISTWIDTH] IN BLOOD BY AUTOMATED COUNT: 13.6 % (ref 11.6–15.1)
GFR SERPL CREATININE-BSD FRML MDRD: 42 ML/MIN/1.73SQ M
GLUCOSE SERPL-MCNC: 93 MG/DL (ref 65–140)
GLUCOSE UR STRIP-MCNC: NEGATIVE MG/DL
HCT VFR BLD AUTO: 45.6 % (ref 34.8–46.1)
HGB BLD-MCNC: 14.4 G/DL (ref 11.5–15.4)
HGB UR QL STRIP.AUTO: ABNORMAL
HYALINE CASTS #/AREA URNS LPF: ABNORMAL /LPF
IMM GRANULOCYTES # BLD AUTO: 0.07 THOUSAND/UL (ref 0–0.2)
IMM GRANULOCYTES NFR BLD AUTO: 1 % (ref 0–2)
KETONES UR STRIP-MCNC: NEGATIVE MG/DL
LEUKOCYTE ESTERASE UR QL STRIP: ABNORMAL
LYMPHOCYTES # BLD AUTO: 1.3 THOUSANDS/ÂΜL (ref 0.6–4.47)
LYMPHOCYTES NFR BLD AUTO: 15 % (ref 14–44)
MCH RBC QN AUTO: 29.1 PG (ref 26.8–34.3)
MCHC RBC AUTO-ENTMCNC: 31.6 G/DL (ref 31.4–37.4)
MCV RBC AUTO: 92 FL (ref 82–98)
MONOCYTES # BLD AUTO: 0.91 THOUSAND/ÂΜL (ref 0.17–1.22)
MONOCYTES NFR BLD AUTO: 11 % (ref 4–12)
NEUTROPHILS # BLD AUTO: 6.13 THOUSANDS/ÂΜL (ref 1.85–7.62)
NEUTS SEG NFR BLD AUTO: 71 % (ref 43–75)
NITRITE UR QL STRIP: NEGATIVE
NON-SQ EPI CELLS URNS QL MICRO: ABNORMAL /HPF
NRBC BLD AUTO-RTO: 0 /100 WBCS
PH UR STRIP.AUTO: 5.5 [PH]
PLATELET # BLD AUTO: 293 THOUSANDS/UL (ref 149–390)
PMV BLD AUTO: 9.6 FL (ref 8.9–12.7)
POTASSIUM SERPL-SCNC: 4.2 MMOL/L (ref 3.5–5.3)
PROT UR STRIP-MCNC: ABNORMAL MG/DL
QRS AXIS: 60 DEGREES
QRSD INTERVAL: 128 MS
QT INTERVAL: 318 MS
QTC INTERVAL: 428 MS
RBC # BLD AUTO: 4.95 MILLION/UL (ref 3.81–5.12)
RBC #/AREA URNS AUTO: ABNORMAL /HPF
SODIUM SERPL-SCNC: 139 MMOL/L (ref 135–147)
SP GR UR STRIP.AUTO: 1.02 (ref 1–1.03)
T WAVE AXIS: 201 DEGREES
UROBILINOGEN UR STRIP-ACNC: <2 MG/DL
VENTRICULAR RATE: 109 BPM
WBC # BLD AUTO: 8.54 THOUSAND/UL (ref 4.31–10.16)
WBC #/AREA URNS AUTO: ABNORMAL /HPF

## 2024-07-26 PROCEDURE — 87077 CULTURE AEROBIC IDENTIFY: CPT | Performed by: EMERGENCY MEDICINE

## 2024-07-26 PROCEDURE — 85025 COMPLETE CBC W/AUTO DIFF WBC: CPT | Performed by: EMERGENCY MEDICINE

## 2024-07-26 PROCEDURE — 36415 COLL VENOUS BLD VENIPUNCTURE: CPT | Performed by: EMERGENCY MEDICINE

## 2024-07-26 PROCEDURE — 87186 SC STD MICRODIL/AGAR DIL: CPT | Performed by: EMERGENCY MEDICINE

## 2024-07-26 PROCEDURE — 99284 EMERGENCY DEPT VISIT MOD MDM: CPT

## 2024-07-26 PROCEDURE — 80048 BASIC METABOLIC PNL TOTAL CA: CPT | Performed by: EMERGENCY MEDICINE

## 2024-07-26 PROCEDURE — 93010 ELECTROCARDIOGRAM REPORT: CPT | Performed by: INTERNAL MEDICINE

## 2024-07-26 PROCEDURE — 81001 URINALYSIS AUTO W/SCOPE: CPT | Performed by: EMERGENCY MEDICINE

## 2024-07-26 PROCEDURE — 99285 EMERGENCY DEPT VISIT HI MDM: CPT | Performed by: EMERGENCY MEDICINE

## 2024-07-26 PROCEDURE — 74018 RADEX ABDOMEN 1 VIEW: CPT

## 2024-07-26 PROCEDURE — 87086 URINE CULTURE/COLONY COUNT: CPT | Performed by: EMERGENCY MEDICINE

## 2024-07-26 PROCEDURE — 93005 ELECTROCARDIOGRAM TRACING: CPT

## 2024-07-26 RX ORDER — CEPHALEXIN 500 MG/1
500 CAPSULE ORAL EVERY 6 HOURS SCHEDULED
Qty: 28 CAPSULE | Refills: 0 | Status: SHIPPED | OUTPATIENT
Start: 2024-07-26 | End: 2024-08-02

## 2024-07-26 NOTE — ED PROVIDER NOTES
History  Chief Complaint   Patient presents with    Constipation     Pt reports that she had a bowel movement yesterday morning and since then she has not been able to move her bowels. Pt reports that this morning she had a bowel movement but it was minimal. Pt states that she is having mid abdominal pain.      Patient is a pleasant 84-year-old female who states she had a small of a normal bowel movement yesterday and again today but states when she had a bowel movement she felt slight pressure in her rectal area.  Denies any blood in the stool states her stools are still loose within normal.  States had some mild suprapubic pain as well.  Denies any dysuria or hematuria.  No increased urinary frequency.  Denies any fevers or chills.  Denies any chest pain or shortness of breath.  Patient is not anticoagulated or immune suppressed.  No previous episodes of constipation.  No change in dietary habits.  States he does drink water and stay well-hydrated.      Constipation  Associated symptoms: no abdominal pain, no back pain, no diarrhea, no dysuria, no fever, no nausea and no vomiting        Prior to Admission Medications   Prescriptions Last Dose Informant Patient Reported? Taking?   Calcium Carbonate-Vit D-Min (Calcium 1200) 4337-8063 MG-UNIT CHEW  Self No No   Sig: Chew 1 tablet daily   Cholecalciferol (VITAMIN D) 2000 UNITS tablet  Self Yes No   Sig: Take 2,000 Units by mouth daily   Multiple Vitamins-Minerals (MULTIVITAMIN WOMEN) TABS  Self Yes No   Sig: Take by mouth   acetaminophen (TYLENOL) 325 mg tablet  Self No No   Sig: Take 2 tablets (650 mg total) by mouth every 6 (six) hours as needed for mild pain, headaches or fever   Patient not taking: Reported on 1/18/2024   amLODIPine (NORVASC) 2.5 mg tablet   No No   Sig: Take 1 tablet by mouth once daily   aspirin 81 MG tablet  Self Yes No   Sig: Take 81 mg by mouth daily   atorvastatin (LIPITOR) 20 mg tablet   No No   Sig: Take 1 tablet by mouth once daily    dicyclomine (BENTYL) 10 mg capsule  Self No No   Sig: Take 1 capsule (10 mg total) by mouth daily as needed (abdominal pain)   Patient not taking: Reported on 7/10/2023   fenofibrate micronized (LOFIBRA) 134 MG capsule   No No   Sig: TAKE 1 CAPSULE BY MOUTH ONCE DAILY WITH BREAKFAST   hyoscyamine (ANASPAZ,LEVSIN) 0.125 MG tablet   No No   Sig: TAKE 1 TABLET BY MOUTH IN THE MORNING   omeprazole (PriLOSEC) 20 mg delayed release capsule   No No   Sig: Take 1 capsule by mouth once daily      Facility-Administered Medications: None       Past Medical History:   Diagnosis Date    Arteriosclerotic heart disease     LAST ASSESSED: 09SEP2015    Esophageal reflux     LAST ASSESSED: 09JAN2018    GERD (gastroesophageal reflux disease)     Hyperlipidemia     LAST ASSESSED: 09JAN2018    Hypertension     LAST ASSESSED: 09JAN2018    Nontoxic single thyroid nodule     LAST ASSESSED: 09MAY2014    Osteopenia     LAST ASSESSED: 11MAY2016       Past Surgical History:   Procedure Laterality Date    CYSTOSCOPY  12/04/2013    DIAGNOSTIC    ESOPHAGOGASTRODUODENOSCOPY      MASTECTOMY Bilateral     cancer    OOPHORECTOMY      SALPINGOOPHORECTOMY Bilateral        Family History   Problem Relation Age of Onset    Alzheimer's disease Mother     Hypertension Father     Stroke Father         SYNDROME    Colon cancer Brother      I have reviewed and agree with the history as documented.    E-Cigarette/Vaping    E-Cigarette Use Never User      E-Cigarette/Vaping Substances    Nicotine No     THC No     CBD No     Flavoring No     Other No     Unknown No      Social History     Tobacco Use    Smoking status: Never    Smokeless tobacco: Never   Vaping Use    Vaping status: Never Used   Substance Use Topics    Alcohol use: No    Drug use: No       Review of Systems   Constitutional:  Negative for appetite change, fatigue, fever and unexpected weight change.   HENT:  Negative for congestion, rhinorrhea and sore throat.    Eyes:  Negative for visual  disturbance.   Respiratory:  Negative for cough, chest tightness, shortness of breath and wheezing.    Cardiovascular:  Negative for chest pain, palpitations and leg swelling.   Gastrointestinal:  Positive for constipation and rectal pain. Negative for abdominal pain, diarrhea, nausea and vomiting.   Genitourinary:  Negative for difficulty urinating, dysuria, frequency, menstrual problem, pelvic pain, vaginal bleeding and vaginal discharge.   Musculoskeletal:  Negative for back pain and neck pain.   Skin:  Negative for rash and wound.   Neurological:  Negative for dizziness, syncope, light-headedness and headaches.   Psychiatric/Behavioral:  Negative for sleep disturbance.        Physical Exam  Physical Exam  Vitals and nursing note reviewed. Exam conducted with a chaperone present (Female nurse chaperone, initials CD).   Constitutional:       General: She is not in acute distress.     Appearance: Normal appearance. She is well-developed and normal weight. She is not ill-appearing, toxic-appearing or diaphoretic.   HENT:      Head: Normocephalic and atraumatic.      Nose: Nose normal.      Mouth/Throat:      Mouth: Mucous membranes are moist.      Pharynx: Oropharynx is clear.   Eyes:      General: No scleral icterus.     Extraocular Movements: Extraocular movements intact.      Conjunctiva/sclera: Conjunctivae normal.   Cardiovascular:      Rate and Rhythm: Tachycardia present. Rhythm irregularly irregular.      Pulses: Normal pulses.      Heart sounds: Normal heart sounds. No murmur heard.     No friction rub. No gallop.      Comments: H/o afib  Pulmonary:      Effort: Pulmonary effort is normal. No respiratory distress.      Breath sounds: Normal breath sounds. No wheezing or rales.   Abdominal:      Palpations: Abdomen is soft. There is no mass.      Tenderness: There is no abdominal tenderness. There is no right CVA tenderness, left CVA tenderness, guarding or rebound.      Hernia: No hernia is present.    Genitourinary:     Rectum: Guaiac result negative.          Comments: Small nonthrombosed external hemorrhoid  Musculoskeletal:         General: Normal range of motion.      Cervical back: Normal range of motion and neck supple. No rigidity or tenderness.      Right lower leg: No edema.      Left lower leg: No edema.   Lymphadenopathy:      Cervical: No cervical adenopathy.   Skin:     General: Skin is warm and dry.      Capillary Refill: Capillary refill takes less than 2 seconds.      Coloration: Skin is not jaundiced or pale.      Findings: No lesion or rash.   Neurological:      General: No focal deficit present.      Mental Status: She is alert and oriented to person, place, and time.   Psychiatric:         Mood and Affect: Mood normal.         Behavior: Behavior normal.         Vital Signs  ED Triage Vitals [07/26/24 1041]   Temperature Pulse Respirations Blood Pressure SpO2   97.7 °F (36.5 °C) (!) 108 20 154/72 97 %      Temp Source Heart Rate Source Patient Position - Orthostatic VS BP Location FiO2 (%)   Temporal Monitor Sitting Left arm --      Pain Score       6           Vitals:    07/26/24 1041 07/26/24 1130 07/26/24 1200   BP: 154/72 141/65 112/75   Pulse: (!) 108 (!) 124 (!) 109   Patient Position - Orthostatic VS: Sitting Sitting          Visual Acuity      ED Medications  Medications - No data to display    Diagnostic Studies  Results Reviewed       Procedure Component Value Units Date/Time    Basic metabolic panel [[id number]] Collected: 07/26/24 1103    Lab Status: Final result Specimen: Blood from Arm, Left Updated: 07/26/24 1137     Sodium 139 mmol/L      Potassium 4.2 mmol/L      Chloride 104 mmol/L      CO2 27 mmol/L      ANION GAP 8 mmol/L      BUN 21 mg/dL      Creatinine 1.19 mg/dL      Glucose 93 mg/dL      Calcium 10.1 mg/dL      eGFR 42 ml/min/1.73sq m     Narrative:      National Kid[employer] Foundation guidelines for Chronic Kidney Disease (CKD):     Stage 1 with normal or high GFR  (GFR > 90 mL/min/1.73 square meters)    Stage 2 Mild CKD (GFR = 60-89 mL/min/1.73 square meters)    Stage 3A Moderate CKD (GFR = 45-59 mL/min/1.73 square meters)    Stage 3B Moderate CKD (GFR = 30-44 mL/min/1.73 square meters)    Stage 4 Severe CKD (GFR = 15-29 mL/min/1.73 square meters)    Stage 5 End Stage CKD (GFR <15 mL/min/1.73 square meters)  Note: GFR calculation is accurate only with a steady state creatinine    Urine Microscopic [[id number]]  (Abnormal) Collected: 07/26/24 1110    Lab Status: Final result Specimen: Urine, Clean Catch Updated: 07/26/24 1137     RBC, UA 2-4 /hpf      WBC, UA Innumerable /hpf      Epithelial Cells None Seen /hpf      Bacteria, UA Moderate /hpf      Hyaline Casts, UA 0-1 /lpf     Urine culture [[id number]] Collected: 07/26/24 1110    Lab Status: In process Specimen: Urine, Clean Catch Updated: 07/26/24 1137    UA w Reflex to Microscopic w Reflex to Culture [[id number]]  (Abnormal) Collected: 07/26/24 1110    Lab Status: Final result Specimen: Urine, Clean Catch Updated: 07/26/24 1121     Color, UA Yellow     Clarity, UA Clear     Specific Gravity, UA 1.020     pH, UA 5.5     Leukocytes, UA Large     Nitrite, UA Negative     Protein, UA Trace mg/dl      Glucose, UA Negative mg/dl      Ketones, UA Negative mg/dl      Urobilinogen, UA <2.0 mg/dl      Bilirubin, UA Negative     Occult Blood, UA Small    CBC and differential [[id number]] Collected: 07/26/24 1103    Lab Status: Final result Specimen: Blood from Arm, Left Updated: 07/26/24 1119     WBC 8.54 Thousand/uL      RBC 4.95 Million/uL      Hemoglobin 14.4 g/dL      Hematocrit 45.6 %      MCV 92 fL      MCH 29.1 pg      MCHC 31.6 g/dL      RDW 13.6 %      MPV 9.6 fL      Platelets 293 Thousands/uL      nRBC 0 /100 WBCs      Segmented % 71 %      Immature Grans % 1 %      Lymphocytes % 15 %      Monocytes % 11 %      Eosinophils Relative 1 %      Basophils Relative 1 %      Absolute Neutrophils 6.13 Thousands/µL      Absolute  Immature Grans 0.07 Thousand/uL      Absolute Lymphocytes 1.30 Thousands/µL      Absolute Monocytes 0.91 Thousand/µL      Eosinophils Absolute 0.09 Thousand/µL      Basophils Absolute 0.04 Thousands/µL                    XR abdomen 1 view kub    (Results Pending)              Procedures  ECG 12 Lead Documentation Only    Date/Time: 7/26/2024 12:19 PM    Performed by: Satya Mcwilliams DO  Authorized by: Satya Mcwilliams DO    Indications / Diagnosis:  Rectal pain  ECG reviewed by me, the ED Provider: yes    Patient location:  ED  Rate:     ECG rate:  108    ECG rate assessment: tachycardic    Rhythm:     Rhythm: atrial fibrillation    Ectopy:     Ectopy: none    QRS:     QRS axis:  Normal    QRS intervals:  Normal  Conduction:     Conduction: abnormal      Abnormal conduction: non-specific intraventricular conduction delay    ST segments:     ST segments:  Abnormal  T waves:     T waves: non-specific             ED Course                                 SBIRT 20yo+      Flowsheet Row Most Recent Value   Initial Alcohol Screen: US AUDIT-C     1. How often do you have a drink containing alcohol? 0 Filed at: 07/26/2024 1044   2. How many drinks containing alcohol do you have on a typical day you are drinking?  0 Filed at: 07/26/2024 1044   3a. Male UNDER 65: How often do you have five or more drinks on one occasion? 0 Filed at: 07/26/2024 1044   3b. FEMALE Any Age, or MALE 65+: How often do you have 4 or more drinks on one occassion? 0 Filed at: 07/26/2024 1044   Audit-C Score 0 Filed at: 07/26/2024 1044   RONNY: How many times in the past year have you...    Used an illegal drug or used a prescription medication for non-medical reasons? Never Filed at: 07/26/2024 1044                      Medical Decision Making  84-year-old female with rectal pressure with having bowel movements    Problems Addressed:  UTI (urinary tract infection): acute illness or injury    Amount and/or Complexity of Data  Reviewed  Labs: ordered. Decision-making details documented in ED Course.  Radiology: ordered and independent interpretation performed. Decision-making details documented in ED Course.     Details: My independent interpretation of the KUB x-ray is negative for any acute findings, nonspecific bowel gas pattern.  No evidence of constipation or obstipation.  No evidence for obstruction.  ECG/medicine tests: ordered and independent interpretation performed. Decision-making details documented in ED Course.    Risk  OTC drugs.  Prescription drug management.  Decision regarding hospitalization.                 Disposition  Final diagnoses:   UTI (urinary tract infection)     Time reflects when diagnosis was documented in both MDM as applicable and the Disposition within this note       Time User Action Codes Description Comment    7/26/2024 12:15 PM Satya Mcwilliams Add [N39.0] UTI (urinary tract infection)           ED Disposition       ED Disposition   Discharge    Condition   Stable    Date/Time   Fri Jul 26, 2024 1214    Comment   Nohemi Arcos discharge to home/self care.                   Follow-up Information       Follow up With Specialties Details Why Contact Info    Gissell Graham,  Internal Medicine, Hospice Services Schedule an appointment as soon as possible for a visit   [address] [place] [id number]  [phone]              Discharge Medication List as of 7/26/2024 12:15 PM        START taking these medications    Details   cephalexin (KEFLEX) 500 mg capsule Take 1 capsule (500 mg total) by mouth every 6 (six) hours for 7 days, Starting Fri 7/26/2024, Until Fri 8/2/2024, Normal           CONTINUE these medications which have NOT CHANGED    Details   acetaminophen (TYLENOL) 325 mg tablet Take 2 tablets (650 mg total) by mouth every 6 (six) hours as needed for mild pain, headaches or fever, Starting Thu 3/23/2023, No Print      amLODIPine (NORVASC) 2.5 mg tablet Take 1 tablet by mouth once  daily, Starting Mon 7/8/2024, Normal      aspirin 81 MG tablet Take 81 mg by mouth daily, Historical Med      atorvastatin (LIPITOR) 20 mg tablet Take 1 tablet by mouth once daily, Starting Thu 7/18/2024, Normal      Calcium Carbonate-Vit D-Min (Calcium 1200) 8457-8073 MG-UNIT CHEW Chew 1 tablet daily, Starting Thu 3/23/2023, No Print      Cholecalciferol (VITAMIN D) 2000 UNITS tablet Take 2,000 Units by mouth daily, Historical Med      dicyclomine (BENTYL) 10 mg capsule Take 1 capsule (10 mg total) by mouth daily as needed (abdominal pain), Starting Tue 4/11/2023, Normal      fenofibrate micronized (LOFIBRA) 134 MG capsule TAKE 1 CAPSULE BY MOUTH ONCE DAILY WITH BREAKFAST, Normal      hyoscyamine (ANASPAZ,LEVSIN) 0.125 MG tablet TAKE 1 TABLET BY MOUTH IN THE MORNING, Starting Mon 7/8/2024, Normal      Multiple Vitamins-Minerals (MULTIVITAMIN WOMEN) TABS Take by mouth, Historical Med      omeprazole (PriLOSEC) 20 mg delayed release capsule Take 1 capsule by mouth once daily, Starting Thu 7/18/2024, Normal             No discharge procedures on file.    PDMP Review       None            ED Provider  Electronically Signed by             Satya Mcwilliams,   07/26/24 8049

## 2024-07-28 LAB — BACTERIA UR CULT: ABNORMAL

## 2024-07-29 ENCOUNTER — HOSPITAL ENCOUNTER (EMERGENCY)
Facility: HOSPITAL | Age: 84
Discharge: HOME | DRG: 291 | End: 2024-07-29
Attending: EMERGENCY MEDICINE
Payer: MEDICARE

## 2024-07-29 ENCOUNTER — APPOINTMENT (EMERGENCY)
Dept: RADIOLOGY | Facility: HOSPITAL | Age: 84
DRG: 291 | End: 2024-07-29
Payer: MEDICARE

## 2024-07-29 VITALS
HEART RATE: 99 BPM | OXYGEN SATURATION: 96 % | RESPIRATION RATE: 18 BRPM | TEMPERATURE: 97.1 F | DIASTOLIC BLOOD PRESSURE: 75 MMHG | SYSTOLIC BLOOD PRESSURE: 170 MMHG

## 2024-07-29 DIAGNOSIS — I48.91 ATRIAL FIBRILLATION WITH RVR (HCC): Primary | ICD-10-CM

## 2024-07-29 LAB
2HR DELTA HS TROPONIN: -1 NG/L
ANION GAP SERPL CALCULATED.3IONS-SCNC: 7 MMOL/L (ref 4–13)
ATRIAL RATE: 105 BPM
ATRIAL RATE: 113 BPM
BACTERIA UR CULT: ABNORMAL
BACTERIA UR CULT: ABNORMAL
BASOPHILS # BLD AUTO: 0.06 THOUSANDS/ÂΜL (ref 0–0.1)
BASOPHILS NFR BLD AUTO: 1 % (ref 0–1)
BUN SERPL-MCNC: 23 MG/DL (ref 5–25)
CALCIUM SERPL-MCNC: 9.5 MG/DL (ref 8.4–10.2)
CARDIAC TROPONIN I PNL SERPL HS: 12 NG/L
CARDIAC TROPONIN I PNL SERPL HS: 13 NG/L
CHLORIDE SERPL-SCNC: 102 MMOL/L (ref 96–108)
CO2 SERPL-SCNC: 25 MMOL/L (ref 21–32)
CREAT SERPL-MCNC: 1.2 MG/DL (ref 0.6–1.3)
D DIMER PPP FEU-MCNC: 0.65 UG/ML FEU
EOSINOPHIL # BLD AUTO: 0.08 THOUSAND/ÂΜL (ref 0–0.61)
EOSINOPHIL NFR BLD AUTO: 1 % (ref 0–6)
ERYTHROCYTE [DISTWIDTH] IN BLOOD BY AUTOMATED COUNT: 13.4 % (ref 11.6–15.1)
GFR SERPL CREATININE-BSD FRML MDRD: 42 ML/MIN/1.73SQ M
GLUCOSE SERPL-MCNC: 144 MG/DL (ref 65–140)
HCT VFR BLD AUTO: 45 % (ref 34.8–46.1)
HGB BLD-MCNC: 14 G/DL (ref 11.5–15.4)
IMM GRANULOCYTES # BLD AUTO: 0.1 THOUSAND/UL (ref 0–0.2)
IMM GRANULOCYTES NFR BLD AUTO: 1 % (ref 0–2)
LYMPHOCYTES # BLD AUTO: 1.44 THOUSANDS/ÂΜL (ref 0.6–4.47)
LYMPHOCYTES NFR BLD AUTO: 14 % (ref 14–44)
MAGNESIUM SERPL-MCNC: 2.1 MG/DL (ref 1.9–2.7)
MCH RBC QN AUTO: 28.7 PG (ref 26.8–34.3)
MCHC RBC AUTO-ENTMCNC: 31.1 G/DL (ref 31.4–37.4)
MCV RBC AUTO: 92 FL (ref 82–98)
MONOCYTES # BLD AUTO: 0.93 THOUSAND/ÂΜL (ref 0.17–1.22)
MONOCYTES NFR BLD AUTO: 9 % (ref 4–12)
NEUTROPHILS # BLD AUTO: 7.7 THOUSANDS/ÂΜL (ref 1.85–7.62)
NEUTS SEG NFR BLD AUTO: 74 % (ref 43–75)
NRBC BLD AUTO-RTO: 0 /100 WBCS
PLATELET # BLD AUTO: 309 THOUSANDS/UL (ref 149–390)
PMV BLD AUTO: 9.7 FL (ref 8.9–12.7)
POTASSIUM SERPL-SCNC: 3.8 MMOL/L (ref 3.5–5.3)
QRS AXIS: 45 DEGREES
QRS AXIS: 66 DEGREES
QRSD INTERVAL: 130 MS
QRSD INTERVAL: 136 MS
QT INTERVAL: 342 MS
QT INTERVAL: 350 MS
QTC INTERVAL: 436 MS
QTC INTERVAL: 482 MS
RBC # BLD AUTO: 4.87 MILLION/UL (ref 3.81–5.12)
SODIUM SERPL-SCNC: 134 MMOL/L (ref 135–147)
T WAVE AXIS: 106 DEGREES
T WAVE AXIS: 61 DEGREES
VENTRICULAR RATE: 114 BPM
VENTRICULAR RATE: 98 BPM
WBC # BLD AUTO: 10.31 THOUSAND/UL (ref 4.31–10.16)

## 2024-07-29 PROCEDURE — 96374 THER/PROPH/DIAG INJ IV PUSH: CPT

## 2024-07-29 PROCEDURE — 85025 COMPLETE CBC W/AUTO DIFF WBC: CPT | Performed by: EMERGENCY MEDICINE

## 2024-07-29 PROCEDURE — 71045 X-RAY EXAM CHEST 1 VIEW: CPT

## 2024-07-29 PROCEDURE — 99285 EMERGENCY DEPT VISIT HI MDM: CPT

## 2024-07-29 PROCEDURE — 84484 ASSAY OF TROPONIN QUANT: CPT | Performed by: EMERGENCY MEDICINE

## 2024-07-29 PROCEDURE — 85379 FIBRIN DEGRADATION QUANT: CPT | Performed by: EMERGENCY MEDICINE

## 2024-07-29 PROCEDURE — 36415 COLL VENOUS BLD VENIPUNCTURE: CPT | Performed by: EMERGENCY MEDICINE

## 2024-07-29 PROCEDURE — 93005 ELECTROCARDIOGRAM TRACING: CPT

## 2024-07-29 PROCEDURE — 93010 ELECTROCARDIOGRAM REPORT: CPT | Performed by: INTERNAL MEDICINE

## 2024-07-29 PROCEDURE — 96361 HYDRATE IV INFUSION ADD-ON: CPT

## 2024-07-29 PROCEDURE — 80048 BASIC METABOLIC PNL TOTAL CA: CPT | Performed by: EMERGENCY MEDICINE

## 2024-07-29 PROCEDURE — 83735 ASSAY OF MAGNESIUM: CPT | Performed by: EMERGENCY MEDICINE

## 2024-07-29 PROCEDURE — 99284 EMERGENCY DEPT VISIT MOD MDM: CPT | Performed by: INTERNAL MEDICINE

## 2024-07-29 PROCEDURE — 99285 EMERGENCY DEPT VISIT HI MDM: CPT | Performed by: EMERGENCY MEDICINE

## 2024-07-29 RX ORDER — FUROSEMIDE 40 MG/1
40 TABLET ORAL DAILY
Qty: 3 TABLET | Refills: 0 | Status: SHIPPED | OUTPATIENT
Start: 2024-07-29 | End: 2024-07-29

## 2024-07-29 RX ORDER — DILTIAZEM HYDROCHLORIDE 5 MG/ML
10 INJECTION INTRAVENOUS ONCE
Status: COMPLETED | OUTPATIENT
Start: 2024-07-29 | End: 2024-07-29

## 2024-07-29 RX ORDER — DILTIAZEM HYDROCHLORIDE 180 MG/1
180 CAPSULE, COATED, EXTENDED RELEASE ORAL DAILY
Qty: 30 CAPSULE | Refills: 0 | Status: SHIPPED | OUTPATIENT
Start: 2024-07-29 | End: 2024-07-31

## 2024-07-29 RX ORDER — FUROSEMIDE 40 MG/1
40 TABLET ORAL DAILY
Qty: 3 TABLET | Refills: 0 | Status: SHIPPED | OUTPATIENT
Start: 2024-07-29 | End: 2024-07-31

## 2024-07-29 RX ORDER — SODIUM CHLORIDE 9 MG/ML
3 INJECTION INTRAVENOUS
Status: DISCONTINUED | OUTPATIENT
Start: 2024-07-29 | End: 2024-07-29 | Stop reason: HOSPADM

## 2024-07-29 RX ORDER — DILTIAZEM HYDROCHLORIDE 180 MG/1
180 CAPSULE, COATED, EXTENDED RELEASE ORAL DAILY
Qty: 30 CAPSULE | Refills: 0 | Status: SHIPPED | OUTPATIENT
Start: 2024-07-29 | End: 2024-07-29

## 2024-07-29 RX ORDER — ASPIRIN 325 MG
325 TABLET ORAL ONCE
Status: COMPLETED | OUTPATIENT
Start: 2024-07-29 | End: 2024-07-29

## 2024-07-29 RX ORDER — DILTIAZEM HYDROCHLORIDE 180 MG/1
180 CAPSULE, COATED, EXTENDED RELEASE ORAL DAILY
Status: DISCONTINUED | OUTPATIENT
Start: 2024-07-29 | End: 2024-07-29 | Stop reason: HOSPADM

## 2024-07-29 RX ORDER — FUROSEMIDE 40 MG/1
40 TABLET ORAL ONCE
Status: COMPLETED | OUTPATIENT
Start: 2024-07-29 | End: 2024-07-29

## 2024-07-29 RX ADMIN — APIXABAN 2.5 MG: 2.5 TABLET, FILM COATED ORAL at 09:29

## 2024-07-29 RX ADMIN — SODIUM CHLORIDE 500 ML: 0.9 INJECTION, SOLUTION INTRAVENOUS at 08:40

## 2024-07-29 RX ADMIN — FUROSEMIDE 40 MG: 40 TABLET ORAL at 10:42

## 2024-07-29 RX ADMIN — DILTIAZEM HYDROCHLORIDE 180 MG: 180 CAPSULE, COATED, EXTENDED RELEASE ORAL at 10:10

## 2024-07-29 RX ADMIN — DILTIAZEM HYDROCHLORIDE 10 MG: 5 INJECTION, SOLUTION INTRAVENOUS at 08:38

## 2024-07-29 RX ADMIN — ASPIRIN 325 MG: 325 TABLET ORAL at 09:27

## 2024-07-29 NOTE — DISCHARGE INSTRUCTIONS
-Please follow-up with a cardiologist.  We are starting you on Cardizem and stopping amlodipine.  Please also take Lasix which is a water pill for the next few days to help with fluid retention and return to care if you have any new or worsening symptoms

## 2024-07-29 NOTE — ASSESSMENT & PLAN NOTE
Easily controlled with a single dose of diltiazem.  Agree with changing from aspirin to lower dose eilquis.  Changing from norvasc to diltiazem.    Chads vasc 3.

## 2024-07-29 NOTE — CONSULTS
[facility]  Consult  Name: Nohemi Arcos 84 y.o. female I MRN: [id number]  Unit/Bed#: ED 02 I Date of Admission: 7/29/2024   Date of Service: 7/29/2024 I Hospital Day: 0    Consult to cardiology  Consult performed by: Kody Wills MD  Consult ordered by: Biju Belle MD          Assessment & Plan   Atrial fibrillation with rapid ventricular response (HCC)  Assessment & Plan  Easily controlled with a single dose of diltiazem.  Agree with changing from aspirin to lower dose eilquis.  Changing from norvasc to diltiazem.    Chads vasc 3.      LBBB (left bundle branch block)  Assessment & Plan  Long standing.    Primary hypertension  Assessment & Plan  Well controlled.  Since diltiazem is being started Norvasc will be stopped.         Other summary comments:   Rate control easily accomplished.  No clear new underlying issues.  Will set up outpatient f/u with her usual cardiologist near her home to be sure rate is controlled etc.    Outpatient Cardiologist: Mariana    HPI: Nohemi Arcos is a 84 y.o. year old female who presented with very mild exertional dyspnea.  She was found to have atrial fibrillation with a rapid rate.  Single dose of diltiazem was added and heart rate became controlled.  It is notable that she had presented to [facility] several days ago with constipation and her rhythm was atrial fibrillation and then as well.  There is no orthopnea.  No chest pain.  No history of stroke.        EKG:   Atrial tach/fib with a left bundle branch block pattern.  LBBB is unchanged from several years ago.    MOST  RECENT CARDIAC IMAGING:   Myoview done because of the left bundle branch block pattern on 8/10/2022 was normal.      Review of Systems: a 10 point review of systems was conducted and is negative except for as mentioned in the HPI or as below.        Historical Information   Past Medical History:   Diagnosis Date    Arteriosclerotic heart disease     LAST ASSESSED:  [id number]    Esophageal reflux     LAST ASSESSED: 2018    GERD (gastroesophageal reflux disease)     Hyperlipidemia     LAST ASSESSED: 2018    Hypertension     LAST ASSESSED: 2018    Nontoxic single thyroid nodule     LAST ASSESSED: 2014    Osteopenia     LAST ASSESSED: 2016     Past Surgical History:   Procedure Laterality Date    CYSTOSCOPY  2013    DIAGNOSTIC    ESOPHAGOGASTRODUODENOSCOPY      MASTECTOMY Bilateral     cancer    OOPHORECTOMY      SALPINGOOPHORECTOMY Bilateral      Social History     Substance and Sexual Activity   Alcohol Use No     Social History     Substance and Sexual Activity   Drug Use No     Social History     Tobacco Use   Smoking Status Never   Smokeless Tobacco Never       Family History:   No longer relevant    Meds/Allergies   all current active meds have been reviewed  Not in a hospital admission.    Allergies   Allergen Reactions    Ciprofloxacin Other (See Comments)     Muscle pain    Doxycycline     Nitrofurantoin        Objective   Vitals: Blood pressure 137/77, pulse 100, temperature (!) 97.1 °F (36.2 °C), temperature source Temporal, resp. rate 16, SpO2 93%., There is no height or weight on file to calculate BMI.,   Orthostatic Blood Pressures      Flowsheet Row Most Recent Value   Blood Pressure 137/77 filed at 2024 1015   Patient Position - Orthostatic VS Lying filed at 2024 0830            Systolic (24hrs), Av , Min:115 , Max:142     Diastolic (24hrs), Av, Min:58, Max:95              Physical Exam:    General:  Normal appearance in no distress.  Eyes:  Anicteric.  Oral mucosa:  Moist.  Neck:  No JVD. Carotid upstrokes are brisk without bruits.  No masses.  Chest:  Clear to auscultation.  Cardiac: Irregularly irregular.  No palpable PMI.  Normal S1 and S2.  No murmur gallop or rub.  Abdomen:  Soft and nontender. No palpable organomegaly or aortic enlargement.  Extremities:  No peripheral edema.  Musculoskeletal:  Symmetric.    Vascular:  Femoral pulses are brisk without bruits.  Popliteal  pulses are intact bilaterally.  Pedal pulses are intact.  Neuro:  Grossly symmetric.  Psych:  Alert and oriented x3.        Lab Results:   Labs reviewed and prominent abnormalities reviewed above and/or below.    Troponins:    Results from last 7 days   Lab Units 07/29/24  1007 07/29/24  0834   HS TNI 0HR ng/L  --  13   HS TNI 2HR ng/L 12  --    HSTNI D2 ng/L -1  --      BNP:

## 2024-07-29 NOTE — ED PROVIDER NOTES
History  Chief Complaint   Patient presents with    Chest Pain     CHEST PRESSURE AND SHORTNESS OF BREATH STARTING LAST NIGHT.      Patient is an 84-year-old female with history of hypertension that presents for evaluation of chest pressure and exertional dyspnea.  Patient says she has developed the symptoms since last evening.  Patient noted to be in atrial fibrillation with rapid ventricular response with rates in the 140s initially.  She has no known history of A-fib.  She was recently seen at [facility] and diagnosed with UTI, review shows that the patient was in A-fib at that time.  Patient says that she has mild chest pressure currently without alleviators or exacerbating factors.  She denies nausea or vomiting.  No recent cough or fevers.  She has not taken anything for her symptoms.        Prior to Admission Medications   Prescriptions Last Dose Informant Patient Reported? Taking?   Calcium Carbonate-Vit D-Min (Calcium 1200) 4142-8043 MG-UNIT CHEW  Self No No   Sig: Chew 1 tablet daily   Cholecalciferol (VITAMIN D) 2000 UNITS tablet  Self Yes No   Sig: Take 2,000 Units by mouth daily   Multiple Vitamins-Minerals (MULTIVITAMIN WOMEN) TABS  Self Yes No   Sig: Take by mouth   acetaminophen (TYLENOL) 325 mg tablet  Self No No   Sig: Take 2 tablets (650 mg total) by mouth every 6 (six) hours as needed for mild pain, headaches or fever   Patient not taking: Reported on 1/18/2024   amLODIPine (NORVASC) 2.5 mg tablet   No No   Sig: Take 1 tablet by mouth once daily   aspirin 81 MG tablet  Self Yes No   Sig: Take 81 mg by mouth daily   atorvastatin (LIPITOR) 20 mg tablet   No No   Sig: Take 1 tablet by mouth once daily   cephalexin (KEFLEX) 500 mg capsule   No No   Sig: Take 1 capsule (500 mg total) by mouth every 6 (six) hours for 7 days   dicyclomine (BENTYL) 10 mg capsule  Self No No   Sig: Take 1 capsule (10 mg total) by mouth daily as needed (abdominal pain)   Patient not taking: Reported on 7/10/2023    fenofibrate micronized (LOFIBRA) 134 MG capsule   No No   Sig: TAKE 1 CAPSULE BY MOUTH ONCE DAILY WITH BREAKFAST   hyoscyamine (ANASPAZ,LEVSIN) 0.125 MG tablet   No No   Sig: TAKE 1 TABLET BY MOUTH IN THE MORNING   omeprazole (PriLOSEC) 20 mg delayed release capsule   No No   Sig: Take 1 capsule by mouth once daily      Facility-Administered Medications: None       Past Medical History:   Diagnosis Date    Arteriosclerotic heart disease     LAST ASSESSED: 09SEP2015    Esophageal reflux     LAST ASSESSED: 09JAN2018    GERD (gastroesophageal reflux disease)     Hyperlipidemia     LAST ASSESSED: 09JAN2018    Hypertension     LAST ASSESSED: 09JAN2018    Nontoxic single thyroid nodule     LAST ASSESSED: 09MAY2014    Osteopenia     LAST ASSESSED: 11MAY2016       Past Surgical History:   Procedure Laterality Date    CYSTOSCOPY  12/04/2013    DIAGNOSTIC    ESOPHAGOGASTRODUODENOSCOPY      MASTECTOMY Bilateral     cancer    OOPHORECTOMY      SALPINGOOPHORECTOMY Bilateral        Family History   Problem Relation Age of Onset    Alzheimer's disease Mother     Hypertension Father     Stroke Father         SYNDROME    Colon cancer Brother      I have reviewed and agree with the history as documented.    E-Cigarette/Vaping    E-Cigarette Use Never User      E-Cigarette/Vaping Substances    Nicotine No     THC No     CBD No     Flavoring No     Other No     Unknown No      Social History     Tobacco Use    Smoking status: Never    Smokeless tobacco: Never   Vaping Use    Vaping status: Never Used   Substance Use Topics    Alcohol use: No    Drug use: No       Review of Systems   Constitutional:  Negative for fever.   HENT:  Negative for sore throat.    Respiratory:  Positive for shortness of breath.    Cardiovascular:  Positive for chest pain and palpitations.   Gastrointestinal:  Negative for abdominal pain.   Genitourinary:  Negative for dysuria.   Musculoskeletal:  Negative for back pain.   Skin:  Negative for rash.    Neurological:  Negative for light-headedness.   Psychiatric/Behavioral:  Negative for agitation.    All other systems reviewed and are negative.      Physical Exam  Physical Exam  Vitals reviewed.   Constitutional:       General: She is not in acute distress.     Appearance: She is well-developed.   HENT:      Head: Normocephalic.   Eyes:      Pupils: Pupils are equal, round, and reactive to light.   Cardiovascular:      Rate and Rhythm: Tachycardia present. Rhythm irregular.      Heart sounds: Normal heart sounds.   Pulmonary:      Effort: Pulmonary effort is normal.      Breath sounds: Normal breath sounds.   Abdominal:      General: Bowel sounds are normal. There is no distension.      Palpations: Abdomen is soft.      Tenderness: There is no abdominal tenderness. There is no guarding.   Musculoskeletal:         General: No tenderness or deformity. Normal range of motion.      Cervical back: Normal range of motion and neck supple.   Skin:     General: Skin is warm and dry.      Capillary Refill: Capillary refill takes less than 2 seconds.   Neurological:      Mental Status: She is alert and oriented to person, place, and time.      Cranial Nerves: No cranial nerve deficit.      Sensory: No sensory deficit.   Psychiatric:         Behavior: Behavior normal.         Thought Content: Thought content normal.         Judgment: Judgment normal.         Vital Signs  ED Triage Vitals [07/29/24 0830]   Temperature Pulse Respirations Blood Pressure SpO2   (!) 97.1 °F (36.2 °C) (!) 131 18 128/95 95 %      Temp Source Heart Rate Source Patient Position - Orthostatic VS BP Location FiO2 (%)   Temporal Monitor Lying Right arm --      Pain Score       --           Vitals:    07/29/24 0945 07/29/24 1010 07/29/24 1015 07/29/24 1045   BP: 130/84 142/94 137/77 170/75   Pulse: 91  100 99   Patient Position - Orthostatic VS:    Sitting         Visual Acuity      ED Medications  Medications   sodium chloride (PF) 0.9 % injection 3 mL  (has no administration in time range)   diltiazem (CARDIZEM CD) 24 hr capsule 180 mg (180 mg Oral Given 7/29/24 1010)   sodium chloride 0.9 % bolus 500 mL (0 mL Intravenous Stopped 7/29/24 1040)   diltiazem (CARDIZEM) injection 10 mg (10 mg Intravenous Given 7/29/24 0838)   aspirin tablet 325 mg (325 mg Oral Given 7/29/24 0927)   apixaban (ELIQUIS) tablet 2.5 mg (2.5 mg Oral Given 7/29/24 0929)   furosemide (LASIX) tablet 40 mg (40 mg Oral Given 7/29/24 1042)       Diagnostic Studies  Results Reviewed       Procedure Component Value Units Date/Time    HS Troponin I 4hr [[id number]]     Lab Status: No result Specimen: Blood     HS Troponin I 2hr [[id number]]  (Normal) Collected: 07/29/24 1007    Lab Status: Final result Specimen: Blood from Arm, Left Updated: 07/29/24 1033     hs TnI 2hr 12 ng/L      Delta 2hr hsTnI -1 ng/L     Magnesium [[id number]]  (Normal) Collected: 07/29/24 0834    Lab Status: Final result Specimen: Blood from Arm, Right Updated: 07/29/24 0921     Magnesium 2.1 mg/dL     D-dimer, quantitative [[id number]]  (Abnormal) Collected: 07/29/24 0834    Lab Status: Final result Specimen: Blood from Arm, Right Updated: 07/29/24 0918     D-Dimer, Quant 0.65 ug/ml FEU     Narrative:      In the evaluation for possible pulmonary embolism, in the appropriate (Well's Score of 4 or less) patient, the age adjusted d-dimer cutoff for this patient can be calculated as:    Age x 0.01 (in ug/mL) for Age-adjusted D-dimer exclusion threshold for a patient over 50 years.    HS Troponin 0hr (reflex protocol) [[id number]]  (Normal) Collected: 07/29/24 0834    Lab Status: Final result Specimen: Blood from Arm, Right Updated: 07/29/24 0900     hs TnI 0hr 13 ng/L     Basic metabolic panel [[id number]]  (Abnormal) Collected: 07/29/24 0834    Lab Status: Final result Specimen: Blood from Arm, Right Updated: 07/29/24 0853     Sodium 134 mmol/L      Potassium 3.8 mmol/L      Chloride 102 mmol/L      CO2 25 mmol/L      ANION GAP 7  mmol/L      BUN 23 mg/dL      Creatinine 1.20 mg/dL      Glucose 144 mg/dL      Calcium 9.5 mg/dL      eGFR 42 ml/min/1.73sq m     Narrative:      National Kid[employer] guidelines for Chronic Kidney Disease (CKD):     Stage 1 with normal or high GFR (GFR > 90 mL/min/1.73 square meters)    Stage 2 Mild CKD (GFR = 60-89 mL/min/1.73 square meters)    Stage 3A Moderate CKD (GFR = 45-59 mL/min/1.73 square meters)    Stage 3B Moderate CKD (GFR = 30-44 mL/min/1.73 square meters)    Stage 4 Severe CKD (GFR = 15-29 mL/min/1.73 square meters)    Stage 5 End Stage CKD (GFR <15 mL/min/1.73 square meters)  Note: GFR calculation is accurate only with a steady state creatinine    CBC and differential [[id number]]  (Abnormal) Collected: 07/29/24 0834    Lab Status: Final result Specimen: Blood from Arm, Right Updated: 07/29/24 0852     WBC 10.31 Thousand/uL      RBC 4.87 Million/uL      Hemoglobin 14.0 g/dL      Hematocrit 45.0 %      MCV 92 fL      MCH 28.7 pg      MCHC 31.1 g/dL      RDW 13.4 %      MPV 9.7 fL      Platelets 309 Thousands/uL      nRBC 0 /100 WBCs      Segmented % 74 %      Immature Grans % 1 %      Lymphocytes % 14 %      Monocytes % 9 %      Eosinophils Relative 1 %      Basophils Relative 1 %      Absolute Neutrophils 7.70 Thousands/µL      Absolute Immature Grans 0.10 Thousand/uL      Absolute Lymphocytes 1.44 Thousands/µL      Absolute Monocytes 0.93 Thousand/µL      Eosinophils Absolute 0.08 Thousand/µL      Basophils Absolute 0.06 Thousands/µL                    X-ray chest 1 view portable   Final Result by Paulino Sanchez MD (07/29 1030)      Mild vascular congestion. Small bilateral pleural effusions.                  Resident: Julio C Mata I, the attending radiologist, have reviewed the images and agree with the final report above.      Workstation performed: [id number]                    Procedures  ECG 12 Lead Documentation Only    Date/Time: 7/29/2024 12:28 PM    Performed by:  Biju Belle MD  Authorized by: Biju Belle MD    ECG reviewed by me, the ED Provider: yes    Patient location:  ED  Previous ECG:     Previous ECG:  Compared to current    Similarity:  Changes noted    Comparison to cardiac monitor: Yes    Interpretation:     Interpretation: abnormal    Rate:     ECG rate assessment: tachycardic    Rhythm:     Rhythm: atrial fibrillation    Ectopy:     Ectopy: none    QRS:     QRS axis:  Normal    QRS intervals:  Wide  Conduction:     Conduction: abnormal      Abnormal conduction: complete LBBB    ST segments:     ST segments:  Normal  T waves:     T waves: normal             ED Course  ED Course as of 07/29/24 1231   Mon Jul 29, 2024   0921 D-Dimer, Quant(!): 0.65  Age adjusted negative                                 SBIRT 22yo+      Flowsheet Row Most Recent Value   Initial Alcohol Screen: US AUDIT-C     1. How often do you have a drink containing alcohol? 0 Filed at: 07/29/2024 0830   2. How many drinks containing alcohol do you have on a typical day you are drinking?  0 Filed at: 07/29/2024 0830   3a. Male UNDER 65: How often do you have five or more drinks on one occasion? 0 Filed at: 07/29/2024 0830   3b. FEMALE Any Age, or MALE 65+: How often do you have 4 or more drinks on one occassion? 0 Filed at: 07/29/2024 0830   Audit-C Score 0 Filed at: 07/29/2024 0830   RONNY: How many times in the past year have you...    Used an illegal drug or used a prescription medication for non-medical reasons? Never Filed at: 07/29/2024 0830                      Medical Decision Making  Patient is an 84-year-old female who presents for evaluation of chest pressure and exertional dyspnea found to be in A-fib with RVR.  As the rate in the 140s, improved significantly after 1 dose of 10 mg IV Cardizem with rates in the 80s.  Patient's [id number] is elevated, will start on Eliquis 2.5 mg twice daily based on her age and weight.  Patient also found to have some very minimal  pulmonary vascular congestion, will give a short course of Lasix outpatient to help with this.  I discussed the case with Dr. Wills of cardiology who agrees the patient can be managed outpatient.  He started the patient on Cardizem p.o. and discontinued amlodipine.  I went over these instructions in detail with the patient and she understood prior to discharge.  Advised strict return precautions and cardiology follow-up.    Amount and/or Complexity of Data Reviewed  Labs: ordered. Decision-making details documented in ED Course.  Radiology: ordered.    Risk  OTC drugs.  Prescription drug management.                 Disposition  Final diagnoses:   Atrial fibrillation with RVR (HCC)     Time reflects when diagnosis was documented in both MDM as applicable and the Disposition within this note       Time User Action Codes Description Comment    7/29/2024  9:27 AM Biju Belle Add [I48.91] Atrial fibrillation with RVR (HCC)           ED Disposition       ED Disposition   Discharge    Condition   Stable    Date/Time   Mon Jul 29, 2024 1035    Comment   Nohemi Arcos discharge to home/self care.                   Follow-up Information       Follow up With Specialties Details Why Contact Info Additional Information    Ruben Peña MD Cardiology Schedule an appointment as soon as possible for a visit   [address] Emergency Department Emergency Medicine  If symptoms worsen [address] [address]  [phone] [facility] Emergency Department, [address] [address]            Discharge Medication List as of 7/29/2024 10:35 AM        START taking these medications    Details   diltiazem (CARDIZEM CD) 180 mg 24 hr capsule Take 1 capsule (180 mg total) by mouth daily, Starting Mon 7/29/2024, Until Wed 8/28/2024, Normal      furosemide (LASIX) 40 mg tablet Take 1 tablet (40  mg total) by mouth daily for 3 days, Starting Mon 7/29/2024, Until Thu 8/1/2024, Normal           CONTINUE these medications which have NOT CHANGED    Details   acetaminophen (TYLENOL) 325 mg tablet Take 2 tablets (650 mg total) by mouth every 6 (six) hours as needed for mild pain, headaches or fever, Starting Thu 3/23/2023, No Print      aspirin 81 MG tablet Take 81 mg by mouth daily, Historical Med      atorvastatin (LIPITOR) 20 mg tablet Take 1 tablet by mouth once daily, Starting Thu 7/18/2024, Normal      Calcium Carbonate-Vit D-Min (Calcium 1200) 3953-7227 MG-UNIT CHEW Chew 1 tablet daily, Starting Thu 3/23/2023, No Print      cephalexin (KEFLEX) 500 mg capsule Take 1 capsule (500 mg total) by mouth every 6 (six) hours for 7 days, Starting Fri 7/26/2024, Until Fri 8/2/2024, Normal      Cholecalciferol (VITAMIN D) 2000 UNITS tablet Take 2,000 Units by mouth daily, Historical Med      dicyclomine (BENTYL) 10 mg capsule Take 1 capsule (10 mg total) by mouth daily as needed (abdominal pain), Starting Tue 4/11/2023, Normal      fenofibrate micronized (LOFIBRA) 134 MG capsule TAKE 1 CAPSULE BY MOUTH ONCE DAILY WITH BREAKFAST, Normal      hyoscyamine (ANASPAZ,LEVSIN) 0.125 MG tablet TAKE 1 TABLET BY MOUTH IN THE MORNING, Starting Mon 7/8/2024, Normal      Multiple Vitamins-Minerals (MULTIVITAMIN WOMEN) TABS Take by mouth, Historical Med      omeprazole (PriLOSEC) 20 mg delayed release capsule Take 1 capsule by mouth once daily, Starting Thu 7/18/2024, Normal           STOP taking these medications       amLODIPine (NORVASC) 2.5 mg tablet Comments:   Reason for Stopping:                   PDMP Review       None            ED Provider  Electronically Signed by             Biju Belle MD  07/29/24 5240

## 2024-07-30 ENCOUNTER — TELEPHONE (OUTPATIENT)
Age: 84
End: 2024-07-30

## 2024-07-30 NOTE — TELEPHONE ENCOUNTER
Patient called, she wanted to speak to her provider about some issues she has been having.   Friday she went to the ER, she was not feeling well, had some stomach cramps and pressure in her bowels. They told her she has a UTI and she was discharged.   Yesterday she went to the ER, she stated she still was not feeling good. She had some shortness of breath and chest pain / heaviness. They told her she was in Atrial fibrillation, and gave her new medicine. Today she stated her chest pain / heaviness is gone, but she it still having bowel pressure and hasn't had a BM since Friday.     She would like a call back to discuss what she should do.     Please advise, thank you.

## 2024-07-31 ENCOUNTER — HOSPITAL ENCOUNTER (INPATIENT)
Facility: HOSPITAL | Age: 84
LOS: 2 days | Discharge: HOME | DRG: 291 | End: 2024-08-02
Attending: FAMILY MEDICINE | Admitting: FAMILY MEDICINE
Payer: MEDICARE

## 2024-07-31 ENCOUNTER — APPOINTMENT (INPATIENT)
Dept: NON INVASIVE DIAGNOSTICS | Facility: HOSPITAL | Age: 84
DRG: 291 | End: 2024-07-31
Payer: MEDICARE

## 2024-07-31 DIAGNOSIS — I48.91 ATRIAL FIBRILLATION WITH RAPID VENTRICULAR RESPONSE (HCC): Primary | ICD-10-CM

## 2024-07-31 DIAGNOSIS — I42.9 CARDIOMYOPATHY, UNSPECIFIED TYPE (HCC): ICD-10-CM

## 2024-07-31 DIAGNOSIS — R07.89 CHEST PRESSURE: ICD-10-CM

## 2024-07-31 DIAGNOSIS — I48.91 ATRIAL FIBRILLATION WITH RVR (HCC): ICD-10-CM

## 2024-07-31 DIAGNOSIS — R06.02 SOB (SHORTNESS OF BREATH): ICD-10-CM

## 2024-07-31 PROBLEM — J90 PLEURAL EFFUSION: Status: ACTIVE | Noted: 2024-07-31

## 2024-07-31 PROBLEM — R09.02 HYPOXIA: Status: ACTIVE | Noted: 2024-07-31

## 2024-07-31 LAB
2HR DELTA HS TROPONIN: 0 NG/L
4HR DELTA HS TROPONIN: 1 NG/L
ANION GAP SERPL CALCULATED.3IONS-SCNC: 9 MMOL/L (ref 4–13)
AORTIC ROOT: 3 CM
APICAL FOUR CHAMBER EJECTION FRACTION: 51 %
ASCENDING AORTA: 2.9 CM
BACTERIA UR QL AUTO: ABNORMAL /HPF
BACTERIA UR QL AUTO: ABNORMAL /HPF
BASOPHILS # BLD AUTO: 0.04 THOUSANDS/ÂΜL (ref 0–0.1)
BASOPHILS NFR BLD AUTO: 0 % (ref 0–1)
BILIRUB UR QL STRIP: NEGATIVE
BILIRUB UR QL STRIP: NEGATIVE
BNP SERPL-MCNC: 351 PG/ML (ref 0–100)
BSA FOR ECHO PROCEDURE: 1.57 M2
BUN SERPL-MCNC: 20 MG/DL (ref 5–25)
CALCIUM SERPL-MCNC: 9.3 MG/DL (ref 8.4–10.2)
CARDIAC TROPONIN I PNL SERPL HS: 12 NG/L
CARDIAC TROPONIN I PNL SERPL HS: 12 NG/L
CARDIAC TROPONIN I PNL SERPL HS: 13 NG/L
CHLORIDE SERPL-SCNC: 101 MMOL/L (ref 96–108)
CLARITY UR: CLEAR
CLARITY UR: CLEAR
CO2 SERPL-SCNC: 24 MMOL/L (ref 21–32)
COLOR UR: YELLOW
COLOR UR: YELLOW
CREAT SERPL-MCNC: 1.22 MG/DL (ref 0.6–1.3)
E WAVE DECELERATION TIME: 88 MS
EOSINOPHIL # BLD AUTO: 0.17 THOUSAND/ÂΜL (ref 0–0.61)
EOSINOPHIL NFR BLD AUTO: 2 % (ref 0–6)
ERYTHROCYTE [DISTWIDTH] IN BLOOD BY AUTOMATED COUNT: 13.8 % (ref 11.6–15.1)
FRACTIONAL SHORTENING: 30 (ref 28–44)
GFR SERPL CREATININE-BSD FRML MDRD: 41 ML/MIN/1.73SQ M
GLUCOSE SERPL-MCNC: 115 MG/DL (ref 65–140)
GLUCOSE UR STRIP-MCNC: NEGATIVE MG/DL
GLUCOSE UR STRIP-MCNC: NEGATIVE MG/DL
HCT VFR BLD AUTO: 42.8 % (ref 34.8–46.1)
HGB BLD-MCNC: 13.5 G/DL (ref 11.5–15.4)
HGB UR QL STRIP.AUTO: ABNORMAL
HGB UR QL STRIP.AUTO: ABNORMAL
IMM GRANULOCYTES # BLD AUTO: 0.06 THOUSAND/UL (ref 0–0.2)
IMM GRANULOCYTES NFR BLD AUTO: 1 % (ref 0–2)
INTERVENTRICULAR SEPTUM IN DIASTOLE (PARASTERNAL SHORT AXIS VIEW): 1.1 CM
INTERVENTRICULAR SEPTUM: 1.1 CM (ref 0.6–1.1)
IVC: 18 MM
KETONES UR STRIP-MCNC: NEGATIVE MG/DL
KETONES UR STRIP-MCNC: NEGATIVE MG/DL
LAAS-AP2: 14.4 CM2
LAAS-AP4: 15.8 CM2
LEFT ATRIUM SIZE: 3.7 CM
LEFT ATRIUM VOLUME (MOD BIPLANE): 53 ML
LEFT ATRIUM VOLUME INDEX (MOD BIPLANE): 33.8 ML/M2
LEFT INTERNAL DIMENSION IN SYSTOLE: 3.1 CM (ref 2.1–4)
LEFT VENTRICLE DIASTOLIC VOLUME (MOD BIPLANE): 55 ML
LEFT VENTRICLE DIASTOLIC VOLUME INDEX (MOD BIPLANE): 35 ML/M2
LEFT VENTRICLE SYSTOLIC VOLUME (MOD BIPLANE): 26 ML
LEFT VENTRICLE SYSTOLIC VOLUME INDEX (MOD BIPLANE): 16.6 ML/M2
LEFT VENTRICULAR INTERNAL DIMENSION IN DIASTOLE: 4.4 CM (ref 3.5–6)
LEFT VENTRICULAR POSTERIOR WALL IN END DIASTOLE: 0.8 CM
LEFT VENTRICULAR STROKE VOLUME: 48 ML
LEUKOCYTE ESTERASE UR QL STRIP: ABNORMAL
LEUKOCYTE ESTERASE UR QL STRIP: ABNORMAL
LV EF: 53 %
LVSV (TEICH): 48 ML
LYMPHOCYTES # BLD AUTO: 1.75 THOUSANDS/ÂΜL (ref 0.6–4.47)
LYMPHOCYTES NFR BLD AUTO: 18 % (ref 14–44)
MAGNESIUM SERPL-MCNC: 2 MG/DL (ref 1.9–2.7)
MCH RBC QN AUTO: 28.8 PG (ref 26.8–34.3)
MCHC RBC AUTO-ENTMCNC: 31.5 G/DL (ref 31.4–37.4)
MCV RBC AUTO: 92 FL (ref 82–98)
MONOCYTES # BLD AUTO: 1.16 THOUSAND/ÂΜL (ref 0.17–1.22)
MONOCYTES NFR BLD AUTO: 12 % (ref 4–12)
MV PEAK E VEL: 113 CM/S
MV STENOSIS PRESSURE HALF TIME: 26 MS
MV VALVE AREA P 1/2 METHOD: 8.5
NEUTROPHILS # BLD AUTO: 6.39 THOUSANDS/ÂΜL (ref 1.85–7.62)
NEUTS SEG NFR BLD AUTO: 67 % (ref 43–75)
NITRITE UR QL STRIP: POSITIVE
NITRITE UR QL STRIP: POSITIVE
NON-SQ EPI CELLS URNS QL MICRO: ABNORMAL /HPF
NON-SQ EPI CELLS URNS QL MICRO: ABNORMAL /HPF
NRBC BLD AUTO-RTO: 0 /100 WBCS
PH UR STRIP.AUTO: 6 [PH]
PH UR STRIP.AUTO: 6 [PH]
PLATELET # BLD AUTO: 273 THOUSANDS/UL (ref 149–390)
PMV BLD AUTO: 9.7 FL (ref 8.9–12.7)
POTASSIUM SERPL-SCNC: 3.5 MMOL/L (ref 3.5–5.3)
PROT UR STRIP-MCNC: NEGATIVE MG/DL
PROT UR STRIP-MCNC: NEGATIVE MG/DL
RA PRESSURE ESTIMATED: 8 MMHG
RBC # BLD AUTO: 4.68 MILLION/UL (ref 3.81–5.12)
RBC #/AREA URNS AUTO: ABNORMAL /HPF
RBC #/AREA URNS AUTO: ABNORMAL /HPF
RIGHT ATRIUM AREA SYSTOLE A4C: 9.4 CM2
RIGHT VENTRICLE ID DIMENSION: 2.4 CM
RV PSP: 45 MMHG
SL CV LEFT ATRIUM LENGTH A2C: 3.5 CM
SL CV LV EF: 50
SL CV PED ECHO LEFT VENTRICLE DIASTOLIC VOLUME (MOD BIPLANE) 2D: 85 ML
SL CV PED ECHO LEFT VENTRICLE SYSTOLIC VOLUME (MOD BIPLANE) 2D: 37 ML
SODIUM SERPL-SCNC: 134 MMOL/L (ref 135–147)
SP GR UR STRIP.AUTO: 1.01
SP GR UR STRIP.AUTO: 1.01
TR MAX PG: 37 MMHG
TR PEAK VELOCITY: 3.1 M/S
TRICUSPID ANNULAR PLANE SYSTOLIC EXCURSION: 1.8 CM
TRICUSPID VALVE PEAK REGURGITATION VELOCITY: 3.06 M/S
UROBILINOGEN UR QL STRIP.AUTO: 0.2 E.U./DL
UROBILINOGEN UR QL STRIP.AUTO: 0.2 E.U./DL
WBC # BLD AUTO: 9.57 THOUSAND/UL (ref 4.31–10.16)
WBC #/AREA URNS AUTO: ABNORMAL /HPF
WBC #/AREA URNS AUTO: ABNORMAL /HPF

## 2024-07-31 PROCEDURE — 83735 ASSAY OF MAGNESIUM: CPT | Performed by: FAMILY MEDICINE

## 2024-07-31 PROCEDURE — 81001 URINALYSIS AUTO W/SCOPE: CPT | Performed by: FAMILY MEDICINE

## 2024-07-31 PROCEDURE — 36415 COLL VENOUS BLD VENIPUNCTURE: CPT | Performed by: FAMILY MEDICINE

## 2024-07-31 PROCEDURE — 99223 1ST HOSP IP/OBS HIGH 75: CPT | Performed by: FAMILY MEDICINE

## 2024-07-31 PROCEDURE — 84484 ASSAY OF TROPONIN QUANT: CPT | Performed by: FAMILY MEDICINE

## 2024-07-31 PROCEDURE — 93005 ELECTROCARDIOGRAM TRACING: CPT

## 2024-07-31 PROCEDURE — 80048 BASIC METABOLIC PNL TOTAL CA: CPT | Performed by: FAMILY MEDICINE

## 2024-07-31 PROCEDURE — 93306 TTE W/DOPPLER COMPLETE: CPT

## 2024-07-31 PROCEDURE — 99222 1ST HOSP IP/OBS MODERATE 55: CPT | Performed by: INTERNAL MEDICINE

## 2024-07-31 PROCEDURE — 85025 COMPLETE CBC W/AUTO DIFF WBC: CPT | Performed by: FAMILY MEDICINE

## 2024-07-31 PROCEDURE — 99285 EMERGENCY DEPT VISIT HI MDM: CPT | Performed by: FAMILY MEDICINE

## 2024-07-31 PROCEDURE — 83880 ASSAY OF NATRIURETIC PEPTIDE: CPT | Performed by: FAMILY MEDICINE

## 2024-07-31 PROCEDURE — 93306 TTE W/DOPPLER COMPLETE: CPT | Performed by: INTERNAL MEDICINE

## 2024-07-31 RX ORDER — DILTIAZEM HYDROCHLORIDE 60 MG/1
60 TABLET, FILM COATED ORAL EVERY 8 HOURS SCHEDULED
Status: DISCONTINUED | OUTPATIENT
Start: 2024-07-31 | End: 2024-08-01

## 2024-07-31 RX ORDER — SULFAMETHOXAZOLE AND TRIMETHOPRIM 800; 160 MG/1; MG/1
1 TABLET ORAL ONCE
Status: COMPLETED | OUTPATIENT
Start: 2024-07-31 | End: 2024-07-31

## 2024-07-31 RX ORDER — DILTIAZEM HYDROCHLORIDE 5 MG/ML
15 INJECTION INTRAVENOUS ONCE
Status: COMPLETED | OUTPATIENT
Start: 2024-07-31 | End: 2024-07-31

## 2024-07-31 RX ORDER — CEPHALEXIN 500 MG/1
500 CAPSULE ORAL EVERY 8 HOURS SCHEDULED
Status: COMPLETED | OUTPATIENT
Start: 2024-07-31 | End: 2024-08-02

## 2024-07-31 RX ORDER — GUAIFENESIN/DEXTROMETHORPHAN 100-10MG/5
10 SYRUP ORAL EVERY 4 HOURS PRN
Status: DISCONTINUED | OUTPATIENT
Start: 2024-07-31 | End: 2024-08-02 | Stop reason: HOSPADM

## 2024-07-31 RX ORDER — FUROSEMIDE 10 MG/ML
20 INJECTION INTRAMUSCULAR; INTRAVENOUS ONCE
Status: COMPLETED | OUTPATIENT
Start: 2024-07-31 | End: 2024-07-31

## 2024-07-31 RX ORDER — PANTOPRAZOLE SODIUM 20 MG/1
20 TABLET, DELAYED RELEASE ORAL
Status: DISCONTINUED | OUTPATIENT
Start: 2024-07-31 | End: 2024-08-02 | Stop reason: HOSPADM

## 2024-07-31 RX ORDER — AMOXICILLIN 250 MG
1 CAPSULE ORAL
Status: DISCONTINUED | OUTPATIENT
Start: 2024-07-31 | End: 2024-08-01

## 2024-07-31 RX ORDER — ATORVASTATIN CALCIUM 10 MG/1
20 TABLET, FILM COATED ORAL DAILY
Status: DISCONTINUED | OUTPATIENT
Start: 2024-07-31 | End: 2024-08-02 | Stop reason: HOSPADM

## 2024-07-31 RX ADMIN — DILTIAZEM HYDROCHLORIDE 5 MG/HR: 5 INJECTION, SOLUTION INTRAVENOUS at 09:33

## 2024-07-31 RX ADMIN — SULFAMETHOXAZOLE AND TRIMETHOPRIM 1 TABLET: 800; 160 TABLET ORAL at 09:16

## 2024-07-31 RX ADMIN — ASPIRIN 81 MG: 81 TABLET, COATED ORAL at 13:37

## 2024-07-31 RX ADMIN — DILTIAZEM HYDROCHLORIDE 60 MG: 60 TABLET ORAL at 17:22

## 2024-07-31 RX ADMIN — CEPHALEXIN 500 MG: 500 CAPSULE ORAL at 22:17

## 2024-07-31 RX ADMIN — APIXABAN 2.5 MG: 2.5 TABLET, FILM COATED ORAL at 17:22

## 2024-07-31 RX ADMIN — PANTOPRAZOLE SODIUM 20 MG: 20 TABLET, DELAYED RELEASE ORAL at 13:37

## 2024-07-31 RX ADMIN — ATORVASTATIN CALCIUM 20 MG: 10 TABLET, FILM COATED ORAL at 13:37

## 2024-07-31 RX ADMIN — DILTIAZEM HYDROCHLORIDE 60 MG: 60 TABLET ORAL at 22:17

## 2024-07-31 RX ADMIN — DILTIAZEM HYDROCHLORIDE 10 MG: 5 INJECTION, SOLUTION INTRAVENOUS at 08:13

## 2024-07-31 RX ADMIN — CEPHALEXIN 500 MG: 500 CAPSULE ORAL at 13:37

## 2024-07-31 RX ADMIN — SENNOSIDES AND DOCUSATE SODIUM 1 TABLET: 50; 8.6 TABLET ORAL at 22:17

## 2024-07-31 RX ADMIN — FUROSEMIDE 20 MG: 10 INJECTION, SOLUTION INTRAMUSCULAR; INTRAVENOUS at 15:45

## 2024-07-31 RX ADMIN — GUAIFENESIN AND DEXTROMETHORPHAN 10 ML: 100; 10 SYRUP ORAL at 22:22

## 2024-07-31 RX ADMIN — FUROSEMIDE 20 MG: 10 INJECTION, SOLUTION INTRAMUSCULAR; INTRAVENOUS at 10:10

## 2024-07-31 RX ADMIN — DILTIAZEM HYDROCHLORIDE 60 MG: 60 TABLET ORAL at 10:09

## 2024-07-31 NOTE — PLAN OF CARE
Problem: CARDIOVASCULAR - ADULT  Goal: Maintains optimal cardiac output and hemodynamic stability  Description: INTERVENTIONS:  - Monitor I/O, vital signs and rhythm  - Monitor for S/S and trends of decreased cardiac output  - Administer and titrate ordered vasoactive medications to optimize hemodynamic stability  - Assess quality of pulses, skin color and temperature  - Assess for signs of decreased coronary artery perfusion  - Instruct patient to report change in severity of symptoms  Outcome: Progressing     Problem: SKIN/TISSUE INTEGRITY - ADULT  Goal: Skin Integrity remains intact(Skin Breakdown Prevention)  Description: Assess:  --Avoid use of baby powder, tape, friction and shearing, hot water or constrictive clothing    Outcome: Progressing     Problem: MUSCULOSKELETAL - ADULT  Goal: Maintain or return mobility to safest level of function  Description: INTERVENTIONS:  - Assess patient's ability to carry out ADLs; assess patient's baseline for ADL function and identify physical deficits which impact ability to perform ADLs (bathing, care of mouth/teeth, toileting, grooming, dressing, etc.)  - Assess/evaluate cause of self-care deficits   - Assess range of motion  - Assess patient's mobility  - Assess patient's need for assistive devices and provide as appropriate  - Encourage maximum independence but intervene and supervise when necessary  - Involve family in performance of ADLs  - Assess for home care needs following discharge   - Consider OT consult to assist with ADL evaluation and planning for discharge  - Provide patient education as appropriate  Outcome: Progressing

## 2024-07-31 NOTE — ED NOTES
Patient placed back on 2 L NC as patient oxygen saturation 88% on RA     Katie Prater RN  07/31/24 4670

## 2024-07-31 NOTE — ASSESSMENT & PLAN NOTE
Patient was initially on Cardizem drip which has been titrated off  Short acting Cardizem to be transition to long-acting today  Cardiology following  Currently on Eliquis for anticoagulation  Echo results appreciated

## 2024-07-31 NOTE — ASSESSMENT & PLAN NOTE
Lab Results   Component Value Date    EGFR 41 07/31/2024    EGFR 42 07/29/2024    EGFR 42 07/26/2024    CREATININE 1.22 07/31/2024    CREATININE 1.20 07/29/2024    CREATININE 1.19 07/26/2024     Baseline creatinine around 1.2

## 2024-07-31 NOTE — ED NOTES
Patient calling sister at this time to make her aware of admission.     Katie Prater RN  07/31/24 1024

## 2024-07-31 NOTE — ED NOTES
Discussion had w/ Dr. Andrade regarding cardizem. Per provider, only administer 10mg at this time as Hr ranging from 's.     Katie Prater RN  07/31/24 0851

## 2024-07-31 NOTE — ED NOTES
Inpatient provider, Dr Moe at bedside     Per. Dr Moe, Cardizem infusion to continue infusing at 5mg/hr     Katie Prater, RN  07/31/24 0297

## 2024-07-31 NOTE — Clinical Note
Case was discussed with  and the patient's admission status was agreed to be Admission Status: observation status to the service of Dr. Venegas

## 2024-07-31 NOTE — ED PROVIDER NOTES
History  Chief Complaint   Patient presents with    Atrial Fibrillation     With chest pressure  Improvement with 324mg ASA given by EMS    Shortness of Breath     HPI  84-year-old female with history of A-fib presented to ED with complaint of chest pressure shortness of breath that started this morning.  Patient was seen emergency department 2 days ago with A-fib was sent home on Cardizem.  States that she has been taking her medication as prescribed.  Initial rate was 115 in A-fib.  Patient was given 15 of Cardizem rate is down to 88.  Complain of chest pressure and shortness of breath.  Patient had 1 episode of hypoxia with a sat of 88% was placed on 3 L  Prior to Admission Medications   Prescriptions Last Dose Informant Patient Reported? Taking?   apixaban (Eliquis) 2.5 mg 7/31/2024  No Yes   Sig: Take 1 tablet (2.5 mg total) by mouth 2 (two) times a day   aspirin 81 MG tablet  Self Yes No   Sig: Take 81 mg by mouth daily   atorvastatin (LIPITOR) 20 mg tablet   No No   Sig: Take 1 tablet by mouth once daily   cephalexin (KEFLEX) 500 mg capsule   No No   Sig: Take 1 capsule (500 mg total) by mouth every 6 (six) hours for 7 days   omeprazole (PriLOSEC) 20 mg delayed release capsule   No No   Sig: Take 1 capsule by mouth once daily      Facility-Administered Medications: None       Past Medical History:   Diagnosis Date    Arteriosclerotic heart disease     LAST ASSESSED: 09SEP2015    Esophageal reflux     LAST ASSESSED: 09JAN2018    GERD (gastroesophageal reflux disease)     Hyperlipidemia     LAST ASSESSED: 09JAN2018    Hypertension     LAST ASSESSED: 09JAN2018    Nontoxic single thyroid nodule     LAST ASSESSED: 09MAY2014    Osteopenia     LAST ASSESSED: 11MAY2016       Past Surgical History:   Procedure Laterality Date    CYSTOSCOPY  12/04/2013    DIAGNOSTIC    ESOPHAGOGASTRODUODENOSCOPY      MASTECTOMY Bilateral     cancer    OOPHORECTOMY      SALPINGOOPHORECTOMY Bilateral        Family History   Problem  Relation Age of Onset    Alzheimer's disease Mother     Hypertension Father     Stroke Father         SYNDROME    Colon cancer Brother      I have reviewed and agree with the history as documented.    E-Cigarette/Vaping    E-Cigarette Use Never User      E-Cigarette/Vaping Substances    Nicotine No     THC No     CBD No     Flavoring No     Other No     Unknown No      Social History     Tobacco Use    Smoking status: Never    Smokeless tobacco: Never   Vaping Use    Vaping status: Never Used   Substance Use Topics    Alcohol use: No    Drug use: No       Review of Systems   Constitutional:  Negative for chills and fever.   HENT:  Negative for rhinorrhea and sore throat.    Eyes:  Negative for visual disturbance.   Respiratory:  Positive for shortness of breath. Negative for cough.    Cardiovascular:  Positive for chest pain. Negative for leg swelling.   Gastrointestinal:  Negative for abdominal pain, diarrhea, nausea and vomiting.   Genitourinary:  Negative for dysuria.   Musculoskeletal:  Negative for back pain and myalgias.   Skin:  Negative for rash.   Neurological:  Negative for dizziness and headaches.   Psychiatric/Behavioral:  Negative for confusion.    All other systems reviewed and are negative.      Physical Exam  Physical Exam  Vitals and nursing note reviewed.   Constitutional:       Appearance: She is well-developed.   HENT:      Head: Normocephalic and atraumatic.      Right Ear: External ear normal.      Left Ear: External ear normal.      Nose: Nose normal.      Mouth/Throat:      Mouth: Mucous membranes are moist.      Pharynx: No oropharyngeal exudate.   Eyes:      General: No scleral icterus.        Right eye: No discharge.         Left eye: No discharge.      Conjunctiva/sclera: Conjunctivae normal.      Pupils: Pupils are equal, round, and reactive to light.   Cardiovascular:      Rate and Rhythm: Normal rate. Rhythm irregularly irregular.      Pulses: Normal pulses.      Heart sounds: Normal  heart sounds.   Pulmonary:      Effort: Pulmonary effort is normal. No respiratory distress.      Breath sounds: Normal breath sounds. No wheezing.   Abdominal:      General: Bowel sounds are normal.      Palpations: Abdomen is soft.   Musculoskeletal:         General: Normal range of motion.      Cervical back: Normal range of motion and neck supple.   Lymphadenopathy:      Cervical: No cervical adenopathy.   Skin:     General: Skin is warm and dry.      Capillary Refill: Capillary refill takes less than 2 seconds.   Neurological:      General: No focal deficit present.      Mental Status: She is alert and oriented to person, place, and time.   Psychiatric:         Mood and Affect: Mood normal.         Behavior: Behavior normal.         Vital Signs  ED Triage Vitals   Temperature Pulse Respirations Blood Pressure SpO2   07/31/24 0752 07/31/24 0748 07/31/24 0748 07/31/24 0748 07/31/24 0748   (!) 97 °F (36.1 °C) 95 16 137/96 (!) 88 %      Temp Source Heart Rate Source Patient Position - Orthostatic VS BP Location FiO2 (%)   07/31/24 0752 07/31/24 0748 07/31/24 0748 07/31/24 0748 --   Tympanic Monitor Sitting Right arm       Pain Score       07/31/24 0748       3           Vitals:    07/31/24 0830 07/31/24 0900 07/31/24 0930 07/31/24 0933   BP: 151/74 163/91 161/91    Pulse: 85 82 90 94   Patient Position - Orthostatic VS: Sitting Sitting Sitting          Visual Acuity      ED Medications  Medications   diltiazem (CARDIZEM) 125 mg in sodium chloride 0.9 % 125 mL infusion (5 mg/hr Intravenous New Bag 7/31/24 0933)   diltiazem (CARDIZEM) tablet 60 mg (has no administration in time range)   furosemide (LASIX) injection 20 mg (has no administration in time range)   diltiazem (CARDIZEM) injection 15 mg (10 mg Intravenous Given 7/31/24 0813)   sulfamethoxazole-trimethoprim (BACTRIM DS) 800-160 mg per tablet 1 tablet (1 tablet Oral Given 7/31/24 0916)       Diagnostic Studies  Results Reviewed       Procedure Component Value  Units Date/Time    HS Troponin I 2hr [[id number]] Collected: 07/31/24 0933    Lab Status: In process Specimen: Blood from Arm, Left Updated: 07/31/24 0938    Basic metabolic panel [[id number]]  (Abnormal) Collected: 07/31/24 0755    Lab Status: Final result Specimen: Blood from Arm, Left Updated: 07/31/24 0850     Sodium 134 mmol/L      Potassium 3.5 mmol/L      Chloride 101 mmol/L      CO2 24 mmol/L      ANION GAP 9 mmol/L      BUN 20 mg/dL      Creatinine 1.22 mg/dL      Glucose 115 mg/dL      Calcium 9.3 mg/dL      eGFR 41 ml/min/1.73sq m     Narrative:      [employer] guidelines for Chronic Kidney Disease (CKD):     Stage 1 with normal or high GFR (GFR > 90 mL/min/1.73 square meters)    Stage 2 Mild CKD (GFR = 60-89 mL/min/1.73 square meters)    Stage 3A Moderate CKD (GFR = 45-59 mL/min/1.73 square meters)    Stage 3B Moderate CKD (GFR = 30-44 mL/min/1.73 square meters)    Stage 4 Severe CKD (GFR = 15-29 mL/min/1.73 square meters)    Stage 5 End Stage CKD (GFR <15 mL/min/1.73 square meters)  Note: GFR calculation is accurate only with a steady state creatinine    Magnesium [[id number]]  (Normal) Collected: 07/31/24 0755    Lab Status: Final result Specimen: Blood from Arm, Left Updated: 07/31/24 0850     Magnesium 2.0 mg/dL     B-Type Natriuretic Peptide(BNP) [[id number]]  (Abnormal) Collected: 07/31/24 0755    Lab Status: Final result Specimen: Blood from Arm, Left Updated: 07/31/24 0845      pg/mL     HS Troponin 0hr (reflex protocol) [[id number]]  (Normal) Collected: 07/31/24 0758    Lab Status: Final result Specimen: Blood from Arm, Left Updated: 07/31/24 0826     hs TnI 0hr 12 ng/L     HS Troponin I 4hr [[id number]]     Lab Status: No result Specimen: Blood     CBC and differential [[id number]] Collected: 07/31/24 0755    Lab Status: Final result Specimen: Blood from Arm, Left Updated: 07/31/24 0804     WBC 9.57 Thousand/uL      RBC 4.68 Million/uL      Hemoglobin 13.5 g/dL       Hematocrit 42.8 %      MCV 92 fL      MCH 28.8 pg      MCHC 31.5 g/dL      RDW 13.8 %      MPV 9.7 fL      Platelets 273 Thousands/uL      nRBC 0 /100 WBCs      Segmented % 67 %      Immature Grans % 1 %      Lymphocytes % 18 %      Monocytes % 12 %      Eosinophils Relative 2 %      Basophils Relative 0 %      Absolute Neutrophils 6.39 Thousands/µL      Absolute Immature Grans 0.06 Thousand/uL      Absolute Lymphocytes 1.75 Thousands/µL      Absolute Monocytes 1.16 Thousand/µL      Eosinophils Absolute 0.17 Thousand/µL      Basophils Absolute 0.04 Thousands/µL     UA w Reflex to Microscopic w Reflex to Culture [[id number]]     Lab Status: No result Specimen: Urine                    No orders to display              Procedures  Procedures         ED Course                                 SBIRT 20yo+      Flowsheet Row Most Recent Value   Initial Alcohol Screen: US AUDIT-C     1. How often do you have a drink containing alcohol? 0 Filed at: 07/31/2024 0752   2. How many drinks containing alcohol do you have on a typical day you are drinking?  0 Filed at: 07/31/2024 0752   3a. Male UNDER 65: How often do you have five or more drinks on one occasion? 0 Filed at: 07/31/2024 0752   3b. FEMALE Any Age, or MALE 65+: How often do you have 4 or more drinks on one occassion? 0 Filed at: 07/31/2024 0752   Audit-C Score 0 Filed at: 07/31/2024 0752   RONNY: How many times in the past year have you...    Used an illegal drug or used a prescription medication for non-medical reasons? Never Filed at: 07/31/2024 0752                      Medical Decision Making  84-year-old female with history of A-fib presented to ED with complaint of chest pressure shortness of breath that started this morning.  Patient was seen emergency department 2 days ago with A-fib was sent home on Cardizem.  States that she has been taking her medication as prescribed.  Initial rate was 115 in A-fib.  Patient was given 15 of Cardizem rate is down to 88.   Complain of chest pressure and shortness of breath.  Patient had an episode of hypoxia in the ED was placed on 3 L nasal cannula.  Differential diagnoses include arrhythmia/electro normality/heart failure/PE  Plan will obtain lab patient given Cardizem  Patient very down to 88-1 10 continue to be in A-fib will start her on Cardizem  Case discussed with cardiology on-call Dr. Nion will admit patient started on Cardizem drip discussed with patient patient is in agreement  Disposition Case discussed with Dr. Flores accepted the admission to stepdown 2.     Amount and/or Complexity of Data Reviewed  Labs: ordered.    Risk  Prescription drug management.  Decision regarding hospitalization.                 Disposition  Final diagnoses:   Atrial fibrillation with rapid ventricular response (HCC)   SOB (shortness of breath)   Chest pressure     Time reflects when diagnosis was documented in both MDM as applicable and the Disposition within this note       Time User Action Codes Description Comment    7/31/2024  9:23 AM Cedric Andrade Add [I48.91] Atrial fibrillation with rapid ventricular response (HCC)     7/31/2024  9:38 AM Gerardo Andradet Add [R06.02] SOB (shortness of breath)     7/31/2024  9:38 AM Gerardo Andradet Add [R07.89] Chest pressure           ED Disposition       ED Disposition   Admit    Condition   Stable    Date/Time   Wed Jul 31, 2024 6225    Comment   Case was discussed with   and the patient's admission status was agreed to be Admission Status: observation status to the service of Dr. Moe  .               Follow-up Information    None         Patient's Medications   Discharge Prescriptions    No medications on file       No discharge procedures on file.    PDMP Review       None            ED Provider  Electronically Signed by             Cedric Andrade MD  07/31/24 9725

## 2024-07-31 NOTE — ASSESSMENT & PLAN NOTE
No baseline oxygen requirements, requiring 3 L on exam  Likely volume overload, will continue diuresis as tolerated

## 2024-07-31 NOTE — ASSESSMENT & PLAN NOTE
Recent onset with multiple ER encounters for symptoms of shortness of breath and chest pressure  Symptoms resolved with rate control  Continue diltiazem 60 mg every 8 hours  Eliquis 2.5 mg twice daily for stroke risk reduction

## 2024-07-31 NOTE — H&P
H&P Exam - Nohemi Arcos 84 y.o. female MRN: [id number]    Unit/Bed#: ED 20 Encounter: [id number]    Assessment:     Atrial fibrillation with rapid ventricular response (HCC)  Assessment & Plan  Chronic AV magdalene blocking regimen with Cardizem 180 mg daily  AC with Eliquis  Patient placed on Cardizem gtt. in the ED, will initiate Cardizem 60 mg p.o. every 8  Check 2D echo   Cardiology consult    Hypoxia  Assessment & Plan  No baseline oxygen requirements, requiring 3 L on exam  Likely volume overload, will provide Lasix 20 mg IV x 1    Pleural effusion  Assessment & Plan  Check 2D echo  Chronic diuretic lasix 40mg daily, provide 20mg IV x 2     Stage 3b chronic kidney disease (HCC)  Assessment & Plan  Lab Results   Component Value Date    EGFR 41 07/31/2024    EGFR 42 07/29/2024    EGFR 42 07/26/2024    CREATININE 1.22 07/31/2024    CREATININE 1.20 07/29/2024    CREATININE 1.19 07/26/2024     Baseline creatinine around 1.2    Primary hypertension  Assessment & Plan  Chronically on Cardizem 180 mg daily    Hypercholesteremia  Assessment & Plan  Lipitor 20 mg at bedtime        History of Present Illness     84-year-old female with a history of CKD, hypertension and A-fib presents to the emergency room with onset of chest pain initiated at 3 AM.  She is also noted she has had increasing shortness of breath over the last few days.    Review of Systems   Respiratory:  Positive for cough and shortness of breath.    Cardiovascular:  Positive for chest pain.   All other systems reviewed and are negative.      Historical Information   Past Medical History:   Diagnosis Date    Arteriosclerotic heart disease     LAST ASSESSED: 09SEP2015    Esophageal reflux     LAST ASSESSED: 09JAN2018    GERD (gastroesophageal reflux disease)     Hyperlipidemia     LAST ASSESSED: 09JAN2018    Hypertension     LAST ASSESSED: 09JAN2018    Nontoxic single thyroid nodule     LAST ASSESSED: 09MAY2014    Osteopenia     LAST ASSESSED: 11MAY2016  "    Past Surgical History:   Procedure Laterality Date    CYSTOSCOPY  12/04/2013    DIAGNOSTIC    ESOPHAGOGASTRODUODENOSCOPY      MASTECTOMY Bilateral     cancer    OOPHORECTOMY      SALPINGOOPHORECTOMY Bilateral      Social History   Social History     Substance and Sexual Activity   Alcohol Use No     Social History     Substance and Sexual Activity   Drug Use No     Social History     Tobacco Use   Smoking Status Never   Smokeless Tobacco Never     E-Cigarette Use: Never User     E-Cigarette/Vaping Substances    Nicotine No     THC No     CBD No     Flavoring No     Other No     Unknown No        Family History: non-contributory    Meds/Allergies   all medications and allergies reviewed  Allergies   Allergen Reactions    Ciprofloxacin Other (See Comments)     Muscle pain    Doxycycline     Nitrofurantoin        Objective   First Vitals:   Blood Pressure: 137/96 (07/31/24 0748)  Pulse: 95 (07/31/24 0748)  Temperature: (!) 97 °F (36.1 °C) (07/31/24 0752)  Temp Source: Tympanic (07/31/24 0752)  Respirations: 16 (07/31/24 0748)  Height: 5' 1\" (154.9 cm) (07/31/24 0751)  Weight - Scale: 59 kg (130 lb) (07/31/24 0751)  SpO2: (!) 88 % (07/31/24 0748)    Current Vitals:   Blood Pressure: 158/78 (07/31/24 0943)  Pulse: 90 (07/31/24 0943)  Temperature: (!) 97 °F (36.1 °C) (07/31/24 0752)  Temp Source: Tympanic (07/31/24 0752)  Respirations: 16 (07/31/24 0943)  Height: 5' 1\" (154.9 cm) (07/31/24 0751)  Weight - Scale: 59 kg (130 lb) (07/31/24 0751)  SpO2: 91 % (07/31/24 0943)    No intake or output data in the 24 hours ending 07/31/24 0957    Invasive Devices       Peripheral Intravenous Line  Duration             Peripheral IV 07/31/24 Left Antecubital <1 day    Peripheral IV 07/31/24 Right Hand <1 day                    Physical Exam  Vitals and nursing note reviewed.   Constitutional:       General: She is not in acute distress.     Appearance: Normal appearance. She is not toxic-appearing.   HENT:      Head: " Normocephalic and atraumatic.      Right Ear: External ear normal.      Left Ear: External ear normal.      Nose: Nose normal. No congestion.      Mouth/Throat:      Mouth: Mucous membranes are moist.      Pharynx: No oropharyngeal exudate.   Eyes:      Pupils: Pupils are equal, round, and reactive to light.   Cardiovascular:      Rate and Rhythm: Tachycardia present. Rhythm irregular.   Pulmonary:      Effort: Pulmonary effort is normal. No respiratory distress.      Breath sounds: Rales present. No wheezing.   Abdominal:      General: Abdomen is flat. Bowel sounds are normal. There is no distension.      Tenderness: There is no abdominal tenderness. There is no guarding.   Musculoskeletal:         General: Normal range of motion.      Right lower leg: No edema.      Left lower leg: No edema.   Skin:     General: Skin is warm.      Capillary Refill: Capillary refill takes 2 to 3 seconds.   Neurological:      General: No focal deficit present.      Mental Status: She is alert and oriented to person, place, and time.         Lab Results:    Lab Results   Component Value Date    WBC 9.57 07/31/2024    HGB 13.5 07/31/2024    HCT 42.8 07/31/2024    MCV 92 07/31/2024     07/31/2024     Lab Results   Component Value Date     09/06/2015    SODIUM 134 (L) 07/31/2024    K 3.5 07/31/2024     07/31/2024    CO2 24 07/31/2024    ANIONGAP 7 09/06/2015    AGAP 9 07/31/2024    BUN 20 07/31/2024    CREATININE 1.22 07/31/2024    GLUC 115 07/31/2024    GLUF 101 (H) 01/03/2024    CALCIUM 9.3 07/31/2024    AST 29 01/03/2024    ALT 16 01/03/2024    ALKPHOS 39 01/03/2024    PROT 6.7 09/06/2015    TP 6.5 01/03/2024    BILITOT 0.52 09/06/2015    TBILI 0.52 01/03/2024    EGFR 41 07/31/2024       Imaging:   No orders to display     Vascular congestion on CXR  EKG, Pathology, and Other Studies: Fib RVR    Code Status: Prior  Advance Directive and Living Will:      Power of :    POLST:      Counseling / Coordination  of Care:

## 2024-08-01 PROBLEM — K59.00 CONSTIPATION: Status: ACTIVE | Noted: 2024-08-01

## 2024-08-01 PROBLEM — I42.9 CARDIOMYOPATHY (HCC): Status: ACTIVE | Noted: 2024-08-01

## 2024-08-01 PROBLEM — I50.33 ACUTE ON CHRONIC DIASTOLIC (CONGESTIVE) HEART FAILURE (HCC): Status: ACTIVE | Noted: 2024-07-31

## 2024-08-01 LAB
ANION GAP SERPL CALCULATED.3IONS-SCNC: 8 MMOL/L (ref 4–13)
ATRIAL RATE: 101 BPM
ATRIAL RATE: 277 BPM
BASOPHILS # BLD AUTO: 0.04 THOUSANDS/ÂΜL (ref 0–0.1)
BASOPHILS NFR BLD AUTO: 0 % (ref 0–1)
BUN SERPL-MCNC: 22 MG/DL (ref 5–25)
CALCIUM SERPL-MCNC: 8.9 MG/DL (ref 8.4–10.2)
CHLORIDE SERPL-SCNC: 100 MMOL/L (ref 96–108)
CO2 SERPL-SCNC: 24 MMOL/L (ref 21–32)
CREAT SERPL-MCNC: 1.25 MG/DL (ref 0.6–1.3)
EOSINOPHIL # BLD AUTO: 0.16 THOUSAND/ÂΜL (ref 0–0.61)
EOSINOPHIL NFR BLD AUTO: 2 % (ref 0–6)
ERYTHROCYTE [DISTWIDTH] IN BLOOD BY AUTOMATED COUNT: 13.7 % (ref 11.6–15.1)
GFR SERPL CREATININE-BSD FRML MDRD: 40 ML/MIN/1.73SQ M
GLUCOSE SERPL-MCNC: 112 MG/DL (ref 65–140)
HCT VFR BLD AUTO: 41.7 % (ref 34.8–46.1)
HGB BLD-MCNC: 13.4 G/DL (ref 11.5–15.4)
IMM GRANULOCYTES # BLD AUTO: 0.08 THOUSAND/UL (ref 0–0.2)
IMM GRANULOCYTES NFR BLD AUTO: 1 % (ref 0–2)
LYMPHOCYTES # BLD AUTO: 1.8 THOUSANDS/ÂΜL (ref 0.6–4.47)
LYMPHOCYTES NFR BLD AUTO: 16 % (ref 14–44)
MAGNESIUM SERPL-MCNC: 2.1 MG/DL (ref 1.9–2.7)
MCH RBC QN AUTO: 29.1 PG (ref 26.8–34.3)
MCHC RBC AUTO-ENTMCNC: 32.1 G/DL (ref 31.4–37.4)
MCV RBC AUTO: 91 FL (ref 82–98)
MONOCYTES # BLD AUTO: 1.32 THOUSAND/ÂΜL (ref 0.17–1.22)
MONOCYTES NFR BLD AUTO: 12 % (ref 4–12)
NEUTROPHILS # BLD AUTO: 7.61 THOUSANDS/ÂΜL (ref 1.85–7.62)
NEUTS SEG NFR BLD AUTO: 69 % (ref 43–75)
NRBC BLD AUTO-RTO: 0 /100 WBCS
PLATELET # BLD AUTO: 281 THOUSANDS/UL (ref 149–390)
PMV BLD AUTO: 9.8 FL (ref 8.9–12.7)
POTASSIUM SERPL-SCNC: 3.5 MMOL/L (ref 3.5–5.3)
QRS AXIS: 63 DEGREES
QRS AXIS: 72 DEGREES
QRSD INTERVAL: 138 MS
QRSD INTERVAL: 138 MS
QT INTERVAL: 400 MS
QT INTERVAL: 400 MS
QTC INTERVAL: 510 MS
QTC INTERVAL: 526 MS
RBC # BLD AUTO: 4.61 MILLION/UL (ref 3.81–5.12)
SODIUM SERPL-SCNC: 132 MMOL/L (ref 135–147)
T WAVE AXIS: 213 DEGREES
T WAVE AXIS: 98 DEGREES
VENTRICULAR RATE: 104 BPM
VENTRICULAR RATE: 98 BPM
WBC # BLD AUTO: 11.01 THOUSAND/UL (ref 4.31–10.16)

## 2024-08-01 PROCEDURE — 83735 ASSAY OF MAGNESIUM: CPT | Performed by: FAMILY MEDICINE

## 2024-08-01 PROCEDURE — NC001 PR NO CHARGE: Performed by: INTERNAL MEDICINE

## 2024-08-01 PROCEDURE — 97163 PT EVAL HIGH COMPLEX 45 MIN: CPT

## 2024-08-01 PROCEDURE — 80048 BASIC METABOLIC PNL TOTAL CA: CPT | Performed by: FAMILY MEDICINE

## 2024-08-01 PROCEDURE — 99232 SBSQ HOSP IP/OBS MODERATE 35: CPT | Performed by: INTERNAL MEDICINE

## 2024-08-01 PROCEDURE — 93010 ELECTROCARDIOGRAM REPORT: CPT | Performed by: INTERNAL MEDICINE

## 2024-08-01 PROCEDURE — 85025 COMPLETE CBC W/AUTO DIFF WBC: CPT | Performed by: FAMILY MEDICINE

## 2024-08-01 PROCEDURE — 97167 OT EVAL HIGH COMPLEX 60 MIN: CPT

## 2024-08-01 RX ORDER — POLYETHYLENE GLYCOL 3350 17 G/17G
17 POWDER, FOR SOLUTION ORAL DAILY
Status: DISCONTINUED | OUTPATIENT
Start: 2024-08-01 | End: 2024-08-01

## 2024-08-01 RX ORDER — DILTIAZEM HYDROCHLORIDE 120 MG/1
120 CAPSULE, COATED, EXTENDED RELEASE ORAL ONCE
Status: COMPLETED | OUTPATIENT
Start: 2024-08-01 | End: 2024-08-01

## 2024-08-01 RX ORDER — DILTIAZEM HYDROCHLORIDE 180 MG/1
180 CAPSULE, COATED, EXTENDED RELEASE ORAL DAILY
Status: DISCONTINUED | OUTPATIENT
Start: 2024-08-02 | End: 2024-08-02 | Stop reason: HOSPADM

## 2024-08-01 RX ORDER — POTASSIUM CHLORIDE 750 MG/1
10 TABLET, EXTENDED RELEASE ORAL DAILY
Status: DISCONTINUED | OUTPATIENT
Start: 2024-08-01 | End: 2024-08-02 | Stop reason: HOSPADM

## 2024-08-01 RX ORDER — TORSEMIDE 20 MG/1
10 TABLET ORAL DAILY
Status: DISCONTINUED | OUTPATIENT
Start: 2024-08-01 | End: 2024-08-02 | Stop reason: HOSPADM

## 2024-08-01 RX ORDER — DOCUSATE SODIUM 100 MG/1
100 CAPSULE, LIQUID FILLED ORAL 2 TIMES DAILY
Status: DISCONTINUED | OUTPATIENT
Start: 2024-08-01 | End: 2024-08-02 | Stop reason: HOSPADM

## 2024-08-01 RX ORDER — POLYETHYLENE GLYCOL 3350 17 G/17G
17 POWDER, FOR SOLUTION ORAL DAILY
Status: DISCONTINUED | OUTPATIENT
Start: 2024-08-01 | End: 2024-08-02 | Stop reason: HOSPADM

## 2024-08-01 RX ORDER — SENNOSIDES 8.6 MG
1 TABLET ORAL
Status: DISCONTINUED | OUTPATIENT
Start: 2024-08-01 | End: 2024-08-02 | Stop reason: HOSPADM

## 2024-08-01 RX ORDER — BISACODYL 10 MG
10 SUPPOSITORY, RECTAL RECTAL DAILY PRN
Status: DISCONTINUED | OUTPATIENT
Start: 2024-08-01 | End: 2024-08-02 | Stop reason: HOSPADM

## 2024-08-01 RX ADMIN — BISACODYL 10 MG: 10 SUPPOSITORY RECTAL at 05:25

## 2024-08-01 RX ADMIN — POTASSIUM CHLORIDE 10 MEQ: 750 TABLET, EXTENDED RELEASE ORAL at 09:03

## 2024-08-01 RX ADMIN — APIXABAN 2.5 MG: 2.5 TABLET, FILM COATED ORAL at 18:03

## 2024-08-01 RX ADMIN — CEPHALEXIN 500 MG: 500 CAPSULE ORAL at 22:06

## 2024-08-01 RX ADMIN — ATORVASTATIN CALCIUM 20 MG: 10 TABLET, FILM COATED ORAL at 08:18

## 2024-08-01 RX ADMIN — ASPIRIN 81 MG: 81 TABLET, COATED ORAL at 08:18

## 2024-08-01 RX ADMIN — CEPHALEXIN 500 MG: 500 CAPSULE ORAL at 05:13

## 2024-08-01 RX ADMIN — DILTIAZEM HYDROCHLORIDE 60 MG: 60 TABLET ORAL at 05:13

## 2024-08-01 RX ADMIN — TORSEMIDE 10 MG: 20 TABLET ORAL at 09:02

## 2024-08-01 RX ADMIN — SENNOSIDES 8.6 MG: 8.6 TABLET, FILM COATED ORAL at 22:06

## 2024-08-01 RX ADMIN — PANTOPRAZOLE SODIUM 20 MG: 20 TABLET, DELAYED RELEASE ORAL at 05:13

## 2024-08-01 RX ADMIN — CEPHALEXIN 500 MG: 500 CAPSULE ORAL at 14:59

## 2024-08-01 RX ADMIN — APIXABAN 2.5 MG: 2.5 TABLET, FILM COATED ORAL at 08:18

## 2024-08-01 RX ADMIN — DOCUSATE SODIUM 100 MG: 100 CAPSULE, LIQUID FILLED ORAL at 18:03

## 2024-08-01 RX ADMIN — DILTIAZEM HYDROCHLORIDE 120 MG: 120 CAPSULE, COATED, EXTENDED RELEASE ORAL at 14:59

## 2024-08-01 NOTE — ASSESSMENT & PLAN NOTE
-Transthoracic echocardiogram showing left-ventricular systolic function lower limits of normal estimated LVEF 50% with regional wall motion abnormalities in the septum and basal inferior wall  -Patient will benefit from outpatient testing and will follow-up with her primary cardiologist Dr. Peña for additional evaluation.  -We will uptitrate guideline directed medical therapy as possible.

## 2024-08-01 NOTE — OCCUPATIONAL THERAPY NOTE
Occupational Therapy Evaluation     Patient Name: Nohemi Arcos  Today's Date: 8/1/2024  Problem List  Principal Problem:    Atrial fibrillation with rapid ventricular response (HCC)  Active Problems:    Hypercholesteremia    Primary hypertension    Stage 3b chronic kidney disease (HCC)    Hypoxia    Pleural effusion    Cardiomyopathy (HCC)    Constipation    Past Medical History  Past Medical History:   Diagnosis Date    Arteriosclerotic heart disease     LAST ASSESSED: 09SEP2015    Esophageal reflux     LAST ASSESSED: 09JAN2018    GERD (gastroesophageal reflux disease)     Hyperlipidemia     LAST ASSESSED: 09JAN2018    Hypertension     LAST ASSESSED: 09JAN2018    Nontoxic single thyroid nodule     LAST ASSESSED: 09MAY2014    Osteopenia     LAST ASSESSED: 11MAY2016     Past Surgical History  Past Surgical History:   Procedure Laterality Date    CYSTOSCOPY  12/04/2013    DIAGNOSTIC    ESOPHAGOGASTRODUODENOSCOPY      MASTECTOMY Bilateral     cancer    OOPHORECTOMY      SALPINGOOPHORECTOMY Bilateral          08/01/24 1052   OT Last Visit   OT Visit Date 08/01/24   Note Type   Note type Evaluation   Additional Comments Nsg staff verbally cleared pt for OT evaluation.  Pt received  supine in bed c HOB semi-elevated on this date + is agreeable to OT session @ this time. @ exit, pt remains in  seated in bedside recliner c all lines intact, all needs met, call bell in hand + nsg aware of location/disposition.  (During evaluation, family present in room.)   Pain Assessment   Pain Assessment Tool 0-10   Pain Score No Pain   Restrictions/Precautions   Weight Bearing Precautions Per Order No   Other Precautions Fall Risk;O2;Telemetry;Multiple lines;Contact/isolation  (2LO2)   Home Living   Type of Home House   Home Layout One level;Stairs to enter with rails;Laundry in basement  (2 WIL)   Bathroom Shower/Tub Tub/shower unit   Bathroom Toilet Standard   Bathroom Equipment Shower chair;Grab bars in shower;Grab bars around  toilet   Bathroom Accessibility Accessible   Home Equipment Walker;Cane;Wheelchair-manual  (None @ baseline)   Prior Function   Level of [place] Independent with ADLs;Independent with functional mobility;Independent with IADLS   Lives With Alone   Receives Help From Family  (Sister)   IADLs Independent with driving;Independent with meal prep;Independent with medication management   Falls in the last 6 months 0   Comments No O2 use @ PLOF   Lifestyle   Autonomy Pt lives in  1 story home alone + @ baseline, performs ADLs  I'ly, IADLs I'ly + fxl mobility I'ly without AD. (+) . Pt is retired.   Reciprocal Relationships Son, grandson   Service to Others Retired   ADL   Eating Assistance 7  Independent   Grooming Assistance 7  Independent   UB Bathing Assistance 5  Supervision/Setup   LB Bathing Assistance 4  Minimal Assistance   UB Dressing Assistance 5  Supervision/Setup   LB Dressing Assistance 4  Minimal Assistance   Toileting Assistance  4  Minimal Assistance   Bed Mobility   Supine to Sit 4  Minimal assistance   Additional items HOB elevated;Verbal cues   Additional Comments DNT sit-sup; pt transitioned from EOB-bedside recliner.   Transfers   Sit to Stand 4  Minimal assistance   Additional items Verbal cues   Stand to Sit 4  Minimal assistance   Additional items Verbal cues;Armrests   Additional Comments RW utilized   Functional Mobility   Additional Comments Pt performed short distance ADL related fxl mobility from bed-bedside recliner c CGA, with RW simulating toileting distances.   No overt LOB demonstrated + pt denies pain /  denies SOB/ denies dizziness during transitional movements. Reports feeling minimally below baseline c abilities related to ADL-focused fxl mobility. G safety awareness noted while navigating t/o room. Transitions to bedside recliner for remainder of session.  (SPO2 decreased to 88% c fxl mobility + rebounded to 94%)   Balance   Static Sitting Good   Dynamic Sitting Fair +    Static Standing Fair -   Dynamic Standing Fair -   Ambulatory Fair -   Activity Tolerance   Activity Tolerance Patient tolerated treatment well   Medical Staff Made Aware PT/OT co-eval on this date d/t medical complexity, ambulatory dysfunction c high fall risk, various impeding lines + requirement for skilled interdisciplinary analysis of appropriate d/c recommendations. PT/OT POC/goals I'ly determined per respective discipline. (Shari)   Nurse Made Aware Medical staff made aware of current fxn, recommendations for d/c planning, fall risk + pt's location upon exit. (Alexandre)   RUE Assessment   RUE Assessment WFL  (4/5)   LUE Assessment   LUE Assessment WFL  (4/5)   Sensation   Additional Comments None   Vision-Basic Assessment   Current Vision Wears glasses all the time   Cognition   Overall Cognitive Status WFL   Arousal/Participation Alert;Responsive;Cooperative   Attention Within functional limits   Orientation Level Oriented X4   Memory Within functional limits   Following Commands Follows all commands and directions without difficulty   Assessment   Limitation Decreased ADL status;Decreased endurance;Decreased UE strength;Decreased self-care trans;Decreased high-level ADLs   Prognosis Fair   Assessment Patient is a 84 y.o. female seen for OT evaluation s/p admit to  [facility] on 7/31/2024 w/Atrial fibrillation with rapid ventricular response (HCC) + commorbidities/PMHx (as listed in medical record) affecting patient's functional performance c ADL tasks at time of assessment. OT orders placed for evaluation and treatment to assess pt's ADLs, cognitive status + performance during functional tasks in order to formulate appropriate d/c recommendations.     Therapist performed at least two patient identifiers during session including name and wristband. Personal factors affecting patient at time of initial evaluation include: step(s) to enter environment, limited home support, advanced age, inability to  perform IADLs, inability to perform ADLs, new need for AD, inability to ambulate household distances, and decreased initiation and engagement.   Pt's clinical presentation is currently unstable/unpredictable given new onset deficits that effect pt's occupational performance and ability to safely return to PLOF including decrease activity tolerance, decrease standing balance, decrease performance during ADL tasks, decrease generalized strength, decrease activity engagement, decrease performance during functional transfers, steps to enter home, and high fall risk combined with medical complications of hypertension , abnormal renal lab values, abnormal CBC, abnormal sodium values, low SpO2 values, new onset O2 use, and need for input for mobility technique/safety. This evaluation required an extensive review of medical and/or therapy records and additional review of physical, cognitive and psychosocial history related to functional performance. Based upon functional performance deficits and assessments, this evaluation has been identified as a  high complexity evaluation.      Patient to benefit from continued Occupational Therapy treatment while in the hospital to address aforementioned deficits and maximize level of functional independence with ADLs and functional mobility.   Plan   Treatment Interventions ADL retraining;Functional transfer training;UE strengthening/ROM;Endurance training;Patient/family training;Equipment evaluation/education;Compensatory technique education;Energy conservation;Activityengagement   Goal Expiration Date 08/11/24   OT Treatment Day 0   OT Frequency 2-3x/wk   Discharge Recommendation   Rehab Resource Intensity Level, OT III (Minimum Resource Intensity)   AM-PAC Daily Activity Inpatient   Lower Body Dressing 3   Bathing 3   Toileting 3   Upper Body Dressing 3   Grooming 4   Eating 4   Daily Activity Raw Score 20   Daily Activity Standardized Score (Calc for Raw Score >=11) 42.03   AM-PAC  Applied Cognition Inpatient   Following a Speech/Presentation 4   Understanding Ordinary Conversation 4   Taking Medications 3   Remembering Where Things Are Placed or Put Away 3   Remembering List of 4-5 Errands 3   Taking Care of Complicated Tasks 3   Applied Cognition Raw Score 20   Applied Cognition Standardized Score 41.76   Barthel Index   Feeding 10   Bathing 0   Grooming Score 5   Dressing Score 5   Bladder Score 5   Bowels Score 10   Toilet Use Score 5   Transfers (Bed/Chair) Score 10   Mobility (Level Surface) Score 0   Stairs Score 5   Barthel Index Score 55   End of Consult   Patient Position at End of Consult Bedside chair;Bed/Chair alarm activated;All needs within reach   Nurse Communication Nurse aware of consult     The patient's raw score on the AM-PAC Daily Activity inpatient short form is 20, standardized score is 42.03, greater than 39.4. Please refer to the recommendation of the Occupational Therapist for safe DC planning.    Resource Intensity Recommendation: Level III (Minimum Resource Intensity)        GOALS:    *ADL transfers with (I) for inc'd independence with ADLs/purposeful tasks    *UB ADL with (I) for inc'd independence with self cares    *LB ADL with (I) using AE prn for inc'd independence with self cares    *Toileting with (I) for clothing management and hygiene for return to PLOF with personal care    *Increase stand tolerance x 5  m for inc'd tolerance with standing purposeful tasks    *Participate in 10m UE therex to increase overall stamina/activity tolerance for purposeful tasks    *Bed mobility- (I) for inc'd independence to manage own comfort and initiate EOB & OOB purposeful tasks    *Patient will verbalize 3 safety awareness/ principles to prevent falls in the home setting.     *Patient will verbalize and demonstrate use of energy conservation/deep breathing techniques and work simplification skills during functional activities with no verbal cues.     *Patient  will increase OOB/sitting tolerance to 2-4 hours per day to increase participation in self-care and leisure tasks with no s/s of exertion.     *Patient will engage in ongoing cognitive assessment to assist with safe discharge planning/recommendations.     *Pt will verbalize and demonstrate understanding of post-op movement precautions 100% (if applicable) in tx sessions for increased safety and functional mobility.      *Pt will demonstrate use of long handled AE (if appropriate) during 100% of tx sessions for increased ADL safety and independence following D/C     Marilyn Lang OTR/CINDY

## 2024-08-01 NOTE — CASE MANAGEMENT
Case Management Assessment & Discharge Planning Note    Patient name Nohemi Arcos  Location ICU 03/ICU - MRN [id number]  : 1940 Date 2024       Current Admission Date: 2024  Current Admission Diagnosis:Atrial fibrillation with rapid ventricular response (HCC)   Patient Active Problem List    Diagnosis Date Noted Date Diagnosed    Cardiomyopathy (HCC) 2024     Constipation 2024     Hypoxia 2024     Pleural effusion 2024     Atrial fibrillation with rapid ventricular response (HCC) 2024     Elevated TSH 2023     Multiple renal cysts 2023     Cholelithiasis 2023     Colitis 2023     Acute diverticulitis 2023     Adrenal hyperplasia (HCC) 2023     LBBB (left bundle branch block) 2023     Stage 3b chronic kidney disease (HCC) 2022     Vitamin D deficiency 2022     Primary localized osteoarthritis of left knee 10/25/2019     Hypercholesteremia 2013     Osteopenia 2012     Primary hypertension 2012     Nontoxic single thyroid nodule 2012       LOS (days): 1  Geometric Mean LOS (GMLOS) (days): 2.3  Days to GMLOS:1     OBJECTIVE:    Risk of Unplanned Readmission Score: 11.64         Current admission status: Inpatient       Preferred Pharmacy:   [facility] [address] [place], [place] [address] - [address] [facility], [address]  [address] [place] [address], [address] [facility] [address]  Phone: [phone] Fax: [phone]    [facility] [address]  [facility] [place]  [address]  [address] [place] [address]  Phone: [phone] Fax: [phone]    Primary Care Provider: Gissell Graham DO    Primary Insurance: MEDICARE  Secondary Insurance: BLUE CROSS    ASSESSMENT:  Active Health Care Proxies    There are no active Health Care Proxies on file.       Advance Directives  Does patient have a Health Care POA?: Yes (Eber Arcos son)  Does patient have Advance Directives?:  Yes  Advance Directives: Living will  Primary Contact: Eber Arcos Son         Readmission Root Cause  30 Day Readmission: No    Patient Information  Admitted from:: Home  Mental Status: Alert  During Assessment patient was accompanied by: Not accompanied during assessment  Assessment information provided by:: Patient  Primary Caregiver: Self  Support Systems: Son, Family members (Sister)  County of Residence: [facility]  What city do you live in?: [place]  Home entry access options. Select all that apply.: Stairs  Number of steps to enter home.: 2  Do the steps have railings?: Yes  Type of Current Residence: [facility]  Living Arrangements: Lives Alone  Is patient a ?: No    Activities of Daily Living Prior to Admission  Functional Status: Independent  Completes ADLs independently?: Yes  Ambulates independently?: Yes (Pt uses no device.)  Does patient use assisted devices?: No  Does patient currently own DME?: Yes  What DME does the patient currently own?: Walker, Straight Cane  Does patient have a history of Outpatient Therapy (PT/OT)?: No  Does the patient have a history of Short-Term Rehab?: No  Does patient have a history of HHC?: No  Does patient currently have HHC?: No         Patient Information Continued  Income Source: Pension/jail  Does patient have prescription coverage?: Yes  Does patient receive dialysis treatments?: No  Does patient have a history of substance abuse?: No  Does patient have a history of Mental Health Diagnosis?: No         Means of Transportation  Means of Transport to Appts:: Drives Self  [place] Application:  (Pt refused)      Social Determinants of Health (SDOH)      Flowsheet Row Most Recent Value   Housing Stability    In the last 12 months, was there a time when you were not able to pay the mortgage or rent on time? N   In the past 12 months, how many times have you moved where you were living? 1   At any time in the past 12 months, were you homeless or living in a  shelter (including now)? N   Food Insecurity    Within the past 12 months, you worried that your food would run out before you got the money to buy more. Never true   Within the past 12 months, the food you bought just didn't last and you didn't have money to get more. Never true   Utilities    In the past 12 months has the electric, gas, oil, or water company threatened to shut off services in your home? No            DISCHARGE DETAILS:    Discharge planning discussed with:: Pt  Freedom of Choice: Yes     CM contacted family/caregiver?: No- see comments (Pt is alert and oriented x3.)  Were Treatment Team discharge recommendations reviewed with patient/caregiver?: Yes  Did patient/caregiver verbalize understanding of patient care needs?: Yes  Were patient/caregiver advised of the risks associated with not following Treatment Team discharge recommendations?: Yes         Requested Home Health Care         Is the patient interested in HHC at discharge?: No    DME Referral Provided  Referral made for DME?: No       Pt lives alone . Pt is independent with ADL's and functional mobility with no device. PT and OT are recommending minimal resource intensity.Pt is currently requiring 02 at 2 liters and does not have at home. I will continue to follow for any Case Management needs.

## 2024-08-01 NOTE — PHYSICAL THERAPY NOTE
PHYSICAL THERAPY EVALUATION  NAME:  Nohemi Arcos  DATE: 08/01/24    AGE:   84 y.o.  Mrn:   [id number]  ADMIT DX:  Atrial fibrillation (HCC) [I48.91]  SOB (shortness of breath) [R06.02]  Chest pressure [R07.89]  Atrial fibrillation with rapid ventricular response (HCC) [I48.91]  Problem List:   Patient Active Problem List   Diagnosis    Hypercholesteremia    Primary hypertension    Nontoxic single thyroid nodule    Osteopenia    Primary localized osteoarthritis of left knee    Vitamin D deficiency    Stage 3b chronic kidney disease (HCC)    Elevated TSH    Multiple renal cysts    Cholelithiasis    Colitis    Acute diverticulitis    Adrenal hyperplasia (HCC)    LBBB (left bundle branch block)    Atrial fibrillation with rapid ventricular response (HCC)    Hypoxia    Pleural effusion    Cardiomyopathy (HCC)    Constipation       Past Medical History  Past Medical History:   Diagnosis Date    Arteriosclerotic heart disease     LAST ASSESSED: 09SEP2015    Esophageal reflux     LAST ASSESSED: 09JAN2018    GERD (gastroesophageal reflux disease)     Hyperlipidemia     LAST ASSESSED: 09JAN2018    Hypertension     LAST ASSESSED: 09JAN2018    Nontoxic single thyroid nodule     LAST ASSESSED: 09MAY2014    Osteopenia     LAST ASSESSED: 11MAY2016       Past Surgical History  Past Surgical History:   Procedure Laterality Date    CYSTOSCOPY  12/04/2013    DIAGNOSTIC    ESOPHAGOGASTRODUODENOSCOPY      MASTECTOMY Bilateral     cancer    OOPHORECTOMY      SALPINGOOPHORECTOMY Bilateral        Length Of Stay: 1  Performed at least 2 patient identifiers during session: Name and ID bracelet       08/01/24 1103   PT Last Visit   PT Visit Date 08/01/24   Note Type   Note type Evaluation   Pain Assessment   Pain Assessment Tool 0-10   Pain Score No Pain   Restrictions/Precautions   Weight Bearing Precautions Per Order No   Other Precautions Fall Risk;O2;Telemetry;Multiple lines;Contact/isolation   Home Living   Type of Home House    Home Layout One level;Stairs to enter with rails;Laundry in basement  (2 WIL)   Bathroom Shower/Tub Tub/shower unit   Bathroom Toilet Standard   Bathroom Equipment Shower chair;Grab bars in shower;Grab bars around toilet   Home Equipment Walker;Cane;Wheelchair-manual  (no AD used at baseline)   Prior Function   Level of [place] Independent with ADLs;Independent with functional mobility;Independent with IADLS   Lives With Alone   Receives Help From Family  (Sister)   IADLs Independent with driving;Independent with meal prep;Independent with medication management   Falls in the last 6 months 0   General   Family/Caregiver Present Yes   Cognition   Overall Cognitive Status WFL   Arousal/Participation Alert   Attention Within functional limits   Orientation Level Oriented X4   Memory Within functional limits   Following Commands Follows all commands and directions without difficulty   RUE Assessment   RUE Assessment WFL  (4/5)   LUE Assessment   LUE Assessment WFL  (4/5)   RLE Assessment   RLE Assessment WFL   LLE Assessment   LLE Assessment WFL   Vision-Basic Assessment   Current Vision Wears glasses all the time   Coordination   Sensation WFL   Bed Mobility   Supine to Sit 4  Minimal assistance   Additional items HOB elevated;Verbal cues   Additional Comments pt denied dizziness with transitional movement   Transfers   Sit to Stand 4  Minimal assistance   Additional items Verbal cues   Stand to Sit 4  Minimal assistance   Additional items Verbal cues;Armrests   Additional Comments pt required cues for proper hand placement   Ambulation/Elevation   Gait pattern Improper Weight shift;Decreased foot clearance;Forward Flexion   Gait Assistance 4  Minimal assist   Additional items Assist x 1;Verbal cues   Assistive Device Rolling walker   Distance 12 ft   Ambulation/Elevation Additional Comments fair safety awareness noted   Balance   Static Sitting Good   Dynamic Sitting Fair +   Static Standing Fair -   Dynamic  Standing Fair -   Endurance Deficit   Endurance Deficit Yes   Endurance Deficit Description SaO2 decreased 87% with ambulation   Activity Tolerance   Activity Tolerance Patient limited by fatigue   Assessment   Prognosis Fair   Assessment Pt is 84 y.o. female seen for PT evaluation s/p admit to [facility] on 7/31/2024 w/ Atrial fibrillation with rapid ventricular response (HCC). PT consulted to assess pt's functional mobility and d/c needs. Order placed for PT eval and tx, w/ up and OOB as tolerated order. Pt agreeable to PT  session upon arrival, pt found supine in bed.  PTA, pt was independent w/ all functional mobility w/ no AD, ambulates community distances and elevations, lives w/ self in 1 level home, and retired.  Pt to benefit from continued PT tx to address deficits and maximize level of functional independent mobility and consistency. Upon conclusion pt  seated in recliner. Complexity: Comorbidities affecting pt's physical performance at time of assessment include: htn, a fib, and CKD . Personal factors affecting pt at time of IE include: advanced age, ambulating with assistive device, and steps to enter home. Please find objective findings from PT assessment regarding body systems outlined above with impairments and limitations including impaired balance, decreased endurance, gait deviations, decreased activity tolerance, decreased functional mobility tolerance, fall risk, and SOB upon exertion.  Pt's clinical presentation is currently unstable/unpredictable seen in pt's presentation of abnormal WBC count, abnormal sodium levels, low SaO2 levels, and new onset of O2 use. The patient's AM-PAC Basic Mobility Inpatient Short Form Raw Score is 18.  Based on patient presentations and impairments, pt would most appropriately benefit from Level 3 resource intensity upon discharge. Please also refer to the recommendation of the Physical Therapist for safe discharge planning. RN verbalized pt appropriate  for PT session. Pt seen as a co-eval with OT due to the patient's co-morbidities, clinically unstable presentation, and present impairments which are a regression from the patient's baseline.   Barriers to Discharge Decreased caregiver support;Inaccessible home environment   Goals   Patient Goals to go home tmorrow   LTG Expiration Date 08/11/24   Long Term Goal #1 Pt will: Perform bed mobility tasks to modified I to improve ease of bed mobility. Perform transfers to modified I to improve ease of transfers. Perform ambulation with MI and LRAD for 250 feet to increase Indep in home environment. Increase dynamic standing balance to F+ to decrease fall risk. Increase OOB activity tolerance to 10 minutes without s/s of exertion to decrease fall risk. Navigate up and down 2 steps with MI so patient can enter and exit home.   Plan   Treatment/Interventions Functional transfer training;Therapeutic exercise;Endurance training;Gait training;Bed mobility;Equipment eval/education;Elevations   PT Frequency 3-5x/wk   Discharge Recommendation   Rehab Resource Intensity Level, PT III (Minimum Resource Intensity)   Equipment Recommended Walker   Walker Package Recommended Wheeled walker   AM-PAC Basic Mobility Inpatient   Turning in Flat Bed Without Bedrails 3   Lying on Back to Sitting on Edge of Flat Bed Without Bedrails 3   Moving Bed to Chair 3   Standing Up From Chair Using Arms 3   Walk in Room 3   Climb 3-5 Stairs With Railing 3   Basic Mobility Inpatient Raw Score 18   Basic Mobility Standardized Score 41.05   [facility] Highest Level Of Mobility   -HLM Goal 6: Walk 10 steps or more   -HLM Achieved 6: Walk 10 steps or more   Barthel Index   Grooming Score 5   Dressing Score 5   Toilet Use Score 5   Transfers (Bed/Chair) Score 10   Mobility (Level Surface) Score 0       Time In: 1049  Time Out: 1103  Total Evaluation Minutes: 14    Shari Landin, PT

## 2024-08-01 NOTE — PLAN OF CARE
Problem: PHYSICAL THERAPY ADULT  Goal: Performs mobility at highest level of function for planned discharge setting.  See evaluation for individualized goals.  Description: Treatment/Interventions: Functional transfer training, Therapeutic exercise, Endurance training, Gait training, Bed mobility, Equipment eval/education, Elevations  Equipment Recommended: Walker       See flowsheet documentation for full assessment, interventions and recommendations.  Outcome: Progressing  Note: Prognosis: Fair     Assessment: Pt is 84 y.o. female seen for PT evaluation s/p admit to [facility] on 7/31/2024 w/ Atrial fibrillation with rapid ventricular response (HCC). PT consulted to assess pt's functional mobility and d/c needs. Order placed for PT eval and tx, w/ up and OOB as tolerated order. Pt agreeable to PT  session upon arrival, pt found supine in bed.  PTA, pt was independent w/ all functional mobility w/ no AD, ambulates community distances and elevations, lives w/ self in 1 level home, and retired.  Pt to benefit from continued PT tx to address deficits and maximize level of functional independent mobility and consistency. Upon conclusion pt  seated in recliner. Complexity: Comorbidities affecting pt's physical performance at time of assessment include: htn, a fib, and CKD . Personal factors affecting pt at time of IE include: advanced age, ambulating with assistive device, and steps to enter home. Please find objective findings from PT assessment regarding body systems outlined above with impairments and limitations including impaired balance, decreased endurance, gait deviations, decreased activity tolerance, decreased functional mobility tolerance, fall risk, and SOB upon exertion.  Pt's clinical presentation is currently unstable/unpredictable seen in pt's presentation of abnormal WBC count, abnormal sodium levels, low SaO2 levels, and new onset of O2 use. The patient's AM-PAC Basic Mobility Inpatient Short  Form Raw Score is 18.  Based on patient presentations and impairments, pt would most appropriately benefit from Level 3 resource intensity upon discharge. Please also refer to the recommendation of the Physical Therapist for safe discharge planning. RN verbalized pt appropriate for PT session. Pt seen as a co-eval with OT due to the patient's co-morbidities, clinically unstable presentation, and present impairments which are a regression from the patient's baseline.  Barriers to Discharge: Decreased caregiver support, Inaccessible home environment     Rehab Resource Intensity Level, PT: III (Minimum Resource Intensity)    See flowsheet documentation for full assessment.

## 2024-08-01 NOTE — PROGRESS NOTES
[facility]  Progress Note  Name: Nohemi Arcos I  MRN: [id number]  Unit/Bed#: ICU 03-01 I Date of Admission: 7/31/2024   Date of Service: 8/1/2024 I Hospital Day: 1    Assessment & Plan   Cardiomyopathy (HCC)  Assessment & Plan  -Transthoracic echocardiogram showing left-ventricular systolic function lower limits of normal estimated LVEF 50% with regional wall motion abnormalities in the septum and basal inferior wall  -Patient will benefit from outpatient testing and will follow-up with her primary cardiologist Dr. Peña for additional evaluation.  -We will uptitrate guideline directed medical therapy as possible.    Stage 3b chronic kidney disease (HCC)  Assessment & Plan  Lab Results   Component Value Date    EGFR 40 08/01/2024    EGFR 41 07/31/2024    EGFR 42 07/29/2024    CREATININE 1.25 08/01/2024    CREATININE 1.22 07/31/2024    CREATININE 1.20 07/29/2024     -Will need to monitor renal function and electrolytes with addition of torsemide therapy  -If renal function worsens would recommend nephrology evaluation at that time.    Primary hypertension  Assessment & Plan  -Stable at this time  -Continue to monitor with transition to long-acting diltiazem and addition of torsemide      Hypercholesteremia  Assessment & Plan  -Counseled patient on dietary lifestyle modifications  -Continue atorvastatin 20 mg daily    * Atrial fibrillation with rapid ventricular response (HCC)  Assessment & Plan  -Recent onset with multiple ER encounters for symptoms of shortness of breath and chest pressure  -Patient currently denies any chest pain or shortness of breath  -Will transition patient to oral diltiazem 180 mg daily  -Continue Eliquis 2.5 mg twice daily for stroke risk reduction      Outpatient Cardiologist: Dr. Peña      Subjective:   Patient seen and examined.  Per patient she denies any chest pain, palpitations, lightness dizziness, loss of consciousness, shortness of breath.  She states  "her only complaint is of abdominal discomfort and believes this is likely due to pains from not having had bowel movement per her account in the last 5 days.      Summary comments:  -Will transition patient to diltiazem 180 mg daily oral dosing beginning tomorrow  -Will give additional 120 mg dosing today  -Can continue Eliquis 2.5 mg twice daily, atorvastatin 20 mg daily,  -Will benefit from oral diuretic regimen and will start with torsemide 10 mg daily with up titration pending patient response  -Continue to monitor renal function and electrolytes with repletion to goal potassium 4.0, magnesium 2.0  -Follow-up internal medicine recommendations for assistance with bowel movement.    Telemetry/ECG/Cardiac testing:   -Rate controlled atrial fibrillation on telemetry      -Transthoracic echocardiogram 7/31/2024 showing left-ventricular systolic function lower limits of normal estimated LVEF 50% with abnormal septal motion in the setting of left bundle branch block, along with hypokinesis of the septal walls and basal inferior wall with estimated RVSP 45 mmHg    Vitals: Blood pressure 139/62, pulse 71, temperature 97.8 °F (36.6 °C), temperature source Temporal, resp. rate (!) 40, height 5' 1\" (1.549 m), weight 58.6 kg (129 lb 3 oz), SpO2 91%.,   Orthostatic Blood Pressures      Flowsheet Row Most Recent Value   Blood Pressure 139/62 filed at 08/01/2024 0800   Patient Position - Orthostatic VS Lying filed at 08/01/2024 0736        ,   Weight (last 2 days)       Date/Time Weight    08/01/24 0452 58.6 (129.19)    07/31/24 1200 59 (130)    07/31/24 1108 59 (130.07)    07/31/24 0751 59 (130)            Physical Exam:  Physical Exam  Vitals reviewed.   Constitutional:       General: She is not in acute distress.     Appearance: Normal appearance. She is not diaphoretic.   HENT:      Head: Normocephalic and atraumatic.      Comments: Nasal cannula oxygen in place  Eyes:      General:         Right eye: No discharge.         " Left eye: No discharge.   Neck:      Comments: Trachea midline, minimal JVD present  Cardiovascular:      Heart sounds: Murmur (JONEL) heard.      Comments: Irregularly irregular rate and rhythm, S1/S2 present  Pulmonary:      Effort: No respiratory distress.      Breath sounds: No wheezing.      Comments: Decreased breath sounds bilaterally  Chest:      Chest wall: No tenderness.   Abdominal:      General: Bowel sounds are normal.      Tenderness: There is abdominal tenderness (slight). There is no rebound.   Musculoskeletal:      Right lower leg: No edema.      Left lower leg: No edema.   Skin:     General: Skin is warm and dry.   Neurological:      Mental Status: She is alert.      Comments: Awake, alert, able answer questions appropriately, able to move extremities bilaterally.   Psychiatric:         Mood and Affect: Mood normal.         Behavior: Behavior normal.          Medications:      Current Facility-Administered Medications:     apixaban (ELIQUIS) tablet 2.5 mg, 2.5 mg, Oral, BID, Sherry Moe MD, 2.5 mg at 08/01/24 0818    aspirin (ECOTRIN LOW STRENGTH) EC tablet 81 mg, 81 mg, Oral, Daily, Sherry Moe MD, 81 mg at 08/01/24 0818    atorvastatin (LIPITOR) tablet 20 mg, 20 mg, Oral, Daily, Sherry Moe MD, 20 mg at 08/01/24 0818    bisacodyl (DULCOLAX) rectal suppository 10 mg, 10 mg, Rectal, Daily PRN, Francis Snowden PA-C, 10 mg at 08/01/24 0525    cephalexin (KEFLEX) capsule 500 mg, 500 mg, Oral, Q8H [facility], Sherry Moe MD, 500 mg at 08/01/24 0513    dextromethorphan-guaiFENesin (ROBITUSSIN DM) oral syrup 10 mL, 10 mL, Oral, Q4H PRN, Francis Snowden PA-C, 10 mL at 07/31/24 2222    diltiazem (CARDIZEM) tablet 60 mg, 60 mg, Oral, Q8H [facility], Sherry Moe MD, 60 mg at 08/01/24 0513    pantoprazole (PROTONIX) EC tablet 20 mg, 20 mg, Oral, Early Morning, Sherry Moe MD, 20 mg at 08/01/24 0513    senna-docusate sodium (SENOKOT S) 8.6-50 mg per tablet 1 tablet, 1 tablet, Oral, HS, Sherry Moe MD, 1  tablet at 07/31/24 9681     Labs & Results:  Labs reviewed and prominent abnormalities reviewed above and/or below.  Troponins:    Results from last 7 days   Lab Units 07/31/24  1150 07/31/24  0933 07/31/24  0758   HS TNI 0HR ng/L  --   --  12   HS TNI 2HR ng/L  --  12  --    HSTNI D2 ng/L  --  0  --    HS TNI 4HR ng/L 13  --   --    HSTNI D4 ng/L 1  --   --         BNP:   Results from last 6 Months   Lab Units 07/31/24  0755   BNP pg/mL 351*

## 2024-08-01 NOTE — ASSESSMENT & PLAN NOTE
Patient states that she is passing gas and denies any abdominal pain  Continue bowel regimen with Colace/senna  MiraLAX added

## 2024-08-01 NOTE — PROGRESS NOTES
[facility]  Progress Note  Name: Nohemi Arcos I  MRN: [id number]  Unit/Bed#: ICU 03-01 I Date of Admission: 7/31/2024   Date of Service: 8/1/2024 I Hospital Day: 1    Assessment & Plan   * Atrial fibrillation with rapid ventricular response (HCC)  Assessment & Plan  Patient was initially on Cardizem drip which has been titrated off  Short acting Cardizem to be transition to long-acting today  Cardiology following  Currently on Eliquis for anticoagulation  Echo results appreciated    Constipation  Assessment & Plan  Patient states that she is passing gas and denies any abdominal pain  Continue bowel regimen with Colace/senna  MiraLAX added    Pleural effusion  Assessment & Plan  Echo results appreciated, EF of 50% noted  Continue torsemide  Titrate oxygen as tolerated    Hypoxia  Assessment & Plan  No baseline oxygen requirements, requiring 3 L on exam  Likely volume overload, will continue diuresis as tolerated    Stage 3b chronic kidney disease (HCC)  Assessment & Plan  Lab Results   Component Value Date    EGFR 41 07/31/2024    EGFR 42 07/29/2024    EGFR 42 07/26/2024    CREATININE 1.22 07/31/2024    CREATININE 1.20 07/29/2024    CREATININE 1.19 07/26/2024     Baseline creatinine around 1.2    Primary hypertension  Assessment & Plan  Blood pressure has been relatively stable  Continue Cardizem, will titrate as tolerated    Hypercholesteremia  Assessment & Plan  Lipitor 20 mg at bedtime           VTE Pharmacologic Prophylaxis: VTE Score: 3 Moderate Risk (Score 3-4) - Pharmacological DVT Prophylaxis Ordered: apixaban (Eliquis).    Mobility:   Basic Mobility Inpatient Raw Score: 18  JH-HLM Goal: 6: Walk 10 steps or more  JH-HLM Achieved: 6: Walk 10 steps or more  JH-HLM Goal achieved. Continue to encourage appropriate mobility.    Patient Centered Rounds: I performed bedside rounds with nursing staff today.   Discussions with Specialists or Other Care Team Provider: yes    Education and  Discussions with Family / Patient: Updated  (son) at bedside.    Current Length of Stay: 1 day(s)  Current Patient Status: Inpatient   Certification Statement: The patient will continue to require additional inpatient hospital stay due to atrial fibrillation  Discharge Plan: Anticipate discharge in 24-48 hrs to home with home services.    Code Status: Level 1 - Full Code    Subjective:   No overnight events noted.  Patient off Cardizem drip.  Denies any chest pain.    Objective:     Vitals:   Temp (24hrs), Av.3 °F (36.3 °C), Min:96.9 °F (36.1 °C), Max:97.8 °F (36.6 °C)    Temp:  [96.9 °F (36.1 °C)-97.8 °F (36.6 °C)] 97.8 °F (36.6 °C)  HR:  [] 71  Resp:  [13-42] 40  BP: (110-161)/() 139/62  SpO2:  [90 %-94 %] 91 %  Body mass index is 24.41 kg/m².     Input and Output Summary (last 24 hours):     Intake/Output Summary (Last 24 hours) at 2024 1025  Last data filed at 2024 0834  Gross per 24 hour   Intake 786.75 ml   Output 2375 ml   Net -1588.25 ml       Physical Exam:   Physical Exam  Constitutional:       General: She is not in acute distress.     Comments: Frail elderly female   HENT:      Head: Normocephalic and atraumatic.      Nose: Nose normal.      Mouth/Throat:      Mouth: Mucous membranes are moist.   Eyes:      Extraocular Movements: Extraocular movements intact.      Conjunctiva/sclera: Conjunctivae normal.   Cardiovascular:      Rate and Rhythm: Rhythm irregular.      Heart sounds: Murmur heard.   Pulmonary:      Effort: Pulmonary effort is normal. No respiratory distress.   Abdominal:      Palpations: Abdomen is soft.      Tenderness: There is no abdominal tenderness.   Musculoskeletal:         General: Normal range of motion.      Cervical back: Normal range of motion and neck supple.   Skin:     General: Skin is warm and dry.   Neurological:      General: No focal deficit present.      Mental Status: She is alert. Mental status is at baseline.      Cranial Nerves:  No cranial nerve deficit.   Psychiatric:         Mood and Affect: Mood normal.         Behavior: Behavior normal.          Additional Data:     Labs:  Results from last 7 days   Lab Units 08/01/24  0517   WBC Thousand/uL 11.01*   HEMOGLOBIN g/dL 13.4   HEMATOCRIT % 41.7   PLATELETS Thousands/uL 281   SEGS PCT % 69   LYMPHO PCT % 16   MONO PCT % 12   EOS PCT % 2     Results from last 7 days   Lab Units 08/01/24  0517   SODIUM mmol/L 132*   POTASSIUM mmol/L 3.5   CHLORIDE mmol/L 100   CO2 mmol/L 24   BUN mg/dL 22   CREATININE mg/dL 1.25   ANION GAP mmol/L 8   CALCIUM mg/dL 8.9   GLUCOSE RANDOM mg/dL 112                       Lines/Drains:  Invasive Devices       Peripheral Intravenous Line  Duration             Peripheral IV 07/31/24 Left Antecubital 1 day    Peripheral IV 07/31/24 Right Hand 1 day                          Imaging: No pertinent imaging reviewed.    Recent Cultures (last 7 days):   Results from last 7 days   Lab Units 07/26/24  1110   URINE CULTURE  >100,000 cfu/ml Escherichia coli*  20,000-29,000 cfu/ml       Last 24 Hours Medication List:   Current Facility-Administered Medications   Medication Dose Route Frequency Provider Last Rate    apixaban  2.5 mg Oral BID Sherry Moe MD      aspirin  81 mg Oral Daily Sherry Moe MD      atorvastatin  20 mg Oral Daily Sherry Moe MD      bisacodyl  10 mg Rectal Daily PRN Francis Snowden PA-C      cephalexin  500 mg Oral Q8H ALEXANDER Sherry Moe MD      dextromethorphan-guaiFENesin  10 mL Oral Q4H PRN Francis Snowden PA-C      diltiazem  120 mg Oral Once Candelario Simmons DO      [START ON 8/2/2024] diltiazem  180 mg Oral Daily Candelario Simmons DO      docusate sodium  100 mg Oral BID Rhonda Diaz MD      pantoprazole  20 mg Oral Early Morning Sherry Moe MD      polyethylene glycol  17 g Oral Daily Rhonda Diaz MD      potassium chloride  10 mEq Oral Daily Candelario Simmons DO      senna  1 tablet Oral HS Rhonda Diaz MD       torsemide  10 mg Oral Daily Candelario Simmons DO          Today, Patient Was Seen By: Rhonda Diaz MD    **Please Note: This note may have been constructed using a voice recognition system.**

## 2024-08-01 NOTE — PLAN OF CARE
Problem: OCCUPATIONAL THERAPY ADULT  Goal: Performs self-care activities at highest level of function for planned discharge setting.  See evaluation for individualized goals.  Description: Treatment Interventions: ADL retraining, Functional transfer training, UE strengthening/ROM, Endurance training, Patient/family training, Equipment evaluation/education, Compensatory technique education, Energy conservation, Activityengagement          See flowsheet documentation for full assessment, interventions and recommendations.   Note: Limitation: Decreased ADL status, Decreased endurance, Decreased UE strength, Decreased self-care trans, Decreased high-level ADLs  Prognosis: Fair  Assessment: Patient is a 84 y.o. female seen for OT evaluation s/p admit to  [facility] on 7/31/2024 w/Atrial fibrillation with rapid ventricular response (HCC) + commorbidities/PMHx (as listed in medical record) affecting patient's functional performance c ADL tasks at time of assessment. OT orders placed for evaluation and treatment to assess pt's ADLs, cognitive status + performance during functional tasks in order to formulate appropriate d/c recommendations.     Therapist performed at least two patient identifiers during session including name and wristband. Personal factors affecting patient at time of initial evaluation include: step(s) to enter environment, limited home support, advanced age, inability to perform IADLs, inability to perform ADLs, new need for AD, inability to ambulate household distances, and decreased initiation and engagement.   Pt's clinical presentation is currently unstable/unpredictable given new onset deficits that effect pt's occupational performance and ability to safely return to PLOF including decrease activity tolerance, decrease standing balance, decrease performance during ADL tasks, decrease generalized strength, decrease activity engagement, decrease performance during functional transfers, steps  to enter home, and high fall risk combined with medical complications of hypertension , abnormal renal lab values, abnormal CBC, abnormal sodium values, low SpO2 values, new onset O2 use, and need for input for mobility technique/safety. This evaluation required an extensive review of medical and/or therapy records and additional review of physical, cognitive and psychosocial history related to functional performance. Based upon functional performance deficits and assessments, this evaluation has been identified as a  high complexity evaluation.      Patient to benefit from continued Occupational Therapy treatment while in the hospital to address aforementioned deficits and maximize level of functional independence with ADLs and functional mobility.     Rehab Resource Intensity Level, OT: III (Minimum Resource Intensity)

## 2024-08-01 NOTE — PROGRESS NOTES
0510: Notified JAYDON Snowden that pt is requesting something to help her have a BM. Suggested a suppository since she's received oral meds already. Morning blood work obtained & sent to lab. Order received for Dulcolax suppository & given.

## 2024-08-01 NOTE — PLAN OF CARE
Problem: Potential for Falls  Goal: Patient will remain free of falls  Description: INTERVENTIONS:  - Educate patient/family on patient safety including physical limitations  - Instruct patient to call for assistance with activity   - Consult OT/PT to assist with strengthening/mobility   - Keep Call bell within reach  - Keep bed low and locked with side rails adjusted as appropriate  - Keep care items and personal belongings within reach  - Initiate and maintain comfort rounds  - Make Fall Risk Sign visible to staff  - Offer Toileting every Hours, in advance of need  - Initiate/Maintain alarm  - Obtain necessary fall risk management equipment:   - Apply yellow socks and bracelet for high fall risk patients  - Consider moving patient to room near nurses station  Outcome: Progressing     Problem: PAIN - ADULT  Goal: Verbalizes/displays adequate comfort level or baseline comfort level  Description: Interventions:  - Encourage patient to monitor pain and request assistance  - Assess pain using appropriate pain scale  - Administer analgesics based on type and severity of pain and evaluate response  - Implement non-pharmacological measures as appropriate and evaluate response  - Consider cultural and social influences on pain and pain management  - Notify physician/advanced practitioner if interventions unsuccessful or patient reports new pain  Outcome: Progressing     Problem: INFECTION - ADULT  Goal: Absence or prevention of progression during hospitalization  Description: INTERVENTIONS:  - Assess and monitor for signs and symptoms of infection  - Monitor lab/diagnostic results  - Monitor all insertion sites, i.e. indwelling lines, tubes, and drains  - Monitor endotracheal if appropriate and nasal secretions for changes in amount and color  - [place] appropriate cooling/warming therapies per order  - Administer medications as ordered  - Instruct and encourage patient and family to use good hand hygiene technique  -  Identify and instruct in appropriate isolation precautions for identified infection/condition  Outcome: Progressing     Problem: SAFETY ADULT  Goal: Patient will remain free of falls  Description: INTERVENTIONS:  - Educate patient/family on patient safety including physical limitations  - Instruct patient to call for assistance with activity   - Consult OT/PT to assist with strengthening/mobility   - Keep Call bell within reach  - Keep bed low and locked with side rails adjusted as appropriate  - Keep care items and personal belongings within reach  - Initiate and maintain comfort rounds  - Make Fall Risk Sign visible to staff  - Offer Toileting every  Hours, in advance of need  - Initiate/Maintain alarm  - Obtain necessary fall risk management equipment:   - Apply yellow socks and bracelet for high fall risk patients  - Consider moving patient to room near nurses station  Outcome: Progressing  Goal: Maintain or return to baseline ADL function  Description: INTERVENTIONS:  -  Assess patient's ability to carry out ADLs; assess patient's baseline for ADL function and identify physical deficits which impact ability to perform ADLs (bathing, care of mouth/teeth, toileting, grooming, dressing, etc.)  - Assess/evaluate cause of self-care deficits   - Assess range of motion  - Assess patient's mobility; develop plan if impaired  - Assess patient's need for assistive devices and provide as appropriate  - Encourage maximum independence but intervene and supervise when necessary  - Involve family in performance of ADLs  - Assess for home care needs following discharge   - Consider OT consult to assist with ADL evaluation and planning for discharge  - Provide patient education as appropriate  Outcome: Progressing  Goal: Maintains/Returns to pre admission functional level  Description: INTERVENTIONS:  - Perform AM-PAC 6 Click Basic Mobility/ Daily Activity assessment daily.  - Set and communicate daily mobility goal to care  team and patient/family/caregiver.   - Collaborate with rehabilitation services on mobility goals if consulted  - Perform Range of Motion  times a day.  - Reposition patient every  hours.  - Dangle patient  times a day  - Stand patient  times a day  - Ambulate patient  times a day  - Out of bed to chair  times a day   - Out of bed for meals  times a day  - Out of bed for toileting  - Record patient progress and toleration of activity level   Outcome: Progressing     Problem: DISCHARGE PLANNING  Goal: Discharge to home or other facility with appropriate resources  Description: INTERVENTIONS:  - Identify barriers to discharge w/patient and caregiver  - Arrange for needed discharge resources and transportation as appropriate  - Identify discharge learning needs (meds, wound care, etc.)  - Arrange for interpretive services to assist at discharge as needed  - Refer to Case Management Department for coordinating discharge planning if the patient needs post-hospital services based on physician/advanced practitioner order or complex needs related to functional status, cognitive ability, or social support system  Outcome: Progressing     Problem: Knowledge Deficit  Goal: Patient/family/caregiver demonstrates understanding of disease process, treatment plan, medications, and discharge instructions  Description: Complete learning assessment and assess knowledge base.  Interventions:  - Provide teaching at level of understanding  - Provide teaching via preferred learning methods  Outcome: Progressing     Problem: CARDIOVASCULAR - ADULT  Goal: Maintains optimal cardiac output and hemodynamic stability  Description: INTERVENTIONS:  - Monitor I/O, vital signs and rhythm  - Monitor for S/S and trends of decreased cardiac output  - Administer and titrate ordered vasoactive medications to optimize hemodynamic stability  - Assess quality of pulses, skin color and temperature  - Assess for signs of decreased coronary artery  perfusion  - Instruct patient to report change in severity of symptoms  Outcome: Progressing  Goal: Absence of cardiac dysrhythmias or at baseline rhythm  Description: INTERVENTIONS:  - Continuous cardiac monitoring, vital signs, obtain 12 lead EKG if ordered  - Administer antiarrhythmic and heart rate control medications as ordered  - Monitor electrolytes and administer replacement therapy as ordered  Outcome: Progressing     Problem: SKIN/TISSUE INTEGRITY - ADULT  Goal: Skin Integrity remains intact(Skin Breakdown Prevention)  Description: Assess:  -Perform Jorge assessment every   -Clean and moisturize skin every   -Inspect skin when repositioning, toileting, and assisting with ADLS  -Assess under medical devices such as  every   -Assess extremities for adequate circulation and sensation     Bed Management:  -Have minimal linens on bed & keep smooth, unwrinkled  -Change linens as needed when moist or perspiring  -Avoid sitting or lying in one position for more than  hours while in bed  -Keep HOB at degrees     Toileting:  -Offer bedside commode  -Assess for incontinence every   -Use incontinent care products after each incontinent episode such as     Activity:  -Mobilize patient  times a day  -Encourage activity and walks on unit  -Encourage or provide ROM exercises   -Turn and reposition patient every  Hours  -Use appropriate equipment to lift or move patient in bed  -Instruct/ Assist with weight shifting every  when out of bed in chair  -Consider limitation of chair time  hour intervals    Skin Care:  -Avoid use of baby powder, tape, friction and shearing, hot water or constrictive clothing  -Relieve pressure over bony prominences using   -Do not massage red bony areas    Next Steps:  -Teach patient strategies to minimize risks such as    -Consider consults to  interdisciplinary teams such as  Outcome: Progressing     Problem: MUSCULOSKELETAL - ADULT  Goal: Maintain or return mobility to safest level of  function  Description: INTERVENTIONS:  - Assess patient's ability to carry out ADLs; assess patient's baseline for ADL function and identify physical deficits which impact ability to perform ADLs (bathing, care of mouth/teeth, toileting, grooming, dressing, etc.)  - Assess/evaluate cause of self-care deficits   - Assess range of motion  - Assess patient's mobility  - Assess patient's need for assistive devices and provide as appropriate  - Encourage maximum independence but intervene and supervise when necessary  - Involve family in performance of ADLs  - Assess for home care needs following discharge   - Consider OT consult to assist with ADL evaluation and planning for discharge  - Provide patient education as appropriate  Outcome: Progressing

## 2024-08-01 NOTE — ASSESSMENT & PLAN NOTE
Lab Results   Component Value Date    EGFR 40 08/01/2024    EGFR 41 07/31/2024    EGFR 42 07/29/2024    CREATININE 1.25 08/01/2024    CREATININE 1.22 07/31/2024    CREATININE 1.20 07/29/2024     -Will need to monitor renal function and electrolytes with addition of torsemide therapy  -If renal function worsens would recommend nephrology evaluation at that time.

## 2024-08-01 NOTE — ASSESSMENT & PLAN NOTE
-Recent onset with multiple ER encounters for symptoms of shortness of breath and chest pressure  -Patient currently denies any chest pain or shortness of breath  -Will transition patient to oral diltiazem 180 mg daily  -Continue Eliquis 2.5 mg twice daily for stroke risk reduction

## 2024-08-01 NOTE — PLAN OF CARE
Problem: Potential for Falls  Goal: Patient will remain free of falls  Description: INTERVENTIONS:  - Educate patient/family on patient safety including physical limitations  - Instruct patient to call for assistance with activity   - Consult OT/PT to assist with strengthening/mobility   - Keep Call bell within reach  - Keep bed low and locked with side rails adjusted as appropriate  - Keep care items and personal belongings within reach  - Initiate and maintain comfort rounds  - Make Fall Risk Sign visible to staff  - Apply yellow socks and bracelet for high fall risk patients  - Consider moving patient to room near nurses station  Outcome: Progressing     Problem: PAIN - ADULT  Goal: Verbalizes/displays adequate comfort level or baseline comfort level  Description: Interventions:  - Encourage patient to monitor pain and request assistance  - Assess pain using appropriate pain scale  - Administer analgesics based on type and severity of pain and evaluate response  - Implement non-pharmacological measures as appropriate and evaluate response  - Consider cultural and social influences on pain and pain management  - Notify physician/advanced practitioner if interventions unsuccessful or patient reports new pain  Outcome: Progressing     Problem: INFECTION - ADULT  Goal: Absence or prevention of progression during hospitalization  Description: INTERVENTIONS:  - Assess and monitor for signs and symptoms of infection  - Monitor lab/diagnostic results  - Monitor all insertion sites, i.e. indwelling lines, tubes, and drains  - Monitor endotracheal if appropriate and nasal secretions for changes in amount and color  - [place] appropriate cooling/warming therapies per order  - Administer medications as ordered  - Instruct and encourage patient and family to use good hand hygiene technique  - Identify and instruct in appropriate isolation precautions for identified infection/condition  Outcome: Progressing     Problem:  SAFETY ADULT  Goal: Patient will remain free of falls  Description: INTERVENTIONS:  - Educate patient/family on patient safety including physical limitations  - Instruct patient to call for assistance with activity   - Consult OT/PT to assist with strengthening/mobility   - Keep Call bell within reach  - Keep bed low and locked with side rails adjusted as appropriate  - Keep care items and personal belongings within reach  - Initiate and maintain comfort rounds  - Make Fall Risk Sign visible to staff  - Apply yellow socks and bracelet for high fall risk patients  - Consider moving patient to room near nurses station  Outcome: Progressing  Goal: Maintain or return to baseline ADL function  Description: INTERVENTIONS:  -  Assess patient's ability to carry out ADLs; assess patient's baseline for ADL function and identify physical deficits which impact ability to perform ADLs (bathing, care of mouth/teeth, toileting, grooming, dressing, etc.)  - Assess/evaluate cause of self-care deficits   - Assess range of motion  - Assess patient's mobility; develop plan if impaired  - Assess patient's need for assistive devices and provide as appropriate  - Encourage maximum independence but intervene and supervise when necessary  - Involve family in performance of ADLs  - Assess for home care needs following discharge   - Consider OT consult to assist with ADL evaluation and planning for discharge  - Provide patient education as appropriate  Outcome: Progressing  Goal: Maintains/Returns to pre admission functional level  Description: INTERVENTIONS:  - Perform AM-PAC 6 Click Basic Mobility/ Daily Activity assessment daily.  - Set and communicate daily mobility goal to care team and patient/family/caregiver.   - Collaborate with rehabilitation services on mobility goals if consulted  - Perform Range of Motion 3 times a day.  - Reposition patient every 2 hours.  - Dangle patient 3 times a day  - Stand patient 3 times a day  - Ambulate  patient 3 times a day  - Out of bed to chair 3 times a day   - Out of bed for meals 3 times a day  - Out of bed for toileting  - Record patient progress and toleration of activity level   Outcome: Progressing     Problem: DISCHARGE PLANNING  Goal: Discharge to home or other facility with appropriate resources  Description: INTERVENTIONS:  - Identify barriers to discharge w/patient and caregiver  - Arrange for needed discharge resources and transportation as appropriate  - Identify discharge learning needs (meds, wound care, etc.)  - Arrange for interpretive services to assist at discharge as needed  - Refer to Case Management Department for coordinating discharge planning if the patient needs post-hospital services based on physician/advanced practitioner order or complex needs related to functional status, cognitive ability, or social support system  Outcome: Progressing     Problem: Knowledge Deficit  Goal: Patient/family/caregiver demonstrates understanding of disease process, treatment plan, medications, and discharge instructions  Description: Complete learning assessment and assess knowledge base.  Interventions:  - Provide teaching at level of understanding  - Provide teaching via preferred learning methods  Outcome: Progressing     Problem: CARDIOVASCULAR - ADULT  Goal: Maintains optimal cardiac output and hemodynamic stability  Description: INTERVENTIONS:  - Monitor I/O, vital signs and rhythm  - Monitor for S/S and trends of decreased cardiac output  - Administer and titrate ordered vasoactive medications to optimize hemodynamic stability  - Assess quality of pulses, skin color and temperature  - Assess for signs of decreased coronary artery perfusion  - Instruct patient to report change in severity of symptoms  Outcome: Progressing  Goal: Absence of cardiac dysrhythmias or at baseline rhythm  Description: INTERVENTIONS:  - Continuous cardiac monitoring, vital signs, obtain 12 lead EKG if ordered  -  Administer antiarrhythmic and heart rate control medications as ordered  - Monitor electrolytes and administer replacement therapy as ordered  Outcome: Progressing     Problem: SKIN/TISSUE INTEGRITY - ADULT  Goal: Skin Integrity remains intact(Skin Breakdown Prevention)  Description: Assess:  -Inspect skin when repositioning, toileting, and assisting with ADLS  -Assess extremities for adequate circulation and sensation     Bed Management:  -Have minimal linens on bed & keep smooth, unwrinkled  -Change linens as needed when moist or perspiring    Toileting:  -Offer bedside commode    Activity:  -Encourage activity and walks on unit  -Encourage or provide ROM exercises   -Use appropriate equipment to lift or move patient in bed    Skin Care:  -Avoid use of baby powder, tape, friction and shearing, hot water or constrictive clothing  -Do not massage red bony areas  Outcome: Progressing     Problem: MUSCULOSKELETAL - ADULT  Goal: Maintain or return mobility to safest level of function  Description: INTERVENTIONS:  - Assess patient's ability to carry out ADLs; assess patient's baseline for ADL function and identify physical deficits which impact ability to perform ADLs (bathing, care of mouth/teeth, toileting, grooming, dressing, etc.)  - Assess/evaluate cause of self-care deficits   - Assess range of motion  - Assess patient's mobility  - Assess patient's need for assistive devices and provide as appropriate  - Encourage maximum independence but intervene and supervise when necessary  - Involve family in performance of ADLs  - Assess for home care needs following discharge   - Consider OT consult to assist with ADL evaluation and planning for discharge  - Provide patient education as appropriate  Outcome: Progressing

## 2024-08-01 NOTE — ASSESSMENT & PLAN NOTE
Echo results appreciated, EF of 50% noted  Continue torsemide  Titrate oxygen as tolerated  Cardiology input appreciated  Counseled on low-salt diet and monitoring fluid intake  Ambulatory referral to cardiology on discharge

## 2024-08-01 NOTE — ASSESSMENT & PLAN NOTE
-Stable at this time  -Continue to monitor with transition to long-acting diltiazem and addition of torsemide

## 2024-08-02 ENCOUNTER — TRANSITIONAL CARE MANAGEMENT (OUTPATIENT)
Dept: FAMILY MEDICINE CLINIC | Facility: CLINIC | Age: 84
End: 2024-08-02

## 2024-08-02 VITALS
TEMPERATURE: 97.9 F | HEART RATE: 87 BPM | RESPIRATION RATE: 27 BRPM | OXYGEN SATURATION: 95 % | BODY MASS INDEX: 24.39 KG/M2 | WEIGHT: 129.19 LBS | DIASTOLIC BLOOD PRESSURE: 87 MMHG | HEIGHT: 61 IN | SYSTOLIC BLOOD PRESSURE: 136 MMHG

## 2024-08-02 LAB
ANION GAP SERPL CALCULATED.3IONS-SCNC: 8 MMOL/L (ref 4–13)
BUN SERPL-MCNC: 16 MG/DL (ref 5–25)
CALCIUM SERPL-MCNC: 8.8 MG/DL (ref 8.4–10.2)
CHLORIDE SERPL-SCNC: 102 MMOL/L (ref 96–108)
CO2 SERPL-SCNC: 24 MMOL/L (ref 21–32)
CREAT SERPL-MCNC: 1.14 MG/DL (ref 0.6–1.3)
ERYTHROCYTE [DISTWIDTH] IN BLOOD BY AUTOMATED COUNT: 13.6 % (ref 11.6–15.1)
GFR SERPL CREATININE-BSD FRML MDRD: 44 ML/MIN/1.73SQ M
GLUCOSE SERPL-MCNC: 97 MG/DL (ref 65–140)
HCT VFR BLD AUTO: 43.2 % (ref 34.8–46.1)
HGB BLD-MCNC: 14 G/DL (ref 11.5–15.4)
MCH RBC QN AUTO: 29.1 PG (ref 26.8–34.3)
MCHC RBC AUTO-ENTMCNC: 32.4 G/DL (ref 31.4–37.4)
MCV RBC AUTO: 90 FL (ref 82–98)
PLATELET # BLD AUTO: 261 THOUSANDS/UL (ref 149–390)
PMV BLD AUTO: 9.4 FL (ref 8.9–12.7)
POTASSIUM SERPL-SCNC: 3.5 MMOL/L (ref 3.5–5.3)
RBC # BLD AUTO: 4.81 MILLION/UL (ref 3.81–5.12)
SODIUM SERPL-SCNC: 134 MMOL/L (ref 135–147)
WBC # BLD AUTO: 8.38 THOUSAND/UL (ref 4.31–10.16)

## 2024-08-02 PROCEDURE — 85027 COMPLETE CBC AUTOMATED: CPT | Performed by: INTERNAL MEDICINE

## 2024-08-02 PROCEDURE — 99232 SBSQ HOSP IP/OBS MODERATE 35: CPT | Performed by: INTERNAL MEDICINE

## 2024-08-02 PROCEDURE — 99239 HOSP IP/OBS DSCHRG MGMT >30: CPT | Performed by: INTERNAL MEDICINE

## 2024-08-02 PROCEDURE — 80048 BASIC METABOLIC PNL TOTAL CA: CPT | Performed by: INTERNAL MEDICINE

## 2024-08-02 RX ORDER — POTASSIUM CHLORIDE 750 MG/1
10 TABLET, EXTENDED RELEASE ORAL DAILY
Qty: 30 TABLET | Refills: 0 | Status: SHIPPED | OUTPATIENT
Start: 2024-08-03

## 2024-08-02 RX ORDER — DILTIAZEM HYDROCHLORIDE 180 MG/1
180 CAPSULE, COATED, EXTENDED RELEASE ORAL DAILY
Qty: 30 CAPSULE | Refills: 0 | Status: SHIPPED | OUTPATIENT
Start: 2024-08-03

## 2024-08-02 RX ORDER — TORSEMIDE 10 MG/1
10 TABLET ORAL DAILY
Qty: 30 TABLET | Refills: 0 | Status: SHIPPED | OUTPATIENT
Start: 2024-08-03

## 2024-08-02 RX ADMIN — PANTOPRAZOLE SODIUM 20 MG: 20 TABLET, DELAYED RELEASE ORAL at 05:22

## 2024-08-02 RX ADMIN — DILTIAZEM HYDROCHLORIDE 180 MG: 180 CAPSULE, EXTENDED RELEASE ORAL at 08:30

## 2024-08-02 RX ADMIN — ASPIRIN 81 MG: 81 TABLET, COATED ORAL at 08:30

## 2024-08-02 RX ADMIN — ATORVASTATIN CALCIUM 20 MG: 10 TABLET, FILM COATED ORAL at 08:30

## 2024-08-02 RX ADMIN — POTASSIUM CHLORIDE 10 MEQ: 750 TABLET, EXTENDED RELEASE ORAL at 08:30

## 2024-08-02 RX ADMIN — DOCUSATE SODIUM 100 MG: 100 CAPSULE, LIQUID FILLED ORAL at 08:30

## 2024-08-02 RX ADMIN — APIXABAN 2.5 MG: 2.5 TABLET, FILM COATED ORAL at 08:30

## 2024-08-02 RX ADMIN — TORSEMIDE 10 MG: 20 TABLET ORAL at 08:30

## 2024-08-02 RX ADMIN — CEPHALEXIN 500 MG: 500 CAPSULE ORAL at 05:22

## 2024-08-02 NOTE — CASE MANAGEMENT
Case Management Discharge Planning Note    Patient name Nohemi Arcos  Location ICU 03/ICU  MRN [id number]  : 1940 Date 2024       Current Admission Date: 2024  Current Admission Diagnosis:Atrial fibrillation with rapid ventricular response (HCC)   Patient Active Problem List    Diagnosis Date Noted Date Diagnosed    Cardiomyopathy (HCC) 2024     Constipation 2024     Hypoxia 2024     Acute on chronic diastolic (congestive) heart failure (HCC) 2024     Atrial fibrillation with rapid ventricular response (HCC) 2024     Elevated TSH 2023     Multiple renal cysts 2023     Cholelithiasis 2023     Colitis 2023     Acute diverticulitis 2023     Adrenal hyperplasia (HCC) 2023     LBBB (left bundle branch block) 2023     Stage 3b chronic kidney disease (HCC) 2022     Vitamin D deficiency 2022     Primary localized osteoarthritis of left knee 10/25/2019     Hypercholesteremia 2013     Osteopenia 2012     Primary hypertension 2012     Nontoxic single thyroid nodule 2012       LOS (days): 2  Geometric Mean LOS (GMLOS) (days): 2.3  Days to GMLOS:0.3     OBJECTIVE:  Risk of Unplanned Readmission Score: 11.66         Current admission status: Inpatient   Preferred Pharmacy:   [facility] [place] - [address] [facility], [address]  [address] [place] [address], [address]  [facility] [address]  Phone: [phone] Fax: [phone]    [facility] [address] [facility] [place] - [address]  [address] [address]  Phone: [phone] Fax: [phone]    Primary Care Provider: Gissell Graham DO    Primary Insurance: MEDICARE  Secondary Insurance: BLUE CROSS    DISCHARGE DETAILS:    Discharge planning discussed with:: patient and sister at the bedside  Freedom of Choice: Yes  Comments - Freedom of Choice: pt has declined hhc & outpt therapy  CM contacted  family/caregiver?: Yes  Were Treatment Team discharge recommendations reviewed with patient/caregiver?: Yes  Did patient/caregiver verbalize understanding of patient care needs?: Yes  Were patient/caregiver advised of the risks associated with not following Treatment Team discharge recommendations?: Yes    Contacts  Patient Contacts: Wendie Cai  Relationship to Patient:: Family (sister)  Contact Method: In Person  Reason/Outcome: Discharge Planning    Requested Home Health Care         Is the patient interested in HHC at discharge?: No    DME Referral Provided  Referral made for DME?: No    Other Referral/Resources/Interventions Provided:  Referral Comments: [facility] was [facility]- pt  has declined hhc & outpt rehab    Would you like to participate in our [facility] service program?  : No - Declined    Treatment Team Recommendation: Home (home with family support & outpt follow up- sister)  Discharge Destination Plan:: Home (home with family support & outpt follow up - sister)  Transport at Discharge : Automobile, Family         Pt & family are in agreement with the d/c & d/c plan              Accompanied by: Family member           Family notified:: sister at the bedside

## 2024-08-02 NOTE — ASSESSMENT & PLAN NOTE
Lab Results   Component Value Date    EGFR 44 08/02/2024    EGFR 40 08/01/2024    EGFR 41 07/31/2024    CREATININE 1.14 08/02/2024    CREATININE 1.25 08/01/2024    CREATININE 1.22 07/31/2024     Baseline creatinine around 1.2

## 2024-08-02 NOTE — PLAN OF CARE
Problem: Potential for Falls  Goal: Patient will remain free of falls  Description: INTERVENTIONS:  - Educate patient/family on patient safety including physical limitations  - Instruct patient to call for assistance with activity   - Consult OT/PT to assist with strengthening/mobility   - Keep Call bell within reach  - Keep bed low and locked with side rails adjusted as appropriate  - Keep care items and personal belongings within reach  - Initiate and maintain comfort rounds  - Make Fall Risk Sign visible to staff  - Apply yellow socks and bracelet for high fall risk patients  - Consider moving patient to room near nurses station  Outcome: Progressing     Problem: PAIN - ADULT  Goal: Verbalizes/displays adequate comfort level or baseline comfort level  Description: Interventions:  - Encourage patient to monitor pain and request assistance  - Assess pain using appropriate pain scale  - Administer analgesics based on type and severity of pain and evaluate response  - Implement non-pharmacological measures as appropriate and evaluate response  - Consider cultural and social influences on pain and pain management  - Notify physician/advanced practitioner if interventions unsuccessful or patient reports new pain  Outcome: Progressing     Problem: INFECTION - ADULT  Goal: Absence or prevention of progression during hospitalization  Description: INTERVENTIONS:  - Assess and monitor for signs and symptoms of infection  - Monitor lab/diagnostic results  - Monitor all insertion sites, i.e. indwelling lines, tubes, and drains  - Monitor endotracheal if appropriate and nasal secretions for changes in amount and color  - [place] appropriate cooling/warming therapies per order  - Administer medications as ordered  - Instruct and encourage patient and family to use good hand hygiene technique  - Identify and instruct in appropriate isolation precautions for identified infection/condition  Outcome: Progressing     Problem:  SAFETY ADULT  Goal: Patient will remain free of falls  Description: INTERVENTIONS:  - Educate patient/family on patient safety including physical limitations  - Instruct patient to call for assistance with activity   - Consult OT/PT to assist with strengthening/mobility   - Keep Call bell within reach  - Keep bed low and locked with side rails adjusted as appropriate  - Keep care items and personal belongings within reach  - Initiate and maintain comfort rounds  - Make Fall Risk Sign visible to staff  - Apply yellow socks and bracelet for high fall risk patients  - Consider moving patient to room near nurses station  Outcome: Progressing  Goal: Maintain or return to baseline ADL function  Description: INTERVENTIONS:  -  Assess patient's ability to carry out ADLs; assess patient's baseline for ADL function and identify physical deficits which impact ability to perform ADLs (bathing, care of mouth/teeth, toileting, grooming, dressing, etc.)  - Assess/evaluate cause of self-care deficits   - Assess range of motion  - Assess patient's mobility; develop plan if impaired  - Assess patient's need for assistive devices and provide as appropriate  - Encourage maximum independence but intervene and supervise when necessary  - Involve family in performance of ADLs  - Assess for home care needs following discharge   - Consider OT consult to assist with ADL evaluation and planning for discharge  - Provide patient education as appropriate  Outcome: Progressing  Goal: Maintains/Returns to pre admission functional level  Description: INTERVENTIONS:  - Perform AM-PAC 6 Click Basic Mobility/ Daily Activity assessment daily.  - Set and communicate daily mobility goal to care team and patient/family/caregiver.   - Collaborate with rehabilitation services on mobility goals if consulted  - Perform Range of Motion 3 times a day.  - Reposition patient every 2 hours.  - Dangle patient 3 times a day  - Stand patient 3 times a day  - Ambulate  patient 3 times a day  - Out of bed to chair 3 times a day   - Out of bed for meals 3 times a day  - Out of bed for toileting  - Record patient progress and toleration of activity level   Outcome: Progressing     Problem: DISCHARGE PLANNING  Goal: Discharge to home or other facility with appropriate resources  Description: INTERVENTIONS:  - Identify barriers to discharge w/patient and caregiver  - Arrange for needed discharge resources and transportation as appropriate  - Identify discharge learning needs (meds, wound care, etc.)  - Arrange for interpretive services to assist at discharge as needed  - Refer to Case Management Department for coordinating discharge planning if the patient needs post-hospital services based on physician/advanced practitioner order or complex needs related to functional status, cognitive ability, or social support system  Outcome: Progressing     Problem: Knowledge Deficit  Goal: Patient/family/caregiver demonstrates understanding of disease process, treatment plan, medications, and discharge instructions  Description: Complete learning assessment and assess knowledge base.  Interventions:  - Provide teaching at level of understanding  - Provide teaching via preferred learning methods  Outcome: Progressing     Problem: CARDIOVASCULAR - ADULT  Goal: Maintains optimal cardiac output and hemodynamic stability  Description: INTERVENTIONS:  - Monitor I/O, vital signs and rhythm  - Monitor for S/S and trends of decreased cardiac output  - Administer and titrate ordered vasoactive medications to optimize hemodynamic stability  - Assess quality of pulses, skin color and temperature  - Assess for signs of decreased coronary artery perfusion  - Instruct patient to report change in severity of symptoms  Outcome: Progressing  Goal: Absence of cardiac dysrhythmias or at baseline rhythm  Description: INTERVENTIONS:  - Continuous cardiac monitoring, vital signs, obtain 12 lead EKG if ordered  -  Administer antiarrhythmic and heart rate control medications as ordered  - Monitor electrolytes and administer replacement therapy as ordered  Outcome: Progressing     Problem: SKIN/TISSUE INTEGRITY - ADULT  Goal: Skin Integrity remains intact(Skin Breakdown Prevention)  Description: Assess:  -Inspect skin when repositioning, toileting, and assisting with ADLS  -Assess extremities for adequate circulation and sensation     Bed Management:  -Have minimal linens on bed & keep smooth, unwrinkled  -Change linens as needed when moist or perspiring    Toileting:  -Offer bedside commode    Activity:  -Encourage activity and walks on unit  -Encourage or provide ROM exercises   -Use appropriate equipment to lift or move patient in bed    Skin Care:  -Avoid use of baby powder, tape, friction and shearing, hot water or constrictive clothing  -Do not massage red bony areas  Outcome: Progressing     Problem: MUSCULOSKELETAL - ADULT  Goal: Maintain or return mobility to safest level of function  Description: INTERVENTIONS:  - Assess patient's ability to carry out ADLs; assess patient's baseline for ADL function and identify physical deficits which impact ability to perform ADLs (bathing, care of mouth/teeth, toileting, grooming, dressing, etc.)  - Assess/evaluate cause of self-care deficits   - Assess range of motion  - Assess patient's mobility  - Assess patient's need for assistive devices and provide as appropriate  - Encourage maximum independence but intervene and supervise when necessary  - Involve family in performance of ADLs  - Assess for home care needs following discharge   - Consider OT consult to assist with ADL evaluation and planning for discharge  - Provide patient education as appropriate  Outcome: Progressing

## 2024-08-02 NOTE — ASSESSMENT & PLAN NOTE
Patient was initially on Cardizem drip which has been titrated off  Patient has been transition to oral Cardizem  We will continue Cardizem 180 mg daily  Cardiology following  Currently on Eliquis for anticoagulation  Echo results appreciated  Patient will follow-up with cardiology as outpatient.  Stable for discharge home

## 2024-08-02 NOTE — ASSESSMENT & PLAN NOTE
Lab Results   Component Value Date    EGFR 44 08/02/2024    EGFR 40 08/01/2024    EGFR 41 07/31/2024    CREATININE 1.14 08/02/2024    CREATININE 1.25 08/01/2024    CREATININE 1.22 07/31/2024   -Would monitor renal function electrolytes with addition of torsemide repeat BMP in 3-5 days posthospital discharge.

## 2024-08-02 NOTE — PLAN OF CARE
Problem: PAIN - ADULT  Goal: Verbalizes/displays adequate comfort level or baseline comfort level  Description: Interventions:  - Encourage patient to monitor pain and request assistance  - Assess pain using appropriate pain scale  - Administer analgesics based on type and severity of pain and evaluate response  - Implement non-pharmacological measures as appropriate and evaluate response  - Consider cultural and social influences on pain and pain management  - Notify physician/advanced practitioner if interventions unsuccessful or patient reports new pain  Outcome: Progressing     Problem: INFECTION - ADULT  Goal: Absence or prevention of progression during hospitalization  Description: INTERVENTIONS:  - Assess and monitor for signs and symptoms of infection  - Monitor lab/diagnostic results  - Monitor all insertion sites, i.e. indwelling lines, tubes, and drains  - Monitor endotracheal if appropriate and nasal secretions for changes in amount and color  - [place] appropriate cooling/warming therapies per order  - Administer medications as ordered  - Instruct and encourage patient and family to use good hand hygiene technique  - Identify and instruct in appropriate isolation precautions for identified infection/condition  Outcome: Progressing

## 2024-08-02 NOTE — DISCHARGE SUMMARY
[facility]  Discharge- Nohemi Arcos 1940, 84 y.o. female MRN: [id number]  Unit/Bed#: ICU 03-01 Encounter: [id number]  Primary Care Provider: Gissell Graham DO   Date and time admitted to hospital: 7/31/2024  7:51 AM    * Atrial fibrillation with rapid ventricular response (HCC)  Assessment & Plan  Patient was initially on Cardizem drip which has been titrated off  Patient has been transition to oral Cardizem  We will continue Cardizem 180 mg daily  Cardiology following  Currently on Eliquis for anticoagulation  Echo results appreciated  Patient will follow-up with cardiology as outpatient.  Stable for discharge home    Acute on chronic diastolic (congestive) heart failure (HCC)  Assessment & Plan  Echo results appreciated, EF of 50% noted  Continue torsemide  Titrate oxygen as tolerated  Cardiology input appreciated  Counseled on low-salt diet and monitoring fluid intake  Ambulatory referral to cardiology on discharge    Constipation  Assessment & Plan  Resolved.  Continue bowel regimen at home as needed    Hypoxia  Assessment & Plan  Patient has been titrated off oxygen.  Improved with diuresis.  Continue home torsemide    Stage 3b chronic kidney disease (HCC)  Assessment & Plan  Lab Results   Component Value Date    EGFR 44 08/02/2024    EGFR 40 08/01/2024    EGFR 41 07/31/2024    CREATININE 1.14 08/02/2024    CREATININE 1.25 08/01/2024    CREATININE 1.22 07/31/2024     Baseline creatinine around 1.2    Primary hypertension  Assessment & Plan  Blood pressure has been relatively stable  Continue Cardizem, will titrate as tolerated    Hypercholesteremia  Assessment & Plan  Lipitor 20 mg at bedtime      Medical Problems       Resolved Problems  Date Reviewed: 8/2/2024   None       Discharging Physician / Practitioner: Rhonda Diaz MD  PCP: Gissell Graham DO  Admission Date:   Admission Orders (From admission, onward)       Ordered        07/31/24 0929  INPATIENT  "ADMISSION  Once                          Discharge Date: 08/02/24    Consultations During Hospital Stay:  Cardiology    Procedures Performed:   None    Significant Findings / Test Results:   Rapid A-fib, CHF    Incidental Findings:   None    Test Results Pending at Discharge (will require follow up):   None     Outpatient Tests Requested:  Routine labs with PCP as outpatient    Complications: None    Reason for Admission: Rapid A-fib, CHF    Hospital Course:   Nohemi Arcos is a 84 y.o. female patient who originally presented to the hospital on 7/31/2024 due to rapid A-fib.  Patient was briefly on Cardizem drip and eventually titrated to oral Cardizem.  Cardiology was consulted.  Echocardiogram was performed.  Patient also with component of fluid overload and was initiated on IV diuresis.  Patient was eventually controlled on Cardizem 180 mg daily.  Diuretics changed to torsemide 10 mg daily.  Patient currently rate controlled.  Stable for discharge home with outpatient follow-up.  Advised to return with any worsening symptoms    Please see above list of diagnoses and related plan for additional information.     Condition at Discharge: stable    Discharge Day Visit / Exam:   Subjective: No complaints at this time  Vitals: Blood Pressure: 136/87 (08/02/24 0830)  Pulse: 82 (08/02/24 0400)  Temperature: 97.9 °F (36.6 °C) (08/02/24 0711)  Temp Source: Tympanic (08/02/24 0711)  Respirations: 17 (08/02/24 0400)  Height: 5' 1\" (154.9 cm) (07/31/24 1200)  Weight - Scale: 58.6 kg (129 lb 3 oz) (08/01/24 0452)  SpO2: 95 % (08/02/24 0400)  Exam:   Physical Exam  Constitutional:       General: She is not in acute distress.  HENT:      Head: Normocephalic and atraumatic.      Nose: Nose normal.      Mouth/Throat:      Mouth: Mucous membranes are moist.   Eyes:      Extraocular Movements: Extraocular movements intact.      Conjunctiva/sclera: Conjunctivae normal.   Cardiovascular:      Rate and Rhythm: Rhythm irregular. "   Pulmonary:      Effort: Pulmonary effort is normal. No respiratory distress.   Abdominal:      Palpations: Abdomen is soft.      Tenderness: There is no abdominal tenderness.   Musculoskeletal:         General: Normal range of motion.      Cervical back: Normal range of motion and neck supple.   Skin:     General: Skin is warm and dry.   Neurological:      General: No focal deficit present.      Mental Status: She is alert. Mental status is at baseline.      Cranial Nerves: No cranial nerve deficit.   Psychiatric:         Mood and Affect: Mood normal.         Behavior: Behavior normal.          Discussion with Family: Attempted to update  (son) via phone. Unable to contact.    Discharge instructions/Information to patient and family:   See after visit summary for information provided to patient and family.      Provisions for Follow-Up Care:  See after visit summary for information related to follow-up care and any pertinent home health orders.      Mobility at time of Discharge:   Basic Mobility Inpatient Raw Score: 18  JH-HLM Goal: 6: Walk 10 steps or more  JH-HLM Achieved: 6: Walk 10 steps or more  HLM Goal achieved. Continue to encourage appropriate mobility.     Disposition:   Home    Planned Readmission: no     Discharge Statement:  I spent 35 minutes discharging the patient. This time was spent on the day of discharge. I had direct contact with the patient on the day of discharge. Greater than 50% of the total time was spent examining patient, answering all patient questions, arranging and discussing plan of care with patient as well as directly providing post-discharge instructions.  Additional time then spent on discharge activities.    Discharge Medications:  See after visit summary for reconciled discharge medications provided to patient and/or family.      **Please Note: This note may have been constructed using a voice recognition system**

## 2024-08-02 NOTE — PROGRESS NOTES
[facility]  Progress Note  Name: Nohemi Arcos I  MRN: [id number]  Unit/Bed#: ICU 03-01 I Date of Admission: 7/31/2024   Date of Service: 8/2/2024 I Hospital Day: 2    Assessment & Plan   Cardiomyopathy (HCC)  Assessment & Plan  -Transthoracic echocardiogram showing left-ventricular systolic function lower limits of normal estimated LVEF 50% with regional wall motion abnormalities in the septum and basal inferior wall  -Patient will benefit from outpatient testing and will follow-up with her primary cardiologist Dr. Peña for additional evaluation.  -Continue medical therapy and uptitrate as patient tolerates  -Patient counseled on dietary lifestyle modifications including sodium and fluid restricted diet along with continued torsemide 10 mg daily with 10 mEq potassium daily.    Stage 3b chronic kidney disease (HCC)  Assessment & Plan  Lab Results   Component Value Date    EGFR 44 08/02/2024    EGFR 40 08/01/2024    EGFR 41 07/31/2024    CREATININE 1.14 08/02/2024    CREATININE 1.25 08/01/2024    CREATININE 1.22 07/31/2024   -Would monitor renal function electrolytes with addition of torsemide repeat BMP in 3-5 days posthospital discharge.    Primary hypertension  Assessment & Plan  -Stable at this time  -Patient will need to monitor with long-acting diltiazem 180 mg daily and addition of torsemide 10 mg daily.      Hypercholesteremia  Assessment & Plan  -Counseled patient on dietary and lifestyle modifications.  -Continue atorvastatin 20 mg daily.    * Atrial fibrillation with rapid ventricular response (HCC)  Assessment & Plan  -Recent onset with multiple ER encounters for symptoms of shortness of breath and chest pressure  -Patient currently denies chest pain or shortness of breath  -Rates appear reasonably well-controlled  -Continue oral diltiazem 180 mg daily along with oral anticoagulation with Eliquis 2.5 mg twice daily for stroke risk reduction  -Patient will be making appoint  "with her primary cardiologist Dr. Peña for follow-up.        Outpatient Cardiologist: Dr. Peña      Subjective:   Patient seen and examined.  No significant events overnight.        Summary comments:  -Continue Eliquis 2.5 mg twice daily, diltiazem 180 mg daily, atorvastatin 20 mg daily, torsemide 10 mg daily, potassium 10 mEq daily  -Patient counseled on dietary lifestyle modifications along with need for follow-up and she wishes to follow-up with her primary cardiologist Dr. Peña and will be making appointment with him as soon as possible  -Patient notes overall feeling well and wishes to be discharged with outpatient follow-up  -Would recommend BMP in 3-5 days posthospital discharge.    Telemetry/ECG/Cardiac testing:   Currently appears to be in rate controlled atrial fibrillation on telemetry with rates ranging from 80s-100s beat per minute range    Vitals: Blood pressure 136/87, pulse 82, temperature 97.9 °F (36.6 °C), temperature source Tympanic, resp. rate 17, height 5' 1\" (1.549 m), weight 58.6 kg (129 lb 3 oz), SpO2 95%.,   Orthostatic Blood Pressures      Flowsheet Row Most Recent Value   Blood Pressure 136/87 filed at 08/02/2024 0830   Patient Position - Orthostatic VS Lying filed at 08/01/2024 0736        ,   Weight (last 2 days)       Date/Time Weight    08/01/24 0452 58.6 (129.19)    07/31/24 1200 59 (130)    07/31/24 1108 59 (130.07)    07/31/24 0751 59 (130)            Physical Exam:  Physical Exam  Vitals reviewed.   Constitutional:       General: She is not in acute distress.     Appearance: Normal appearance. She is not diaphoretic.   HENT:      Head: Normocephalic and atraumatic.   Eyes:      General:         Right eye: No discharge.         Left eye: No discharge.   Neck:      Comments: Trachea midline, minimal JVD present  Cardiovascular:      Heart sounds: Murmur (JONEL) heard.      Comments: Irregularly irregular rate and rhythm  Pulmonary:      Effort: No respiratory distress.      " Breath sounds: No wheezing.      Comments: Decreased breath sounds bilaterally  Chest:      Chest wall: No tenderness.   Abdominal:      General: Bowel sounds are normal.      Palpations: Abdomen is soft.      Tenderness: There is no abdominal tenderness. There is no rebound.   Musculoskeletal:      Right lower leg: No edema.      Left lower leg: No edema.   Skin:     General: Skin is warm and dry.   Neurological:      Mental Status: She is alert.      Comments: Awake, alert, able to answer questions appropriately   Psychiatric:         Mood and Affect: Mood normal.         Behavior: Behavior normal.          Medications:      Current Facility-Administered Medications:     apixaban (ELIQUIS) tablet 2.5 mg, 2.5 mg, Oral, BID, Sherry Moe MD, 2.5 mg at 08/02/24 0830    aspirin (ECOTRIN LOW STRENGTH) EC tablet 81 mg, 81 mg, Oral, Daily, Sherry Moe MD, 81 mg at 08/02/24 0830    atorvastatin (LIPITOR) tablet 20 mg, 20 mg, Oral, Daily, Sherry Moe MD, 20 mg at 08/02/24 0830    bisacodyl (DULCOLAX) rectal suppository 10 mg, 10 mg, Rectal, Daily PRN, Francis Snowden PA-C, 10 mg at 08/01/24 0525    dextromethorphan-guaiFENesin (ROBITUSSIN DM) oral syrup 10 mL, 10 mL, Oral, Q4H PRN, Francis Snowden PA-C, 10 mL at 07/31/24 2222    diltiazem (CARDIZEM CD) 24 hr capsule 180 mg, 180 mg, Oral, Daily, Candelario Simmons DO, 180 mg at 08/02/24 0830    docusate sodium (COLACE) capsule 100 mg, 100 mg, Oral, BID, Rhonda Diaz MD, 100 mg at 08/02/24 0830    pantoprazole (PROTONIX) EC tablet 20 mg, 20 mg, Oral, Early Morning, Sherry Moe MD, 20 mg at 08/02/24 0522    polyethylene glycol (MIRALAX) packet 17 g, 17 g, Oral, Daily, Rhonda Diaz MD    potassium chloride (Klor-Con M10) CR tablet 10 mEq, 10 mEq, Oral, Daily, Candelario Simmons DO, 10 mEq at 08/02/24 0830    senna (SENOKOT) tablet 8.6 mg, 1 tablet, Oral, HS, Rhonda Diaz MD, 8.6 mg at 08/01/24 2206    torsemide (DEMADEX) tablet 10 mg, 10 mg,  Oral, Daily, Candelario Simmons DO, 10 mg at 08/02/24 0830     Labs & Results:  Labs reviewed and prominent abnormalities reviewed above and/or below.  Troponins:    Results from last 7 days   Lab Units 07/31/24  1150 07/31/24  0933 07/31/24  0758   HS TNI 0HR ng/L  --   --  12   HS TNI 2HR ng/L  --  12  --    HSTNI D2 ng/L  --  0  --    HS TNI 4HR ng/L 13  --   --    HSTNI D4 ng/L 1  --   --         BNP:   Results from last 6 Months   Lab Units 07/31/24  0755   BNP pg/mL 351*

## 2024-08-02 NOTE — ASSESSMENT & PLAN NOTE
-Stable at this time  -Patient will need to monitor with long-acting diltiazem 180 mg daily and addition of torsemide 10 mg daily.

## 2024-08-02 NOTE — ASSESSMENT & PLAN NOTE
-Recent onset with multiple ER encounters for symptoms of shortness of breath and chest pressure  -Patient currently denies chest pain or shortness of breath  -Rates appear reasonably well-controlled  -Continue oral diltiazem 180 mg daily along with oral anticoagulation with Eliquis 2.5 mg twice daily for stroke risk reduction  -Patient will be making appoint with her primary cardiologist Dr. Peña for follow-up.

## 2024-08-05 ENCOUNTER — HOSPITAL ENCOUNTER (INPATIENT)
Facility: HOSPITAL | Age: 84
LOS: 3 days | Discharge: HOME | DRG: 291 | End: 2024-08-08
Attending: EMERGENCY MEDICINE | Admitting: INTERNAL MEDICINE
Payer: MEDICARE

## 2024-08-05 ENCOUNTER — APPOINTMENT (INPATIENT)
Dept: INTERVENTIONAL RADIOLOGY/VASCULAR | Facility: HOSPITAL | Age: 84
DRG: 291 | End: 2024-08-05
Payer: MEDICARE

## 2024-08-05 ENCOUNTER — APPOINTMENT (EMERGENCY)
Dept: CT IMAGING | Facility: HOSPITAL | Age: 84
DRG: 291 | End: 2024-08-05
Payer: MEDICARE

## 2024-08-05 DIAGNOSIS — I42.9 CARDIOMYOPATHY, UNSPECIFIED TYPE (HCC): ICD-10-CM

## 2024-08-05 DIAGNOSIS — R06.02 SHORTNESS OF BREATH: Primary | ICD-10-CM

## 2024-08-05 DIAGNOSIS — R79.89 ELEVATED BRAIN NATRIURETIC PEPTIDE (BNP) LEVEL: ICD-10-CM

## 2024-08-05 DIAGNOSIS — J90 PLEURAL EFFUSION, BILATERAL: ICD-10-CM

## 2024-08-05 DIAGNOSIS — K59.00 CONSTIPATION, UNSPECIFIED CONSTIPATION TYPE: ICD-10-CM

## 2024-08-05 PROBLEM — R35.0 URINARY FREQUENCY: Status: ACTIVE | Noted: 2024-08-05

## 2024-08-05 PROBLEM — R65.10 SIRS (SYSTEMIC INFLAMMATORY RESPONSE SYNDROME) (HCC): Status: ACTIVE | Noted: 2024-08-05

## 2024-08-05 PROBLEM — A41.9 SEPSIS (HCC): Status: ACTIVE | Noted: 2024-08-05

## 2024-08-05 PROBLEM — I48.91 ATRIAL FIBRILLATION, CONTROLLED (HCC): Status: ACTIVE | Noted: 2024-08-05

## 2024-08-05 PROBLEM — I48.20 ATRIAL FIBRILLATION, CHRONIC (HCC): Status: ACTIVE | Noted: 2024-08-05

## 2024-08-05 LAB
2HR DELTA HS TROPONIN: -2 NG/L
4HR DELTA HS TROPONIN: 1 NG/L
ANION GAP SERPL CALCULATED.3IONS-SCNC: 8 MMOL/L (ref 4–13)
APPEARANCE FLD: CLEAR
ATRIAL RATE: 101 BPM
ATRIAL RATE: 111 BPM
BACTERIA UR QL AUTO: NORMAL /HPF
BASOPHILS # BLD AUTO: 0.07 THOUSANDS/ÂΜL (ref 0–0.1)
BASOPHILS NFR BLD AUTO: 1 % (ref 0–1)
BILIRUB UR QL STRIP: NEGATIVE
BNP SERPL-MCNC: 452 PG/ML (ref 0–100)
BUN SERPL-MCNC: 19 MG/DL (ref 5–25)
CALCIUM SERPL-MCNC: 9.7 MG/DL (ref 8.4–10.2)
CARDIAC TROPONIN I PNL SERPL HS: 12 NG/L
CARDIAC TROPONIN I PNL SERPL HS: 14 NG/L
CARDIAC TROPONIN I PNL SERPL HS: 15 NG/L
CHLORIDE SERPL-SCNC: 99 MMOL/L (ref 96–108)
CLARITY UR: CLEAR
CO2 SERPL-SCNC: 28 MMOL/L (ref 21–32)
COLOR FLD: NORMAL
COLOR UR: YELLOW
CREAT SERPL-MCNC: 1.13 MG/DL (ref 0.6–1.3)
D DIMER PPP FEU-MCNC: 1 UG/ML FEU
EOSINOPHIL # BLD AUTO: 0.15 THOUSAND/ÂΜL (ref 0–0.61)
EOSINOPHIL NFR BLD AUTO: 2 % (ref 0–6)
ERYTHROCYTE [DISTWIDTH] IN BLOOD BY AUTOMATED COUNT: 13.6 % (ref 11.6–15.1)
GFR SERPL CREATININE-BSD FRML MDRD: 45 ML/MIN/1.73SQ M
GLUCOSE FLD-MCNC: 115 MG/DL
GLUCOSE SERPL-MCNC: 110 MG/DL (ref 65–140)
GLUCOSE UR STRIP-MCNC: NEGATIVE MG/DL
HCT VFR BLD AUTO: 45.5 % (ref 34.8–46.1)
HGB BLD-MCNC: 14.2 G/DL (ref 11.5–15.4)
HGB UR QL STRIP.AUTO: ABNORMAL
IMM GRANULOCYTES # BLD AUTO: 0.08 THOUSAND/UL (ref 0–0.2)
IMM GRANULOCYTES NFR BLD AUTO: 1 % (ref 0–2)
KETONES UR STRIP-MCNC: NEGATIVE MG/DL
LDH FLD L TO P-CCNC: 98 U/L
LEUKOCYTE ESTERASE UR QL STRIP: NEGATIVE
LYMPHOCYTES # BLD AUTO: 1.55 THOUSANDS/ÂΜL (ref 0.6–4.47)
LYMPHOCYTES NFR BLD AUTO: 19 % (ref 14–44)
LYMPHOCYTES NFR BLD AUTO: 32 %
MCH RBC QN AUTO: 28.8 PG (ref 26.8–34.3)
MCHC RBC AUTO-ENTMCNC: 31.2 G/DL (ref 31.4–37.4)
MCV RBC AUTO: 92 FL (ref 82–98)
MONOCYTES # BLD AUTO: 0.96 THOUSAND/ÂΜL (ref 0.17–1.22)
MONOCYTES NFR BLD AUTO: 12 % (ref 4–12)
MONOCYTES NFR BLD AUTO: 5 %
MONONUC CELLS NFR FLD MANUAL: 62 %
NEUTROPHILS # BLD AUTO: 5.48 THOUSANDS/ÂΜL (ref 1.85–7.62)
NEUTS SEG NFR BLD AUTO: 1 %
NEUTS SEG NFR BLD AUTO: 65 % (ref 43–75)
NITRITE UR QL STRIP: NEGATIVE
NON-SQ EPI CELLS URNS QL MICRO: NORMAL /HPF
NRBC BLD AUTO-RTO: 0 /100 WBCS
P AXIS: 72 DEGREES
PH BODY FLUID: 7.6
PH UR STRIP.AUTO: 6.5 [PH]
PLATELET # BLD AUTO: 335 THOUSANDS/UL (ref 149–390)
PMV BLD AUTO: 9.5 FL (ref 8.9–12.7)
POTASSIUM SERPL-SCNC: 3.6 MMOL/L (ref 3.5–5.3)
PR INTERVAL: 188 MS
PROT FLD-MCNC: <3 G/DL
PROT UR STRIP-MCNC: NEGATIVE MG/DL
QRS AXIS: 47 DEGREES
QRS AXIS: 50 DEGREES
QRSD INTERVAL: 142 MS
QRSD INTERVAL: 142 MS
QT INTERVAL: 322 MS
QT INTERVAL: 334 MS
QTC INTERVAL: 427 MS
QTC INTERVAL: 454 MS
RBC # BLD AUTO: 4.93 MILLION/UL (ref 3.81–5.12)
RBC #/AREA URNS AUTO: NORMAL /HPF
SITE: NORMAL
SODIUM SERPL-SCNC: 135 MMOL/L (ref 135–147)
SP GR UR STRIP.AUTO: 1.01
T WAVE AXIS: 126 DEGREES
T WAVE AXIS: 149 DEGREES
TOTAL CELLS COUNTED SPEC: 100
TOTAL PROTEIN FLUID: 2.1 G/DL
UROBILINOGEN UR QL STRIP.AUTO: 0.2 E.U./DL
VENTRICULAR RATE: 106 BPM
VENTRICULAR RATE: 111 BPM
WBC # BLD AUTO: 8.29 THOUSAND/UL (ref 4.31–10.16)
WBC # FLD MANUAL: 592 /UL
WBC #/AREA URNS AUTO: NORMAL /HPF

## 2024-08-05 PROCEDURE — 32555 ASPIRATE PLEURA W/ IMAGING: CPT

## 2024-08-05 PROCEDURE — 83615 LACTATE (LD) (LDH) ENZYME: CPT | Performed by: NURSE PRACTITIONER

## 2024-08-05 PROCEDURE — 82945 GLUCOSE OTHER FLUID: CPT | Performed by: NURSE PRACTITIONER

## 2024-08-05 PROCEDURE — 87205 SMEAR GRAM STAIN: CPT | Performed by: NURSE PRACTITIONER

## 2024-08-05 PROCEDURE — 99285 EMERGENCY DEPT VISIT HI MDM: CPT

## 2024-08-05 PROCEDURE — 88305 TISSUE EXAM BY PATHOLOGIST: CPT | Performed by: PATHOLOGY

## 2024-08-05 PROCEDURE — 87070 CULTURE OTHR SPECIMN AEROBIC: CPT | Performed by: NURSE PRACTITIONER

## 2024-08-05 PROCEDURE — 93005 ELECTROCARDIOGRAM TRACING: CPT

## 2024-08-05 PROCEDURE — 99223 1ST HOSP IP/OBS HIGH 75: CPT | Performed by: NURSE PRACTITIONER

## 2024-08-05 PROCEDURE — 71275 CT ANGIOGRAPHY CHEST: CPT

## 2024-08-05 PROCEDURE — 84484 ASSAY OF TROPONIN QUANT: CPT

## 2024-08-05 PROCEDURE — 36415 COLL VENOUS BLD VENIPUNCTURE: CPT

## 2024-08-05 PROCEDURE — 88112 CYTOPATH CELL ENHANCE TECH: CPT | Performed by: PATHOLOGY

## 2024-08-05 PROCEDURE — 0W993ZZ DRAINAGE OF RIGHT PLEURAL CAVITY, PERCUTANEOUS APPROACH: ICD-10-PCS | Performed by: RADIOLOGY

## 2024-08-05 PROCEDURE — 94760 N-INVAS EAR/PLS OXIMETRY 1: CPT

## 2024-08-05 PROCEDURE — 83880 ASSAY OF NATRIURETIC PEPTIDE: CPT

## 2024-08-05 PROCEDURE — 96374 THER/PROPH/DIAG INJ IV PUSH: CPT

## 2024-08-05 PROCEDURE — 84157 ASSAY OF PROTEIN OTHER: CPT | Performed by: NURSE PRACTITIONER

## 2024-08-05 PROCEDURE — 80048 BASIC METABOLIC PNL TOTAL CA: CPT

## 2024-08-05 PROCEDURE — 85379 FIBRIN DEGRADATION QUANT: CPT

## 2024-08-05 PROCEDURE — 93010 ELECTROCARDIOGRAM REPORT: CPT | Performed by: INTERNAL MEDICINE

## 2024-08-05 PROCEDURE — 81001 URINALYSIS AUTO W/SCOPE: CPT

## 2024-08-05 PROCEDURE — 89051 BODY FLUID CELL COUNT: CPT | Performed by: NURSE PRACTITIONER

## 2024-08-05 PROCEDURE — 94664 DEMO&/EVAL PT USE INHALER: CPT

## 2024-08-05 PROCEDURE — 85025 COMPLETE CBC W/AUTO DIFF WBC: CPT

## 2024-08-05 PROCEDURE — 83986 ASSAY PH BODY FLUID NOS: CPT | Performed by: NURSE PRACTITIONER

## 2024-08-05 PROCEDURE — 32555 ASPIRATE PLEURA W/ IMAGING: CPT | Performed by: RADIOLOGY

## 2024-08-05 PROCEDURE — NC001 PR NO CHARGE: Performed by: RADIOLOGY

## 2024-08-05 RX ORDER — PANTOPRAZOLE SODIUM 40 MG/1
40 TABLET, DELAYED RELEASE ORAL
Status: DISCONTINUED | OUTPATIENT
Start: 2024-08-05 | End: 2024-08-08 | Stop reason: HOSPADM

## 2024-08-05 RX ORDER — ACETAMINOPHEN 325 MG/1
650 TABLET ORAL EVERY 4 HOURS PRN
Status: DISCONTINUED | OUTPATIENT
Start: 2024-08-05 | End: 2024-08-08 | Stop reason: HOSPADM

## 2024-08-05 RX ORDER — FUROSEMIDE 10 MG/ML
40 INJECTION INTRAMUSCULAR; INTRAVENOUS 2 TIMES DAILY
Status: DISCONTINUED | OUTPATIENT
Start: 2024-08-05 | End: 2024-08-06

## 2024-08-05 RX ORDER — LIDOCAINE HYDROCHLORIDE 10 MG/ML
INJECTION, SOLUTION EPIDURAL; INFILTRATION; INTRACAUDAL; PERINEURAL AS NEEDED
Status: COMPLETED | OUTPATIENT
Start: 2024-08-05 | End: 2024-08-05

## 2024-08-05 RX ORDER — FUROSEMIDE 10 MG/ML
40 INJECTION INTRAMUSCULAR; INTRAVENOUS ONCE
Status: COMPLETED | OUTPATIENT
Start: 2024-08-05 | End: 2024-08-05

## 2024-08-05 RX ORDER — DILTIAZEM HYDROCHLORIDE 180 MG/1
180 CAPSULE, COATED, EXTENDED RELEASE ORAL DAILY
Status: DISCONTINUED | OUTPATIENT
Start: 2024-08-05 | End: 2024-08-08 | Stop reason: HOSPADM

## 2024-08-05 RX ORDER — POTASSIUM CHLORIDE 20 MEQ/1
40 TABLET, EXTENDED RELEASE ORAL ONCE
Status: COMPLETED | OUTPATIENT
Start: 2024-08-05 | End: 2024-08-05

## 2024-08-05 RX ORDER — HEPARIN SODIUM 5000 [USP'U]/ML
5000 INJECTION, SOLUTION INTRAVENOUS; SUBCUTANEOUS EVERY 8 HOURS SCHEDULED
Status: DISCONTINUED | OUTPATIENT
Start: 2024-08-05 | End: 2024-08-06

## 2024-08-05 RX ORDER — ATORVASTATIN CALCIUM 20 MG/1
20 TABLET, FILM COATED ORAL DAILY
Status: DISCONTINUED | OUTPATIENT
Start: 2024-08-06 | End: 2024-08-08 | Stop reason: HOSPADM

## 2024-08-05 RX ADMIN — FUROSEMIDE 40 MG: 10 INJECTION, SOLUTION INTRAMUSCULAR; INTRAVENOUS at 11:22

## 2024-08-05 RX ADMIN — LIDOCAINE HYDROCHLORIDE 10 ML: 10 INJECTION, SOLUTION EPIDURAL; INFILTRATION; INTRACAUDAL; PERINEURAL at 15:06

## 2024-08-05 RX ADMIN — POTASSIUM CHLORIDE 40 MEQ: 1500 TABLET, EXTENDED RELEASE ORAL at 11:22

## 2024-08-05 RX ADMIN — IOHEXOL 85 ML: 350 INJECTION, SOLUTION INTRAVENOUS at 09:39

## 2024-08-05 RX ADMIN — HEPARIN SODIUM 5000 UNITS: 5000 INJECTION, SOLUTION INTRAVENOUS; SUBCUTANEOUS at 14:45

## 2024-08-05 RX ADMIN — HEPARIN SODIUM 5000 UNITS: 5000 INJECTION, SOLUTION INTRAVENOUS; SUBCUTANEOUS at 21:45

## 2024-08-05 RX ADMIN — FUROSEMIDE 40 MG: 10 INJECTION, SOLUTION INTRAMUSCULAR; INTRAVENOUS at 17:29

## 2024-08-05 NOTE — ED PROVIDER NOTES
"History  Chief Complaint   Patient presents with    Shortness of Breath     Pt reports SOB starting at 3am this morning     Patient is an 84-year-old female hypertension, atrial fibrillation on Eliquis, and heart failure. Presents today for a chief complaint of shortness of breath. States that she woke up at 3 AM and had a coughing fit and then experienced shortness of breath since then. Feels a \"wheezing\" in her chest. Decided to wait it out and see if it resolved prior to coming in. Denies it getting any worse or any better. Was recently admitted from 07/31-08/02 where she was diagnosed with atrial fibrillation and placed on Eliquis and Cardizem. Reports compliance with medications. Denies any fever, chills, chest pain, palpitations, lower extremity swelling, or abdominal pain. No recent travel or known sick contacts.           Prior to Admission Medications   Prescriptions Last Dose Informant Patient Reported? Taking?   apixaban (Eliquis) 2.5 mg   No No   Sig: Take 1 tablet (2.5 mg total) by mouth 2 (two) times a day   aspirin 81 MG tablet  Self Yes No   Sig: Take 81 mg by mouth daily   atorvastatin (LIPITOR) 20 mg tablet   No No   Sig: Take 1 tablet by mouth once daily   diltiazem (CARDIZEM CD) 180 mg 24 hr capsule   No No   Sig: Take 1 capsule (180 mg total) by mouth daily   omeprazole (PriLOSEC) 20 mg delayed release capsule   No No   Sig: Take 1 capsule by mouth once daily   potassium chloride (Klor-Con M10) 10 mEq tablet   No No   Sig: Take 1 tablet (10 mEq total) by mouth daily   torsemide (DEMADEX) 10 mg tablet   No No   Sig: Take 1 tablet (10 mg total) by mouth daily      Facility-Administered Medications: None       Past Medical History:   Diagnosis Date    Arteriosclerotic heart disease     LAST ASSESSED: 09SEP2015    Esophageal reflux     LAST ASSESSED: 09JAN2018    GERD (gastroesophageal reflux disease)     Hyperlipidemia     LAST ASSESSED: 09JAN2018    Hypertension     LAST ASSESSED: 09JAN2018    " Nontoxic single thyroid nodule     LAST ASSESSED: [id number]    Osteopenia     LAST ASSESSED: [id number]       Past Surgical History:   Procedure Laterality Date    CYSTOSCOPY  12/04/2013    DIAGNOSTIC    ESOPHAGOGASTRODUODENOSCOPY      MASTECTOMY Bilateral     cancer    OOPHORECTOMY      SALPINGOOPHORECTOMY Bilateral        Family History   Problem Relation Age of Onset    Alzheimer's disease Mother     Hypertension Father     Stroke Father         SYNDROME    Colon cancer Brother      I have reviewed and agree with the history as documented.    E-Cigarette/Vaping    E-Cigarette Use Never User      E-Cigarette/Vaping Substances    Nicotine No     THC No     CBD No     Flavoring No     Other No     Unknown No      Social History     Tobacco Use    Smoking status: Never    Smokeless tobacco: Never   Vaping Use    Vaping status: Never Used   Substance Use Topics    Alcohol use: Never    Drug use: No       Review of Systems   Constitutional:  Negative for chills and fever.   HENT:  Negative for congestion and rhinorrhea.    Respiratory:  Positive for shortness of breath. Negative for chest tightness.    Cardiovascular:  Negative for chest pain and palpitations.   Gastrointestinal:  Negative for abdominal pain, diarrhea, nausea and vomiting.   Genitourinary:  Positive for frequency and urgency.   All other systems reviewed and are negative.      Physical Exam  Physical Exam  Vitals and nursing note reviewed.   Constitutional:       Appearance: She is well-developed.   HENT:      Head: Normocephalic and atraumatic.   Cardiovascular:      Rate and Rhythm: Regular rhythm. Tachycardia present.      Heart sounds: Normal heart sounds.   Pulmonary:      Effort: Pulmonary effort is normal.      Breath sounds: Normal breath sounds.   Abdominal:      General: Abdomen is flat. Bowel sounds are normal.      Palpations: Abdomen is soft.      Tenderness: There is no abdominal tenderness.   Musculoskeletal:         General: No swelling.  Normal range of motion.      Right lower leg: No edema.      Left lower leg: No edema.   Skin:     General: Skin is warm and dry.      Capillary Refill: Capillary refill takes less than 2 seconds.   Neurological:      General: No focal deficit present.      Mental Status: She is alert and oriented to person, place, and time.   Psychiatric:         Mood and Affect: Mood normal.         Behavior: Behavior normal.         Vital Signs  ED Triage Vitals   Temperature Pulse Respirations Blood Pressure SpO2   08/05/24 0829 08/05/24 0828 08/05/24 0828 08/05/24 0828 08/05/24 0828   97.5 °F (36.4 °C) 104 18 138/91 94 %      Temp Source Heart Rate Source Patient Position - Orthostatic VS BP Location FiO2 (%)   08/05/24 0829 08/05/24 0828 08/05/24 0831 08/05/24 0831 --   Tympanic Monitor Sitting Left arm       Pain Score       08/05/24 0831       No Pain           Vitals:    08/05/24 0915 08/05/24 1124 08/05/24 1130 08/05/24 1211   BP: 147/67 125/85 136/78 142/73   Pulse: (!) 111 (!) 132 (!) 116 99   Patient Position - Orthostatic VS: Sitting Sitting Sitting          Visual Acuity      ED Medications  Medications   iohexol (OMNIPAQUE) 350 MG/ML injection (MULTI-DOSE) 85 mL (85 mL Intravenous Given 8/5/24 0939)   furosemide (LASIX) injection 40 mg (40 mg Intravenous Given 8/5/24 1122)   potassium chloride (Klor-Con M20) CR tablet 40 mEq (40 mEq Oral Given 8/5/24 1122)       Diagnostic Studies  Results Reviewed       Procedure Component Value Units Date/Time    HS Troponin I 2hr [[id number]]  (Normal) Collected: 08/05/24 1036    Lab Status: Final result Specimen: Blood from Arm, Right Updated: 08/05/24 1116     hs TnI 2hr 12 ng/L      Delta 2hr hsTnI -2 ng/L     Urine Microscopic [[id number]]  (Normal) Collected: 08/05/24 1032    Lab Status: Final result Specimen: Urine, Clean Catch Updated: 08/05/24 1109     RBC, UA 0-1 /hpf      WBC, UA 0-1 /hpf      Epithelial Cells Occasional /hpf      Bacteria, UA None Seen /hpf     UA w  Reflex to Microscopic w Reflex to Culture [[id number]]  (Abnormal) Collected: 08/05/24 1032    Lab Status: Final result Specimen: Urine, Clean Catch Updated: 08/05/24 1054     Color, UA Yellow     Clarity, UA Clear     Specific Gravity, UA 1.010     pH, UA 6.5     Leukocytes, UA Negative     Nitrite, UA Negative     Protein, UA Negative mg/dl      Glucose, UA Negative mg/dl      Ketones, UA Negative mg/dl      Urobilinogen, UA 0.2 E.U./dl      Bilirubin, UA Negative     Occult Blood, UA Trace-Intact    HS Troponin I 4hr [[id number]]     Lab Status: No result Specimen: Blood     B-Type Natriuretic Peptide(BNP) [[id number]]  (Abnormal) Collected: 08/05/24 0834    Lab Status: Final result Specimen: Blood from Arm, Right Updated: 08/05/24 1019      pg/mL     D-dimer, quantitative [[id number]]  (Abnormal) Collected: 08/05/24 0851    Lab Status: Final result Specimen: Blood from Arm, Right Updated: 08/05/24 0920     D-Dimer, Quant 1.00 ug/ml FEU     Narrative:      In the evaluation for possible pulmonary embolism, in the appropriate (Well's Score of 4 or less) patient, the age adjusted d-dimer cutoff for this patient can be calculated as:    Age x 0.01 (in ug/mL) for Age-adjusted D-dimer exclusion threshold for a patient over 50 years.    HS Troponin 0hr (reflex protocol) [[id number]]  (Normal) Collected: 08/05/24 0834    Lab Status: Final result Specimen: Blood from Arm, Right Updated: 08/05/24 0901     hs TnI 0hr 14 ng/L     Basic metabolic panel [[id number]] Collected: 08/05/24 0834    Lab Status: Final result Specimen: Blood from Arm, Right Updated: 08/05/24 0852     Sodium 135 mmol/L      Potassium 3.6 mmol/L      Chloride 99 mmol/L      CO2 28 mmol/L      ANION GAP 8 mmol/L      BUN 19 mg/dL      Creatinine 1.13 mg/dL      Glucose 110 mg/dL      Calcium 9.7 mg/dL      eGFR 45 ml/min/1.73sq m     Narrative:      [employer] guidelines for Chronic Kidney Disease (CKD):     Stage 1 with normal  "or high GFR (GFR > 90 mL/min/1.73 square meters)    Stage 2 Mild CKD (GFR = 60-89 mL/min/1.73 square meters)    Stage 3A Moderate CKD (GFR = 45-59 mL/min/1.73 square meters)    Stage 3B Moderate CKD (GFR = 30-44 mL/min/1.73 square meters)    Stage 4 Severe CKD (GFR = 15-29 mL/min/1.73 square meters)    Stage 5 End Stage CKD (GFR <15 mL/min/1.73 square meters)  Note: GFR calculation is accurate only with a steady state creatinine    CBC and differential [[id number]]  (Abnormal) Collected: 08/05/24 0834    Lab Status: Final result Specimen: Blood from Arm, Right Updated: 08/05/24 0852     WBC 8.29 Thousand/uL      RBC 4.93 Million/uL      Hemoglobin 14.2 g/dL      Hematocrit 45.5 %      MCV 92 fL      MCH 28.8 pg      MCHC 31.2 g/dL      RDW 13.6 %      MPV 9.5 fL      Platelets 335 Thousands/uL      nRBC 0 /100 WBCs      Segmented % 65 %      Immature Grans % 1 %      Lymphocytes % 19 %      Monocytes % 12 %      Eosinophils Relative 2 %      Basophils Relative 1 %      Absolute Neutrophils 5.48 Thousands/µL      Absolute Immature Grans 0.08 Thousand/uL      Absolute Lymphocytes 1.55 Thousands/µL      Absolute Monocytes 0.96 Thousand/µL      Eosinophils Absolute 0.15 Thousand/µL      Basophils Absolute 0.07 Thousands/µL                    CTA ED chest PE study   ED Interpretation by OSMAN Bird (08/05 1007)   Official CT showing: \"No evidence for pulmonary embolism. Moderate sized bilateral pleural effusions with subjacent compressive atelectasis.\"        Final Result by Tristan Johnson MD (08/05 1001)      No evidence for pulmonary embolism.      Moderate sized bilateral pleural effusions with subjacent compressive atelectasis.                  Workstation performed: [id number]         IR IN-Patient Thoracentesis    (Results Pending)              Procedures  ECG 12 Lead Documentation Only    Date/Time: 8/5/2024 8:51 AM    Performed by: OSMAN Bird  Authorized by: OSMAN Bird  "   Indications / Diagnosis:  SOB/tachycardia  ECG reviewed by me, the ED Provider: yes    Patient location:  ED  Previous ECG:     Previous ECG:  Compared to current    Comparison ECG info:  Atrial fibrillation, JM=410  Interpretation:     Interpretation: normal    Rate:     ECG rate:  106    ECG rate assessment: tachycardic    Rhythm:     Rhythm: atrial fibrillation    Ectopy:     Ectopy: none    QRS:     QRS axis:  Normal  Conduction:     Conduction: normal    ST segments:     ST segments:  Normal  T waves:     T waves: normal    Comments:      Left BBB           ED Course  ED Course as of 08/05/24 1222   Mon Aug 05, 2024   0856 CBC and differential(!)  No leukocytosis or anemia present.   0857 Basic metabolic panel  No electrolyte imbalance.   0901 HS Troponin 0hr (reflex protocol)  Trop=14.   0924 D-dimer, quantitative(!)  Dimer=1.  CTA ordered.   1005 CTA negative for PE   1027 B-Type Natriuretic Peptide(BNP)(!)  LTW=754.   1032 Patient reporting her breathing feels better but admits to increased urinary frequency and urgency. Will test UA to r/o UTI   1135 UA w Reflex to Microscopic w Reflex to Culture(!)  Negative UTI   1136 HS Troponin I 2hr  Trop=12.               HEART Risk Score      Flowsheet Row Most Recent Value   Heart Score Risk Calculator    History 0 Filed at: 08/05/2024 0903   ECG 0 Filed at: 08/05/2024 0903   Age 2 Filed at: 08/05/2024 0903   Risk Factors 1 Filed at: 08/05/2024 0903   Troponin 1 Filed at: 08/05/2024 0903   HEART Score 4 Filed at: 08/05/2024 0903                          SBIRT 22yo+      Flowsheet Row Most Recent Value   Initial Alcohol Screen: US AUDIT-C     1. How often do you have a drink containing alcohol? 0 Filed at: 08/05/2024 0833   2. How many drinks containing alcohol do you have on a typical day you are drinking?  0 Filed at: 08/05/2024 0833   3a. Male UNDER 65: How often do you have five or more drinks on one occasion? 0 Filed at: 08/05/2024 0833   3b. FEMALE Any Age,  "or MALE 65+: How often do you have 4 or more drinks on one occassion? 0 Filed at: 08/05/2024 0833   Audit-C Score 0 Filed at: 08/05/2024 0833   RONNY: How many times in the past year have you...    Used an illegal drug or used a prescription medication for non-medical reasons? Never Filed at: 08/05/2024 0833            Wells' Criteria for PE      Flowsheet Row Most Recent Value   Wells' Criteria for PE    Clinical signs and symptoms of DVT 0 Filed at: 08/05/2024 0841   PE is primary diagnosis or equally likely 0 Filed at: 08/05/2024 0841   HR >100 1.5 Filed at: 08/05/2024 0841   Immobilization at least 3 days or Surgery in the previous 4 weeks 0 Filed at: 08/05/2024 0841   Previous, objectively diagnosed PE or DVT 0 Filed at: 08/05/2024 0841   Hemoptysis 0 Filed at: 08/05/2024 0841   Malignancy with treatment within 6 months or palliative 0 Filed at: 08/05/2024 0841   Wells' Criteria Total 1.5 Filed at: 08/05/2024 0841                  Medical Decision Making  Patient is an 84-year-old female presenting for shortness of breath. Work-up showing normal CBC and BMP. Negative for UTI. Initial troponin=14, repeat troponin=12. EKG showing atrial fibrillation with left BBB and heart rate of 106. HEART score=4. Official CT showing: \"No evidence for pulmonary embolism. Moderate sized bilateral pleural effusions with subjacent compressive atelectasis.\" Wells PE= 1.5. ZOR=546. Patient reporting decreased shortness of breath. Heart rate remains in the 120s despite morning medications taken PTA. Oxygen between 92-95% on room air. Patient given IV Lasix and potassium and reached out to [facility] who was agreeable for admission.       Amount and/or Complexity of Data Reviewed  Labs: ordered. Decision-making details documented in ED Course.     Details: Reviewed   Radiology: ordered.     Details: Reviewed     Risk  Prescription drug management.  Decision regarding hospitalization.                 Disposition  Final diagnoses:   Shortness " of breath   Elevated brain natriuretic peptide (BNP) level   Pleural effusion, bilateral     Time reflects when diagnosis was documented in both MDM as applicable and the Disposition within this note       Time User Action Codes Description Comment    8/5/2024 11:31 AM Beverly Reinoso [R06.02] Shortness of breath     8/5/2024 11:31 AM Beverly Reinoso [R79.89] Elevated brain natriuretic peptide (BNP) level     8/5/2024 11:31 AM Beverly Reinoso [J90] Pleural effusion, bilateral           ED Disposition       ED Disposition   Admit    Condition   Stable    Date/Time   Mon Aug 5, 2024 1131    Comment   Case was discussed with [facility] and the patient's admission status was agreed to be Admission Status: inpatient status to the service of Dr. Lawson .               Follow-up Information    None         Current Discharge Medication List        CONTINUE these medications which have NOT CHANGED    Details   apixaban (Eliquis) 2.5 mg Take 1 tablet (2.5 mg total) by mouth 2 (two) times a day  Qty: 60 tablet, Refills: 0    Comments: Eliquis Starter Pack  Associated Diagnoses: Atrial fibrillation with RVR (HCC)      aspirin 81 MG tablet Take 81 mg by mouth daily      atorvastatin (LIPITOR) 20 mg tablet Take 1 tablet by mouth once daily  Qty: 100 tablet, Refills: 1    Associated Diagnoses: Hyperlipidemia, unspecified hyperlipidemia type      diltiazem (CARDIZEM CD) 180 mg 24 hr capsule Take 1 capsule (180 mg total) by mouth daily  Qty: 30 capsule, Refills: 0    Associated Diagnoses: Atrial fibrillation with rapid ventricular response (HCC)      omeprazole (PriLOSEC) 20 mg delayed release capsule Take 1 capsule by mouth once daily  Qty: 100 capsule, Refills: 1    Associated Diagnoses: Gastroesophageal reflux disease      potassium chloride (Klor-Con M10) 10 mEq tablet Take 1 tablet (10 mEq total) by mouth daily  Qty: 30 tablet, Refills: 0    Associated Diagnoses: Cardiomyopathy, unspecified type (HCC)      torsemide  (DEMADEX) 10 mg tablet Take 1 tablet (10 mg total) by mouth daily  Qty: 30 tablet, Refills: 0    Associated Diagnoses: Cardiomyopathy, unspecified type (HCC)             No discharge procedures on file.    PDMP Review       None            ED Provider  Electronically Signed by             OSMAN Bird  08/05/24 3547

## 2024-08-05 NOTE — SEDATION DOCUMENTATION
Right side thoracentesis completed by Dr Arcos. 700 cc clear yellow fluid removed. Band aid applied to site. Pt tolerated well.

## 2024-08-05 NOTE — ASSESSMENT & PLAN NOTE
Wt Readings from Last 3 Encounters:   08/05/24 58.7 kg (129 lb 4.8 oz)   08/01/24 58.6 kg (129 lb 3 oz)   05/01/24 59 kg (130 lb)     Presented for shortness of breath   History of recent hospitalization for new atrial fibrillation with RVR-31 2024  Echoardiogram 7/29/2024: The left ventricular ejection fraction is 50%. Systolic function is low normal. There appears to be hypokinesis of the septal walls and basal inferior wall. Unable to assess diastolic function due to atrial fibrillation  CT chest PE study 8/5/2024:Moderate sized bilateral pleural effusions with subjacent compressive atelectasis     Received furosemide 40 mg IV in the ER.  Will continue furosemide 40 mg IV twice daily for now  Monitor daily weights and I&O's  Cardiology consultation

## 2024-08-05 NOTE — RESPIRATORY THERAPY NOTE
RT Protocol Note  Nohemi Arcos 84 y.o. female MRN: [id number]  Unit/Bed#: -01 Encounter: [id number]    Assessment    Principal Problem:    Acute on chronic diastolic (congestive) heart failure (HCC)  Active Problems:    SIRS (systemic inflammatory response syndrome) (HCC)    Bilateral pleural effusion    Atrial fibrillation, controlled (HCC)    Urinary frequency      Home Pulmonary Medications:  None    Home Devices/Therapy: Other (Comment) (None)    Past Medical History:   Diagnosis Date    Arteriosclerotic heart disease     LAST ASSESSED: 09SEP2015    Esophageal reflux     LAST ASSESSED: 09JAN2018    GERD (gastroesophageal reflux disease)     Hyperlipidemia     LAST ASSESSED: 09JAN2018    Hypertension     LAST ASSESSED: 09JAN2018    Nontoxic single thyroid nodule     LAST ASSESSED: 09MAY2014    Osteopenia     LAST ASSESSED: 11MAY2016     Social History     Socioeconomic History    Marital status:      Spouse name: None    Number of children: None    Years of education: None    Highest education level: None   Occupational History    None   Tobacco Use    Smoking status: Never    Smokeless tobacco: Never   Vaping Use    Vaping status: Never Used   Substance and Sexual Activity    Alcohol use: Never    Drug use: No    Sexual activity: Not Currently   Other Topics Concern    None   Social History Narrative    Always uses seat belt    Dental care, regularly     Social Determinants of Health     Financial Resource Strain: Low Risk  (7/10/2023)    Overall Financial Resource Strain (CARDIA)     Difficulty of Paying Living Expenses: Not very hard   Food Insecurity: No Food Insecurity (8/1/2024)    Hunger Vital Sign     Worried About Running Out of Food in the Last Year: Never true     Ran Out of Food in the Last Year: Never true   Transportation Needs: No Transportation Needs (7/10/2023)    PRAPARE - Transportation     Lack of Transportation (Medical): No     Lack of Transportation (Non-Medical): No  "  Physical Activity: Not on file   Stress: Not on file   Social Connections: Not on file   Intimate Partner Violence: Not on file   Housing Stability: Low Risk  (8/1/2024)    Housing Stability Vital Sign     Unable to Pay for Housing in the Last Year: No     Number of Times Moved in the Last Year: 1     Homeless in the Last Year: No       Subjective         Objective    Physical Exam:   Assessment Type: Assess only  General Appearance: Alert, Awake  Respiratory Pattern: Normal  Chest Assessment: Chest expansion symmetrical  Bilateral Breath Sounds: Diminished  Cough: None    Vitals:  Blood pressure 142/73, pulse 89, temperature (!) 96.8 °F (36 °C), temperature source Temporal, resp. rate (!) 24, height 5' 1\" (1.549 m), weight 58.7 kg (129 lb 4.8 oz), SpO2 92%.          Imaging and other studies: I have personally reviewed pertinent reports.            Plan    Respiratory Plan:  (No distress . As  per patient she has no pulmonary problems.)        Resp Comments: Patient states her breathing feels fine at the present time. She states she has no issues with her breathing and takes no breathing medication at home.. No breathing medication indicated at this time . Patient is admitted for CHF. Will discontinue the respiratory protocol at this time.       "

## 2024-08-05 NOTE — ASSESSMENT & PLAN NOTE
CT chest PE study 8/5/2024: Bilateral pleural effusion with subjacent compressive atelectasis.  No pulmonary embolism  Received Lasix 40 mg IV to continue twice daily  IR consultation for possible thoracentesis if indicated  Conduct serial respiratory assessments, monitor volume status

## 2024-08-05 NOTE — PLAN OF CARE
Problem: Potential for Falls  Goal: Patient will remain free of falls  Description: INTERVENTIONS:  - Educate patient/family on patient safety including physical limitations  - Instruct patient to call for assistance with activity   - Consult OT/PT to assist with strengthening/mobility   - Keep Call bell within reach  - Keep bed low and locked with side rails adjusted as appropriate  - Keep care items and personal belongings within reach  - Initiate and maintain comfort rounds  - Make Fall Risk Sign visible to staff  - Offer Toileting every 2 Hours, in advance of need  - Initiate/Maintain bed alarm  - Obtain necessary fall risk management equipment: non skid socks  - Apply yellow socks and bracelet for high fall risk patients  - Consider moving patient to room near nurses station  Outcome: Progressing     Problem: PAIN - ADULT  Goal: Verbalizes/displays adequate comfort level or baseline comfort level  Description: Interventions:  - Encourage patient to monitor pain and request assistance  - Assess pain using appropriate pain scale  - Administer analgesics based on type and severity of pain and evaluate response  - Implement non-pharmacological measures as appropriate and evaluate response  - Consider cultural and social influences on pain and pain management  - Notify physician/advanced practitioner if interventions unsuccessful or patient reports new pain  Outcome: Progressing     Problem: INFECTION - ADULT  Goal: Absence or prevention of progression during hospitalization  Description: INTERVENTIONS:  - Assess and monitor for signs and symptoms of infection  - Monitor lab/diagnostic results  - Monitor all insertion sites, i.e. indwelling lines, tubes, and drains  - Monitor endotracheal if appropriate and nasal secretions for changes in amount and color  - [place] appropriate cooling/warming therapies per order  - Administer medications as ordered  - Instruct and encourage patient and family to use good hand  hygiene technique  - Identify and instruct in appropriate isolation precautions for identified infection/condition  Outcome: Progressing  Goal: Absence of fever/infection during neutropenic period  Description: INTERVENTIONS:  - Monitor WBC    Outcome: Progressing     Problem: SAFETY ADULT  Goal: Patient will remain free of falls  Description: INTERVENTIONS:  - Educate patient/family on patient safety including physical limitations  - Instruct patient to call for assistance with activity   - Consult OT/PT to assist with strengthening/mobility   - Keep Call bell within reach  - Keep bed low and locked with side rails adjusted as appropriate  - Keep care items and personal belongings within reach  - Initiate and maintain comfort rounds  - Make Fall Risk Sign visible to staff  - Offer Toileting every 2 Hours, in advance of need  - Initiate/Maintain bed alarm  - Obtain necessary fall risk management equipment: non skid socks  - Apply yellow socks and bracelet for high fall risk patients  - Consider moving patient to room near nurses station  Outcome: Progressing  Goal: Maintain or return to baseline ADL function  Description: INTERVENTIONS:  -  Assess patient's ability to carry out ADLs; assess patient's baseline for ADL function and identify physical deficits which impact ability to perform ADLs (bathing, care of mouth/teeth, toileting, grooming, dressing, etc.)  - Assess/evaluate cause of self-care deficits   - Assess range of motion  - Assess patient's mobility; develop plan if impaired  - Assess patient's need for assistive devices and provide as appropriate  - Encourage maximum independence but intervene and supervise when necessary  - Involve family in performance of ADLs  - Assess for home care needs following discharge   - Consider OT consult to assist with ADL evaluation and planning for discharge  - Provide patient education as appropriate  Outcome: Progressing  Goal: Maintains/Returns to pre admission  functional level  Description: INTERVENTIONS:  - Perform AM-PAC 6 Click Basic Mobility/ Daily Activity assessment daily.  - Set and communicate daily mobility goal to care team and patient/family/caregiver.   - Collaborate with rehabilitation services on mobility goals if consulted  - Perform Range of Motion 2 times a day.  - Reposition patient every 2 hours.  - Dangle patient 2 times a day  - Stand patient 2 times a day  - Ambulate patient 2 times a day  - Out of bed to chair 3 times a day   - Out of bed for meals 3 times a day  - Out of bed for toileting  - Record patient progress and toleration of activity level   Outcome: Progressing     Problem: DISCHARGE PLANNING  Goal: Discharge to home or other facility with appropriate resources  Description: INTERVENTIONS:  - Identify barriers to discharge w/patient and caregiver  - Arrange for needed discharge resources and transportation as appropriate  - Identify discharge learning needs (meds, wound care, etc.)  - Refer to Case Management Department for coordinating discharge planning if the patient needs post-hospital services based on physician/advanced practitioner order or complex needs related to functional status, cognitive ability, or social support system  Outcome: Progressing     Problem: Knowledge Deficit  Goal: Patient/family/caregiver demonstrates understanding of disease process, treatment plan, medications, and discharge instructions  Description: Complete learning assessment and assess knowledge base.  Interventions:  - Provide teaching at level of understanding  - Provide teaching via preferred learning methods  Outcome: Progressing     Problem: RESPIRATORY - ADULT  Goal: Achieves optimal ventilation and oxygenation  Description: INTERVENTIONS:  - Assess for changes in respiratory status  - Assess for changes in mentation and behavior  - Position to facilitate oxygenation and minimize respiratory effort  - Oxygen administered by appropriate delivery if  ordered  - Initiate smoking cessation education as indicated  - Encourage broncho-pulmonary hygiene including cough, deep breathe, Incentive Spirometry  - Assess the need for suctioning and aspirate as needed  - Assess and instruct to report SOB or any respiratory difficulty  - Respiratory Therapy support as indicated  Outcome: Progressing     Problem: Decreased Cardiac Output  Goal: Cardiac output adequate for individual needs  Description: INTERVENTIONS: Monitor for signs and symptoms of decreased cardiac output   - Monitor for dyspnea with exertion and at rest  - Monitor for orthopnea  - Monitor for signs of tachycardia. Place patient on telemetry monitoring.  - Assess patient for jugular vein distention  - Assess patient for lower extremity edema and poor peripheral perfusion   - Auscultate lung sound for Fine bibasilar crackles   - Monitor for cardiac arrythmias   - Administer beta blockers, antiarrhythmic, and blood pressure medications as ordered    Outcome: Progressing

## 2024-08-05 NOTE — PROCEDURES
Interventional Radiology Procedure Note    PATIENT NAME: Nohemi Arcos   : 1940  MRN: [id number]     Pre-op Diagnosis:   1. Shortness of breath    2. Elevated brain natriuretic peptide (BNP) level    3. Pleural effusion, bilateral      2.    Dyspnea, symptomatic    Post-op Diagnosis:   1. Shortness of breath    2. Elevated brain natriuretic peptide (BNP) level    3. Pleural effusion, bilateral      2.   Same    Procedure: Thoracentesis on the right    Surgeon:   Jose Arcos MD  Assistants:     No qualified resident was available, Resident is only observing    Estimated Blood Loss: Minimal    Findings: 700 cc of clear yellow fluid out    Specimens: Sent as requested    Complications:  None     Anesthesia: local    Jose Arcos MD     Date: 2024  Time: 4:59 PM  I

## 2024-08-05 NOTE — ASSESSMENT & PLAN NOTE
Recently diagnosed and initiated on antiarrhythmic and apixaban  Continue diltiazem 180 mg p.o. daily  Hold apixaban and aspirin pending IR consultation for pleural effusions  Monitor vital signs

## 2024-08-05 NOTE — ASSESSMENT & PLAN NOTE
Tachycardia and tachypnea suspect secondary to atrial fibrillation and shortness of breath  Afebrile.  No leukocytosis  Monitor labs and vital signs

## 2024-08-05 NOTE — PLAN OF CARE
Problem: Potential for Falls  Goal: Patient will remain free of falls  Description: INTERVENTIONS:  - Educate patient/family on patient safety including physical limitations  - Instruct patient to call for assistance with activity   - Consult OT/PT to assist with strengthening/mobility   - Keep Call bell within reach  - Keep bed low and locked with side rails adjusted as appropriate  - Keep care items and personal belongings within reach  - Initiate and maintain comfort rounds  - Make Fall Risk Sign visible to staff  - Offer Toileting every 2 Hours, in advance of need  - Initiate/Maintain bed alarm  - Obtain necessary fall risk management equipment: non skid socks  - Apply yellow socks and bracelet for high fall risk patients  - Consider moving patient to room near nurses station  Outcome: Progressing     Problem: PAIN - ADULT  Goal: Verbalizes/displays adequate comfort level or baseline comfort level  Description: Interventions:  - Encourage patient to monitor pain and request assistance  - Assess pain using appropriate pain scale  - Administer analgesics based on type and severity of pain and evaluate response  - Implement non-pharmacological measures as appropriate and evaluate response  - Consider cultural and social influences on pain and pain management  - Notify physician/advanced practitioner if interventions unsuccessful or patient reports new pain  Outcome: Progressing     Problem: INFECTION - ADULT  Goal: Absence or prevention of progression during hospitalization  Description: INTERVENTIONS:  - Assess and monitor for signs and symptoms of infection  - Monitor lab/diagnostic results  - Monitor all insertion sites, i.e. indwelling lines, tubes, and drains  - Monitor endotracheal if appropriate and nasal secretions for changes in amount and color  - [place] appropriate cooling/warming therapies per order  - Administer medications as ordered  - Instruct and encourage patient and family to use good hand  hygiene technique  - Identify and instruct in appropriate isolation precautions for identified infection/condition  Outcome: Progressing  Goal: Absence of fever/infection during neutropenic period  Description: INTERVENTIONS:  - Monitor WBC    Outcome: Progressing     Problem: SAFETY ADULT  Goal: Patient will remain free of falls  Description: INTERVENTIONS:  - Educate patient/family on patient safety including physical limitations  - Instruct patient to call for assistance with activity   - Consult OT/PT to assist with strengthening/mobility   - Keep Call bell within reach  - Keep bed low and locked with side rails adjusted as appropriate  - Keep care items and personal belongings within reach  - Initiate and maintain comfort rounds  - Make Fall Risk Sign visible to staff  - Offer Toileting every 2 Hours, in advance of need  - Initiate/Maintain bed alarm  - Obtain necessary fall risk management equipment: non skid socks  - Apply yellow socks and bracelet for high fall risk patients  - Consider moving patient to room near nurses station  Outcome: Progressing  Goal: Maintain or return to baseline ADL function  Description: INTERVENTIONS:  -  Assess patient's ability to carry out ADLs; assess patient's baseline for ADL function and identify physical deficits which impact ability to perform ADLs (bathing, care of mouth/teeth, toileting, grooming, dressing, etc.)  - Assess/evaluate cause of self-care deficits   - Assess range of motion  - Assess patient's mobility; develop plan if impaired  - Assess patient's need for assistive devices and provide as appropriate  - Encourage maximum independence but intervene and supervise when necessary  - Involve family in performance of ADLs  - Assess for home care needs following discharge   - Consider OT consult to assist with ADL evaluation and planning for discharge  - Provide patient education as appropriate  Outcome: Progressing  Goal: Maintains/Returns to pre admission  functional level  Description: INTERVENTIONS:  - Perform AM-PAC 6 Click Basic Mobility/ Daily Activity assessment daily.  - Set and communicate daily mobility goal to care team and patient/family/caregiver.   - Collaborate with rehabilitation services on mobility goals if consulted  - Perform Range of Motion 2 times a day.  - Reposition patient every 2 hours.  - Dangle patient 2 times a day  - Stand patient 2 times a day  - Ambulate patient 2 times a day  - Out of bed to chair 3 times a day   - Out of bed for meals 3 times a day  - Out of bed for toileting  - Record patient progress and toleration of activity level   Outcome: Progressing     Problem: DISCHARGE PLANNING  Goal: Discharge to home or other facility with appropriate resources  Description: INTERVENTIONS:  - Identify barriers to discharge w/patient and caregiver  - Arrange for needed discharge resources and transportation as appropriate  - Identify discharge learning needs (meds, wound care, etc.)  - Refer to Case Management Department for coordinating discharge planning if the patient needs post-hospital services based on physician/advanced practitioner order or complex needs related to functional status, cognitive ability, or social support system  Outcome: Progressing     Problem: Knowledge Deficit  Goal: Patient/family/caregiver demonstrates understanding of disease process, treatment plan, medications, and discharge instructions  Description: Complete learning assessment and assess knowledge base.  Interventions:  - Provide teaching at level of understanding  - Provide teaching via preferred learning methods  Outcome: Progressing     Problem: RESPIRATORY - ADULT  Goal: Achieves optimal ventilation and oxygenation  Description: INTERVENTIONS:  - Assess for changes in respiratory status  - Assess for changes in mentation and behavior  - Position to facilitate oxygenation and minimize respiratory effort  - Oxygen administered by appropriate delivery if  ordered  - Initiate smoking cessation education as indicated  - Encourage broncho-pulmonary hygiene including cough, deep breathe, Incentive Spirometry  - Assess the need for suctioning and aspirate as needed  - Assess and instruct to report SOB or any respiratory difficulty  - Respiratory Therapy support as indicated  Outcome: Progressing

## 2024-08-05 NOTE — NURSING NOTE
Pt received from the er to room 220, oriented to the unit and the callbell, IR in to see pt, tolerated procedure well, states that her breathing is better since the fluid was removed, in bed at this time in no acute distress watching tv

## 2024-08-05 NOTE — DISCHARGE INSTRUCTIONS
[facility]  Interventional Radiology  [phone]    Thoracentesis     WHAT YOU NEED TO KNOW:   A thoracentesis is a procedure to remove extra fluid or air from between your lungs and your inner chest wall. Air or fluid buildup may make it hard for you to breathe. A thoracentesis allows your lungs to expand fully so you can breathe more easily.    DISCHARGE INSTRUCTIONS:     Small amount of shoulder pain and bloody sputum is normal after a Thoracentesis.     Rest:  Rest when you feel it is needed. Slowly start to do more each day. Return to your daily activities as directed.     Resume your normal diet. Small sips of flat soda will help mild nausea.    Do not smoke:  If you smoke, it is never too late to quit. Ask for information about how to stop smoking if you need help.    Contact Interventional Radiology at [phone] if:     You have a fever.    Your puncture site is red, warm, swollen, or draining pus.    You have questions or concerns about your procedure, medicine, or care.    Seek care immediately or call 911 if:     Severe chest pain with inspiration and shortness of breath    Large amounts of blood in your sputum    Follow up with your healthcare provider as directed.

## 2024-08-05 NOTE — ASSESSMENT & PLAN NOTE
Patient reported urinary frequency  Urinalysis without evidence for infection  Monitor urinary output  Conduct serial assessments

## 2024-08-05 NOTE — ED NOTES
Pt reports an increase of frequency of urination. Provider notified and UA ordered and sent down.      Lila Hays RN  08/05/24 [id number]

## 2024-08-05 NOTE — H&P
[facility]  H&P  Name: Nohemi Arcos 84 y.o. female I MRN: [id number]  Unit/Bed#: -01 I Date of Admission: 8/5/2024   Date of Service: 8/5/2024 I Hospital Day: 0      Assessment & Plan     * Acute on chronic diastolic (congestive) heart failure (HCC)  Assessment & Plan  Wt Readings from Last 3 Encounters:   08/05/24 58.7 kg (129 lb 4.8 oz)   08/01/24 58.6 kg (129 lb 3 oz)   05/01/24 59 kg (130 lb)     Presented for shortness of breath   History of recent hospitalization for new atrial fibrillation with RVR-31 2024  Echoardiogram 7/29/2024: The left ventricular ejection fraction is 50%. Systolic function is low normal. There appears to be hypokinesis of the septal walls and basal inferior wall. Unable to assess diastolic function due to atrial fibrillation  CT chest PE study 8/5/2024:Moderate sized bilateral pleural effusions with subjacent compressive atelectasis     Received furosemide 40 mg IV in the ER.  Will continue furosemide 40 mg IV twice daily for now  Monitor daily weights and I&O's  Cardiology consultation      Bilateral pleural effusion  Assessment & Plan  CT chest PE study 8/5/2024: Bilateral pleural effusion with subjacent compressive atelectasis.  No pulmonary embolism  Received Lasix 40 mg IV to continue twice daily  IR consultation for possible thoracentesis if indicated  Conduct serial respiratory assessments, monitor volume status    Atrial fibrillation, controlled (HCC)  Assessment & Plan  Recently diagnosed and initiated on antiarrhythmic and apixaban  Continue diltiazem 180 mg p.o. daily  Hold apixaban and aspirin pending IR consultation for pleural effusions  Monitor vital signs    Urinary frequency  Assessment & Plan  Patient reported urinary frequency  Urinalysis without evidence for infection  Monitor urinary output  Conduct serial assessments    SIRS (systemic inflammatory response syndrome) (HCC)  Assessment & Plan  Tachycardia and tachypnea suspect  secondary to atrial fibrillation and shortness of breath  Afebrile.  No leukocytosis  Monitor labs and vital signs         VTE Pharmacologic Prophylaxis: VTE Score: 5 High Risk (Score >/= 5) - Pharmacological DVT Prophylaxis Ordered: apixaban (Eliquis). Sequential Compression Devices Ordered.  Code Status: Level 1 - Full Code   Discussion with family: Patient declined call to .     Anticipated Length of Stay: Patient will be admitted on an inpatient basis with an anticipated length of stay of greater than 2 midnights secondary to acute CHF/pleural effusion..    Total Time Spent on Date of Encounter in care of patient: 50 mins. This time was spent on one or more of the following: performing physical exam; counseling and coordination of care; obtaining or reviewing history; documenting in the medical record; reviewing/ordering tests, medications or procedures; communicating with other healthcare professionals and discussing with patient's family/caregivers.    Chief Complaint: Shortness of breath    History of Present Illness:  Nohemi Arcos is a 84 y.o. female with a PMH of hypertension, GERD, atrial fibrillation on Eliquis, and hyperlipidemia who presents with shortness of breath that started suddenly overnight after a coughing fit.  She describes it as moderate and unchanging.  Nothing has improved it. CT imaging performed in the ER revealed moderate bilateral pleural effusions.  BNP was elevated.  She was treated with IV diuretic.  She is admitted to medicine on the acute CHF pathway.  Interventional radiology was consulted for the bilateral pleural effusions. She denies fever, sore throat, chest pain, abdominal pain, nausea, myalgias, arthralgias, dizziness, or syncope.     Review of Systems:  Review of Systems   Constitutional:  Negative for fever.   HENT:  Negative for sore throat.    Eyes:  Negative for visual disturbance.   Respiratory:  Positive for shortness of breath. Negative for cough.     Cardiovascular:  Negative for chest pain.   Gastrointestinal:  Negative for abdominal pain and nausea.   Genitourinary:  Negative for dysuria.   Musculoskeletal:  Negative for arthralgias and back pain.   Skin:  Negative for color change.   Neurological:  Negative for dizziness and syncope.   Psychiatric/Behavioral:  Negative for confusion.    All other systems reviewed and are negative.      Past Medical and Surgical History:   Past Medical History:   Diagnosis Date    Arteriosclerotic heart disease     LAST ASSESSED: 09SEP2015    Esophageal reflux     LAST ASSESSED: 09JAN2018    GERD (gastroesophageal reflux disease)     Hyperlipidemia     LAST ASSESSED: 09JAN2018    Hypertension     LAST ASSESSED: 09JAN2018    Nontoxic single thyroid nodule     LAST ASSESSED: 09MAY2014    Osteopenia     LAST ASSESSED: 11MAY2016       Past Surgical History:   Procedure Laterality Date    CYSTOSCOPY  12/04/2013    DIAGNOSTIC    ESOPHAGOGASTRODUODENOSCOPY      MASTECTOMY Bilateral     cancer    OOPHORECTOMY      SALPINGOOPHORECTOMY Bilateral        Meds/Allergies:  Prior to Admission medications    Medication Sig Start Date End Date Taking? Authorizing Provider   apixaban (Eliquis) 2.5 mg Take 1 tablet (2.5 mg total) by mouth 2 (two) times a day 8/2/24 9/1/24  Rhonda Diaz MD   aspirin 81 MG tablet Take 81 mg by mouth daily    Historical Provider, MD   atorvastatin (LIPITOR) 20 mg tablet Take 1 tablet by mouth once daily 7/18/24   Gissell Graham DO   diltiazem (CARDIZEM CD) 180 mg 24 hr capsule Take 1 capsule (180 mg total) by mouth daily 8/3/24   Rhonda Diaz MD   omeprazole (PriLOSEC) 20 mg delayed release capsule Take 1 capsule by mouth once daily 7/18/24   Gissell Graham DO   potassium chloride (Klor-Con M10) 10 mEq tablet Take 1 tablet (10 mEq total) by mouth daily 8/3/24   Rhonda Diaz MD   torsemide (DEMADEX) 10 mg tablet Take 1 tablet (10 mg total) by mouth daily 8/3/24   Rhonda  "Patricia Diaz MD     I have reviewed home medications with patient personally.    Allergies:   Allergies   Allergen Reactions    Ciprofloxacin Other (See Comments)     Muscle pain    Doxycycline     Nitrofurantoin        Social History:  Marital Status:    Occupation: Not employed  Patient Pre-hospital Living Situation: Home  Patient Pre-hospital Level of Mobility: walks  Patient Pre-hospital Diet Restrictions: None  Substance Use History:   Social History     Substance and Sexual Activity   Alcohol Use Never     Social History     Tobacco Use   Smoking Status Never   Smokeless Tobacco Never     Social History     Substance and Sexual Activity   Drug Use No       Family History:  Family History   Problem Relation Age of Onset    Alzheimer's disease Mother     Hypertension Father     Stroke Father         SYNDROME    Colon cancer Brother        Physical Exam:     Vitals:   Blood Pressure: 101/58 (08/05/24 1513)  Pulse: 84 (08/05/24 1513)  Temperature: 97.6 °F (36.4 °C) (08/05/24 1421)  Temp Source: Temporal (08/05/24 1421)  Respirations: 18 (08/05/24 1513)  Height: 5' 1\" (154.9 cm) (08/05/24 1211)  Weight - Scale: 58.7 kg (129 lb 4.8 oz) (08/05/24 1255)  SpO2: 95 % (08/05/24 1513)    Physical Exam  Vitals and nursing note reviewed.   Constitutional:       General: She is not in acute distress.  HENT:      Head: Normocephalic and atraumatic.      Nose: Nose normal.      Mouth/Throat:      Mouth: Mucous membranes are moist.      Pharynx: Oropharynx is clear.   Eyes:      Pupils: Pupils are equal, round, and reactive to light.   Cardiovascular:      Pulses: Normal pulses.      Heart sounds: Normal heart sounds.   Pulmonary:      Effort: Pulmonary effort is normal. No respiratory distress.      Breath sounds: Decreased breath sounds present. No wheezing, rhonchi or rales.   Abdominal:      General: Bowel sounds are normal.      Palpations: Abdomen is soft.      Tenderness: There is no abdominal tenderness. " "  Musculoskeletal:      Cervical back: Neck supple.      Right lower leg: No edema.      Left lower leg: No edema.   Skin:     General: Skin is warm and dry.      Capillary Refill: Capillary refill takes less than 2 seconds.   Neurological:      General: No focal deficit present.      Mental Status: She is alert and oriented to person, place, and time. Mental status is at baseline.          Additional Data:     Lab Results:  Results from last 7 days   Lab Units 08/05/24  0834   WBC Thousand/uL 8.29   HEMOGLOBIN g/dL 14.2   HEMATOCRIT % 45.5   PLATELETS Thousands/uL 335   SEGS PCT % 65   LYMPHO PCT % 19   MONO PCT % 12   EOS PCT % 2     Results from last 7 days   Lab Units 08/05/24  0834   SODIUM mmol/L 135   POTASSIUM mmol/L 3.6   CHLORIDE mmol/L 99   CO2 mmol/L 28   BUN mg/dL 19   CREATININE mg/dL 1.13   ANION GAP mmol/L 8   CALCIUM mg/dL 9.7   GLUCOSE RANDOM mg/dL 110             No results found for: \"HGBA1C\"        Lines/Drains:  Invasive Devices       Peripheral Intravenous Line  Duration             Peripheral IV 08/05/24 Right Antecubital <1 day                        Imaging: Reviewed radiology reports from this admission including: chest CT scan  CTA ED chest PE study   ED Interpretation by OSMAN Bird (08/05 1007)   Official CT showing: \"No evidence for pulmonary embolism. Moderate sized bilateral pleural effusions with subjacent compressive atelectasis.\"        Final Result by Tristan Johnson MD (08/05 1001)      No evidence for pulmonary embolism.      Moderate sized bilateral pleural effusions with subjacent compressive atelectasis.                  Workstation performed: [id number]         IR IN-Patient Thoracentesis    (Results Pending)       EKG and Other Studies Reviewed on Admission:   EKG: Atrial flutter. .    ** Please Note: This note has been constructed using a voice recognition system. **      "

## 2024-08-06 LAB
ANION GAP SERPL CALCULATED.3IONS-SCNC: 5 MMOL/L (ref 4–13)
ATRIAL RATE: 258 BPM
BASOPHILS # BLD AUTO: 0.08 THOUSANDS/ÂΜL (ref 0–0.1)
BASOPHILS NFR BLD AUTO: 1 % (ref 0–1)
BUN SERPL-MCNC: 27 MG/DL (ref 5–25)
CALCIUM SERPL-MCNC: 9.3 MG/DL (ref 8.4–10.2)
CHLORIDE SERPL-SCNC: 104 MMOL/L (ref 96–108)
CO2 SERPL-SCNC: 30 MMOL/L (ref 21–32)
CREAT SERPL-MCNC: 1.41 MG/DL (ref 0.6–1.3)
EOSINOPHIL # BLD AUTO: 0.17 THOUSAND/ÂΜL (ref 0–0.61)
EOSINOPHIL NFR BLD AUTO: 2 % (ref 0–6)
ERYTHROCYTE [DISTWIDTH] IN BLOOD BY AUTOMATED COUNT: 13.6 % (ref 11.6–15.1)
GFR SERPL CREATININE-BSD FRML MDRD: 34 ML/MIN/1.73SQ M
GLUCOSE SERPL-MCNC: 110 MG/DL (ref 65–140)
HCT VFR BLD AUTO: 43.7 % (ref 34.8–46.1)
HGB BLD-MCNC: 13.6 G/DL (ref 11.5–15.4)
IMM GRANULOCYTES # BLD AUTO: 0.04 THOUSAND/UL (ref 0–0.2)
IMM GRANULOCYTES NFR BLD AUTO: 1 % (ref 0–2)
LYMPHOCYTES # BLD AUTO: 1.7 THOUSANDS/ÂΜL (ref 0.6–4.47)
LYMPHOCYTES NFR BLD AUTO: 23 % (ref 14–44)
MCH RBC QN AUTO: 28.5 PG (ref 26.8–34.3)
MCHC RBC AUTO-ENTMCNC: 31.1 G/DL (ref 31.4–37.4)
MCV RBC AUTO: 91 FL (ref 82–98)
MONOCYTES # BLD AUTO: 0.99 THOUSAND/ÂΜL (ref 0.17–1.22)
MONOCYTES NFR BLD AUTO: 13 % (ref 4–12)
NEUTROPHILS # BLD AUTO: 4.59 THOUSANDS/ÂΜL (ref 1.85–7.62)
NEUTS SEG NFR BLD AUTO: 60 % (ref 43–75)
NRBC BLD AUTO-RTO: 0 /100 WBCS
PLATELET # BLD AUTO: 266 THOUSANDS/UL (ref 149–390)
PMV BLD AUTO: 9.1 FL (ref 8.9–12.7)
POTASSIUM SERPL-SCNC: 4.3 MMOL/L (ref 3.5–5.3)
QRS AXIS: 39 DEGREES
QRSD INTERVAL: 142 MS
QT INTERVAL: 396 MS
QTC INTERVAL: 508 MS
RBC # BLD AUTO: 4.78 MILLION/UL (ref 3.81–5.12)
SODIUM SERPL-SCNC: 139 MMOL/L (ref 135–147)
T WAVE AXIS: 149 DEGREES
VENTRICULAR RATE: 99 BPM
WBC # BLD AUTO: 7.57 THOUSAND/UL (ref 4.31–10.16)

## 2024-08-06 PROCEDURE — 99223 1ST HOSP IP/OBS HIGH 75: CPT | Performed by: NURSE PRACTITIONER

## 2024-08-06 PROCEDURE — 99233 SBSQ HOSP IP/OBS HIGH 50: CPT | Performed by: NURSE PRACTITIONER

## 2024-08-06 PROCEDURE — 85025 COMPLETE CBC W/AUTO DIFF WBC: CPT | Performed by: NURSE PRACTITIONER

## 2024-08-06 PROCEDURE — 80048 BASIC METABOLIC PNL TOTAL CA: CPT | Performed by: NURSE PRACTITIONER

## 2024-08-06 PROCEDURE — 93010 ELECTROCARDIOGRAM REPORT: CPT | Performed by: INTERNAL MEDICINE

## 2024-08-06 RX ORDER — ASPIRIN 81 MG/1
81 TABLET, CHEWABLE ORAL DAILY
Status: DISCONTINUED | OUTPATIENT
Start: 2024-08-07 | End: 2024-08-08 | Stop reason: HOSPADM

## 2024-08-06 RX ADMIN — APIXABAN 2.5 MG: 2.5 TABLET, FILM COATED ORAL at 19:03

## 2024-08-06 RX ADMIN — DILTIAZEM HYDROCHLORIDE 180 MG: 180 CAPSULE, EXTENDED RELEASE ORAL at 09:09

## 2024-08-06 RX ADMIN — HEPARIN SODIUM 5000 UNITS: 5000 INJECTION, SOLUTION INTRAVENOUS; SUBCUTANEOUS at 14:49

## 2024-08-06 RX ADMIN — ATORVASTATIN CALCIUM 20 MG: 20 TABLET, FILM COATED ORAL at 09:09

## 2024-08-06 RX ADMIN — PANTOPRAZOLE SODIUM 40 MG: 40 TABLET, DELAYED RELEASE ORAL at 05:29

## 2024-08-06 RX ADMIN — FUROSEMIDE 40 MG: 10 INJECTION, SOLUTION INTRAMUSCULAR; INTRAVENOUS at 09:09

## 2024-08-06 RX ADMIN — HEPARIN SODIUM 5000 UNITS: 5000 INJECTION, SOLUTION INTRAVENOUS; SUBCUTANEOUS at 05:29

## 2024-08-06 NOTE — CASE MANAGEMENT
Case Management Assessment & Discharge Planning Note    Patient name Nohemi Arcos  Location /-01 MRN [id number]  : 1940 Date 2024       Current Admission Date: 2024  Current Admission Diagnosis:Acute on chronic diastolic (congestive) heart failure (HCC)   Patient Active Problem List    Diagnosis Date Noted Date Diagnosed    SIRS (systemic inflammatory response syndrome) (HCC) 2024     Bilateral pleural effusion 2024     Atrial fibrillation, controlled (HCC) 2024     Urinary frequency 2024     Cardiomyopathy (HCC) 2024     Constipation 2024     Hypoxia 2024     Acute on chronic diastolic (congestive) heart failure (HCC) 2024     Atrial fibrillation with rapid ventricular response (HCC) 2024     Elevated TSH 2023     Multiple renal cysts 2023     Cholelithiasis 2023     Colitis 2023     Acute diverticulitis 2023     Adrenal hyperplasia (HCC) 2023     LBBB (left bundle branch block) 2023     Stage 3b chronic kidney disease (HCC) 2022     Vitamin D deficiency 2022     Primary localized osteoarthritis of left knee 10/25/2019     Hypercholesteremia 2013     Osteopenia 2012     Primary hypertension 2012     Nontoxic single thyroid nodule 2012       LOS (days): 1  Geometric Mean LOS (GMLOS) (days): 3.9  Days to GMLOS:2.7     OBJECTIVE:  PATIENT READMITTED TO HOSPITAL  Risk of Unplanned Readmission Score: 15.24         Current admission status: Inpatient  Referral Reason: Other (d/c planning)    Preferred Pharmacy:   [id number]  [place] [address] - [address], [address]  [address] [facility], [address]  [place] [address]  Phone: [phone] Fax: [phone]    [id number] - [place] - [address]  [address] [place] [address]  Phone: [phone] Fax: [phone]    Primary Care Provider: Gissell  DO Gladys    Primary Insurance: MEDICARE  Secondary Insurance: BLUE CROSS    ASSESSMENT:  Active Health Care Proxies       Eber Arcos Health Care Representative - Son   Primary Phone: [phone] (Home)                 Advance Directives  Does patient have a Health Care POA?: Yes (fqamily needs to bring in the paperwork)  Does patient have Advance Directives?: Yes (family needs to bring in the paperwork)         Readmission Root Cause  30 Day Readmission: Yes  Who directed you to return to the hospital?: Self  Did you understand whom to contact if you had questions or problems?: Yes  Did you get your prescriptions before you left the hospital?: No  Reason:: Declined service  Were you able to get your prescriptions filled when you left the hospital?: Yes  Did you take your medications as prescribed?: Yes  Were you able to get to your follow-up appointments?: No  Reason:: Readmitted prior to appointment  During previous admission, was a post-acute recommendation made?: Yes  What post-acute resources were offered?: HHC (pt declined)  Patient was readmitted due to: pt readmitted for CHF, bilateral pleural effusion, afib- pt to have a thoracentesis  Action Plan: follow up care - pt is declining any hhc    Patient Information  Admitted from:: Home  Mental Status: Alert  During Assessment patient was accompanied by: Sister  Assessment information provided by:: Patient  Primary Caregiver: Self  Support Systems: Son, Family members (sister)  County of Residence: [facility]  What city do you live in?: [place]  Home entry access options. Select all that apply.: Stairs  Number of steps to enter home.: 2  Do the steps have railings?: Yes  Type of Current Residence: [facility]  Living Arrangements: Lives Alone  Is patient a ?: No    Activities of Daily Living Prior to Admission  Functional Status: Independent  Completes ADLs independently?: Yes  Ambulates independently?: Yes  Does patient use assisted devices?:  No  Does patient currently own DME?: Yes  What DME does the patient currently own?: Walker, Wheelchair, Shower Chair, Straight Cane, Bedside Commode  Does patient have a history of Outpatient Therapy (PT/OT)?: No  Does the patient have a history of Short-Term Rehab?: No  Does patient have a history of HHC?: No  Does patient currently have HHC?: No         Patient Information Continued  Income Source: Pension/long-term  Does patient have prescription coverage?: Yes ([employer])  Does patient receive dialysis treatments?: No  Does patient have a history of substance abuse?: No  Does patient have a history of Mental Health Diagnosis?: No         Means of Transportation  Means of Transport to Appts:: Drives Self      Social Determinants of Health (SDOH)      Flowsheet Row Most Recent Value   Housing Stability    In the last 12 months, was there a time when you were not able to pay the mortgage or rent on time? N   In the past 12 months, how many times have you moved where you were living? 0   At any time in the past 12 months, were you homeless or living in a shelter (including now)? N   Transportation Needs    In the past 12 months, has lack of transportation kept you from medical appointments or from getting medications? no   In the past 12 months, has lack of transportation kept you from meetings, work, or from getting things needed for daily living? No   Food Insecurity    Within the past 12 months, you worried that your food would run out before you got the money to buy more. Never true   Within the past 12 months, the food you bought just didn't last and you didn't have money to get more. Never true   Utilities    In the past 12 months has the electric, gas, oil, or water company threatened to shut off services in your home? No            DISCHARGE DETAILS:    Discharge planning discussed with:: patient & sister at the bedside  Freedom of Choice: Yes  Comments - Freedom of Choice: pt denies any d/c needs-  pt states she does not want hhc  CM contacted family/caregiver?: Yes             Contacts  Patient Contacts: Wendie Cai  Relationship to Patient:: Family (sister)  Contact Method: In Person  Reason/Outcome: Discharge Planning    Requested Home Health Care         Is the patient interested in HHC at discharge?: No    DME Referral Provided  Referral made for DME?: No    Other Referral/Resources/Interventions Provided:  Referral Comments: pt not stable for d/c - pt needs an thoracentesis IR consulted    Would you like to participate in our [facility] service program?  : No - Declined    Treatment Team Recommendation: Home (home with family support & outpt follow up- family)

## 2024-08-06 NOTE — ASSESSMENT & PLAN NOTE
Recently diagnosed and initiated on antiarrhythmic and apixaban  Continue diltiazem 180 mg p.o. daily  Apixaban and aspirin held due to IR intervention will resume  Monitor vital signs

## 2024-08-06 NOTE — PLAN OF CARE
Problem: Potential for Falls  Goal: Patient will remain free of falls  Description: INTERVENTIONS:  - Educate patient/family on patient safety including physical limitations  - Instruct patient to call for assistance with activity   - Consult OT/PT to assist with strengthening/mobility   - Keep Call bell within reach  - Keep bed low and locked with side rails adjusted as appropriate  - Keep care items and personal belongings within reach  - Initiate and maintain comfort rounds  - Make Fall Risk Sign visible to staff  - Offer Toileting every 2 Hours, in advance of need  - Initiate/Maintain bed alarm  - Obtain necessary fall risk management equipment: non skid socks  - Apply yellow socks and bracelet for high fall risk patients  - Consider moving patient to room near nurses station  Outcome: Progressing     Problem: PAIN - ADULT  Goal: Verbalizes/displays adequate comfort level or baseline comfort level  Description: Interventions:  - Encourage patient to monitor pain and request assistance  - Assess pain using appropriate pain scale  - Administer analgesics based on type and severity of pain and evaluate response  - Implement non-pharmacological measures as appropriate and evaluate response  - Consider cultural and social influences on pain and pain management  - Notify physician/advanced practitioner if interventions unsuccessful or patient reports new pain  Outcome: Progressing     Problem: INFECTION - ADULT  Goal: Absence or prevention of progression during hospitalization  Description: INTERVENTIONS:  - Assess and monitor for signs and symptoms of infection  - Monitor lab/diagnostic results  - Monitor all insertion sites, i.e. indwelling lines, tubes, and drains  - Monitor endotracheal if appropriate and nasal secretions for changes in amount and color  - [place] appropriate cooling/warming therapies per order  - Administer medications as ordered  - Instruct and encourage patient and family to use good hand  hygiene technique  - Identify and instruct in appropriate isolation precautions for identified infection/condition  Outcome: Progressing  Goal: Absence of fever/infection during neutropenic period  Description: INTERVENTIONS:  - Monitor WBC    Outcome: Progressing     Problem: SAFETY ADULT  Goal: Patient will remain free of falls  Description: INTERVENTIONS:  - Educate patient/family on patient safety including physical limitations  - Instruct patient to call for assistance with activity   - Consult OT/PT to assist with strengthening/mobility   - Keep Call bell within reach  - Keep bed low and locked with side rails adjusted as appropriate  - Keep care items and personal belongings within reach  - Initiate and maintain comfort rounds  - Make Fall Risk Sign visible to staff  - Offer Toileting every 2 Hours, in advance of need  - Initiate/Maintain bed alarm  - Obtain necessary fall risk management equipment: non skid socks  - Apply yellow socks and bracelet for high fall risk patients  - Consider moving patient to room near nurses station  Outcome: Progressing  Goal: Maintain or return to baseline ADL function  Description: INTERVENTIONS:  -  Assess patient's ability to carry out ADLs; assess patient's baseline for ADL function and identify physical deficits which impact ability to perform ADLs (bathing, care of mouth/teeth, toileting, grooming, dressing, etc.)  - Assess/evaluate cause of self-care deficits   - Assess range of motion  - Assess patient's mobility; develop plan if impaired  - Assess patient's need for assistive devices and provide as appropriate  - Encourage maximum independence but intervene and supervise when necessary  - Involve family in performance of ADLs  - Assess for home care needs following discharge   - Consider OT consult to assist with ADL evaluation and planning for discharge  - Provide patient education as appropriate  Outcome: Progressing  Goal: Maintains/Returns to pre admission  functional level  Description: INTERVENTIONS:  - Perform AM-PAC 6 Click Basic Mobility/ Daily Activity assessment daily.  - Set and communicate daily mobility goal to care team and patient/family/caregiver.   - Collaborate with rehabilitation services on mobility goals if consulted  - Perform Range of Motion 2 times a day.  - Reposition patient every 2 hours.  - Dangle patient 2 times a day  - Stand patient 2 times a day  - Ambulate patient 2 times a day  - Out of bed to chair 3 times a day   - Out of bed for meals 3 times a day  - Out of bed for toileting  - Record patient progress and toleration of activity level   Outcome: Progressing     Problem: DISCHARGE PLANNING  Goal: Discharge to home or other facility with appropriate resources  Description: INTERVENTIONS:  - Identify barriers to discharge w/patient and caregiver  - Arrange for needed discharge resources and transportation as appropriate  - Identify discharge learning needs (meds, wound care, etc.)  - Refer to Case Management Department for coordinating discharge planning if the patient needs post-hospital services based on physician/advanced practitioner order or complex needs related to functional status, cognitive ability, or social support system  Outcome: Progressing     Problem: Knowledge Deficit  Goal: Patient/family/caregiver demonstrates understanding of disease process, treatment plan, medications, and discharge instructions  Description: Complete learning assessment and assess knowledge base.  Interventions:  - Provide teaching at level of understanding  - Provide teaching via preferred learning methods  Outcome: Progressing     Problem: RESPIRATORY - ADULT  Goal: Achieves optimal ventilation and oxygenation  Description: INTERVENTIONS:  - Assess for changes in respiratory status  - Assess for changes in mentation and behavior  - Position to facilitate oxygenation and minimize respiratory effort  - Oxygen administered by appropriate delivery if  ordered  - Initiate smoking cessation education as indicated  - Encourage broncho-pulmonary hygiene including cough, deep breathe, Incentive Spirometry  - Assess the need for suctioning and aspirate as needed  - Assess and instruct to report SOB or any respiratory difficulty  - Respiratory Therapy support as indicated  Outcome: Progressing     Problem: Decreased Cardiac Output  Goal: Cardiac output adequate for individual needs  Description: INTERVENTIONS: Monitor for signs and symptoms of decreased cardiac output   - Monitor for dyspnea with exertion and at rest  - Monitor for orthopnea  - Monitor for signs of tachycardia. Place patient on telemetry monitoring.  - Assess patient for jugular vein distention  - Assess patient for lower extremity edema and poor peripheral perfusion   - Auscultate lung sound for Fine bibasilar crackles   - Monitor for cardiac arrythmias   - Administer beta blockers, antiarrhythmic, and blood pressure medications as ordered    Outcome: Progressing     Problem: Impaired Gas Exchange  Goal: Optimize oxygenation and ensure adequate ventilation  Description: INTERVENTIONS: Monitor for signs and symptoms of respiratory distress                - Elevate HOB or use high fowlers to promote lung expansion                - Administer oxygen as ordered to maintain adequate oxygenation                - Encourage use of IS to promote lung expansion and prevent PN                - Monitor ABGs to assess oxygenation status                - Monitor blood oxygen level to maintain adequate oxygenation                - Encourage cough and deep breathing exercises to promote lung expansion                - Monitor patient's mental status for increased confusion    Outcome: Progressing     Problem: Excess Fluid Volume  Goal: Patient is able to achieve and maintain homeostasis  Description: INTERVENTIONS: Monitor for sign and symptoms of fluid overload  - Evaluate LE edema every shift  - Elevate LE to  prevent dependent edema  - Apply JOLIE stockings as ordered   - Monitor ankle circumference daily  - Assess for jugular vein distention  - Evaluate provider orders for the CHF diuretic algorithm. Administer diuretics as ordered  - Weigh the patient daily at 0600 and report a weight gain of five pounds or more   - Strict intake and output  - Monitor fluid intake and adhere to fluid restrictions  - Assess lung sounds every shift and as needed  - Monitor vital signs and lab values (CBC, chem, BUN, BNP)  - Measure and document urine output    Outcome: Progressing     Problem: Activity Intolerance  Goal: Patient is able to perform activities within their limitations  Description: INTERVENTIONS:                       -   Alternate periods of activity with periods of rest                 -   Patients is able to maintain normal vitals heart rhythm during activity                 -   Gradually increase activity and exercise as patient can tolerate                 -   Monitor blood pressure and heart before and after exercise                  -   Monitor blood oxygen saturation during activity and apply oxygen as needed    Outcome: Progressing     Problem: Knowledge Deficit  Goal: Patient is able to verbalize understanding of Heart Failure after education  Description: INTERVENTIONS:  - Educate the patient and family on signs and symptoms of HF  - Provide the patient with HF education and HF zone tool  - Educate on the importance of daily weight in the AM and reporting a weight gain               of 3 or more pounds to their primary care physician  - Monitor for SOB  - Maintain and sodium and fluid restriction  - Educate the patient on the importance of medications such as: diuretics, betablockers,               antiarrhythmics and their purpose, dose, route, side effects and labs               if they are needed    Outcome: Progressing

## 2024-08-06 NOTE — ASSESSMENT & PLAN NOTE
Overall, rate controlled  Continue diltiazem 180 mg daily  Eliquis 2.5 mg twice daily for stroke risk reduction

## 2024-08-06 NOTE — OCCUPATIONAL THERAPY NOTE
Occupational Therapy     Patient Name: Nohemi Arcos  Today's Date: 8/6/2024 08/06/24 1501   OT Last Visit   OT Visit Date 08/06/24   Note Type   Note type Cancelled Session   Additional Comments OT order received and chart review performed. @ this time, OT evaluation cancelled d/t refusal. OT will follow and evaluate in efforts to provide skilled interventions as appropriate. Nsg aware.    Marilyn Lang OTR/L

## 2024-08-06 NOTE — ASSESSMENT & PLAN NOTE
Wt Readings from Last 3 Encounters:   08/06/24 54.8 kg (120 lb 11.2 oz)   08/01/24 58.6 kg (129 lb 3 oz)   05/01/24 59 kg (130 lb)     Presented for shortness of breath   History of recent hospitalization for new atrial fibrillation with RVR-31 2024  Echoardiogram 7/29/2024: The left ventricular ejection fraction is 50%. Systolic function is low normal. There appears to be hypokinesis of the septal walls and basal inferior wall. Unable to assess diastolic function due to atrial fibrillation  CT chest PE study 8/5/2024:Moderate sized bilateral pleural effusions with subjacent compressive atelectasis     Received furosemide 40 mg IV in the ER.  Diuretics held today due to rising creatinine with plan to switch to oral diuretics tomorrow   Monitor daily weights and I&O's.   Cardiology evaluation appreciated

## 2024-08-06 NOTE — ASSESSMENT & PLAN NOTE
CT chest PE study 8/5/2024: Bilateral pleural effusion with subjacent compressive atelectasis.  No pulmonary embolism  S/p furosemide IV, hold diuretics today due to rising creatinine with plan to switch to oral diuretics tomorrow   S/p right thoracentesis in IR for 700 mL 8/5/2024  Conduct serial respiratory assessments, monitor volume status

## 2024-08-06 NOTE — CONSULTS
[facility]  Consult  Name: Nohemi Arcos 84 y.o. female I MRN: [id number]  Unit/Bed#: -01 I Date of Admission: 8/5/2024   Date of Service: 8/6/2024 I Hospital Day: 1    Inpatient consult to Cardiology  Consult performed by: OSMAN Shukla  Consult ordered by: OSMAN Brian          Assessment & Plan   Atrial fibrillation, controlled (HCC)  Assessment & Plan  Overall, rate controlled  Continue diltiazem 180 mg daily  Eliquis 2.5 mg twice daily for stroke risk reduction    Bilateral pleural effusion  Assessment & Plan  S/p R-sided thoracentesis    * Acute on chronic diastolic (congestive) heart failure (HCC)  Assessment & Plan  Wt Readings from Last 3 Encounters:   08/06/24 54.8 kg (120 lb 11.2 oz)   08/01/24 58.6 kg (129 lb 3 oz)   05/01/24 59 kg (130 lb)     BNP elevated (452,) pleural effusions, and subjective shortness of breath  Status post thoracentesis and IV diuretics  Creatinine rising-transition to oral diuretic regimen.  Weight is down, net -1 L  Daily weights, I/O, low-sodium diet           Other summary comments:   Nohemi was readmitted with volume overload/mild exacerbation of presumed HFpEF.    Her symptoms are improved following a right-sided thoracentesis and according to the patient, consideration is being given to a left-sided thoracentesis.      Suspect that volume overload is related to her atrial fibrillation.  Rate is presently controlled and she is appropriately anticoagulated with Eliquis.    Creatinine increased this morning from 1.1-1.4.  Hold diuretics today.  Monitor renal function and electrolytes, daily weight, I/O.      Outpatient Cardiologist: Dr. Peña    HPI: Nohemi Arcos is a 84 y.o. year old female who presented with shortness of breath.    Nohemi was recently diagnosed with atrial fibrillation, noted first during an ER encounter 7/26/2024 for constipation.  Prior to this, she had no known history of A-fib.    On 7/29/2024,  she presented to [facility] with chest pressure and exertional dyspnea.  She was found to be in A-fib with RVR.  Rate was controlled with diltiazem 180 mg daily, started on anticoagulation with Eliquis, and she was discharged home.  She returned 2 days later with recurrent symptoms, was found in A-fib with RVR and was admitted for rate control.  During that admission, she was mildly volume overloaded and received diuretic therapy.    On 8/5/2024, Nohemi returned to the emergency department at [facility] with a report of shortness of breath.    Imaging was suggestive of bilateral pleural effusions with compressive atelectasis and BNP was elevated 452.  She received 40 mg of IV Lasix in the emergency department and underwent a right sided thoracentesis last evening for 700 cc of fluid.    At the time of my evaluation, Nohemi reports that she is feeling much better.  She is not quite at baseline but notes significant improvement in her symptoms.  She denies any chest pain or pressure, palpitations, dizziness, lightheadedness, syncope.    EKG: Aflutter, CVR, LBBB- no change compared to tracing 8/5/2024      MOST  RECENT CARDIAC IMAGING:   Echo 7/31/2024  EF 50%  Mild LAE, moderate MR, mild TR    Nuclear stress test 8/10/2022  No evidence of ischemia or infarct      Review of Systems: a 10 point review of systems was conducted and is negative except for as mentioned in the HPI or as below.        Historical Information   Past Medical History:   Diagnosis Date    Arteriosclerotic heart disease     LAST ASSESSED: 09SEP2015    Esophageal reflux     LAST ASSESSED: 09JAN2018    GERD (gastroesophageal reflux disease)     Hyperlipidemia     LAST ASSESSED: 09JAN2018    Hypertension     LAST ASSESSED: 09JAN2018    Nontoxic single thyroid nodule     LAST ASSESSED: 09MAY2014    Osteopenia     LAST ASSESSED: 11MAY2016     Past Surgical History:   Procedure Laterality Date    CYSTOSCOPY  12/04/2013    DIAGNOSTIC    ESOPHAGOGASTRODUODENOSCOPY    "   MASTECTOMY Bilateral     cancer    OOPHORECTOMY      SALPINGOOPHORECTOMY Bilateral      Social History     Substance and Sexual Activity   Alcohol Use Never     Social History     Substance and Sexual Activity   Drug Use No     Social History     Tobacco Use   Smoking Status Never   Smokeless Tobacco Never       Family History: No longer relevant due to patient age      Meds/Allergies   all current active meds have been reviewed    Medications Prior to Admission:     apixaban (Eliquis) 2.5 mg    aspirin 81 MG tablet    atorvastatin (LIPITOR) 20 mg tablet    diltiazem (CARDIZEM CD) 180 mg 24 hr capsule    omeprazole (PriLOSEC) 20 mg delayed release capsule    potassium chloride (Klor-Con M10) 10 mEq tablet    torsemide (DEMADEX) 10 mg tablet    Allergies   Allergen Reactions    Ciprofloxacin Other (See Comments)     Muscle pain    Doxycycline     Nitrofurantoin        Objective   Vitals: Blood pressure 122/81, pulse 100, temperature 98 °F (36.7 °C), resp. rate 18, height 5' 1\" (1.549 m), weight 54.8 kg (120 lb 11.2 oz), SpO2 92%., Body mass index is 22.81 kg/m².,   Orthostatic Blood Pressures      Flowsheet Row Most Recent Value   Blood Pressure 122/81 filed at 2024 2227   Patient Position - Orthostatic VS Sitting filed at 2024 1130            Systolic (24hrs), Av , Min:101 , Max:154     Diastolic (24hrs), Av, Min:58, Max:91    Physical Exam:    General:  Normal appearance in no distress.  Eyes:  Anicteric.  Oral mucosa:  Moist.  Neck:  No JVD. Carotid upstrokes are brisk without bruits.  No masses.  Chest: Decreased in the left base, one third of the way up with a few coarse Rales  Cardiac: Irregularly irregular.  No palpable PMI.  Normal S1 and S2.  No murmur gallop or rub.  Abdomen:  Soft and nontender. No palpable organomegaly or aortic enlargement.  Extremities:  No peripheral edema.  Musculoskeletal:  Symmetric.   Vascular:  Pedal pulses are intact.  Neuro:  Grossly symmetric.  Psych:  " Alert and oriented x3.        Lab Results:   Labs reviewed and prominent abnormalities reviewed above and/or below.    Troponins:    Results from last 7 days   Lab Units 08/05/24  1228 08/05/24  1036 08/05/24  0834   HS TNI 0HR ng/L  --   --  14   HS TNI 2HR ng/L  --  12  --    HSTNI D2 ng/L  --  -2  --    HS TNI 4HR ng/L 15  --   --    HSTNI D4 ng/L 1  --   --      BNP:   Results from last 6 Months   Lab Units 08/05/24  0834 07/31/24  0755   BNP pg/mL 452* 351*

## 2024-08-06 NOTE — ASSESSMENT & PLAN NOTE
Wt Readings from Last 3 Encounters:   08/06/24 54.8 kg (120 lb 11.2 oz)   08/01/24 58.6 kg (129 lb 3 oz)   05/01/24 59 kg (130 lb)     BNP elevated (452,) pleural effusions, and subjective shortness of breath  Status post thoracentesis and IV diuretics  Creatinine rising-transition to oral diuretic regimen.  Weight is down, net -1 L  Daily weights, I/O, low-sodium diet

## 2024-08-06 NOTE — PROGRESS NOTES
[facility]  Progress Note  Name: Nohemi Arcos I  MRN: [id number]  Unit/Bed#: -01 I Date of Admission: 8/5/2024   Date of Service: 8/6/2024 I Hospital Day: 1    Assessment & Plan     * Acute on chronic diastolic (congestive) heart failure (HCC)  Assessment & Plan  Wt Readings from Last 3 Encounters:   08/06/24 54.8 kg (120 lb 11.2 oz)   08/01/24 58.6 kg (129 lb 3 oz)   05/01/24 59 kg (130 lb)     Presented for shortness of breath   History of recent hospitalization for new atrial fibrillation with RVR-31 2024  Echoardiogram 7/29/2024: The left ventricular ejection fraction is 50%. Systolic function is low normal. There appears to be hypokinesis of the septal walls and basal inferior wall. Unable to assess diastolic function due to atrial fibrillation  CT chest PE study 8/5/2024:Moderate sized bilateral pleural effusions with subjacent compressive atelectasis     Received furosemide 40 mg IV in the ER.  Diuretics held today due to rising creatinine with plan to switch to oral diuretics tomorrow   Monitor daily weights and I&O's.   Cardiology evaluation appreciated      Bilateral pleural effusion  Assessment & Plan  CT chest PE study 8/5/2024: Bilateral pleural effusion with subjacent compressive atelectasis.  No pulmonary embolism  S/p furosemide IV, hold diuretics today due to rising creatinine with plan to switch to oral diuretics tomorrow   S/p right thoracentesis in IR for 700 mL 8/5/2024  Conduct serial respiratory assessments, monitor volume status    Atrial fibrillation, controlled (HCC)  Assessment & Plan  Recently diagnosed and initiated on antiarrhythmic and apixaban  Continue diltiazem 180 mg p.o. daily  Apixaban and aspirin held due to IR intervention will resume  Monitor vital signs    Urinary frequency  Assessment & Plan  Patient reported urinary frequency  Urinalysis without evidence for infection  Monitor urinary output  Conduct serial assessments    SIRS  (systemic inflammatory response syndrome) (HCC)  Assessment & Plan  Tachycardia and tachypnea suspect secondary to atrial fibrillation and shortness of breath  Afebrile.  No leukocytosis  Monitor labs and vital signs         VTE Pharmacologic Prophylaxis: VTE Score: 5 High Risk (Score >/= 5) - Pharmacological DVT Prophylaxis Ordered: apixaban (Eliquis). Sequential Compression Devices Ordered.    Mobility:   Basic Mobility Inpatient Raw Score: 22  JH-HLM Goal: 7: Walk 25 feet or more  JH-HLM Achieved: 7: Walk 25 feet or more  JH-HLM Goal achieved. Continue to encourage appropriate mobility.    Patient Centered Rounds: I performed bedside rounds with nursing staff today.   Discussions with Specialists or Other Care Team Provider: Cardiology    Education and Discussions with Family / Patient: Updated  (sister) at bedside.    Total Time Spent on Date of Encounter in care of patient: 40 mins. This time was spent on one or more of the following: performing physical exam; counseling and coordination of care; obtaining or reviewing history; documenting in the medical record; reviewing/ordering tests, medications or procedures; communicating with other healthcare professionals and discussing with patient's family/caregivers.    Current Length of Stay: 1 day(s)  Current Patient Status: Inpatient   Certification Statement: The patient will continue to require additional inpatient hospital stay due to CHF.  Discharge Plan: Anticipate discharge in 24-48 hrs to home.    Code Status: Level 1 - Full Code    Subjective:   Patient seen and examined.  She said she is feeling better denies shortness of breath.    Objective:     Vitals:   Temp (24hrs), Av.1 °F (36.7 °C), Min:98 °F (36.7 °C), Max:98.2 °F (36.8 °C)    Temp:  [98 °F (36.7 °C)-98.2 °F (36.8 °C)] 98.2 °F (36.8 °C)  HR:  [] 86  Resp:  [16-20] 20  BP: (120-155)/(62-81) 120/62  SpO2:  [92 %-94 %] 94 %  Body mass index is 22.81 kg/m².     Input and Output  Summary (last 24 hours):     Intake/Output Summary (Last 24 hours) at 8/6/2024 1832  Last data filed at 8/6/2024 1700  Gross per 24 hour   Intake 840 ml   Output 1600 ml   Net -760 ml       Physical Exam:      Physical Exam  Vitals and nursing note reviewed.   HENT:      Head: Normocephalic and atraumatic.      Nose: Nose normal.      Mouth/Throat:      Mouth: Mucous membranes are moist.      Pharynx: Oropharynx is clear.   Eyes:      Pupils: Pupils are equal, round, and reactive to light.   Cardiovascular:      Rate and Rhythm: Normal rate and regular rhythm.      Pulses: Normal pulses.   Pulmonary:      Effort: Pulmonary effort is normal. No respiratory distress.      Breath sounds: Normal breath sounds. No rales.   Abdominal:      General: Bowel sounds are normal.      Palpations: Abdomen is soft.      Tenderness: There is no abdominal tenderness.   Musculoskeletal:      Cervical back: Neck supple.      Right lower leg: No edema.      Left lower leg: No edema.   Skin:     General: Skin is warm and dry.      Capillary Refill: Capillary refill takes less than 2 seconds.   Neurological:      General: No focal deficit present.      Mental Status: She is alert and oriented to person, place, and time. Mental status is at baseline.          Additional Data:     Labs:  Results from last 7 days   Lab Units 08/06/24  0448   WBC Thousand/uL 7.57   HEMOGLOBIN g/dL 13.6   HEMATOCRIT % 43.7   PLATELETS Thousands/uL 266   SEGS PCT % 60   LYMPHO PCT % 23   MONO PCT % 13*   EOS PCT % 2     Results from last 7 days   Lab Units 08/06/24  0448   SODIUM mmol/L 139   POTASSIUM mmol/L 4.3   CHLORIDE mmol/L 104   CO2 mmol/L 30   BUN mg/dL 27*   CREATININE mg/dL 1.41*   ANION GAP mmol/L 5   CALCIUM mg/dL 9.3   GLUCOSE RANDOM mg/dL 110                       Lines/Drains:  Invasive Devices       Peripheral Intravenous Line  Duration             Peripheral IV 08/05/24 Right Antecubital 1 day                  Recent Cultures (last 7 days):    Results from last 7 days   Lab Units 08/05/24  1448   GRAM STAIN RESULT  1+ Polys  No bacteria seen   BODY FLUID CULTURE, STERILE  No growth       Last 24 Hours Medication List:   Current Facility-Administered Medications   Medication Dose Route Frequency Provider Last Rate    acetaminophen  650 mg Oral Q4H PRN OSMAN Brian      apixaban  2.5 mg Oral BID OSMAN Brian      [START ON 8/7/2024] aspirin  81 mg Oral Daily OSMAN Brian      atorvastatin  20 mg Oral Daily OSMAN Brian      diltiazem  180 mg Oral Daily OSMAN Brian      pantoprazole  40 mg Oral Early Morning OSMAN Brian          Today, Patient Was Seen By: OSMAN Brian    **Please Note: This note may have been constructed using a voice recognition system.**

## 2024-08-06 NOTE — PHYSICAL THERAPY NOTE
PT cancel note       08/06/24 1520   PT Last Visit   PT Visit Date 08/06/24   Note Type   Note type Cancelled Session   Cancel Reasons Refusal     Shari Landin

## 2024-08-07 ENCOUNTER — PATIENT OUTREACH (OUTPATIENT)
Dept: CASE MANAGEMENT | Facility: OTHER | Age: 84
End: 2024-08-07

## 2024-08-07 LAB
ANION GAP SERPL CALCULATED.3IONS-SCNC: 7 MMOL/L (ref 4–13)
BASOPHILS # BLD AUTO: 0.07 THOUSANDS/ÂΜL (ref 0–0.1)
BASOPHILS NFR BLD AUTO: 1 % (ref 0–1)
BUN SERPL-MCNC: 31 MG/DL (ref 5–25)
CALCIUM SERPL-MCNC: 9.5 MG/DL (ref 8.4–10.2)
CHLORIDE SERPL-SCNC: 104 MMOL/L (ref 96–108)
CO2 SERPL-SCNC: 27 MMOL/L (ref 21–32)
CREAT SERPL-MCNC: 1.23 MG/DL (ref 0.6–1.3)
EOSINOPHIL # BLD AUTO: 0.29 THOUSAND/ÂΜL (ref 0–0.61)
EOSINOPHIL NFR BLD AUTO: 4 % (ref 0–6)
ERYTHROCYTE [DISTWIDTH] IN BLOOD BY AUTOMATED COUNT: 13.5 % (ref 11.6–15.1)
GFR SERPL CREATININE-BSD FRML MDRD: 40 ML/MIN/1.73SQ M
GLUCOSE SERPL-MCNC: 104 MG/DL (ref 65–140)
HCT VFR BLD AUTO: 47.9 % (ref 34.8–46.1)
HGB BLD-MCNC: 14.8 G/DL (ref 11.5–15.4)
IMM GRANULOCYTES # BLD AUTO: 0.07 THOUSAND/UL (ref 0–0.2)
IMM GRANULOCYTES NFR BLD AUTO: 1 % (ref 0–2)
LDH SERPL-CCNC: 195 U/L (ref 140–271)
LYMPHOCYTES # BLD AUTO: 1.81 THOUSANDS/ÂΜL (ref 0.6–4.47)
LYMPHOCYTES NFR BLD AUTO: 23 % (ref 14–44)
MCH RBC QN AUTO: 28.3 PG (ref 26.8–34.3)
MCHC RBC AUTO-ENTMCNC: 30.9 G/DL (ref 31.4–37.4)
MCV RBC AUTO: 92 FL (ref 82–98)
MONOCYTES # BLD AUTO: 0.95 THOUSAND/ÂΜL (ref 0.17–1.22)
MONOCYTES NFR BLD AUTO: 12 % (ref 4–12)
NEUTROPHILS # BLD AUTO: 4.71 THOUSANDS/ÂΜL (ref 1.85–7.62)
NEUTS SEG NFR BLD AUTO: 59 % (ref 43–75)
NRBC BLD AUTO-RTO: 0 /100 WBCS
PLATELET # BLD AUTO: 262 THOUSANDS/UL (ref 149–390)
PMV BLD AUTO: 9.3 FL (ref 8.9–12.7)
POTASSIUM SERPL-SCNC: 3.8 MMOL/L (ref 3.5–5.3)
PROT SERPL-MCNC: 6.3 G/DL (ref 6.4–8.4)
RBC # BLD AUTO: 5.23 MILLION/UL (ref 3.81–5.12)
SODIUM SERPL-SCNC: 138 MMOL/L (ref 135–147)
WBC # BLD AUTO: 7.9 THOUSAND/UL (ref 4.31–10.16)

## 2024-08-07 PROCEDURE — 85025 COMPLETE CBC W/AUTO DIFF WBC: CPT | Performed by: NURSE PRACTITIONER

## 2024-08-07 PROCEDURE — 80048 BASIC METABOLIC PNL TOTAL CA: CPT | Performed by: NURSE PRACTITIONER

## 2024-08-07 PROCEDURE — 83615 LACTATE (LD) (LDH) ENZYME: CPT | Performed by: NURSE PRACTITIONER

## 2024-08-07 PROCEDURE — 84155 ASSAY OF PROTEIN SERUM: CPT | Performed by: NURSE PRACTITIONER

## 2024-08-07 PROCEDURE — 97161 PT EVAL LOW COMPLEX 20 MIN: CPT

## 2024-08-07 PROCEDURE — 99232 SBSQ HOSP IP/OBS MODERATE 35: CPT | Performed by: NURSE PRACTITIONER

## 2024-08-07 PROCEDURE — 99232 SBSQ HOSP IP/OBS MODERATE 35: CPT | Performed by: PHYSICIAN ASSISTANT

## 2024-08-07 RX ORDER — POLYETHYLENE GLYCOL 3350 17 G/17G
17 POWDER, FOR SOLUTION ORAL DAILY PRN
Status: DISCONTINUED | OUTPATIENT
Start: 2024-08-07 | End: 2024-08-07

## 2024-08-07 RX ORDER — POLYETHYLENE GLYCOL 3350 17 G/17G
17 POWDER, FOR SOLUTION ORAL 2 TIMES DAILY
Status: DISCONTINUED | OUTPATIENT
Start: 2024-08-07 | End: 2024-08-08 | Stop reason: HOSPADM

## 2024-08-07 RX ORDER — DOCUSATE SODIUM 100 MG/1
100 CAPSULE, LIQUID FILLED ORAL 2 TIMES DAILY
Status: DISCONTINUED | OUTPATIENT
Start: 2024-08-07 | End: 2024-08-08 | Stop reason: HOSPADM

## 2024-08-07 RX ORDER — SENNOSIDES 8.6 MG
2 TABLET ORAL
Status: DISCONTINUED | OUTPATIENT
Start: 2024-08-07 | End: 2024-08-08 | Stop reason: HOSPADM

## 2024-08-07 RX ORDER — BISACODYL 10 MG
10 SUPPOSITORY, RECTAL RECTAL DAILY PRN
Status: DISCONTINUED | OUTPATIENT
Start: 2024-08-07 | End: 2024-08-08 | Stop reason: HOSPADM

## 2024-08-07 RX ADMIN — PANTOPRAZOLE SODIUM 40 MG: 40 TABLET, DELAYED RELEASE ORAL at 05:13

## 2024-08-07 RX ADMIN — DOCUSATE SODIUM 100 MG: 100 CAPSULE, LIQUID FILLED ORAL at 09:40

## 2024-08-07 RX ADMIN — ASPIRIN 81 MG 81 MG: 81 TABLET ORAL at 09:40

## 2024-08-07 RX ADMIN — POLYETHYLENE GLYCOL 3350 17 G: 17 POWDER, FOR SOLUTION ORAL at 05:13

## 2024-08-07 RX ADMIN — APIXABAN 2.5 MG: 2.5 TABLET, FILM COATED ORAL at 09:40

## 2024-08-07 RX ADMIN — DOCUSATE SODIUM 100 MG: 100 CAPSULE, LIQUID FILLED ORAL at 18:12

## 2024-08-07 RX ADMIN — APIXABAN 2.5 MG: 2.5 TABLET, FILM COATED ORAL at 18:12

## 2024-08-07 RX ADMIN — SENNOSIDES 17.2 MG: 8.6 TABLET, FILM COATED ORAL at 21:32

## 2024-08-07 RX ADMIN — ATORVASTATIN CALCIUM 20 MG: 20 TABLET, FILM COATED ORAL at 09:40

## 2024-08-07 RX ADMIN — POLYETHYLENE GLYCOL 3350 17 G: 17 POWDER, FOR SOLUTION ORAL at 21:32

## 2024-08-07 RX ADMIN — DILTIAZEM HYDROCHLORIDE 180 MG: 180 CAPSULE, EXTENDED RELEASE ORAL at 09:40

## 2024-08-07 NOTE — ASSESSMENT & PLAN NOTE
CT chest PE study 8/5: Bilateral pleural effusion with subjacent compressive atelectasis.  No pulmonary embolism  S/p furosemide IV and right thoracentesis in IR 8/5   Holding diuretics for now given elevated Cr

## 2024-08-07 NOTE — PROGRESS NOTES
[facility]  Progress Note  Name: Nohemi Arcos I  MRN: [id number]  Unit/Bed#: -01 I Date of Admission: 8/5/2024   Date of Service: 8/7/2024 I Hospital Day: 2    Assessment & Plan   * Acute on chronic diastolic (congestive) heart failure (HCC)  Assessment & Plan  Wt Readings from Last 3 Encounters:   08/07/24 55 kg (121 lb 5.8 oz)   08/01/24 58.6 kg (129 lb 3 oz)   05/01/24 59 kg (130 lb)     Presented for shortness of breath, elevated , pleural effusions    History of recent hospitalization for new atrial fibrillation with RVR  Echocardiogram 7/29: LVEF 50%.There appears to be hypokinesis of the septal walls and basal inferior wall. Unable to assess diastolic function due to atrial fibrillation  CT chest PE study 8/5:Moderate sized bilateral pleural effusions with subjacent compressive atelectasis   Received furosemide 40 mg IV in the ER.   Weight down 8 lbs since admission  IR and cardiology input appreciated   Underwent IR thoracentesis 8/5 with 700 ml pleural effusion removal  Diuretics were held due to elevated Cr. Will continue to hold for today.   Follow up with BMP      Monitor daily weights and I&O's.         Urinary frequency  Assessment & Plan  Patient reported urinary frequency  Urinalysis without evidence for infection  Monitor urinary output  Conduct serial assessments     Atrial fibrillation, controlled (HCC)  Assessment & Plan  Recently diagnosed and initiated on rate control agent and apixaban  Continue diltiazem 180 mg p.o. daily  Anticoagulation- Apixaban; continue with ASA       Bilateral pleural effusion  Assessment & Plan  CT chest PE study 8/5: Bilateral pleural effusion with subjacent compressive atelectasis.  No pulmonary embolism  S/p furosemide IV and right thoracentesis in IR 8/5   Holding diuretics for now given elevated Cr      SIRS (systemic inflammatory response syndrome) (HCC)  Assessment & Plan  Tachycardia and tachypnea suspect secondary to  atrial fibrillation and shortness of breath  Afebrile.  No leukocytosis  Monitor off antibiotics               VTE Pharmacologic Prophylaxis: VTE Score: 5 High Risk (Score >/= 5) - Pharmacological DVT Prophylaxis Ordered: apixaban (Eliquis). Sequential Compression Devices Ordered.    Mobility:   Basic Mobility Inpatient Raw Score: 24  JH-HLM Goal: 8: Walk 250 feet or more  JH-HLM Achieved: 7: Walk 25 feet or more  JH-HLM Goal achieved. Continue to encourage appropriate mobility.    Patient Centered Rounds: I performed bedside rounds with nursing staff today.   Discussions with Specialists or Other Care Team Provider: cardiology, CM, OT/PT    Education and Discussions with Family / Patient: Updated  (sister) via phone.    Total Time Spent on Date of Encounter in care of patient: 38 mins. This time was spent on one or more of the following: performing physical exam; counseling and coordination of care; obtaining or reviewing history; documenting in the medical record; reviewing/ordering tests, medications or procedures; communicating with other healthcare professionals and discussing with patient's family/caregivers.    Current Length of Stay: 2 day(s)  Current Patient Status: Inpatient   Certification Statement: The patient will continue to require additional inpatient hospital stay due to CHF  Discharge Plan: Anticipate discharge in 24-48 hrs to home.    Code Status: Level 1 - Full Code    Subjective:   Patient was seen and examined. States that she is feeling better since admitted. She has not had any bowel movement since Monday and feeling like to need to go. Asking for more stool softeners. Denies any dyspnea.     Objective:     Vitals:   Temp (24hrs), Av.9 °F (36.6 °C), Min:97.7 °F (36.5 °C), Max:98.2 °F (36.8 °C)    Temp:  [97.7 °F (36.5 °C)-98.2 °F (36.8 °C)] 97.7 °F (36.5 °C)  HR:  [] 77  Resp:  [17-20] 18  BP: (120-148)/() 132/111  SpO2:  [93 %-97 %] 97 %  Body mass index is  22.93 kg/m².     Input and Output Summary (last 24 hours):     Intake/Output Summary (Last 24 hours) at 8/7/2024 1109  Last data filed at 8/7/2024 0900  Gross per 24 hour   Intake 900 ml   Output 950 ml   Net -50 ml       Physical Exam:   Physical Exam  Vitals and nursing note reviewed.   Constitutional:       General: She is not in acute distress.     Appearance: Normal appearance.      Comments: Frail and elderly      HENT:      Head: Normocephalic and atraumatic.      Right Ear: External ear normal.      Left Ear: External ear normal.      Nose: Nose normal. No rhinorrhea.      Mouth/Throat:      Mouth: Mucous membranes are moist.      Pharynx: Oropharynx is clear.   Eyes:      General:         Right eye: No discharge.         Left eye: No discharge.      Pupils: Pupils are equal, round, and reactive to light.   Cardiovascular:      Rate and Rhythm: Normal rate and regular rhythm.      Pulses: Normal pulses.      Heart sounds: Normal heart sounds. No murmur heard.  Pulmonary:      Effort: Pulmonary effort is normal. No respiratory distress.      Breath sounds: Rales present.      Comments: Decreased right greater than left     Abdominal:      General: Bowel sounds are normal. There is no distension.      Palpations: Abdomen is soft. There is no mass.      Tenderness: There is no abdominal tenderness.   Musculoskeletal:         General: No swelling or tenderness. Normal range of motion.      Cervical back: Normal range of motion and neck supple. No muscular tenderness.   Skin:     General: Skin is warm and dry.      Capillary Refill: Capillary refill takes less than 2 seconds.      Findings: No erythema or rash.   Neurological:      General: No focal deficit present.      Mental Status: She is alert and oriented to person, place, and time. Mental status is at baseline.   Psychiatric:         Mood and Affect: Mood normal.         Behavior: Behavior normal.         Thought Content: Thought content normal.          Judgment: Judgment normal.        Additional Data:     Labs:  Results from last 7 days   Lab Units 08/07/24  0503   WBC Thousand/uL 7.90   HEMOGLOBIN g/dL 14.8   HEMATOCRIT % 47.9*   PLATELETS Thousands/uL 262   SEGS PCT % 59   LYMPHO PCT % 23   MONO PCT % 12   EOS PCT % 4     Results from last 7 days   Lab Units 08/07/24  0503   SODIUM mmol/L 138   POTASSIUM mmol/L 3.8   CHLORIDE mmol/L 104   CO2 mmol/L 27   BUN mg/dL 31*   CREATININE mg/dL 1.23   ANION GAP mmol/L 7   CALCIUM mg/dL 9.5   GLUCOSE RANDOM mg/dL 104                       Lines/Drains:  Invasive Devices       Peripheral Intravenous Line  Duration             Peripheral IV 08/05/24 Right Antecubital 2 days                          Imaging: Reviewed radiology reports from this admission including: chest xray and chest CT scan    Recent Cultures (last 7 days):   Results from last 7 days   Lab Units 08/05/24  1448   GRAM STAIN RESULT  1+ Polys  No bacteria seen   BODY FLUID CULTURE, STERILE  No growth       Last 24 Hours Medication List:   Current Facility-Administered Medications   Medication Dose Route Frequency Provider Last Rate    acetaminophen  650 mg Oral Q4H PRN Rachel PANCHO Dickerson, OSMAN      apixaban  2.5 mg Oral BID Rachel Dickerson, OSMAN      aspirin  81 mg Oral Daily Racehl Dickerson, CRNP      atorvastatin  20 mg Oral Daily Rachel Dickerson, OSMAN      bisacodyl  10 mg Rectal Daily PRN OSMAN Walker      diltiazem  180 mg Oral Daily Rachel Dickerson, OSMAN      docusate sodium  100 mg Oral BID Brigitte Miles PA-C      pantoprazole  40 mg Oral Early Morning OSMAN Brian      polyethylene glycol  17 g Oral BID OSMAN Walker      senna  2 tablet Oral HS OSMAN Walker          Today, Patient Was Seen By: OSMAN Walker    **Please Note: This note may have been constructed using a voice recognition system.**

## 2024-08-07 NOTE — ASSESSMENT & PLAN NOTE
Recently diagnosed and initiated on rate control agent and apixaban  Continue diltiazem 180 mg p.o. daily  Anticoagulation- Apixaban; continue with ASA

## 2024-08-07 NOTE — PLAN OF CARE
Problem: INFECTION - ADULT  Goal: Absence or prevention of progression during hospitalization  Description: INTERVENTIONS:  - Assess and monitor for signs and symptoms of infection  - Monitor lab/diagnostic results  - Monitor all insertion sites, i.e. indwelling lines, tubes, and drains  - Monitor endotracheal if appropriate and nasal secretions for changes in amount and color  - [place] appropriate cooling/warming therapies per order  - Administer medications as ordered  - Instruct and encourage patient and family to use good hand hygiene technique  - Identify and instruct in appropriate isolation precautions for identified infection/condition  Outcome: Progressing     Problem: Decreased Cardiac Output  Goal: Cardiac output adequate for individual needs  Description: INTERVENTIONS: Monitor for signs and symptoms of decreased cardiac output   - Monitor for dyspnea with exertion and at rest  - Monitor for orthopnea  - Monitor for signs of tachycardia. Place patient on telemetry monitoring.  - Assess patient for jugular vein distention  - Assess patient for lower extremity edema and poor peripheral perfusion   - Auscultate lung sound for Fine bibasilar crackles   - Monitor for cardiac arrythmias   - Administer beta blockers, antiarrhythmic, and blood pressure medications as ordered    Outcome: Progressing     Problem: Excess Fluid Volume  Goal: Patient is able to achieve and maintain homeostasis  Description: INTERVENTIONS: Monitor for sign and symptoms of fluid overload  - Evaluate LE edema every shift  - Elevate LE to prevent dependent edema  - Apply JOLIE stockings as ordered   - Monitor ankle circumference daily  - Assess for jugular vein distention  - Evaluate provider orders for the CHF diuretic algorithm. Administer diuretics as ordered  - Weigh the patient daily at 0600 and report a weight gain of five pounds or more   - Strict intake and output  - Monitor fluid intake and adhere to fluid  restrictions  - Assess lung sounds every shift and as needed  - Monitor vital signs and lab values (CBC, chem, BUN, BNP)  - Measure and document urine output    Outcome: Progressing

## 2024-08-07 NOTE — PHYSICAL THERAPY NOTE
PHYSICAL THERAPY EVALUATION  NAME:  Nohemi Arcos  DATE: 08/07/24    AGE:   84 y.o.  Mrn:   [id number]  ADMIT DX:  Shortness of breath [R06.02]  Pleural effusion, bilateral [J90]  Elevated brain natriuretic peptide (BNP) level [R79.89]  Problem List:   Patient Active Problem List   Diagnosis    Hypercholesteremia    Primary hypertension    Nontoxic single thyroid nodule    Osteopenia    Primary localized osteoarthritis of left knee    Vitamin D deficiency    Stage 3b chronic kidney disease (HCC)    Elevated TSH    Multiple renal cysts    Cholelithiasis    Colitis    Acute diverticulitis    Adrenal hyperplasia (HCC)    LBBB (left bundle branch block)    Atrial fibrillation with rapid ventricular response (HCC)    Hypoxia    Acute on chronic diastolic (congestive) heart failure (HCC)    Cardiomyopathy (HCC)    Constipation    SIRS (systemic inflammatory response syndrome) (HCC)    Bilateral pleural effusion    Atrial fibrillation, controlled (HCC)    Urinary frequency       Past Medical History  Past Medical History:   Diagnosis Date    Arteriosclerotic heart disease     LAST ASSESSED: 09SEP2015    Esophageal reflux     LAST ASSESSED: 09JAN2018    GERD (gastroesophageal reflux disease)     Hyperlipidemia     LAST ASSESSED: 09JAN2018    Hypertension     LAST ASSESSED: 09JAN2018    Nontoxic single thyroid nodule     LAST ASSESSED: 09MAY2014    Osteopenia     LAST ASSESSED: 11MAY2016       Past Surgical History  Past Surgical History:   Procedure Laterality Date    CYSTOSCOPY  12/04/2013    DIAGNOSTIC    ESOPHAGOGASTRODUODENOSCOPY      IR THORACENTESIS  8/5/2024    MASTECTOMY Bilateral     cancer    OOPHORECTOMY      SALPINGOOPHORECTOMY Bilateral        Length Of Stay: 2  Performed at least 2 patient identifiers during session: Name and ID bracelet       08/07/24 0914   PT Last Visit   PT Visit Date 08/07/24   Note Type   Note type Evaluation   Pain Assessment   Pain Assessment Tool 0-10   Pain Score No Pain    Restrictions/Precautions   Weight Bearing Precautions Per Order No   Other Precautions Contact/isolation   Home Living   Type of Home House   Home Layout One level;Stairs to enter with rails  (2 WIL)   Bathroom Shower/Tub Tub/shower unit   Bathroom Toilet Standard   Bathroom Equipment Grab bars in shower;Shower chair;Grab bars around toilet   Home Equipment Walker;Cane;Wheelchair-manual  (no AD used at baseline)   Prior Function   Level of [place] Independent with ADLs;Independent with functional mobility   Lives With Alone   Receives Help From Family   IADLs Independent with driving;Independent with meal prep;Independent with medication management   Falls in the last 6 months 0   Vocational Retired   General   Family/Caregiver Present No   Cognition   Overall Cognitive Status WFL   Arousal/Participation Alert   Orientation Level Oriented X4   Memory Within functional limits   Following Commands Follows all commands and directions without difficulty   RLE Assessment   RLE Assessment WFL   LLE Assessment   LLE Assessment WFL   Vision-Basic Assessment   Current Vision Wears glasses all the time   Coordination   Sensation WFL   Bed Mobility   Supine to Sit 6  Modified independent   Additional items HOB elevated   Additional Comments pt denied dizziness with transitional movement   Transfers   Sit to Stand 6  Modified independent   Stand to Sit 6  Modified independent   Ambulation/Elevation   Gait pattern Excessively slow   Gait Assistance 6  Modified independent   Assistive Device None   Distance 50 ft   Ambulation/Elevation Additional Comments good safety awareness noted   Balance   Static Sitting Normal   Dynamic Sitting Normal   Static Standing Good   Dynamic Standing Fair +   Ambulatory Fair +   Endurance Deficit   Endurance Deficit No   Activity Tolerance   Activity Tolerance Patient tolerated treatment well   Assessment   Prognosis Excellent   Assessment Pt is 84 y.o. female seen for PT evaluation s/p  [facility] w/ Acute on chronic diastolic (congestive) heart failure (HCC). PT consulted to assess pt's functional mobility and d/c needs. Order placed for PT eval and tx, w/ up as tolerated order. Pt agreeable to PT  session upon arrival, pt found supine in bed. The following objective measures performed on IE also reveal limitations: AM-PAC 6 Clicks 24/24.  Patient was independent w/ all functional mobility w/ no AD.  Pt presents at [facility] with transfers, ambulation, and bed mobility.  From PT/mobility standpoint, recommendation at time of d/c would be anticipate no needs/resources. Upon conclusion pt seated in recliner. D/c PT services at this time. Complexity: Comorbidities affecting pt's physical performance at time of assessment include: CHF, a fib, and SIRS . Personal factors affecting pt at time of IE include: advanced age, living alone, and steps to enter home. Please find objective findings from PT assessment regarding body systems outlined above with impairments and limitations including fall risk.  Pt's clinical presentation is currently stable  seen in pt's presentation of  lack of deficits . The patient's AM-PAC Basic Mobility Inpatient Short Form Raw Score is 24. Please also refer to the recommendation of the Physical Therapist for safe discharge planning. RN verbalized pt appropriate for PT session.   Barriers to Discharge None   Goals   Patient Goals to get OOB   Discharge Recommendation   Rehab Resource Intensity Level, PT No post-acute rehabilitation needs   AM-PAC Basic Mobility Inpatient   Turning in Flat Bed Without Bedrails 4   Lying on Back to Sitting on Edge of Flat Bed Without Bedrails 4   Moving Bed to Chair 4   Standing Up From Chair Using Arms 4   Walk in Room 4   Climb 3-5 Stairs With Railing 4   Basic Mobility Inpatient Raw Score 24   Basic Mobility Standardized Score 57.68   [facility] Highest Level Of Mobility   [facility] Goal 8: Walk 250 feet or more   [facility]  Achieved 7: Walk 25 feet or more         Time In: 0912  Time Out: 0921  Total Evaluation Minutes: 9    Shari Landin, PT

## 2024-08-07 NOTE — PROGRESS NOTES
"[facility]  Progress Note  Name: Nohemi Arcos I  MRN: [id number]  Unit/Bed#: MS [facility] I Date of Admission: 8/5/2024   Date of Service: 8/7/2024 I Hospital Day: 2    Assessment & Plan   Atrial fibrillation, controlled (HCC)  Assessment & Plan  Overall, rate controlled  Continue diltiazem 180 mg daily  Eliquis 2.5 mg twice daily for stroke risk reduction    Bilateral pleural effusion  Assessment & Plan  S/p R-sided thoracentesis    * Acute on chronic diastolic (congestive) heart failure (HCC)  Assessment & Plan  Wt Readings from Last 3 Encounters:   08/07/24 55 kg (121 lb 5.8 oz)   08/01/24 58.6 kg (129 lb 3 oz)   05/01/24 59 kg (130 lb)     BNP elevated (452,) pleural effusions, and subjective shortness of breath  Status post thoracentesis and IV diuretics  Creatinine rising Weight is down, net -0.7 L  Holding diuretic at present  Daily weights, I/O, low-sodium diet             Summary Comments:  Ok to continue diltiazem 180 mg daily.  Eliquis 2.5 mg twice daily to continue for stroke risk prevention.  Low-sodium fluid restricted diet and daily weights.  Strict RAY's.  Hold diuretics today.    Outpatient Cardiologist: Dr. Peña     Subjective:  Patient seen and examined at the bedside.  No events overnight.  Feeling better. No chest pain dyspnea, or palpitations. No edema.     Objective:   Vitals: Blood pressure (!) 132/111, pulse 77, temperature 97.7 °F (36.5 °C), temperature source Oral, resp. rate 18, height 5' 1\" (1.549 m), weight 55 kg (121 lb 5.8 oz), SpO2 97%.,   Orthostatic Blood Pressures      Flowsheet Row Most Recent Value   Blood Pressure 132/111 filed at 08/07/2024 0942   Patient Position - Orthostatic VS Lying filed at 08/07/2024 0652            Telemetry/ECG/Cardiac Testing:   Echo 7/31/2024  EF 50%  Mild LAE, moderate MR, mild TR    Physical Exam:  Physical Exam  Vitals and nursing note reviewed.   Constitutional:       Appearance: She is well-developed.   HENT:      " Head: Normocephalic and atraumatic.      Mouth/Throat:      Mouth: Mucous membranes are moist.   Eyes:      General: No scleral icterus.     Conjunctiva/sclera: Conjunctivae normal.   Neck:      Vascular: No JVD.      Trachea: No tracheal deviation.   Cardiovascular:      Rate and Rhythm: Normal rate. Rhythm irregularly irregular.      Pulses: Intact distal pulses.      Heart sounds: S1 normal and S2 normal. Murmur heard.      Systolic murmur is present.      No friction rub. No gallop.   Pulmonary:      Effort: Pulmonary effort is normal. No respiratory distress.      Breath sounds: No decreased air movement. Examination of the left-lower field reveals decreased breath sounds. Decreased breath sounds present. No wheezing or rales.   Chest:      Chest wall: No tenderness.   Abdominal:      General: Bowel sounds are normal. There is no distension.      Palpations: Abdomen is soft.      Tenderness: There is no abdominal tenderness.      Comments: Aorta not palpable    Musculoskeletal:         General: No tenderness. Normal range of motion.      Cervical back: Normal range of motion and neck supple.      Right lower leg: No edema.      Left lower leg: No edema.   Skin:     General: Skin is warm and dry.      Coloration: Skin is not pale.      Findings: No erythema or rash.   Neurological:      General: No focal deficit present.      Mental Status: She is alert and oriented to person, place, and time.   Psychiatric:         Mood and Affect: Mood normal.         Behavior: Behavior normal.         Judgment: Judgment normal.         Medications:    Current Facility-Administered Medications:     acetaminophen (TYLENOL) tablet 650 mg, 650 mg, Oral, Q4H PRN, OSMAN Brian    apixaban (ELIQUIS) tablet 2.5 mg, 2.5 mg, Oral, BID, OSMAN Brian, 2.5 mg at 08/07/24 0940    aspirin chewable tablet 81 mg, 81 mg, Oral, Daily, OSMAN Brian, 81 mg at 08/07/24 0940    atorvastatin (LIPITOR) tablet 20 mg, 20 mg,  Oral, Daily, OSMAN Brian, 20 mg at 08/07/24 0940    bisacodyl (DULCOLAX) rectal suppository 10 mg, 10 mg, Rectal, Daily PRN, OSMAN Walker    diltiazem (CARDIZEM CD) 24 hr capsule 180 mg, 180 mg, Oral, Daily, OSMAN Brian, 180 mg at 08/07/24 0940    docusate sodium (COLACE) capsule 100 mg, 100 mg, Oral, BID, Brigitte Miles PA-C, 100 mg at 08/07/24 0940    pantoprazole (PROTONIX) EC tablet 40 mg, 40 mg, Oral, Early Morning, OSMAN Brian, 40 mg at 08/07/24 0513    polyethylene glycol (MIRALAX) packet 17 g, 17 g, Oral, BID, OSMAN Walker    senna (SENOKOT) tablet 17.2 mg, 2 tablet, Oral, HS, OSMAN Walker    Labs & Results:  Results from last 7 days   Lab Units 08/05/24  0834   BNP pg/mL 452*     Results from last 7 days   Lab Units 08/07/24  0503   WBC Thousand/uL 7.90   HEMOGLOBIN g/dL 14.8   HEMATOCRIT % 47.9*   PLATELETS Thousands/uL 262         Results from last 7 days   Lab Units 08/07/24  0503 08/02/24  0437 08/01/24  0517   POTASSIUM mmol/L 3.8   < > 3.5   CHLORIDE mmol/L 104   < > 100   CO2 mmol/L 27   < > 24   BUN mg/dL 31*   < > 22   CREATININE mg/dL 1.23   < > 1.25   MAGNESIUM mg/dL  --   --  2.1    < > = values in this interval not displayed.

## 2024-08-07 NOTE — PROGRESS NOTES
Heart Failure Outpatient Care Manager Note: Patient identified on inpatient system report, chart notes reviewed and referral made for HF-Complex Care Management.     Met with patient via TV kit while in hospital. Explained role of OPCM and HF education session to be provided. Patient was receptive and engaged to education and outreach. HF education provided and included definition, main types, causes, symptoms and treatments, including medication and lifestyle management: daily weights, low sodium diet, fluid restriction, activity and symptom monitoring. The HF Zone tool was reviewed and encourage patient to use as a daily guide. Questions answered and short teach back was done with good understanding noted.   Stressed importance of following up with providers after discharge and notifying cardiology office with symptoms/weight gain. Patient agreeable to outreach once discharged.This care manager will follow up with patient again by phone weekly after discharge.

## 2024-08-07 NOTE — ASSESSMENT & PLAN NOTE
Wt Readings from Last 3 Encounters:   08/07/24 55 kg (121 lb 5.8 oz)   08/01/24 58.6 kg (129 lb 3 oz)   05/01/24 59 kg (130 lb)     Presented for shortness of breath, elevated , pleural effusions    History of recent hospitalization for new atrial fibrillation with RVR  Echocardiogram 7/29: LVEF 50%.There appears to be hypokinesis of the septal walls and basal inferior wall. Unable to assess diastolic function due to atrial fibrillation  CT chest PE study 8/5:Moderate sized bilateral pleural effusions with subjacent compressive atelectasis   Received furosemide 40 mg IV in the ER.   Weight down 8 lbs since admission  IR and cardiology input appreciated   Underwent IR thoracentesis 8/5 with 700 ml pleural effusion removal  Diuretics were held due to elevated Cr. Will continue to hold for today.   Follow up with BMP      Monitor daily weights and I&O's.

## 2024-08-07 NOTE — ASSESSMENT & PLAN NOTE
CT chest PE study 8/5: Bilateral pleural effusion with subjacent compressive atelectasis.  No pulmonary embolism  S/p furosemide IV and right thoracentesis in IR 8/5    Pleural fluid studies shows transudative secondary to congestive heart failure  Diuretic is resumed as needed  Discussed with IR regarding left pleural effusion.  At this time no indication for thoracentesis as the patient clinically has improved.

## 2024-08-07 NOTE — ASSESSMENT & PLAN NOTE
Wt Readings from Last 3 Encounters:   08/07/24 55 kg (121 lb 5.8 oz)   08/01/24 58.6 kg (129 lb 3 oz)   05/01/24 59 kg (130 lb)     BNP elevated (452,) pleural effusions, and subjective shortness of breath  Status post thoracentesis and IV diuretics  Creatinine rising Weight is down, net -0.7 L  Holding diuretic at present  Daily weights, I/O, low-sodium diet

## 2024-08-07 NOTE — CASE MANAGEMENT
Case Management Discharge Planning Note    Patient name Nohemi Arcos  Location /-01 MRN [id number]  : 1940 Date 2024       Current Admission Date: 2024  Current Admission Diagnosis:Acute on chronic diastolic (congestive) heart failure (HCC)   Patient Active Problem List    Diagnosis Date Noted Date Diagnosed    SIRS (systemic inflammatory response syndrome) (HCC) 2024     Bilateral pleural effusion 2024     Atrial fibrillation, controlled (HCC) 2024     Urinary frequency 2024     Cardiomyopathy (HCC) 2024     Constipation 2024     Hypoxia 2024     Acute on chronic diastolic (congestive) heart failure (HCC) 2024     Atrial fibrillation with rapid ventricular response (HCC) 2024     Elevated TSH 2023     Multiple renal cysts 2023     Cholelithiasis 2023     Colitis 2023     Acute diverticulitis 2023     Adrenal hyperplasia (HCC) 2023     LBBB (left bundle branch block) 2023     Stage 3b chronic kidney disease (HCC) 2022     Vitamin D deficiency 2022     Primary localized osteoarthritis of left knee 10/25/2019     Hypercholesteremia 2013     Osteopenia 2012     Primary hypertension 2012     Nontoxic single thyroid nodule 2012       LOS (days): 2  Geometric Mean LOS (GMLOS) (days): 3.9  Days to GMLOS:1.8     OBJECTIVE:  Risk of Unplanned Readmission Score: 15.24         Current admission status: Inpatient   Preferred Pharmacy:   [id number]  [address] [address] - [address]  [address]  [facility] [address]  Phone: [phone] Fax: [phone]    [id number]  [address] [address] - [address]  [address] [place] [address]  Phone: [phone] Fax: [phone]    Primary Care Provider: Gissell Graham DO    Primary Insurance: MEDICARE  Secondary Insurance: BLUE  CROSS    DISCHARGE DETAILS:    Discharge planning discussed with:: patient and sister at the bedside  Freedom of Choice: Yes  Comments - Freedom of Choice: pt denies any d/c needs-   no rehab needs- pt has declined hhc  CM contacted family/caregiver?: Yes             Contacts  Patient Contacts: Wendie Cai  Relationship to Patient:: Family (sister)  Contact Method: In Person  Reason/Outcome: Discharge Planning    Requested Home Health Care         Is the patient interested in HHC at discharge?: No    DME Referral Provided  Referral made for DME?: No    Other Referral/Resources/Interventions Provided:  Referral Comments: pt declined hhc - pt not stable for d/c today         Treatment Team Recommendation: Home (home with family support & outpt follow up- family)                                   IMM Given (Date):: 08/07/24  IMM Given to:: Patient  Family notified:: sister at the bedside

## 2024-08-07 NOTE — ASSESSMENT & PLAN NOTE
Tachycardia and tachypnea suspect secondary to atrial fibrillation and shortness of breath  Afebrile.  No leukocytosis  Monitor off antibiotics

## 2024-08-08 VITALS
SYSTOLIC BLOOD PRESSURE: 150 MMHG | OXYGEN SATURATION: 95 % | HEIGHT: 61 IN | BODY MASS INDEX: 23.23 KG/M2 | HEART RATE: 87 BPM | TEMPERATURE: 98.4 F | DIASTOLIC BLOOD PRESSURE: 78 MMHG | WEIGHT: 123.02 LBS | RESPIRATION RATE: 16 BRPM

## 2024-08-08 LAB
ANION GAP SERPL CALCULATED.3IONS-SCNC: 7 MMOL/L (ref 4–13)
BACTERIA SPEC BFLD CULT: NO GROWTH
BUN SERPL-MCNC: 28 MG/DL (ref 5–25)
CALCIUM SERPL-MCNC: 9.3 MG/DL (ref 8.4–10.2)
CHLORIDE SERPL-SCNC: 105 MMOL/L (ref 96–108)
CO2 SERPL-SCNC: 26 MMOL/L (ref 21–32)
CREAT SERPL-MCNC: 1.12 MG/DL (ref 0.6–1.3)
GFR SERPL CREATININE-BSD FRML MDRD: 45 ML/MIN/1.73SQ M
GLUCOSE SERPL-MCNC: 101 MG/DL (ref 65–140)
GRAM STN SPEC: NORMAL
GRAM STN SPEC: NORMAL
POTASSIUM SERPL-SCNC: 4.2 MMOL/L (ref 3.5–5.3)
SODIUM SERPL-SCNC: 138 MMOL/L (ref 135–147)

## 2024-08-08 PROCEDURE — 80048 BASIC METABOLIC PNL TOTAL CA: CPT | Performed by: NURSE PRACTITIONER

## 2024-08-08 PROCEDURE — 88305 TISSUE EXAM BY PATHOLOGIST: CPT | Performed by: PATHOLOGY

## 2024-08-08 PROCEDURE — 99239 HOSP IP/OBS DSCHRG MGMT >30: CPT | Performed by: NURSE PRACTITIONER

## 2024-08-08 PROCEDURE — 88112 CYTOPATH CELL ENHANCE TECH: CPT | Performed by: PATHOLOGY

## 2024-08-08 PROCEDURE — 99232 SBSQ HOSP IP/OBS MODERATE 35: CPT | Performed by: NURSE PRACTITIONER

## 2024-08-08 RX ORDER — FUROSEMIDE 40 MG/1
40 TABLET ORAL DAILY PRN
Qty: 30 TABLET | Refills: 0 | Status: SHIPPED | OUTPATIENT
Start: 2024-08-08 | End: 2024-09-07

## 2024-08-08 RX ORDER — DOCUSATE SODIUM 100 MG/1
100 CAPSULE, LIQUID FILLED ORAL 2 TIMES DAILY
Qty: 60 CAPSULE | Refills: 0 | Status: SHIPPED | OUTPATIENT
Start: 2024-08-08 | End: 2024-08-16 | Stop reason: ALTCHOICE

## 2024-08-08 RX ORDER — SENNOSIDES 8.6 MG
17.2 TABLET ORAL
Qty: 60 TABLET | Refills: 0 | Status: SHIPPED | OUTPATIENT
Start: 2024-08-08 | End: 2024-09-07

## 2024-08-08 RX ORDER — POLYETHYLENE GLYCOL 3350 17 G/17G
17 POWDER, FOR SOLUTION ORAL DAILY
Qty: 340 G | Refills: 0 | Status: SHIPPED | OUTPATIENT
Start: 2024-08-08 | End: 2024-08-28

## 2024-08-08 RX ORDER — POTASSIUM CHLORIDE 750 MG/1
10 TABLET, EXTENDED RELEASE ORAL DAILY PRN
Qty: 30 TABLET | Refills: 0 | Status: SHIPPED | OUTPATIENT
Start: 2024-08-08

## 2024-08-08 RX ADMIN — DILTIAZEM HYDROCHLORIDE 180 MG: 180 CAPSULE, EXTENDED RELEASE ORAL at 09:37

## 2024-08-08 RX ADMIN — PANTOPRAZOLE SODIUM 40 MG: 40 TABLET, DELAYED RELEASE ORAL at 05:12

## 2024-08-08 RX ADMIN — ASPIRIN 81 MG 81 MG: 81 TABLET ORAL at 09:37

## 2024-08-08 RX ADMIN — BISACODYL 10 MG: 10 SUPPOSITORY RECTAL at 02:01

## 2024-08-08 RX ADMIN — DOCUSATE SODIUM 100 MG: 100 CAPSULE, LIQUID FILLED ORAL at 09:37

## 2024-08-08 RX ADMIN — POLYETHYLENE GLYCOL 3350 17 G: 17 POWDER, FOR SOLUTION ORAL at 09:37

## 2024-08-08 RX ADMIN — APIXABAN 2.5 MG: 2.5 TABLET, FILM COATED ORAL at 09:37

## 2024-08-08 RX ADMIN — ATORVASTATIN CALCIUM 20 MG: 20 TABLET, FILM COATED ORAL at 09:37

## 2024-08-08 NOTE — ASSESSMENT & PLAN NOTE
Patient reported urinary frequency  Urinalysis without evidence for infection  Recommend to follow-up with PCP

## 2024-08-08 NOTE — ASSESSMENT & PLAN NOTE
Wt Readings from Last 3 Encounters:   08/08/24 55.8 kg (123 lb 0.3 oz)   08/01/24 58.6 kg (129 lb 3 oz)   05/01/24 59 kg (130 lb)     BNP elevated (452,) pleural effusions, and subjective shortness of breath  Status post thoracentesis and IV diuretics  Holding diuretic due to elevated creatinine, now improved.    Daily weights, I/O, low-sodium diet

## 2024-08-08 NOTE — NURSING NOTE
Pt DC to home via sister in stable condition. All belongings packed by pt. IV catheter removed fully intact. AVS reviewed with pt face-to-face, all questions answered.

## 2024-08-08 NOTE — PROGRESS NOTES
[facility]  Progress Note  Name: Nohemi Arcos I  MRN: [id number]  Unit/Bed#: -01 I Date of Admission: 8/5/2024   Date of Service: 8/8/2024 I Hospital Day: 3    Assessment & Plan   Atrial fibrillation, controlled (HCC)  Assessment & Plan  Overall, rate controlled  Continue diltiazem 180 mg daily  Eliquis 2.5 mg twice daily for stroke risk reduction    Bilateral pleural effusion  Assessment & Plan  S/p R-sided thoracentesis    * Acute on chronic diastolic (congestive) heart failure (HCC)  Assessment & Plan  Wt Readings from Last 3 Encounters:   08/08/24 55.8 kg (123 lb 0.3 oz)   08/01/24 58.6 kg (129 lb 3 oz)   05/01/24 59 kg (130 lb)     BNP elevated (452,) pleural effusions, and subjective shortness of breath  Status post thoracentesis and IV diuretics  Holding diuretic due to elevated creatinine, now improved.    Daily weights, I/O, low-sodium diet             Outpatient Cardiologist: Dr Peña      Subjective:   Patient seen and examined.  No significant events overnight.    Feeling well, denies any shortness of breath or chest discomfort.  Ambulated in the solorzano without difficulty.    Summary comments:  Nohemi remains in rate controlled A-fib.  Presenting symptoms of chest discomfort and shortness of breath are now resolved following rate control and diuresis.    Recommend continuing on current regimen of diltiazem 180 mg daily and Eliquis 2.5 mg twice daily.    She was diuresed and is now euvolemic on exam.  Diuretics were placed on hold several days ago due to rising creatinine.  Creatinine is now normalized and BUN/creatinine ratio appears more on the dry side.  Would hold off on further diuretics.  May use Lasix 40 mg once daily in the outpatient setting for a weight gain of 3 pounds overnight or 5 pounds in 1 week.  I discussed this with the patient and explained the importance of daily weights.    She is scheduled for cardiology follow-up 8/21/2024 and we recommend keeping  "this appointment.  She is stable for discharge from a cardiac perspective    Telemetry/ECG/Cardiac testing:   Echo 7/31/2024  EF 50%  Mild LAE, moderate MR, mild TR    Nuclear stress test 8/10/2022  No evidence of ischemia or infarct    Vitals: Blood pressure 121/54, pulse 87, temperature (!) 97.2 °F (36.2 °C), resp. rate 16, height 5' 1\" (1.549 m), weight 55.8 kg (123 lb 0.3 oz), SpO2 95%.,   Orthostatic Blood Pressures      Flowsheet Row Most Recent Value   Blood Pressure 121/54 filed at 08/08/2024 0940   Patient Position - Orthostatic VS Sitting filed at 08/07/2024 1500        ,   Weight (last 2 days)       Date/Time Weight    08/08/24 0533 55.8 (123.02)    08/07/24 0559 55 (121.36)    08/06/24 0542 54.8 (120.7)     Weight: pt weighed 2x at 08/06/24 0542            Physical Exam:    General:  Normal appearance in no distress.  Eyes:  Anicteric.  Oral mucosa:  Moist.  Neck:  No JVD. Carotid upstrokes are brisk without bruits.  No masses.  Chest:  Clear to auscultation   Cardiac:  Irregularly irregular.  No palpable PMI.  Normal S1 and S2.  No murmur gallop or rub.  Abdomen:  Soft and nontender. No palpable organomegaly or aortic enlargement.  Extremities:  No peripheral edema.  Neuro:  Grossly symmetric.  Psych:  Alert and oriented x3.      Medications:      Current Facility-Administered Medications:     acetaminophen (TYLENOL) tablet 650 mg, 650 mg, Oral, Q4H PRN, OSMAN Brian    apixaban (ELIQUIS) tablet 2.5 mg, 2.5 mg, Oral, BID, OSMAN Brian, 2.5 mg at 08/08/24 0937    aspirin chewable tablet 81 mg, 81 mg, Oral, Daily, OSMAN Brian, 81 mg at 08/08/24 0937    atorvastatin (LIPITOR) tablet 20 mg, 20 mg, Oral, Daily, OSMAN Brian, 20 mg at 08/08/24 0937    bisacodyl (DULCOLAX) rectal suppository 10 mg, 10 mg, Rectal, Daily PRN, OSMAN Walker, 10 mg at 08/08/24 0201    diltiazem (CARDIZEM CD) 24 hr capsule 180 mg, 180 mg, Oral, Daily, OSMAN Brian, 180 mg at " 08/08/24 0937    docusate sodium (COLACE) capsule 100 mg, 100 mg, Oral, BID, Brigitte Miles PA-C, 100 mg at 08/08/24 0937    pantoprazole (PROTONIX) EC tablet 40 mg, 40 mg, Oral, Early Morning, OSMAN Brian, 40 mg at 08/08/24 0512    polyethylene glycol (MIRALAX) packet 17 g, 17 g, Oral, BID, QING WalkerNP, 17 g at 08/08/24 0937    senna (SENOKOT) tablet 17.2 mg, 2 tablet, Oral, HS, QING WalkerNP, 17.2 mg at 08/07/24 2132     Labs & Results:  Labs reviewed and prominent abnormalities reviewed above and/or below.  Troponins:    Results from last 7 days   Lab Units 08/05/24  1228 08/05/24  1036 08/05/24  0834   HS TNI 0HR ng/L  --   --  14   HS TNI 2HR ng/L  --  12  --    HSTNI D2 ng/L  --  -2  --    HS TNI 4HR ng/L 15  --   --    HSTNI D4 ng/L 1  --   --         BNP:   Results from last 6 Months   Lab Units 08/05/24  0834 07/31/24  0755   BNP pg/mL 452* 351*

## 2024-08-08 NOTE — ASSESSMENT & PLAN NOTE
Wt Readings from Last 3 Encounters:   08/08/24 55.8 kg (123 lb 0.3 oz)   08/01/24 58.6 kg (129 lb 3 oz)   05/01/24 59 kg (130 lb)     Presented for shortness of breath, elevated , pleural effusions    History of recent hospitalization for new atrial fibrillation with RVR  Echocardiogram 7/29: LVEF 50%.There appears to be hypokinesis of the septal walls and basal inferior wall. Unable to assess diastolic function due to atrial fibrillation  CT chest PE study 8/5:Moderate sized bilateral pleural effusions with subjacent compressive atelectasis   Received furosemide 40 mg IV in the ER.   Weight down 6 lbs since admission  IR and cardiology input appreciated   Underwent IR thoracentesis 8/5 with 700 ml pleural effusion removal. No dyspnea on exertion. No indication for left thoracentesis at this time.   Diuretics were held due to elevated Cr. Upon discharge, she will be on diuretics on as needed basis.  The patient may use furosemide 40 mg daily as needed for weight gain of 3 pounds over 24 hours or 5 pounds in 1 week or worsening shortness of breath and lower extremity edema.  Continue to monitor frequent weight.  Outpatient follow-up with cardiology; appointment is on 8/21.

## 2024-08-08 NOTE — PLAN OF CARE
Problem: Potential for Falls  Goal: Patient will remain free of falls  Description: INTERVENTIONS:  - Educate patient/family on patient safety including physical limitations  - Instruct patient to call for assistance with activity   - Consult OT/PT to assist with strengthening/mobility   - Keep Call bell within reach  - Keep bed low and locked with side rails adjusted as appropriate  - Keep care items and personal belongings within reach  - Initiate and maintain comfort rounds  - Make Fall Risk Sign visible to staff  - Offer Toileting every 2 Hours, in advance of need  - Initiate/Maintain bed alarm  - Obtain necessary fall risk management equipment: non skid socks  - Apply yellow socks and bracelet for high fall risk patients  - Consider moving patient to room near nurses station  Outcome: Adequate for Discharge     Problem: PAIN - ADULT  Goal: Verbalizes/displays adequate comfort level or baseline comfort level  Description: Interventions:  - Encourage patient to monitor pain and request assistance  - Assess pain using appropriate pain scale  - Administer analgesics based on type and severity of pain and evaluate response  - Implement non-pharmacological measures as appropriate and evaluate response  - Consider cultural and social influences on pain and pain management  - Notify physician/advanced practitioner if interventions unsuccessful or patient reports new pain  Outcome: Adequate for Discharge     Problem: INFECTION - ADULT  Goal: Absence or prevention of progression during hospitalization  Description: INTERVENTIONS:  - Assess and monitor for signs and symptoms of infection  - Monitor lab/diagnostic results  - Monitor all insertion sites, i.e. indwelling lines, tubes, and drains  - Monitor endotracheal if appropriate and nasal secretions for changes in amount and color  - [place] appropriate cooling/warming therapies per order  - Administer medications as ordered  - Instruct and encourage patient and  family to use good hand hygiene technique  - Identify and instruct in appropriate isolation precautions for identified infection/condition  Outcome: Adequate for Discharge  Goal: Absence of fever/infection during neutropenic period  Description: INTERVENTIONS:  - Monitor WBC    Outcome: Adequate for Discharge     Problem: SAFETY ADULT  Goal: Patient will remain free of falls  Description: INTERVENTIONS:  - Educate patient/family on patient safety including physical limitations  - Instruct patient to call for assistance with activity   - Consult OT/PT to assist with strengthening/mobility   - Keep Call bell within reach  - Keep bed low and locked with side rails adjusted as appropriate  - Keep care items and personal belongings within reach  - Initiate and maintain comfort rounds  - Make Fall Risk Sign visible to staff  - Offer Toileting every 2 Hours, in advance of need  - Initiate/Maintain bed alarm  - Obtain necessary fall risk management equipment: non skid socks  - Apply yellow socks and bracelet for high fall risk patients  - Consider moving patient to room near nurses station  Outcome: Adequate for Discharge  Goal: Maintain or return to baseline ADL function  Description: INTERVENTIONS:  -  Assess patient's ability to carry out ADLs; assess patient's baseline for ADL function and identify physical deficits which impact ability to perform ADLs (bathing, care of mouth/teeth, toileting, grooming, dressing, etc.)  - Assess/evaluate cause of self-care deficits   - Assess range of motion  - Assess patient's mobility; develop plan if impaired  - Assess patient's need for assistive devices and provide as appropriate  - Encourage maximum independence but intervene and supervise when necessary  - Involve family in performance of ADLs  - Assess for home care needs following discharge   - Consider OT consult to assist with ADL evaluation and planning for discharge  - Provide patient education as appropriate  Outcome:  Adequate for Discharge  Goal: Maintains/Returns to pre admission functional level  Description: INTERVENTIONS:  - Perform AM-PAC 6 Click Basic Mobility/ Daily Activity assessment daily.  - Set and communicate daily mobility goal to care team and patient/family/caregiver.   - Collaborate with rehabilitation services on mobility goals if consulted  - Perform Range of Motion 2 times a day.  - Reposition patient every 2 hours.  - Dangle patient 2 times a day  - Stand patient 2 times a day  - Ambulate patient 2 times a day  - Out of bed to chair 3 times a day   - Out of bed for meals 3 times a day  - Out of bed for toileting  - Record patient progress and toleration of activity level   Outcome: Adequate for Discharge     Problem: DISCHARGE PLANNING  Goal: Discharge to home or other facility with appropriate resources  Description: INTERVENTIONS:  - Identify barriers to discharge w/patient and caregiver  - Arrange for needed discharge resources and transportation as appropriate  - Identify discharge learning needs (meds, wound care, etc.)  - Refer to Case Management Department for coordinating discharge planning if the patient needs post-hospital services based on physician/advanced practitioner order or complex needs related to functional status, cognitive ability, or social support system  Outcome: Adequate for Discharge     Problem: Knowledge Deficit  Goal: Patient/family/caregiver demonstrates understanding of disease process, treatment plan, medications, and discharge instructions  Description: Complete learning assessment and assess knowledge base.  Interventions:  - Provide teaching at level of understanding  - Provide teaching via preferred learning methods  Outcome: Adequate for Discharge     Problem: RESPIRATORY - ADULT  Goal: Achieves optimal ventilation and oxygenation  Description: INTERVENTIONS:  - Assess for changes in respiratory status  - Assess for changes in mentation and behavior  - Position to  facilitate oxygenation and minimize respiratory effort  - Oxygen administered by appropriate delivery if ordered  - Initiate smoking cessation education as indicated  - Encourage broncho-pulmonary hygiene including cough, deep breathe, Incentive Spirometry  - Assess the need for suctioning and aspirate as needed  - Assess and instruct to report SOB or any respiratory difficulty  - Respiratory Therapy support as indicated  Outcome: Adequate for Discharge     Problem: Decreased Cardiac Output  Goal: Cardiac output adequate for individual needs  Description: INTERVENTIONS: Monitor for signs and symptoms of decreased cardiac output   - Monitor for dyspnea with exertion and at rest  - Monitor for orthopnea  - Monitor for signs of tachycardia. Place patient on telemetry monitoring.  - Assess patient for jugular vein distention  - Assess patient for lower extremity edema and poor peripheral perfusion   - Auscultate lung sound for Fine bibasilar crackles   - Monitor for cardiac arrythmias   - Administer beta blockers, antiarrhythmic, and blood pressure medications as ordered    Outcome: Adequate for Discharge     Problem: Impaired Gas Exchange  Goal: Optimize oxygenation and ensure adequate ventilation  Description: INTERVENTIONS: Monitor for signs and symptoms of respiratory distress                - Elevate HOB or use high fowlers to promote lung expansion                - Administer oxygen as ordered to maintain adequate oxygenation                - Encourage use of IS to promote lung expansion and prevent PN                - Monitor ABGs to assess oxygenation status                - Monitor blood oxygen level to maintain adequate oxygenation                - Encourage cough and deep breathing exercises to promote lung expansion                - Monitor patient's mental status for increased confusion    Outcome: Adequate for Discharge     Problem: Excess Fluid Volume  Goal: Patient is able to achieve and maintain  homeostasis  Description: INTERVENTIONS: Monitor for sign and symptoms of fluid overload  - Evaluate LE edema every shift  - Elevate LE to prevent dependent edema  - Apply JOLIE stockings as ordered   - Monitor ankle circumference daily  - Assess for jugular vein distention  - Evaluate provider orders for the CHF diuretic algorithm. Administer diuretics as ordered  - Weigh the patient daily at 0600 and report a weight gain of five pounds or more   - Strict intake and output  - Monitor fluid intake and adhere to fluid restrictions  - Assess lung sounds every shift and as needed  - Monitor vital signs and lab values (CBC, chem, BUN, BNP)  - Measure and document urine output    Outcome: Adequate for Discharge     Problem: Activity Intolerance  Goal: Patient is able to perform activities within their limitations  Description: INTERVENTIONS:                       -   Alternate periods of activity with periods of rest                 -   Patients is able to maintain normal vitals heart rhythm during activity                 -   Gradually increase activity and exercise as patient can tolerate                 -   Monitor blood pressure and heart before and after exercise                  -   Monitor blood oxygen saturation during activity and apply oxygen as needed    Outcome: Adequate for Discharge     Problem: Knowledge Deficit  Goal: Patient is able to verbalize understanding of Heart Failure after education  Description: INTERVENTIONS:  - Educate the patient and family on signs and symptoms of HF  - Provide the patient with HF education and HF zone tool  - Educate on the importance of daily weight in the AM and reporting a weight gain               of 3 or more pounds to their primary care physician  - Monitor for SOB  - Maintain and sodium and fluid restriction  - Educate the patient on the importance of medications such as: diuretics, betablockers,               antiarrhythmics and their purpose, dose, route, side  effects and labs               if they are needed    Outcome: Adequate for Discharge

## 2024-08-08 NOTE — DISCHARGE SUMMARY
[facility]  Discharge- Nohemi Arcos 1940, 84 y.o. female MRN: [id number]  Unit/Bed#: -[facility] Encounter: [id number]  Primary Care Provider: Gissell Graham DO   Date and time admitted to hospital: 8/5/2024  8:18 AM    * Acute on chronic diastolic (congestive) heart failure (HCC)  Assessment & Plan  Wt Readings from Last 3 Encounters:   08/08/24 55.8 kg (123 lb 0.3 oz)   08/01/24 58.6 kg (129 lb 3 oz)   05/01/24 59 kg (130 lb)     Presented for shortness of breath, elevated , pleural effusions    History of recent hospitalization for new atrial fibrillation with RVR  Echocardiogram 7/29: LVEF 50%.There appears to be hypokinesis of the septal walls and basal inferior wall. Unable to assess diastolic function due to atrial fibrillation  CT chest PE study 8/5:Moderate sized bilateral pleural effusions with subjacent compressive atelectasis   Received furosemide 40 mg IV in the ER.   Weight down 6 lbs since admission  IR and cardiology input appreciated   Underwent IR thoracentesis 8/5 with 700 ml pleural effusion removal. No dyspnea on exertion. No indication for left thoracentesis at this time.   Diuretics were held due to elevated Cr. Upon discharge, she will be on diuretics on as needed basis.  The patient may use furosemide 40 mg daily as needed for weight gain of 3 pounds over 24 hours or 5 pounds in 1 week or worsening shortness of breath and lower extremity edema.  Continue to monitor frequent weight.  Outpatient follow-up with cardiology; appointment is on 8/21.        Urinary frequency  Assessment & Plan  Patient reported urinary frequency  Urinalysis without evidence for infection  Recommend to follow-up with PCP    Atrial fibrillation, controlled (HCC)  Assessment & Plan  Recently diagnosed and initiated on rate control agent and apixaban  Continue diltiazem 180 mg p.o. daily  Anticoagulation- Apixaban; continue with ASA        Bilateral pleural effusion  Assessment  & Plan  CT chest PE study 8/5: Bilateral pleural effusion with subjacent compressive atelectasis.  No pulmonary embolism  S/p furosemide IV and right thoracentesis in IR 8/5    Pleural fluid studies shows transudative secondary to congestive heart failure  Diuretic is resumed as needed  Discussed with IR regarding left pleural effusion.  At this time no indication for thoracentesis as the patient clinically has improved.    SIRS (systemic inflammatory response syndrome) ([facility])  Assessment & Plan  Tachycardia and tachypnea suspect secondary to atrial fibrillation and shortness of breath  Afebrile.  No leukocytosis  Monitor off antibiotics           Discharging Physician / Practitioner: OSMAN Walker  PCP: Gissell Graham DO  Admission Date:   Admission Orders (From admission, onward)       Ordered        08/05/24 1131  INPATIENT ADMISSION  Once                          Discharge Date: 08/08/24    Reason for Admission: dyspnea     Discharge Diagnoses:     Principal Problem:    Acute on chronic diastolic (congestive) heart failure (HCC)  Active Problems:    SIRS (systemic inflammatory response syndrome) ([facility])    Bilateral pleural effusion    Atrial fibrillation, controlled ([facility])    Urinary frequency  Resolved Problems:    * No resolved hospital problems. *      Consultations During Hospital Stay:  IP CONSULT TO NUTRITION SERVICES  IP CONSULT TO CARDIOLOGY    Procedures Performed:     Right thoracentesis 8/5    Significant Findings / Test Results:     IR IN-Patient Thoracentesis  Result Date: 8/6/2024  A total volume of 700 cc of Transparent appearing, Yellow colored fluid was removed.     CTA ED chest PE study  Result Date: 8/5/2024  No evidence for pulmonary embolism. Moderate sized bilateral pleural effusions with subjacent compressive atelectasis.     Incidental Findings:   None      Test Results Pending at Discharge (will require follow up):   None      Outpatient Tests Requested:  Follow up with PCP and  "cardiology     Complications:  none     Hospital Course:     Nohemi Arcos is a 84 y.o. female patient who originally presented to the hospital on 8/5/2024 due to worsening shortness of breath.  Please refer to H&P for initial presenting complaints and symptoms.  In brief the patient presented with worsening shortness of breath that happen suddenly overnight on the day of admission after a coughing fit.  She subsequently was admitted for acute on chronic congestive heart failure.  She received IV diuresis.  The patient was evaluated by cardiology.  It is suspected that acute CHF is related to her atrial fibrillation.  Due to worsening creatinine with diuretic, furosemide was held on admission.  Upon discharge, the patient will continue with furosemide 40 mg daily as needed for weight gain of 3 pounds in 24 hours or 5 pounds in 1 week.  She was evaluated by IR and underwent right thoracentesis on 8/5/2024.     Condition at Discharge: good     Discharge Day Visit / Exam:     Subjective: Patient was seen and examined.  She states she is feeling much better since admissions.  She denies any dyspnea or chest pain.  Plaint of some constipation however she received stool softener and had a few bowel movements today.  Vitals: Blood Pressure: 121/54 (08/08/24 0940)  Pulse: 87 (08/08/24 0940)  Temperature: (!) 97.2 °F (36.2 °C) (08/08/24 0703)  Temp Source: Oral (08/07/24 1500)  Respirations: 16 (08/08/24 0703)  Height: 5' 1\" (154.9 cm) (08/05/24 1211)  Weight - Scale: 55.8 kg (123 lb 0.3 oz) (08/08/24 0533)  SpO2: 95 % (08/08/24 0940)  Exam:   Physical Exam  Vitals and nursing note reviewed.   Constitutional:       General: She is not in acute distress.     Appearance: Normal appearance.      Comments: Frail and elderly      HENT:      Head: Normocephalic and atraumatic.      Right Ear: External ear normal.      Left Ear: External ear normal.      Nose: Nose normal. No rhinorrhea.      Mouth/Throat:      Mouth: Mucous " membranes are moist.      Pharynx: Oropharynx is clear.   Eyes:      General:         Right eye: No discharge.         Left eye: No discharge.      Pupils: Pupils are equal, round, and reactive to light.   Cardiovascular:      Rate and Rhythm: Normal rate and regular rhythm.      Pulses: Normal pulses.      Heart sounds: Normal heart sounds. No murmur heard.  Pulmonary:      Effort: Pulmonary effort is normal. No respiratory distress.      Breath sounds: Rales present.      Comments: Decreased right greater than left     Abdominal:      General: Bowel sounds are normal. There is no distension.      Palpations: Abdomen is soft. There is no mass.      Tenderness: There is no abdominal tenderness.   Musculoskeletal:         General: No swelling or tenderness. Normal range of motion.      Cervical back: Normal range of motion and neck supple. No muscular tenderness.   Skin:     General: Skin is warm and dry.      Capillary Refill: Capillary refill takes less than 2 seconds.      Findings: No erythema or rash.   Neurological:      General: No focal deficit present.      Mental Status: She is alert and oriented to person, place, and time. Mental status is at baseline.   Psychiatric:         Mood and Affect: Mood normal.         Behavior: Behavior normal.         Thought Content: Thought content normal.         Judgment: Judgment normal.     Discussion with Family: Sister    Discharge instructions/Information to patient and family:   See after visit summary for information provided to patient and family.      Provisions for Follow-Up Care:  See after visit summary for information related to follow-up care and any pertinent home health orders.      Disposition:     Home    Planned Readmission: no      Discharge Statement:  I spent 38 minutes discharging the patient. This time was spent on the day of discharge. I had direct contact with the patient on the day of discharge. Greater than 50% of the total time was spent  examining patient, answering all patient questions, arranging and discussing plan of care with patient as well as directly providing post-discharge instructions.  Additional time then spent on discharge activities.    Discharge Medications:  See after visit summary for reconciled discharge medications provided to patient and family.      ** Please Note: This note has been constructed using a voice recognition system **

## 2024-08-08 NOTE — DISCHARGE INSTR - AVS FIRST PAGE
Medication changes-  Furosemide 40 mg daily as needed for weight gain 3lbs in 24 hours and 5lbs in a week, shortness of breath and leg swelling   Potassium supplement-take with furosemide    Colace, Senokot, MiraLAX-for constipation.  May hold for loose stool.      [id number] and cardiology

## 2024-08-08 NOTE — PLAN OF CARE
Problem: RESPIRATORY - ADULT  Goal: Achieves optimal ventilation and oxygenation  Description: INTERVENTIONS:  - Assess for changes in respiratory status  - Assess for changes in mentation and behavior  - Position to facilitate oxygenation and minimize respiratory effort  - Oxygen administered by appropriate delivery if ordered  - Initiate smoking cessation education as indicated  - Encourage broncho-pulmonary hygiene including cough, deep breathe, Incentive Spirometry  - Assess the need for suctioning and aspirate as needed  - Assess and instruct to report SOB or any respiratory difficulty  - Respiratory Therapy support as indicated  Outcome: Progressing     Problem: Excess Fluid Volume  Goal: Patient is able to achieve and maintain homeostasis  Description: INTERVENTIONS: Monitor for sign and symptoms of fluid overload  - Evaluate LE edema every shift  - Elevate LE to prevent dependent edema  - Apply JOLIE stockings as ordered   - Monitor ankle circumference daily  - Assess for jugular vein distention  - Evaluate provider orders for the CHF diuretic algorithm. Administer diuretics as ordered  - Weigh the patient daily at 0600 and report a weight gain of five pounds or more   - Strict intake and output  - Monitor fluid intake and adhere to fluid restrictions  - Assess lung sounds every shift and as needed  - Monitor vital signs and lab values (CBC, chem, BUN, BNP)  - Measure and document urine output    Outcome: Progressing     Problem: Activity Intolerance  Goal: Patient is able to perform activities within their limitations  Description: INTERVENTIONS:                       -   Alternate periods of activity with periods of rest                 -   Patients is able to maintain normal vitals heart rhythm during activity                 -   Gradually increase activity and exercise as patient can tolerate                 -   Monitor blood pressure and heart before and after exercise                  -   Monitor blood  oxygen saturation during activity and apply oxygen as needed    Outcome: Progressing

## 2024-08-09 ENCOUNTER — PATIENT OUTREACH (OUTPATIENT)
Dept: CASE MANAGEMENT | Facility: OTHER | Age: 84
End: 2024-08-09

## 2024-08-09 ENCOUNTER — TRANSITIONAL CARE MANAGEMENT (OUTPATIENT)
Dept: FAMILY MEDICINE CLINIC | Facility: CLINIC | Age: 84
End: 2024-08-09

## 2024-08-09 NOTE — PROGRESS NOTES
Outpatient Care Management Note:  Covering RN received discharge ADT.  Chart review completed.  OPCM referral identified via HF TV kit education .  Patient was admitted to [facility] on 8/5/24 for acute on chronic diastolic CHF.  Patient was found to have B/L pleural effusions with atelectasis and underwent thoracentesis on 8/5 with 700 ml fluid removed. Patient initially diuresed, but held when creatinine became elevated.  TV kit visit completed with  patient and OP RN DANIA Kee on 8/7/24.   Patient was cleared to discharge to home on PRN diuretic and with recommendation to follow up with Cardio on 8/21/24 and with PCP.      Discharge follow up call made. Spoke with patient.  CM introduced self with explanation of purpose of phone call.  Patient was very short on the phone and stated she had no recollection of TV kit visit on 8/7/24 and does not consent to OP CM services or to future calls.  Patient states her discharge instructions were reviewed in the hospital and she declined having any questions about her medications or treatment plan.     I thanked patient for her time and call was terminated.  IB message sent to Conchita Kee for case communication.

## 2024-08-11 ENCOUNTER — NURSE TRIAGE (OUTPATIENT)
Dept: OTHER | Facility: OTHER | Age: 84
End: 2024-08-11

## 2024-08-11 ENCOUNTER — HOSPITAL ENCOUNTER (EMERGENCY)
Facility: HOSPITAL | Age: 84
Discharge: HOME | End: 2024-08-11
Attending: INTERNAL MEDICINE
Payer: MEDICARE

## 2024-08-11 ENCOUNTER — APPOINTMENT (EMERGENCY)
Dept: CT IMAGING | Facility: HOSPITAL | Age: 84
End: 2024-08-11
Payer: MEDICARE

## 2024-08-11 VITALS
HEART RATE: 105 BPM | TEMPERATURE: 98 F | OXYGEN SATURATION: 96 % | BODY MASS INDEX: 23.24 KG/M2 | SYSTOLIC BLOOD PRESSURE: 122 MMHG | WEIGHT: 123 LBS | DIASTOLIC BLOOD PRESSURE: 71 MMHG | RESPIRATION RATE: 15 BRPM

## 2024-08-11 DIAGNOSIS — R51.9 HEADACHE: Primary | ICD-10-CM

## 2024-08-11 LAB
2HR DELTA HS TROPONIN: 0 NG/L
ALBUMIN SERPL BCG-MCNC: 4.1 G/DL (ref 3.5–5)
ALP SERPL-CCNC: 41 U/L (ref 34–104)
ALT SERPL W P-5'-P-CCNC: 22 U/L (ref 7–52)
ANION GAP SERPL CALCULATED.3IONS-SCNC: 7 MMOL/L (ref 4–13)
APTT PPP: 32 SECONDS (ref 23–34)
AST SERPL W P-5'-P-CCNC: 32 U/L (ref 13–39)
BACTERIA UR QL AUTO: NORMAL /HPF
BASOPHILS # BLD AUTO: 0.06 THOUSANDS/ÂΜL (ref 0–0.1)
BASOPHILS NFR BLD AUTO: 1 % (ref 0–1)
BILIRUB SERPL-MCNC: 0.59 MG/DL (ref 0.2–1)
BILIRUB UR QL STRIP: NEGATIVE
BUN SERPL-MCNC: 22 MG/DL (ref 5–25)
CALCIUM SERPL-MCNC: 9.8 MG/DL (ref 8.4–10.2)
CARDIAC TROPONIN I PNL SERPL HS: 11 NG/L
CARDIAC TROPONIN I PNL SERPL HS: 11 NG/L
CHLORIDE SERPL-SCNC: 103 MMOL/L (ref 96–108)
CLARITY UR: CLEAR
CO2 SERPL-SCNC: 25 MMOL/L (ref 21–32)
COLOR UR: YELLOW
CREAT SERPL-MCNC: 1.09 MG/DL (ref 0.6–1.3)
EOSINOPHIL # BLD AUTO: 0.12 THOUSAND/ÂΜL (ref 0–0.61)
EOSINOPHIL NFR BLD AUTO: 2 % (ref 0–6)
ERYTHROCYTE [DISTWIDTH] IN BLOOD BY AUTOMATED COUNT: 13.2 % (ref 11.6–15.1)
GFR SERPL CREATININE-BSD FRML MDRD: 47 ML/MIN/1.73SQ M
GLUCOSE SERPL-MCNC: 106 MG/DL (ref 65–140)
GLUCOSE UR STRIP-MCNC: NEGATIVE MG/DL
HCT VFR BLD AUTO: 46.3 % (ref 34.8–46.1)
HGB BLD-MCNC: 14.4 G/DL (ref 11.5–15.4)
HGB UR QL STRIP.AUTO: ABNORMAL
IMM GRANULOCYTES # BLD AUTO: 0.05 THOUSAND/UL (ref 0–0.2)
IMM GRANULOCYTES NFR BLD AUTO: 1 % (ref 0–2)
INR PPP: 1.11 (ref 0.85–1.19)
KETONES UR STRIP-MCNC: NEGATIVE MG/DL
LEUKOCYTE ESTERASE UR QL STRIP: ABNORMAL
LYMPHOCYTES # BLD AUTO: 1.2 THOUSANDS/ÂΜL (ref 0.6–4.47)
LYMPHOCYTES NFR BLD AUTO: 15 % (ref 14–44)
MCH RBC QN AUTO: 28.3 PG (ref 26.8–34.3)
MCHC RBC AUTO-ENTMCNC: 31.1 G/DL (ref 31.4–37.4)
MCV RBC AUTO: 91 FL (ref 82–98)
MONOCYTES # BLD AUTO: 1.12 THOUSAND/ÂΜL (ref 0.17–1.22)
MONOCYTES NFR BLD AUTO: 14 % (ref 4–12)
NEUTROPHILS # BLD AUTO: 5.7 THOUSANDS/ÂΜL (ref 1.85–7.62)
NEUTS SEG NFR BLD AUTO: 67 % (ref 43–75)
NITRITE UR QL STRIP: NEGATIVE
NON-SQ EPI CELLS URNS QL MICRO: NORMAL /HPF
NRBC BLD AUTO-RTO: 0 /100 WBCS
PH UR STRIP.AUTO: 6 [PH]
PLATELET # BLD AUTO: 282 THOUSANDS/UL (ref 149–390)
PMV BLD AUTO: 9.3 FL (ref 8.9–12.7)
POTASSIUM SERPL-SCNC: 4.7 MMOL/L (ref 3.5–5.3)
PROT SERPL-MCNC: 6.6 G/DL (ref 6.4–8.4)
PROT UR STRIP-MCNC: NEGATIVE MG/DL
PROTHROMBIN TIME: 14.8 SECONDS (ref 12.3–15)
RBC # BLD AUTO: 5.08 MILLION/UL (ref 3.81–5.12)
RBC #/AREA URNS AUTO: NORMAL /HPF
SODIUM SERPL-SCNC: 135 MMOL/L (ref 135–147)
SP GR UR STRIP.AUTO: 1.01
UROBILINOGEN UR QL STRIP.AUTO: 0.2 E.U./DL
WBC # BLD AUTO: 8.25 THOUSAND/UL (ref 4.31–10.16)
WBC #/AREA URNS AUTO: NORMAL /HPF

## 2024-08-11 PROCEDURE — 81001 URINALYSIS AUTO W/SCOPE: CPT | Performed by: INTERNAL MEDICINE

## 2024-08-11 PROCEDURE — 81003 URINALYSIS AUTO W/O SCOPE: CPT | Performed by: INTERNAL MEDICINE

## 2024-08-11 PROCEDURE — 99284 EMERGENCY DEPT VISIT MOD MDM: CPT | Performed by: INTERNAL MEDICINE

## 2024-08-11 PROCEDURE — 93005 ELECTROCARDIOGRAM TRACING: CPT

## 2024-08-11 PROCEDURE — 85610 PROTHROMBIN TIME: CPT | Performed by: INTERNAL MEDICINE

## 2024-08-11 PROCEDURE — 70450 CT HEAD/BRAIN W/O DYE: CPT

## 2024-08-11 PROCEDURE — 36415 COLL VENOUS BLD VENIPUNCTURE: CPT | Performed by: INTERNAL MEDICINE

## 2024-08-11 PROCEDURE — 99284 EMERGENCY DEPT VISIT MOD MDM: CPT

## 2024-08-11 PROCEDURE — 85730 THROMBOPLASTIN TIME PARTIAL: CPT | Performed by: INTERNAL MEDICINE

## 2024-08-11 PROCEDURE — 84484 ASSAY OF TROPONIN QUANT: CPT | Performed by: INTERNAL MEDICINE

## 2024-08-11 PROCEDURE — 85025 COMPLETE CBC W/AUTO DIFF WBC: CPT | Performed by: INTERNAL MEDICINE

## 2024-08-11 PROCEDURE — 80053 COMPREHEN METABOLIC PANEL: CPT | Performed by: INTERNAL MEDICINE

## 2024-08-11 RX ORDER — ACETAMINOPHEN 325 MG/1
650 TABLET ORAL ONCE
Status: COMPLETED | OUTPATIENT
Start: 2024-08-11 | End: 2024-08-11

## 2024-08-11 RX ADMIN — ACETAMINOPHEN 650 MG: 325 TABLET ORAL at 21:38

## 2024-08-11 NOTE — TELEPHONE ENCOUNTER
"Reason for Disposition  • [1] Blurred vision or visual changes AND [2] present now AND [3] sudden onset or new (e.g., minutes, hours, days)  (Exception: seeing floaters / black specks OR previously diagnosed migraine headaches with same symptoms)    Answer Assessment - Initial Assessment Questions  1. DESCRIPTION: \"What is the vision loss like? Describe it for me.\" (e.g., complete vision loss, blurred vision, double vision, floaters, etc.)      Patient was recently hospitalized for AFib, patient now reports blurry, \"glittery,\" not normal; head feels numb    2. LOCATION: \"One or both eyes?\" If one, ask: \"Which eye?\"      Yes    3. SEVERITY: \"Can you see anything?\" If Yes, ask: \"What can you see?\" (e.g., fine print)      Yes, it's just a little annoying    4. ONSET: \"When did this begin?\" \"Did it start suddenly or has this been gradual?\"      This morning    5. PATTERN: \"Does this come and go, or has it been constant since it started?\"      It's constant; patient has appointment with Dr. Graham tomorrow    6. PAIN: \"Is there any pain in your eye(s)?\"  (Scale 1-10; or mild, moderate, severe)      Denies    7. CONTACTS-GLASSES: \"Do you wear contacts or glasses?\"      Glasses    8. CAUSE: \"What do you think is causing this visual problem?\"      Unsure    9. OTHER SYMPTOMS: \"Do you have any other symptoms?\" (e.g., confusion, headache, arm or leg weakness, speech problems)      Swelling of right ankle - not hot to touch, just mildly swollen; had fluid removed from right lung. Denies fever, other symptoms    Protocols used: Vision Loss or Change-ADULT-AH    "

## 2024-08-11 NOTE — ED PROVIDER NOTES
History  No chief complaint on file.    84-year-old female presents emergency room with chief complaint of head pressure with visual disturbance bilaterally she states feels like there is sparkling in her eyes but no loss of vision no diplopia.  She has had no chest pain chest pressure shortness of breath.  Patient has a known history of atrial fibrillation, no history of previous MI TIA CVA.  Patient denies any facial droop slurred speech garbled speech hemiparesis weakness in any of her extremities.        Prior to Admission Medications   Prescriptions Last Dose Informant Patient Reported? Taking?   apixaban (Eliquis) 2.5 mg   No No   Sig: Take 1 tablet (2.5 mg total) by mouth 2 (two) times a day   aspirin 81 MG tablet  Self Yes No   Sig: Take 81 mg by mouth daily   atorvastatin (LIPITOR) 20 mg tablet   No No   Sig: Take 1 tablet by mouth once daily   diltiazem (CARDIZEM CD) 180 mg 24 hr capsule   No No   Sig: Take 1 capsule (180 mg total) by mouth daily   docusate sodium (COLACE) 100 mg capsule   No No   Sig: Take 1 capsule (100 mg total) by mouth 2 (two) times a day   furosemide (LASIX) 40 mg tablet   No No   Sig: Take 1 tablet (40 mg total) by mouth daily as needed (weight gain 3lbs in 24 hours and 5lbs in a week, shortness of breath and leg swelling)   omeprazole (PriLOSEC) 20 mg delayed release capsule   No No   Sig: Take 1 capsule by mouth once daily   polyethylene glycol (MIRALAX) 17 g packet   No No   Sig: Take 17 g by mouth daily for 20 days   potassium chloride (Klor-Con M10) 10 mEq tablet   No No   Sig: Take 1 tablet (10 mEq total) by mouth daily as needed (take with lasix)   senna (SENOKOT) 8.6 mg   No No   Sig: Take 2 tablets (17.2 mg total) by mouth daily at bedtime      Facility-Administered Medications: None       Past Medical History:   Diagnosis Date    Arteriosclerotic heart disease     LAST ASSESSED: 09SEP2015    Esophageal reflux     LAST ASSESSED: 09JAN2018    GERD (gastroesophageal reflux  disease)     Hyperlipidemia     LAST ASSESSED: 09JAN2018    Hypertension     LAST ASSESSED: 09JAN2018    Nontoxic single thyroid nodule     LAST ASSESSED: 09MAY2014    Osteopenia     LAST ASSESSED: 11MAY2016       Past Surgical History:   Procedure Laterality Date    CYSTOSCOPY  12/04/2013    DIAGNOSTIC    ESOPHAGOGASTRODUODENOSCOPY      IR THORACENTESIS  8/5/2024    MASTECTOMY Bilateral     cancer    OOPHORECTOMY      SALPINGOOPHORECTOMY Bilateral        Family History   Problem Relation Age of Onset    Alzheimer's disease Mother     Hypertension Father     Stroke Father         SYNDROME    Colon cancer Brother      I have reviewed and agree with the history as documented.    E-Cigarette/Vaping    E-Cigarette Use Never User      E-Cigarette/Vaping Substances    Nicotine No     THC No     CBD No     Flavoring No     Other No     Unknown No      Social History     Tobacco Use    Smoking status: Never    Smokeless tobacco: Never   Vaping Use    Vaping status: Never Used   Substance Use Topics    Alcohol use: Never    Drug use: No       Review of Systems   Constitutional: Negative.    Eyes:  Positive for visual disturbance.   Respiratory: Negative.     Cardiovascular: Negative.    Gastrointestinal: Negative.    Genitourinary: Negative.    Musculoskeletal: Negative.    Skin: Negative.    Neurological:  Positive for headaches.   Hematological: Negative.    Psychiatric/Behavioral: Negative.         Physical Exam  Physical Exam  Vitals and nursing note reviewed.   Constitutional:       General: She is not in acute distress.     Appearance: Normal appearance. She is not ill-appearing.   HENT:      Head: Normocephalic and atraumatic.      Right Ear: Tympanic membrane normal.      Left Ear: Tympanic membrane normal.   Eyes:      Extraocular Movements: Extraocular movements intact.      Conjunctiva/sclera: Conjunctivae normal.      Pupils: Pupils are equal, round, and reactive to light.   Cardiovascular:      Rate and Rhythm:  Rhythm irregular.      Pulses: Normal pulses.      Heart sounds: Normal heart sounds.   Pulmonary:      Effort: Pulmonary effort is normal.      Breath sounds: Normal breath sounds.   Abdominal:      General: Abdomen is flat. Bowel sounds are normal.      Palpations: Abdomen is soft.   Musculoskeletal:         General: Normal range of motion.      Cervical back: Normal range of motion and neck supple.   Skin:     General: Skin is warm and dry.      Capillary Refill: Capillary refill takes less than 2 seconds.   Neurological:      General: No focal deficit present.      Mental Status: She is alert and oriented to person, place, and time. Mental status is at baseline.      Cranial Nerves: No cranial nerve deficit.      Sensory: No sensory deficit.      Motor: No weakness.      Coordination: Coordination normal.      Gait: Gait normal.      Deep Tendon Reflexes: Reflexes normal.      Comments: Patient normal mass tone sensation DTRs are equal and symmetric throughout.  5/5 strength times all extremities, negative pronator drift, Babinski's are downgoing bilaterally.  Heel-to-shin is bilaterally equal and normal.         Vital Signs  ED Triage Vitals   Temp Pulse Resp BP SpO2   -- -- -- -- --      Temp src Heart Rate Source Patient Position - Orthostatic VS BP Location FiO2 (%)   -- -- -- -- --      Pain Score       --           There were no vitals filed for this visit.      Visual Acuity      ED Medications  Medications - No data to display    Diagnostic Studies  Results Reviewed       None                   No orders to display              Procedures  ECG 12 Lead Documentation Only    Date/Time: 8/11/2024 6:29 PM    Performed by: Reginald Montalvo MD  Authorized by: Reginald Montalvo MD    Indications / Diagnosis:  Visual disturbance  ECG reviewed by me, the ED Provider: yes    Patient location:  ED  Previous ECG:     Previous ECG:  Compared to current    Similarity:  No change    Comparison to cardiac monitor: Yes     Interpretation:     Interpretation: abnormal    Rate:     ECG rate:  70-90  Rhythm:     Rhythm: atrial fibrillation and atrial flutter    Ectopy:     Ectopy: none    QRS:     QRS intervals:  Wide  Conduction:     Conduction: abnormal      Abnormal conduction: complete LBBB    T waves:     T waves: non-specific             ED Course                                               Medical Decision Making  Patient presented with essentially head pressure and felt as if her she was having difficulty focusing.  She spoke to her PCP who said she may be having a stroke or mini stroke.  Her physical exam is completely normal laboratory studies and CT scan are negative.  At this time the patient states she just feels anxious if she has some head pressure frontal there was moderate relief with Tylenol.  Patient advised to return to the ER if headache worsens or develops visual disturbance.    Amount and/or Complexity of Data Reviewed  Labs: ordered.  Radiology: ordered.    Risk  OTC drugs.                 Disposition  Final diagnoses:   None     ED Disposition       None          Follow-up Information    None         Patient's Medications   Discharge Prescriptions    No medications on file       No discharge procedures on file.    PDMP Review       None            ED Provider  Electronically Signed by

## 2024-08-11 NOTE — TELEPHONE ENCOUNTER
On-call provider advised ED due to concern for TIA or evolving stroke. Patient advised.     Patient will get a ride to the ED; advised patient that if immediate transportation is not available, she should call 911. Patient agreeable.

## 2024-08-11 NOTE — TELEPHONE ENCOUNTER
Advised Emergency Department evaluation, but patient declined at this time. Epic SC messages sent to on-call provider.

## 2024-08-11 NOTE — TELEPHONE ENCOUNTER
"Regarding: blurry vision/ankle swollen  ----- Message from Danyelle EUCEDA sent at 8/11/2024  4:19 PM EDT -----  \"I have blurry vision and my right ankle is extremely swollen\"    "

## 2024-08-12 ENCOUNTER — VBI (OUTPATIENT)
Dept: FAMILY MEDICINE CLINIC | Facility: CLINIC | Age: 84
End: 2024-08-12

## 2024-08-12 LAB
ATRIAL RATE: 174 BPM
QRS AXIS: 59 DEGREES
QRSD INTERVAL: 138 MS
QT INTERVAL: 396 MS
QTC INTERVAL: 489 MS
T WAVE AXIS: 170 DEGREES
VENTRICULAR RATE: 92 BPM

## 2024-08-12 PROCEDURE — 93010 ELECTROCARDIOGRAM REPORT: CPT | Performed by: INTERNAL MEDICINE

## 2024-08-12 NOTE — TELEPHONE ENCOUNTER
08/12/24 11:13 AM    Patient contacted post ED visit, [facility] department spoke with patient/caregiver and outreach was successful.    Thank you.  Moraima Gipson  [facility]

## 2024-08-16 ENCOUNTER — OFFICE VISIT (OUTPATIENT)
Dept: FAMILY MEDICINE CLINIC | Facility: CLINIC | Age: 84
End: 2024-08-16
Payer: MEDICARE

## 2024-08-16 VITALS
TEMPERATURE: 97.5 F | BODY MASS INDEX: 23.33 KG/M2 | OXYGEN SATURATION: 98 % | HEART RATE: 86 BPM | DIASTOLIC BLOOD PRESSURE: 70 MMHG | HEIGHT: 61 IN | WEIGHT: 123.6 LBS | RESPIRATION RATE: 18 BRPM | SYSTOLIC BLOOD PRESSURE: 122 MMHG

## 2024-08-16 DIAGNOSIS — I10 PRIMARY HYPERTENSION: ICD-10-CM

## 2024-08-16 DIAGNOSIS — I48.91 ATRIAL FIBRILLATION WITH RAPID VENTRICULAR RESPONSE (HCC): ICD-10-CM

## 2024-08-16 DIAGNOSIS — I50.33 ACUTE ON CHRONIC DIASTOLIC (CONGESTIVE) HEART FAILURE (HCC): Primary | ICD-10-CM

## 2024-08-16 DIAGNOSIS — I42.9 CARDIOMYOPATHY, UNSPECIFIED TYPE (HCC): ICD-10-CM

## 2024-08-16 PROCEDURE — 99495 TRANSJ CARE MGMT MOD F2F 14D: CPT | Performed by: INTERNAL MEDICINE

## 2024-08-16 NOTE — PROGRESS NOTES
Transition of Care Visit  Name: Nohemi Arcos      : 1940      MRN: [id number]  Encounter Provider: Gissell Graham DO  Encounter Date: 2024   Encounter department: [facility]    Assessment & Plan   1. Acute on chronic diastolic (congestive) heart failure (HCC)  -     Basic metabolic panel; Future  Pt weight stable and is following lo sodium/fluid restrict diet  She has Cardiology appt next week   Continue current rx   2. Atrial fibrillation with rapid ventricular response (HCC)  Stable on diltiazem and eliquis Has cardio followup Pt feeling better   3. Primary hypertension  BP stable Continue to monitor on current rx   4. Cardiomyopathy, unspecified type (HCC)  Pt has cardiology followup She is feeling better on current regimen and limiting exertional activities       Depression Screening and Follow-up Plan: Patient was screened for depression during today's encounter. They screened negative with a PHQ-2 score of 0.    Rto 6 weeks/awv    History of Present Illness   Pt feeling better since admit for sob/chest pressure Found to be in afib and treated for chf She is less sob and no chest pain She was in ER since admit for headache which has resolved Her weight has been stable and has not needed prn lasix She is following lo sodium diet and fluid restriction No dizziness or syncopal sxs No chest pain Her sister is with her today and drove her to visit Pt has been able to do adls at home but has not been doing yardwork - she had someone cut the grass last PM   No cough NO urinary issues Her bowels are better and she is only using miralax not additional stool softeners No fever or chills She has cardio appt next week     Transitional Care Management Review:   Nohemi Arcos is a 84 y.o. female here for TCM follow up.     During the TCM phone call patient stated:  TCM Call       Date and time call was made  2024  7:52 AM    Hospital care reviewed  Records reviewed    Patient was  "hospitialized at  [facility]    Date of Admission  08/05/24    Date of discharge  08/08/24    Diagnosis  acute on chronic CHF    Disposition  Home  self care    Were the patients medications reviewed and updated  No    Current Symptoms  Fatigue    Fatigue severity  Mild          TCM Call       Post hospital issues  Reduced activity    Scheduled for follow up?  Yes    Patients specialists  Cardiologist    Did you obtain your prescribed medications  Yes    Do you need help managing your prescriptions or medications  No    I have advised the patient to call PCP with any new or worsening symptoms  Tisha Norris, medical receptioinist II    Counseling  Patient          HPI  See above Pt feeling better Weights stable and independent in her home  Review of Systems   Constitutional:  Negative for chills and fever.   HENT: Negative.     Eyes:  Negative for visual disturbance.   Respiratory: Negative.     Cardiovascular: Negative.    Gastrointestinal: Negative.    Musculoskeletal: Negative.    Skin: Negative.    Neurological:  Negative for dizziness, light-headedness and headaches.   Psychiatric/Behavioral:  Negative for sleep disturbance. The patient is not nervous/anxious.      Objective     /70   Pulse 86   Temp 97.5 °F (36.4 °C) (Temporal)   Resp 18   Ht 5' 1\" (1.549 m)   Wt 56.1 kg (123 lb 9.6 oz)   SpO2 98%   BMI 23.35 kg/m²     Physical Exam  Vitals and nursing note reviewed.   Constitutional:       General: She is not in acute distress.     Appearance: Normal appearance. She is not ill-appearing, toxic-appearing or diaphoretic.   HENT:      Head: Normocephalic and atraumatic.      Right Ear: External ear normal.      Left Ear: External ear normal.      Nose: Nose normal.      Mouth/Throat:      Mouth: Mucous membranes are moist.   Eyes:      General: No scleral icterus.     Extraocular Movements: Extraocular movements intact.      Conjunctiva/sclera: Conjunctivae normal.      Pupils: Pupils are " equal, round, and reactive to light.   Cardiovascular:      Rate and Rhythm: Normal rate. Rhythm irregular.      Pulses: Normal pulses.      Heart sounds: Normal heart sounds.   Pulmonary:      Effort: Pulmonary effort is normal. No respiratory distress.      Breath sounds: Normal breath sounds. No wheezing.   Abdominal:      General: Bowel sounds are normal. There is no distension.      Palpations: Abdomen is soft.      Tenderness: There is no abdominal tenderness.   Musculoskeletal:      Cervical back: Normal range of motion and neck supple.      Right lower leg: No edema.      Left lower leg: No edema.   Lymphadenopathy:      Cervical: No cervical adenopathy.   Skin:     General: Skin is warm and dry.   Neurological:      General: No focal deficit present.      Mental Status: She is alert and oriented to person, place, and time. Mental status is at baseline.      Cranial Nerves: No cranial nerve deficit.      Sensory: No sensory deficit.   Psychiatric:         Mood and Affect: Mood normal.         Behavior: Behavior normal.         Thought Content: Thought content normal.         Judgment: Judgment normal.       Medications have been reviewed by provider in current encounter    Administrative Statements

## 2024-08-19 ENCOUNTER — TELEPHONE (OUTPATIENT)
Age: 84
End: 2024-08-19

## 2024-08-19 NOTE — TELEPHONE ENCOUNTER
She can do stool softener daily instead of Miralax and if bowels remain loose use the pill as needed

## 2024-08-19 NOTE — TELEPHONE ENCOUNTER
Pt requesting to stop miralax as she feels it is causing too much diarrhea at this point. She is wondering if she can use senokot daily and see if that if sufficient enough to keep BM's regular.

## 2024-08-20 ENCOUNTER — HOSPITAL ENCOUNTER (EMERGENCY)
Facility: HOSPITAL | Age: 84
Discharge: HOME | End: 2024-08-20
Attending: EMERGENCY MEDICINE
Payer: MEDICARE

## 2024-08-20 ENCOUNTER — APPOINTMENT (EMERGENCY)
Dept: RADIOLOGY | Facility: HOSPITAL | Age: 84
End: 2024-08-20
Payer: MEDICARE

## 2024-08-20 VITALS
OXYGEN SATURATION: 94 % | RESPIRATION RATE: 20 BRPM | DIASTOLIC BLOOD PRESSURE: 84 MMHG | TEMPERATURE: 97.7 F | HEART RATE: 105 BPM | SYSTOLIC BLOOD PRESSURE: 141 MMHG

## 2024-08-20 DIAGNOSIS — I48.92 ATRIAL FLUTTER (HCC): Primary | ICD-10-CM

## 2024-08-20 LAB
2HR DELTA HS TROPONIN: -2 NG/L
ALBUMIN SERPL BCG-MCNC: 3.8 G/DL (ref 3.5–5)
ALP SERPL-CCNC: 31 U/L (ref 34–104)
ALT SERPL W P-5'-P-CCNC: 17 U/L (ref 7–52)
ANION GAP SERPL CALCULATED.3IONS-SCNC: 9 MMOL/L (ref 4–13)
APTT PPP: 33 SECONDS (ref 23–34)
AST SERPL W P-5'-P-CCNC: 25 U/L (ref 13–39)
ATRIAL RATE: 340 BPM
ATRIAL RATE: 88 BPM
BASOPHILS # BLD AUTO: 0.04 THOUSANDS/ÂΜL (ref 0–0.1)
BASOPHILS NFR BLD AUTO: 0 % (ref 0–1)
BILIRUB SERPL-MCNC: 0.52 MG/DL (ref 0.2–1)
BNP SERPL-MCNC: 517 PG/ML (ref 0–100)
BUN SERPL-MCNC: 20 MG/DL (ref 5–25)
CALCIUM SERPL-MCNC: 9.7 MG/DL (ref 8.4–10.2)
CARDIAC TROPONIN I PNL SERPL HS: 10 NG/L
CARDIAC TROPONIN I PNL SERPL HS: 12 NG/L
CHLORIDE SERPL-SCNC: 105 MMOL/L (ref 96–108)
CO2 SERPL-SCNC: 24 MMOL/L (ref 21–32)
CREAT SERPL-MCNC: 1.07 MG/DL (ref 0.6–1.3)
EOSINOPHIL # BLD AUTO: 0.06 THOUSAND/ÂΜL (ref 0–0.61)
EOSINOPHIL NFR BLD AUTO: 1 % (ref 0–6)
ERYTHROCYTE [DISTWIDTH] IN BLOOD BY AUTOMATED COUNT: 13.5 % (ref 11.6–15.1)
GFR SERPL CREATININE-BSD FRML MDRD: 48 ML/MIN/1.73SQ M
GLUCOSE SERPL-MCNC: 107 MG/DL (ref 65–140)
HCT VFR BLD AUTO: 45.1 % (ref 34.8–46.1)
HGB BLD-MCNC: 14.2 G/DL (ref 11.5–15.4)
IMM GRANULOCYTES # BLD AUTO: 0.06 THOUSAND/UL (ref 0–0.2)
IMM GRANULOCYTES NFR BLD AUTO: 1 % (ref 0–2)
INR PPP: 1.32 (ref 0.85–1.19)
LYMPHOCYTES # BLD AUTO: 1.26 THOUSANDS/ÂΜL (ref 0.6–4.47)
LYMPHOCYTES NFR BLD AUTO: 14 % (ref 14–44)
MAGNESIUM SERPL-MCNC: 2.2 MG/DL (ref 1.9–2.7)
MCH RBC QN AUTO: 28.8 PG (ref 26.8–34.3)
MCHC RBC AUTO-ENTMCNC: 31.5 G/DL (ref 31.4–37.4)
MCV RBC AUTO: 92 FL (ref 82–98)
MONOCYTES # BLD AUTO: 0.99 THOUSAND/ÂΜL (ref 0.17–1.22)
MONOCYTES NFR BLD AUTO: 11 % (ref 4–12)
NEUTROPHILS # BLD AUTO: 6.53 THOUSANDS/ÂΜL (ref 1.85–7.62)
NEUTS SEG NFR BLD AUTO: 73 % (ref 43–75)
NRBC BLD AUTO-RTO: 0 /100 WBCS
PHOSPHATE SERPL-MCNC: 3.2 MG/DL (ref 2.3–4.1)
PLATELET # BLD AUTO: 298 THOUSANDS/UL (ref 149–390)
PMV BLD AUTO: 10.4 FL (ref 8.9–12.7)
POTASSIUM SERPL-SCNC: 4.1 MMOL/L (ref 3.5–5.3)
PROT SERPL-MCNC: 6.1 G/DL (ref 6.4–8.4)
PROTHROMBIN TIME: 16.9 SECONDS (ref 12.3–15)
QRS AXIS: 123 DEGREES
QRS AXIS: 125 DEGREES
QRSD INTERVAL: 130 MS
QRSD INTERVAL: 132 MS
QT INTERVAL: 364 MS
QT INTERVAL: 378 MS
QTC INTERVAL: 478 MS
QTC INTERVAL: 499 MS
RBC # BLD AUTO: 4.93 MILLION/UL (ref 3.81–5.12)
SODIUM SERPL-SCNC: 138 MMOL/L (ref 135–147)
T WAVE AXIS: 46 DEGREES
T WAVE AXIS: 52 DEGREES
VENTRICULAR RATE: 104 BPM
VENTRICULAR RATE: 105 BPM
WBC # BLD AUTO: 8.94 THOUSAND/UL (ref 4.31–10.16)

## 2024-08-20 PROCEDURE — 99285 EMERGENCY DEPT VISIT HI MDM: CPT

## 2024-08-20 PROCEDURE — 85610 PROTHROMBIN TIME: CPT | Performed by: EMERGENCY MEDICINE

## 2024-08-20 PROCEDURE — 96375 TX/PRO/DX INJ NEW DRUG ADDON: CPT

## 2024-08-20 PROCEDURE — 99291 CRITICAL CARE FIRST HOUR: CPT | Performed by: EMERGENCY MEDICINE

## 2024-08-20 PROCEDURE — 93005 ELECTROCARDIOGRAM TRACING: CPT

## 2024-08-20 PROCEDURE — 85730 THROMBOPLASTIN TIME PARTIAL: CPT | Performed by: EMERGENCY MEDICINE

## 2024-08-20 PROCEDURE — 96374 THER/PROPH/DIAG INJ IV PUSH: CPT

## 2024-08-20 PROCEDURE — 93010 ELECTROCARDIOGRAM REPORT: CPT | Performed by: INTERNAL MEDICINE

## 2024-08-20 PROCEDURE — 71045 X-RAY EXAM CHEST 1 VIEW: CPT

## 2024-08-20 PROCEDURE — 80053 COMPREHEN METABOLIC PANEL: CPT | Performed by: EMERGENCY MEDICINE

## 2024-08-20 PROCEDURE — 84484 ASSAY OF TROPONIN QUANT: CPT | Performed by: EMERGENCY MEDICINE

## 2024-08-20 PROCEDURE — 84100 ASSAY OF PHOSPHORUS: CPT | Performed by: EMERGENCY MEDICINE

## 2024-08-20 PROCEDURE — 85025 COMPLETE CBC W/AUTO DIFF WBC: CPT | Performed by: EMERGENCY MEDICINE

## 2024-08-20 PROCEDURE — 83880 ASSAY OF NATRIURETIC PEPTIDE: CPT | Performed by: EMERGENCY MEDICINE

## 2024-08-20 PROCEDURE — 36415 COLL VENOUS BLD VENIPUNCTURE: CPT | Performed by: EMERGENCY MEDICINE

## 2024-08-20 PROCEDURE — 83735 ASSAY OF MAGNESIUM: CPT | Performed by: EMERGENCY MEDICINE

## 2024-08-20 RX ORDER — DILTIAZEM HYDROCHLORIDE 300 MG/1
300 CAPSULE, COATED, EXTENDED RELEASE ORAL DAILY
Qty: 15 CAPSULE | Refills: 0 | Status: SHIPPED | OUTPATIENT
Start: 2024-08-21 | End: 2024-08-26 | Stop reason: SDUPTHER

## 2024-08-20 RX ORDER — FUROSEMIDE 10 MG/ML
40 INJECTION INTRAMUSCULAR; INTRAVENOUS ONCE
Status: COMPLETED | OUTPATIENT
Start: 2024-08-20 | End: 2024-08-20

## 2024-08-20 RX ORDER — DILTIAZEM HYDROCHLORIDE 60 MG/1
120 CAPSULE, EXTENDED RELEASE ORAL ONCE
Status: COMPLETED | OUTPATIENT
Start: 2024-08-20 | End: 2024-08-20

## 2024-08-20 RX ORDER — DILTIAZEM HYDROCHLORIDE 5 MG/ML
10 INJECTION INTRAVENOUS ONCE
Status: COMPLETED | OUTPATIENT
Start: 2024-08-20 | End: 2024-08-20

## 2024-08-20 RX ADMIN — DILTIAZEM HYDROCHLORIDE 10 MG: 5 INJECTION, SOLUTION INTRAVENOUS at 11:17

## 2024-08-20 RX ADMIN — FUROSEMIDE 40 MG: 10 INJECTION, SOLUTION INTRAMUSCULAR; INTRAVENOUS at 13:00

## 2024-08-20 RX ADMIN — DILTIAZEM HYDROCHLORIDE 120 MG: 60 CAPSULE, EXTENDED RELEASE ORAL at 15:49

## 2024-08-20 NOTE — DISCHARGE INSTRUCTIONS
Return to the ER for any new, concerning, worsening issues.  I recommend that you follow-up with your family doctor for repeat evaluation within 4 to 5 days.  Also make sure you follow-up with your cardiologist tomorrow and let them know that we increased your Cardizem dose to 300 mg a day.  And you are just going to start that medication on 8/21/2024.

## 2024-08-20 NOTE — PROGRESS NOTES
Cardiology Follow Up    Nohemi Arcos  1940  [id number]  [facility] [address] [id number] [phone]    1. Persistent atrial fibrillation (HCC)  Holter monitor      2. Atrial flutter, unspecified type (HCC)  Holter monitor      3. Chronic heart failure with preserved ejection fraction (HCC)  Basic metabolic panel      4. LBBB (left bundle branch block)        5. Primary hypertension        6. Atrial fibrillation with rapid ventricular response (HCC)  Ambulatory referral to Cardiology          Discussion/Summary:  Persistent atrial fibrillation/flutter   Recent diagnosis in July 2024. Patient has had a total of 4 visits to the hospital (2 admissions, 2 ER visits) in the past month.   Most recently she was seen yesterday and again her heart rate was elevated in the 120's. Her Cardizem was increased from 180 mg daily to 300 mg daily.  Heart rate is 95 bpm in office today.  Discussed option of cardioversion with her, especially in the setting of low-normal ejection fraction and recurrent volume overload. She wishes to contemplate this.  I will have her wear a 48 hour holter monitor to assess rate control, especially if she defers a cardioversion.   Continue anticoagulation with Eliquis 2.5 mg BID given her age and weight.     Chronic heart failure with preserved ejection fraction   Prescribed lasix 40 mg prn only for weight gain.   She required IV diuretics during her hospitalizations and also received lasix 40 mg IV yesterday.   I have recommended that she take lasix 40 mg daily with potassium supplementation as she will likely need daily diuretic dosing to keep up with volume overload in the setting of rapid heart rate.  Check BMP next week.  Continue daily weights.    Hypertension   Mildly elevated but will accept this given the current situation.     LBBB  Chronic and noted.     Timing of  follow up will be arranged based on if she decides on a cardioversion.    Interval History:   Nohemi Arcos is a 84 y.o. female with recently diagnosed persistent atrial fibrillation, low normal ejection fraction at 50%, hypertension, dyslipidemia, CKD stage III who presents to the office today for hospital follow up.    She had 4 admissions (ER or full admissions) in the recent past. She initially presented on 7/29 to the ER with shortness of breath. She was found to be in atrial fibrillation with RVR which was a new diagnosis for her. She was placed on diltiazem. Subsequently she presented back 2 days later with shortness of breath.  Again she was found to be in atrial fibrillation with RVR. Cardizem was continued and Eliquis was initiated. Imaging also suggested volume overload. She was given IV diuretics. She then presented back 3 days later with shortness of breath. This time her atrial fibrillation appeared to be rate controlled but she was volume overloaded with bilateral pleural effusions. She did undergo right sided thoracentesis yielding 700 cc of fluid. She also was given additional diuretics. With rising creatinine, she was asked to just take lasix 40 mg prn at discharge.     Lastly, she was seen in the ER yesterday. EKG showed rapid atrial flutter. A decision was made to increase her cardizem to 300 mg daily. Imaging suggested mild pulmonary vascular congestion and a dose of IV lasix 40 mg.    She presents today with her family member.  The patient states she feels well today.  She denies any shortness of breath at this current time.  She states when she does it short of breath it comes on fairly suddenly.  It can occur anytime, with rest or with exertion.  She denies any palpitations  of any kind.  She does report a sensation of heaviness in her chest when she feels short of breath.  She denies lightheadedness or dizziness.  She has been weighing herself regularly at home and states it is about 121  pounds.  She questions if she should be taking the Lasix regularly as she does not seem to have weight gain as a sign of fluid retention.  She denies any lower extremity edema or abdominal bloating.    Medical Problems       Problem List       Hypercholesteremia    Primary hypertension    Nontoxic single thyroid nodule    Osteopenia    Primary localized osteoarthritis of left knee    Vitamin D deficiency    Stage 3b chronic kidney disease (HCC)    Lab Results   Component Value Date    EGFR 48 08/20/2024    EGFR 47 08/11/2024    EGFR 45 08/08/2024    CREATININE 1.07 08/20/2024    CREATININE 1.09 08/11/2024    CREATININE 1.12 08/08/2024         Elevated TSH    Multiple renal cysts    Cholelithiasis    Colitis    Acute diverticulitis    Adrenal hyperplasia (HCC)    LBBB (left bundle branch block)    Atrial fibrillation with rapid ventricular response (HCC)    Hypoxia    Acute on chronic diastolic (congestive) heart failure (HCC)    Wt Readings from Last 3 Encounters:   08/21/24 55.8 kg (123 lb)   08/16/24 56.1 kg (123 lb 9.6 oz)   08/11/24 55.8 kg (123 lb)                 Cardiomyopathy (HCC)    Constipation    SIRS (systemic inflammatory response syndrome) (HCC)    Bilateral pleural effusion    Atrial fibrillation, controlled (HCC)    Urinary frequency        Past Medical History:   Diagnosis Date    Arteriosclerotic heart disease     LAST ASSESSED: 09SEP2015    Atrial fibrillation (HCC)     Esophageal reflux     LAST ASSESSED: 09JAN2018    GERD (gastroesophageal reflux disease)     Hyperlipidemia     LAST ASSESSED: 09JAN2018    Hypertension     LAST ASSESSED: 09JAN2018    Nontoxic single thyroid nodule     LAST ASSESSED: 09MAY2014    Osteopenia     LAST ASSESSED: 11MAY2016     Social History     Socioeconomic History    Marital status:      Spouse name: Not on file    Number of children: Not on file    Years of education: Not on file    Highest education level: Not on file   Occupational History    Not on file    Tobacco Use    Smoking status: Never    Smokeless tobacco: Never   Vaping Use    Vaping status: Never Used   Substance and Sexual Activity    Alcohol use: Never    Drug use: No    Sexual activity: Not Currently   Other Topics Concern    Not on file   Social History Narrative    Always uses seat belt    Dental care, regularly     Social Determinants of Health     Financial Resource Strain: Low Risk  (7/10/2023)    Overall Financial Resource Strain (CARDIA)     Difficulty of Paying Living Expenses: Not very hard   Food Insecurity: No Food Insecurity (8/6/2024)    Hunger Vital Sign     Worried About Running Out of Food in the Last Year: Never true     Ran Out of Food in the Last Year: Never true   Transportation Needs: No Transportation Needs (8/6/2024)    PRAPARE - Transportation     Lack of Transportation (Medical): No     Lack of Transportation (Non-Medical): No   Physical Activity: Not on file   Stress: Not on file   Social Connections: Not on file   Intimate Partner Violence: Not on file   Housing Stability: Low Risk  (8/6/2024)    Housing Stability Vital Sign     Unable to Pay for Housing in the Last Year: No     Number of Times Moved in the Last Year: 0     Homeless in the Last Year: No      Family History   Problem Relation Age of Onset    Alzheimer's disease Mother     Hypertension Father     Stroke Father         SYNDROME    Colon cancer Brother      Past Surgical History:   Procedure Laterality Date    CYSTOSCOPY  12/04/2013    DIAGNOSTIC    ESOPHAGOGASTRODUODENOSCOPY      IR THORACENTESIS  8/5/2024    MASTECTOMY Bilateral     cancer    OOPHORECTOMY      SALPINGOOPHORECTOMY Bilateral        Current Outpatient Medications:     apixaban (Eliquis) 2.5 mg, Take 1 tablet (2.5 mg total) by mouth 2 (two) times a day, Disp: 60 tablet, Rfl: 0    aspirin 81 MG tablet, Take 81 mg by mouth daily, Disp: , Rfl:     atorvastatin (LIPITOR) 20 mg tablet, Take 1 tablet by mouth once daily, Disp: 100 tablet, Rfl: 1     diltiazem (CARDIZEM CD) 300 mg 24 hr capsule, Take 1 capsule (300 mg total) by mouth daily for 15 doses Do not start before August 21, 2024., Disp: 15 capsule, Rfl: 0    furosemide (LASIX) 40 mg tablet, Take 1 tablet (40 mg total) by mouth daily as needed (weight gain 3lbs in 24 hours and 5lbs in a week, shortness of breath and leg swelling), Disp: 30 tablet, Rfl: 0    omeprazole (PriLOSEC) 20 mg delayed release capsule, Take 1 capsule by mouth once daily, Disp: 100 capsule, Rfl: 1    polyethylene glycol (MIRALAX) 17 g packet, Take 17 g by mouth daily for 20 days, Disp: 340 g, Rfl: 0    potassium chloride (Klor-Con M10) 10 mEq tablet, Take 1 tablet (10 mEq total) by mouth daily as needed (take with lasix), Disp: 30 tablet, Rfl: 0    senna (SENOKOT) 8.6 mg, Take 2 tablets (17.2 mg total) by mouth daily at bedtime, Disp: 60 tablet, Rfl: 0  No current facility-administered medications for this visit.  Allergies   Allergen Reactions    Ciprofloxacin Other (See Comments)     Muscle pain    Doxycycline     Nitrofurantoin        Labs:     Chemistry        Component Value Date/Time     09/06/2015 0818    K 4.1 08/20/2024 1108    K 4.1 09/06/2015 0818     08/20/2024 1108     09/06/2015 0818    CO2 24 08/20/2024 1108    CO2 30.6 09/06/2015 0818    BUN 20 08/20/2024 1108    BUN 18 09/06/2015 0818    CREATININE 1.07 08/20/2024 1108    CREATININE 1.01 09/06/2015 0818        Component Value Date/Time    CALCIUM 9.7 08/20/2024 1108    CALCIUM 9.4 09/06/2015 0818    ALKPHOS 31 (L) 08/20/2024 1108    ALKPHOS 51 09/06/2015 0818    AST 25 08/20/2024 1108    AST 29 09/06/2015 0818    ALT 17 08/20/2024 1108    ALT 29 09/06/2015 0818    BILITOT 0.52 09/06/2015 0818            Lab Results   Component Value Date    CHOL 192 09/06/2015    CHOL 203 01/11/2015    CHOL 198 06/22/2014     Lab Results   Component Value Date    HDL 52 01/03/2024    HDL 56 07/06/2022    HDL 53 06/23/2021     Lab Results   Component Value Date     "LDLCALC 107 (H) 01/03/2024    LDLCALC 112 (H) 07/06/2022    LDLCALC 119 (H) 06/23/2021     Lab Results   Component Value Date    TRIG 150 (H) 01/03/2024    TRIG 120 07/06/2022    TRIG 157 (H) 06/23/2021     No results found for: \"CHOLHDL\"    Imaging: CT head without contrast    Result Date: 8/11/2024  Narrative: CT BRAIN - WITHOUT CONTRAST INDICATION:   Headache pressure with visual disturbance.. COMPARISON:  None. TECHNIQUE:  CT examination of the brain was performed.  Multiplanar 2D reformatted images were created from the source data. Radiation dose length product (DLP) for this visit:  859.45 mGy-cm .  This examination, like all CT scans performed [facility] the [facility], was performed utilizing techniques to minimize radiation dose exposure, including the use of iterative  reconstruction and automated exposure control. IMAGE QUALITY:  Diagnostic. FINDINGS: PARENCHYMA:  No intracranial mass, mass effect or midline shift. No CT signs of acute infarction.  No acute parenchymal hemorrhage. There is moderate periventricular white matter low attenuation which is nonspecific and most likely related to chronic small vessel ischemic changes. There are old bilateral basal ganglia lacunar infarcts. VENTRICLES AND EXTRA-AXIAL SPACES:  Normal for the patient's age. VISUALIZED ORBITS: Normal visualized orbits. PARANASAL SINUSES: Normal visualized paranasal sinuses. CALVARIUM AND EXTRACRANIAL SOFT TISSUES:  Normal.     Impression: No acute intracranial abnormality. Moderate chronic small vessel ischemic changes. Workstation performed: [id number]      IN-Patient Thoracentesis    Result Date: 8/6/2024  Narrative: EXAMINATION: Thoracentesis with sonographic guidance. HISTORY: Pleural effusion, dyspnea. Bilateral effusions. Right larger TECHNIQUE: The patient was brought to Radiology.  Informed consent was obtained.  The right lower back was prepped and draped in the usual sterile fashion.  Timeout was performed. " "Lidocaine was given as local anesthesia.  Using sonographic guidance, a 5 Maltese centesis catheter was advanced into the fluid.  The catheter was connected to a vacuum bottle.  The fluid was drained in its entirety.  A total volume of 700 cc of Transparent appearing, Yellow colored fluid was removed.  The catheter was removed. Specimens were not sent to the laboratory for evaluation.     Impression: Technically successful ultrasound-guided thoracentesis. This is the end of the clinically relevant portion of this report.  Subsequent information is for compliance, documentation, and coding purposes. In the process of informed consent, information was communicated, verbally, to the patient regarding the procedure.  The patient was informed of; the name of the procedure, nature of the procedure, nature of the condition, risks, benefits, alternatives, and potential complications, as well as the consequences of not having the procedure.  All the patient's questions were answered.  Informed consent was signed.  Quoted risks include bleeding, and pneumothorax, including axial vacuo pneumothorax. Chlorhexidine and alcohol was used for cleansing and sterile preparation of the skin.  This was allowed to dry prior to the application of sterile draping. Timeout was performed, with all team members present, and in agreement.  Confirmation of patient, procedure, laterally, allergies, and all needed equipment was performed. PROCEDURE: Thoracentesis PREPROCEDURE DIAGNOSIS: Please see \"history \", above POSTPROCEDURE DIAGNOSIS: Same ANTIBIOTICS: None SPECIMEN: Pleural fluid ESTIMATED BLOOD LOSS: None ANESTHESIA: Local FINAL DISPOSITION OF CATHETER: Out of patient's body Ultrasound was used to evaluate the pleural fluid as a potential access site.  Static and real-time images of needle entry were obtained, and archived. Workstation performed: [id number]     CTA ED chest PE study    Result Date: 8/5/2024  Narrative: CTA - CHEST WITH IV " CONTRAST - PULMONARY ANGIOGRAM INDICATION: shortness of breath. COMPARISON: CT chest dated 9/29/2011. TECHNIQUE: CTA examination of the chest was performed using angiographic technique according to a protocol specifically tailored to evaluate for pulmonary embolism. Multiplanar 2D reformatted images were created from the source data. In addition, coronal  3D MIP postprocessing was performed on the acquisition scanner. Radiation dose length product (DLP) for this visit: 187.16 mGy-cm . This examination, like all CT scans performed in the [facility], was performed utilizing techniques to minimize radiation dose exposure, including the use of iterative reconstruction and automated exposure control. IV Contrast: 85 mL of iohexol (OMNIPAQUE) FINDINGS: PULMONARY ARTERIAL TREE:  No pulmonary embolus. LUNGS: No focal airspace consolidation. There is no tracheal or endobronchial lesion. PLEURA: Moderate sized bilateral pleural effusions with subjacent compressive atelectasis. HEART/GREAT VESSELS: Heart is unremarkable for patient's age. No thoracic aortic aneurysm. MEDIASTINUM AND EVERETTE: Unremarkable. CHEST WALL AND LOWER NECK: Unremarkable. VISUALIZED STRUCTURES IN THE UPPER ABDOMEN: Unremarkable. OSSEOUS STRUCTURES: No acute fracture or destructive osseous lesion.     Impression: No evidence for pulmonary embolism. Moderate sized bilateral pleural effusions with subjacent compressive atelectasis. Workstation performed: [id number]     Echo complete w/ contrast if indicated    Result Date: 7/31/2024  Narrative:   Left Ventricle: Left ventricular cavity size is normal. Wall thickness is mildly increased. The left ventricular ejection fraction is 50%. Systolic function is low normal.   IVS: There is abnormal septal motion with left bundle branch block.   Left Atrium: The atrium is mildly dilated.   Mitral Valve: There is moderate regurgitation.   Tricuspid Valve: There is mild regurgitation.     X-ray chest 1  "view portable    Result Date: 7/29/2024  Narrative: XR CHEST PORTABLE INDICATION: chest pain. COMPARISON: Rib radiograph 12/15/2016 FINDINGS: Monitoring leads and clips project over the chest. Diffuse interstitial prominence suggesting mild fluid overload. Small bilateral pleural effusions. Normal cardiomediastinal silhouette. Degenerative changes in the shoulders and spine. Normal upper abdomen.     Impression: Mild vascular congestion. Small bilateral pleural effusions. Resident: Julio C Mata I, the attending radiologist, have reviewed the images and agree with the final report above. Workstation performed: [id number]     XR abdomen 1 view kub    Result Date: 7/26/2024  Narrative: XR ABDOMEN 1 VIEW KUB INDICATION: consipation. COMPARISON: CT from 3/22/2023 FINDINGS: Nonobstructive bowel gas pattern. A segment of the left colon demonstrates loss of haustration. There was left colonic inflammation on the previous CT from 2023. No evidence of pneumoperitoneum on this supine study. Upright or left lateral decubitus imaging is more sensitive to detect subtle free air in the appropriate setting. No pathologic calcification or soft tissue mass. Clear lung bases. There are degenerative changes of the hips and spine.     Impression: Loss of haustration in the left colon which can be seen in the setting of colitis. Note that this correlates with segment of colitis seen on prior CT from 2023. Inflammatory bowel disease is not excluded. The study was marked in EPIC for immediate notification. Workstation performed: [id number]       Review of Systems   All other systems reviewed and are negative.      Vitals:    08/21/24 1346   BP: 146/82   Pulse: 95   SpO2: 94%     Vitals:    08/21/24 1346   Weight: 55.8 kg (123 lb)     Height: 5' 1\" (154.9 cm)   Body mass index is 23.24 kg/m².    Physical Exam:  Physical Exam  Vitals reviewed.   Constitutional:       General: She is not in acute distress.     Appearance: She is not " diaphoretic.   HENT:      Head: Normocephalic and atraumatic.   Eyes:      Pupils: Pupils are equal, round, and reactive to light.   Neck:      Vascular: No carotid bruit.   Cardiovascular:      Rate and Rhythm: Normal rate. Rhythm irregularly irregular.      Pulses:           Radial pulses are 2+ on the right side and 2+ on the left side.      Heart sounds: S1 normal and S2 normal. No murmur heard.  Pulmonary:      Effort: Pulmonary effort is normal. No respiratory distress.      Breath sounds: Decreased breath sounds present. No wheezing or rales.   Abdominal:      General: There is no distension.      Palpations: Abdomen is soft.      Tenderness: There is no abdominal tenderness.   Musculoskeletal:         General: No deformity. Normal range of motion.      Cervical back: Normal range of motion.      Right lower leg: Edema (trace edema bilaterally) present.      Left lower leg: Edema present.   Skin:     General: Skin is warm and dry.      Findings: No erythema.   Neurological:      General: No focal deficit present.      Mental Status: She is alert and oriented to person, place, and time.      Gait: Gait normal.   Psychiatric:         Mood and Affect: Mood normal.         Behavior: Behavior normal.

## 2024-08-20 NOTE — ED PROVIDER NOTES
History  Chief Complaint   Patient presents with    Shortness of Breath     83 yo-year-old female presents emergency room complaining of lightheadedness and shortness of breath.  Patient denies any fever chills or chest pain.  The patient notes that she has been in the hospital due to pleural effusion, congestive heart failure, as well as atrial fibrillation.  Patient notes that she has had no change in her medications or missed dosages.  The patient is found to be in likely atrial flutter with rapid ventricular response from 115-120.        Prior to Admission Medications   Prescriptions Last Dose Informant Patient Reported? Taking?   apixaban (Eliquis) 2.5 mg   No No   Sig: Take 1 tablet (2.5 mg total) by mouth 2 (two) times a day   aspirin 81 MG tablet  Self Yes No   Sig: Take 81 mg by mouth daily   atorvastatin (LIPITOR) 20 mg tablet   No No   Sig: Take 1 tablet by mouth once daily   furosemide (LASIX) 40 mg tablet   No No   Sig: Take 1 tablet (40 mg total) by mouth daily as needed (weight gain 3lbs in 24 hours and 5lbs in a week, shortness of breath and leg swelling)   omeprazole (PriLOSEC) 20 mg delayed release capsule   No No   Sig: Take 1 capsule by mouth once daily   polyethylene glycol (MIRALAX) 17 g packet   No No   Sig: Take 17 g by mouth daily for 20 days   potassium chloride (Klor-Con M10) 10 mEq tablet   No No   Sig: Take 1 tablet (10 mEq total) by mouth daily as needed (take with lasix)   senna (SENOKOT) 8.6 mg   No No   Sig: Take 2 tablets (17.2 mg total) by mouth daily at bedtime      Facility-Administered Medications: None       Past Medical History:   Diagnosis Date    Arteriosclerotic heart disease     LAST ASSESSED: 09SEP2015    Atrial fibrillation (HCC)     Esophageal reflux     LAST ASSESSED: 09JAN2018    GERD (gastroesophageal reflux disease)     Hyperlipidemia     LAST ASSESSED: 09JAN2018    Hypertension     LAST ASSESSED: 09JAN2018    Nontoxic single thyroid nodule     LAST ASSESSED:  [id number]    Osteopenia     LAST ASSESSED: [id number]       Past Surgical History:   Procedure Laterality Date    CYSTOSCOPY  12/04/2013    DIAGNOSTIC    ESOPHAGOGASTRODUODENOSCOPY      IR THORACENTESIS  8/5/2024    MASTECTOMY Bilateral     cancer    OOPHORECTOMY      SALPINGOOPHORECTOMY Bilateral        Family History   Problem Relation Age of Onset    Alzheimer's disease Mother     Hypertension Father     Stroke Father         SYNDROME    Colon cancer Brother      I have reviewed and agree with the history as documented.    E-Cigarette/Vaping    E-Cigarette Use Never User      E-Cigarette/Vaping Substances    Nicotine No     THC No     CBD No     Flavoring No     Other No     Unknown No      Social History     Tobacco Use    Smoking status: Never    Smokeless tobacco: Never   Vaping Use    Vaping status: Never Used   Substance Use Topics    Alcohol use: Never    Drug use: No       Review of Systems   Constitutional:  Positive for activity change and fatigue. Negative for chills and fever.   HENT:  Negative for ear pain and sore throat.    Eyes:  Negative for pain and visual disturbance.   Respiratory:  Positive for shortness of breath. Negative for cough.    Cardiovascular:  Positive for palpitations. Negative for chest pain.   Gastrointestinal:  Negative for abdominal pain and vomiting.   Genitourinary:  Negative for dysuria and hematuria.   Musculoskeletal:  Negative for arthralgias and back pain.   Skin:  Negative for color change and rash.   Neurological:  Negative for seizures and syncope.   All other systems reviewed and are negative.      Physical Exam  Physical Exam  Vitals and nursing note reviewed.   Constitutional:       General: She is not in acute distress.     Appearance: Normal appearance. She is well-developed.   HENT:      Head: Normocephalic and atraumatic.      Right Ear: External ear normal.      Left Ear: External ear normal.      Nose: Nose normal.      Mouth/Throat:      Mouth: Mucous  membranes are moist.      Pharynx: No pharyngeal swelling or oropharyngeal exudate.   Eyes:      Conjunctiva/sclera: Conjunctivae normal.   Neck:      Thyroid: No thyromegaly.      Vascular: JVD present.   Cardiovascular:      Rate and Rhythm: Normal rate. Rhythm irregular.      Pulses: Normal pulses.      Heart sounds: Normal heart sounds. No murmur heard.  Pulmonary:      Effort: Pulmonary effort is normal. No respiratory distress.      Breath sounds: Normal breath sounds. No decreased breath sounds, wheezing, rhonchi or rales.   Chest:      Chest wall: No mass or deformity.   Abdominal:      General: Bowel sounds are normal.      Palpations: Abdomen is soft.      Tenderness: There is no abdominal tenderness.   Musculoskeletal:         General: No swelling or deformity.      Cervical back: Neck supple.      Right lower leg: Edema present.      Left lower leg: Edema present.      Comments: Trace edema bilaterally   Skin:     General: Skin is warm and dry.      Capillary Refill: Capillary refill takes less than 2 seconds.      Coloration: Skin is pale.   Neurological:      General: No focal deficit present.      Mental Status: She is alert and oriented to person, place, and time. Mental status is at baseline.         Vital Signs  ED Triage Vitals [08/20/24 0944]   Temperature Pulse Respirations Blood Pressure SpO2   97.7 °F (36.5 °C) 104 20 131/73 95 %      Temp Source Heart Rate Source Patient Position - Orthostatic VS BP Location FiO2 (%)   Tympanic Monitor Lying Left arm --      Pain Score       No Pain           Vitals:    08/20/24 1300 08/20/24 1500 08/20/24 1515 08/20/24 1530   BP: 144/82 163/91  141/84   Pulse: 102 (!) 115 105    Patient Position - Orthostatic VS:             Visual Acuity      ED Medications  Medications   diltiazem (CARDIZEM) injection 10 mg (10 mg Intravenous Given 8/20/24 1117)   furosemide (LASIX) injection 40 mg (40 mg Intravenous Given 8/20/24 1300)   diltiazem (CARDIZEM SR) 12 hr  capsule 120 mg (120 mg Oral Given 8/20/24 9744)       Diagnostic Studies  Results Reviewed       Procedure Component Value Units Date/Time    HS Troponin I 2hr [[id number]]  (Normal) Collected: 08/20/24 1305    Lab Status: Final result Specimen: Blood from Arm, Right Updated: 08/20/24 1332     hs TnI 2hr 10 ng/L      Delta 2hr hsTnI -2 ng/L     B-Type Natriuretic Peptide(BNP) [[id number]]  (Abnormal) Collected: 08/20/24 1108    Lab Status: Final result Specimen: Blood from Arm, Right Updated: 08/20/24 1144      pg/mL     HS Troponin 0hr (reflex protocol) [[id number]]  (Normal) Collected: 08/20/24 1108    Lab Status: Final result Specimen: Blood from Arm, Right Updated: 08/20/24 1136     hs TnI 0hr 12 ng/L     Comprehensive metabolic panel [[id number]]  (Abnormal) Collected: 08/20/24 1108    Lab Status: Final result Specimen: Blood from Arm, Right Updated: 08/20/24 1131     Sodium 138 mmol/L      Potassium 4.1 mmol/L      Chloride 105 mmol/L      CO2 24 mmol/L      ANION GAP 9 mmol/L      BUN 20 mg/dL      Creatinine 1.07 mg/dL      Glucose 107 mg/dL      Calcium 9.7 mg/dL      AST 25 U/L      ALT 17 U/L      Alkaline Phosphatase 31 U/L      Total Protein 6.1 g/dL      Albumin 3.8 g/dL      Total Bilirubin 0.52 mg/dL      eGFR 48 ml/min/1.73sq m     Narrative:      National Kidney Disease Foundation guidelines for Chronic Kidney Disease (CKD):     Stage 1 with normal or high GFR (GFR > 90 mL/min/1.73 square meters)    Stage 2 Mild CKD (GFR = 60-89 mL/min/1.73 square meters)    Stage 3A Moderate CKD (GFR = 45-59 mL/min/1.73 square meters)    Stage 3B Moderate CKD (GFR = 30-44 mL/min/1.73 square meters)    Stage 4 Severe CKD (GFR = 15-29 mL/min/1.73 square meters)    Stage 5 End Stage CKD (GFR <15 mL/min/1.73 square meters)  Note: GFR calculation is accurate only with a steady state creatinine    Magnesium [[id number]]  (Normal) Collected: 08/20/24 1108    Lab Status: Final result Specimen: Blood from Arm, Right  Updated: 08/20/24 1131     Magnesium 2.2 mg/dL     Phosphorus [[id number]]  (Normal) Collected: 08/20/24 1108    Lab Status: Final result Specimen: Blood from Arm, Right Updated: 08/20/24 1131     Phosphorus 3.2 mg/dL     Protime-INR [[id number]]  (Abnormal) Collected: 08/20/24 1108    Lab Status: Final result Specimen: Blood from Arm, Right Updated: 08/20/24 1126     Protime 16.9 seconds      INR 1.32    Narrative:      INR Therapeutic Range    Indication                                             INR Range      Atrial Fibrillation                                               2.0-3.0  Hypercoagulable State                                    2.0.2.3  Left Ventricular Asist Device                            2.0-3.0  Mechanical Heart Valve                                  -    Aortic(with afib, MI, embolism, HF, LA enlargement,    and/or coagulopathy)                                     2.0-3.0 (2.5-3.5)     Mitral                                                             2.5-3.5  Prosthetic/Bioprosthetic Heart Valve               2.0-3.0  Venous thromboembolism (VTE: VT, PE        2.0-3.0    APTT [[id number]]  (Normal) Collected: 08/20/24 1108    Lab Status: Final result Specimen: Blood from Arm, Right Updated: 08/20/24 1126     PTT 33 seconds     CBC and differential [[id number]] Collected: 08/20/24 1108    Lab Status: Final result Specimen: Blood from Arm, Right Updated: 08/20/24 1114     WBC 8.94 Thousand/uL      RBC 4.93 Million/uL      Hemoglobin 14.2 g/dL      Hematocrit 45.1 %      MCV 92 fL      MCH 28.8 pg      MCHC 31.5 g/dL      RDW 13.5 %      MPV 10.4 fL      Platelets 298 Thousands/uL      nRBC 0 /100 WBCs      Segmented % 73 %      Immature Grans % 1 %      Lymphocytes % 14 %      Monocytes % 11 %      Eosinophils Relative 1 %      Basophils Relative 0 %      Absolute Neutrophils 6.53 Thousands/µL      Absolute Immature Grans 0.06 Thousand/uL      Absolute Lymphocytes 1.26 Thousands/µL      Absolute  Monocytes 0.99 Thousand/µL      Eosinophils Absolute 0.06 Thousand/µL      Basophils Absolute 0.04 Thousands/µL                    XR chest 1 view portable   ED Interpretation by Jose Alfredo Caal Jr., DO (08/20 1118)   Mild vascular congestion with small pleural effusions bilaterally.      Final Result by Marley Islas MD (08/20 1611)   Mild bibasal interstitial marking prominence. Suspected atelectatic changes in the posteromedial lower lobes. Trace bilateral pleural fluid. This is nonspecific but may reflect mild CHF            Workstation performed: [id number]                    Procedures  CriticalCare Time    Date/Time: 8/20/2024 4:05 PM    Performed by: Jose Alfredo Caal Jr., DO  Authorized by: Jose Alfredo Caal Jr., DO    Critical care provider statement:     Critical care time (minutes):  40    Critical care time was exclusive of:  Separately billable procedures and treating other patients and teaching time    Critical care was necessary to treat or prevent imminent or life-threatening deterioration of the following conditions:  Cardiac failure    Critical care was time spent personally by me on the following activities:  Discussions with consultants, evaluation of patient's response to treatment, examination of patient, ordering and performing treatments and interventions, ordering and review of laboratory studies, ordering and review of radiographic studies, re-evaluation of patient's condition and review of old charts           ED Course  ED Course as of 08/20/24 1615   Tue Aug 20, 2024   1105 Patient on Cardizem CD chronically, will add an extra dose of Cardizem due to heart rate of 1 15-1 24.   1516 Discussed case Dr. Wills who recommends that the patient have her Cardizem increased to Cardizem .  This will be written to the [facility].  Patient has an appointment with her cardiologist tomorrow.  Patient be discharged                                 SBIRT 22yo+      Flowsheet Row  Most Recent Value   Initial Alcohol Screen: US AUDIT-C     1. How often do you have a drink containing alcohol? 0 Filed at: 08/20/2024 1044   2. How many drinks containing alcohol do you have on a typical day you are drinking?  0 Filed at: 08/20/2024 1044   3a. Male UNDER 65: How often do you have five or more drinks on one occasion? 0 Filed at: 08/20/2024 1044   3b. FEMALE Any Age, or MALE 65+: How often do you have 4 or more drinks on one occassion? 0 Filed at: 08/20/2024 1044   Audit-C Score 0 Filed at: 08/20/2024 1044   RONNY: How many times in the past year have you...    Used an illegal drug or used a prescription medication for non-medical reasons? Never Filed at: 08/20/2024 1044                      Medical Decision Making  84-year-old female presents emergency department complaining of shortness of breath.  Patient denies chest pain fever chills or cough.  The patient notes that she was recently admitted for atrial fibrillation, patient is on Cardizem 180 mg CD.  The patients differential diagnosis is electrolyte abnormalities, congestive heart failure, atrial fibrillation or atrial flutter, or infectious process.  Lab studies show no evidence of acute infectious process however the patient's heart rate was found to be over 122 and atrial flutter.  The patient is given a small dose of IV Cardizem which brought her rate down into the low 100s.  The patient did take her Cardizem of 180 mg CD dose earlier today.  The patient was held for 2 troponins which were negative and chest x-ray showed small amount of pleural effusions but no significant pulmonary edema or fluid collection.  Case was ultimately discussed with Dr. Wills who is in agreement that we should for now adjust the patient's Cardizem dose being that she has an appointment with her cardiologist tomorrow.  The patient will be changed from Cardizem  to Cardizem .  Patient will be bridged with Cardizem  milligrams while in the  emergency department.  Patient was also given a dose of Lasix to help diurese.  Patient was told to follow-up with her cardiology appointment tomorrow and to return to the ER for any new or concerning issues.  Patient discharged.    Amount and/or Complexity of Data Reviewed  Labs: ordered.  Radiology: ordered and independent interpretation performed.    Risk  Prescription drug management.                 Disposition  Final diagnoses:   Atrial flutter (HCC)     Time reflects when diagnosis was documented in both MDM as applicable and the Disposition within this note       Time User Action Codes Description Comment    8/20/2024  3:19 PM Jose Alfredo Caal [I48.92] Atrial flutter (HCC)           ED Disposition       ED Disposition   Discharge    Condition   Stable    Date/Time   Tue Aug 20, 2024 1521    Comment   Nohemi Arcos discharge to home/self care.                   Follow-up Information       Follow up With Specialties Details Why Contact Info    Gissell Graham DO Internal Medicine, Hospice Services On 8/26/2024  [address] [facility] [address]  [phone]              Discharge Medication List as of 8/20/2024  3:39 PM        START taking these medications    Details   diltiazem (CARDIZEM CD) 300 mg 24 hr capsule Take 1 capsule (300 mg total) by mouth daily for 15 doses Do not start before August 21, 2024., Starting Wed 8/21/2024, Until Thu 9/5/2024, Normal           CONTINUE these medications which have NOT CHANGED    Details   apixaban (Eliquis) 2.5 mg Take 1 tablet (2.5 mg total) by mouth 2 (two) times a day, Starting Fri 8/2/2024, Until Sun 9/1/2024, Normal      aspirin 81 MG tablet Take 81 mg by mouth daily, Historical Med      atorvastatin (LIPITOR) 20 mg tablet Take 1 tablet by mouth once daily, Starting Thu 7/18/2024, Normal      furosemide (LASIX) 40 mg tablet Take 1 tablet (40 mg total) by mouth daily as needed (weight gain 3lbs in 24 hours and 5lbs in a week, shortness of breath and leg  swelling), Starting Thu 8/8/2024, Until Sat 9/7/2024 at 2359, Normal      omeprazole (PriLOSEC) 20 mg delayed release capsule Take 1 capsule by mouth once daily, Starting Thu 7/18/2024, Normal      polyethylene glycol (MIRALAX) 17 g packet Take 17 g by mouth daily for 20 days, Starting Thu 8/8/2024, Until Wed 8/28/2024, Normal      potassium chloride (Klor-Con M10) 10 mEq tablet Take 1 tablet (10 mEq total) by mouth daily as needed (take with lasix), Starting Thu 8/8/2024, Normal      senna (SENOKOT) 8.6 mg Take 2 tablets (17.2 mg total) by mouth daily at bedtime, Starting Thu 8/8/2024, Until Sat 9/7/2024, Normal             No discharge procedures on file.    PDMP Review       None            ED Provider  Electronically Signed by             Jose Alfredo Caal Jr., DO  08/20/24 1615       Jose Alfredo Caal Jr., DO  08/23/24 7800

## 2024-08-21 ENCOUNTER — OFFICE VISIT (OUTPATIENT)
Dept: CARDIOLOGY CLINIC | Facility: HOSPITAL | Age: 84
End: 2024-08-21
Payer: MEDICARE

## 2024-08-21 ENCOUNTER — HOSPITAL ENCOUNTER (OUTPATIENT)
Dept: NON INVASIVE DIAGNOSTICS | Facility: HOSPITAL | Age: 84
Discharge: HOME | End: 2024-08-21
Payer: MEDICARE

## 2024-08-21 VITALS
DIASTOLIC BLOOD PRESSURE: 82 MMHG | HEART RATE: 95 BPM | BODY MASS INDEX: 23.22 KG/M2 | OXYGEN SATURATION: 94 % | WEIGHT: 123 LBS | HEIGHT: 61 IN | SYSTOLIC BLOOD PRESSURE: 146 MMHG

## 2024-08-21 DIAGNOSIS — I50.32 CHRONIC HEART FAILURE WITH PRESERVED EJECTION FRACTION (HCC): ICD-10-CM

## 2024-08-21 DIAGNOSIS — I44.7 LBBB (LEFT BUNDLE BRANCH BLOCK): ICD-10-CM

## 2024-08-21 DIAGNOSIS — I48.92 ATRIAL FLUTTER, UNSPECIFIED TYPE (HCC): ICD-10-CM

## 2024-08-21 DIAGNOSIS — I10 PRIMARY HYPERTENSION: ICD-10-CM

## 2024-08-21 DIAGNOSIS — I48.91 ATRIAL FIBRILLATION WITH RAPID VENTRICULAR RESPONSE (HCC): ICD-10-CM

## 2024-08-21 DIAGNOSIS — I48.19 PERSISTENT ATRIAL FIBRILLATION (HCC): Primary | ICD-10-CM

## 2024-08-21 DIAGNOSIS — I48.19 PERSISTENT ATRIAL FIBRILLATION (HCC): ICD-10-CM

## 2024-08-21 PROCEDURE — 93225 XTRNL ECG REC<48 HRS REC: CPT

## 2024-08-21 PROCEDURE — 93226 XTRNL ECG REC<48 HR SCAN A/R: CPT

## 2024-08-21 PROCEDURE — 99214 OFFICE O/P EST MOD 30 MIN: CPT | Performed by: PHYSICIAN ASSISTANT

## 2024-08-22 ENCOUNTER — PATIENT OUTREACH (OUTPATIENT)
Dept: CASE MANAGEMENT | Facility: OTHER | Age: 84
End: 2024-08-22

## 2024-08-22 ENCOUNTER — TELEPHONE (OUTPATIENT)
Age: 84
End: 2024-08-22

## 2024-08-22 NOTE — TELEPHONE ENCOUNTER
FYI:  Patient had holter monitor applied on 8/21/24 and was to return it on Friday, 8/23/24.  Patient does not have a ride to the office on Friday, but will return it on Monday, 8/26/24.

## 2024-08-22 NOTE — PROGRESS NOTES
Outpatient Care Manager Note: Call made to patient and reviewed role of RN LEONIDAS and she is receptive to outreach. We had met during her hospitalization via TV kit where HF education was provided. We   Self-management/education and teach back:  Primary learner: self  Following low sodium diet: yes  Following fluid restriction:ys  Hospital discharge weight: 123#  Weighing daily: yes and recording:  Weight today:121#  Monitoring symptoms: yes  Any current symptoms: denies SOB or edema today and states is feeling better.  Knows when to call provider: yes  Medication reviewed and taking all as prescribed: declines review, but states taking as prescribed and they were reviewed with PCP and cardiology  Knows name of diuretic:Yes  Escalation plan: Call Cardiology office  HF education reviewed/reinforced including low sodium diet, 64 oz fluid restriction, activity, symptoms of decompensation and when and who to call.     Care Coordination:  Aware of cardiology follow up appointment: Saw cardiology AP yesterday  Aware of PCP follow up appointment: Saw last week  Transportation: sister drives  Social Support: sister live nearby, son in [place]  Insurance/financial concerns:no  Home health care: declined  Health literacy:fair  Engagement:good  Personal Goal: get heart rate under control  Additional comments: Will continue to follow

## 2024-08-26 DIAGNOSIS — I48.92 ATRIAL FLUTTER (HCC): ICD-10-CM

## 2024-08-26 DIAGNOSIS — I48.91 ATRIAL FIBRILLATION WITH RVR (HCC): ICD-10-CM

## 2024-08-26 NOTE — TELEPHONE ENCOUNTER
Reason for call:   [x] Refill   [] Prior Auth  [] Other:     Office:   [] PCP/Provider -   [x] Specialty/Provider -  cardio         Does the patient have enough for 3 days?   [x] Yes   [] No - Send as HP to POD

## 2024-08-27 PROCEDURE — 93227 XTRNL ECG REC<48 HR R&I: CPT | Performed by: INTERNAL MEDICINE

## 2024-08-27 RX ORDER — DILTIAZEM HYDROCHLORIDE 300 MG/1
300 CAPSULE, COATED, EXTENDED RELEASE ORAL DAILY
Qty: 30 CAPSULE | Refills: 3 | Status: SHIPPED | OUTPATIENT
Start: 2024-08-27

## 2024-08-28 NOTE — RESULT ENCOUNTER NOTE
Called patient and discussed Holter results.    Holter shows persistent atrial fibrillation, average heart rate controlled at 85 bpm  Again I did recommend a cardioversion given recurrent CHF as well as low normal EF AT 50% but patient wishes to continue to contemplate at this time.    Reminded her to get labs this week to check renal function/electrolytes being on daily lasix.  Continue all medications.    Also discussed with patient's sister Chayito on the phone.

## 2024-08-29 ENCOUNTER — APPOINTMENT (OUTPATIENT)
Dept: LAB | Facility: HOSPITAL | Age: 84
End: 2024-08-29
Payer: MEDICARE

## 2024-08-29 DIAGNOSIS — Z79.899 ENCOUNTER FOR MONITORING DIURETIC THERAPY: Primary | ICD-10-CM

## 2024-08-29 DIAGNOSIS — Z51.81 ENCOUNTER FOR MONITORING DIURETIC THERAPY: Primary | ICD-10-CM

## 2024-08-29 DIAGNOSIS — I50.32 CHRONIC HEART FAILURE WITH PRESERVED EJECTION FRACTION (HCC): ICD-10-CM

## 2024-08-29 LAB
ANION GAP SERPL CALCULATED.3IONS-SCNC: 8 MMOL/L (ref 4–13)
BUN SERPL-MCNC: 22 MG/DL (ref 5–25)
CALCIUM SERPL-MCNC: 9.5 MG/DL (ref 8.4–10.2)
CHLORIDE SERPL-SCNC: 106 MMOL/L (ref 96–108)
CO2 SERPL-SCNC: 25 MMOL/L (ref 21–32)
CREAT SERPL-MCNC: 1.36 MG/DL (ref 0.6–1.3)
GFR SERPL CREATININE-BSD FRML MDRD: 36 ML/MIN/1.73SQ M
GLUCOSE SERPL-MCNC: 120 MG/DL (ref 65–140)
POTASSIUM SERPL-SCNC: 3.7 MMOL/L (ref 3.5–5.3)
SODIUM SERPL-SCNC: 139 MMOL/L (ref 135–147)

## 2024-08-29 PROCEDURE — 36415 COLL VENOUS BLD VENIPUNCTURE: CPT

## 2024-08-29 PROCEDURE — 80048 BASIC METABOLIC PNL TOTAL CA: CPT

## 2024-08-30 ENCOUNTER — PATIENT OUTREACH (OUTPATIENT)
Dept: CASE MANAGEMENT | Facility: OTHER | Age: 84
End: 2024-08-30

## 2024-08-30 NOTE — PROGRESS NOTES
Outpatient Care Manager Note: Chart notes reviewed and attempted call to patient, line was busy. Will retry.   Call made to patient. She reports she is feeling well and denies and SOB or edema. She states her weights have been stable around 119-120#. She states she did get a call from cardiology and her furosemide was decreased to 20 mg (1/2 of 40 mg tablet) and to continue potassium 20 meq daily. She is to get a repeat BMP next week.  Will continue weekly calls for remainder of complex episode.

## 2024-09-05 ENCOUNTER — TELEPHONE (OUTPATIENT)
Dept: CARDIOLOGY CLINIC | Facility: HOSPITAL | Age: 84
End: 2024-09-05

## 2024-09-05 ENCOUNTER — APPOINTMENT (OUTPATIENT)
Dept: LAB | Facility: HOSPITAL | Age: 84
End: 2024-09-05
Payer: MEDICARE

## 2024-09-05 DIAGNOSIS — Z79.899 ENCOUNTER FOR MONITORING DIURETIC THERAPY: ICD-10-CM

## 2024-09-05 DIAGNOSIS — Z51.81 ENCOUNTER FOR MONITORING DIURETIC THERAPY: ICD-10-CM

## 2024-09-05 LAB
ANION GAP SERPL CALCULATED.3IONS-SCNC: 8 MMOL/L (ref 4–13)
BUN SERPL-MCNC: 20 MG/DL (ref 5–25)
CALCIUM SERPL-MCNC: 9.6 MG/DL (ref 8.4–10.2)
CHLORIDE SERPL-SCNC: 104 MMOL/L (ref 96–108)
CO2 SERPL-SCNC: 26 MMOL/L (ref 21–32)
CREAT SERPL-MCNC: 1.22 MG/DL (ref 0.6–1.3)
GFR SERPL CREATININE-BSD FRML MDRD: 41 ML/MIN/1.73SQ M
GLUCOSE SERPL-MCNC: 108 MG/DL (ref 65–140)
POTASSIUM SERPL-SCNC: 3.6 MMOL/L (ref 3.5–5.3)
SODIUM SERPL-SCNC: 138 MMOL/L (ref 135–147)

## 2024-09-05 PROCEDURE — 80048 BASIC METABOLIC PNL TOTAL CA: CPT

## 2024-09-05 PROCEDURE — 36415 COLL VENOUS BLD VENIPUNCTURE: CPT

## 2024-09-05 NOTE — TELEPHONE ENCOUNTER
KALEB Vicente MA  Please call patient and let her know that her blood work was good, kidney function looks better.  How is she feeling on the half dose of lasix?    Thank you.

## 2024-09-05 NOTE — TELEPHONE ENCOUNTER
09/05/24- Pt called back and states the lasix is working out good, she is happy with this dose and states no swelling.

## 2024-09-09 ENCOUNTER — PATIENT OUTREACH (OUTPATIENT)
Dept: CASE MANAGEMENT | Facility: OTHER | Age: 84
End: 2024-09-09

## 2024-09-09 ENCOUNTER — TELEPHONE (OUTPATIENT)
Age: 84
End: 2024-09-09

## 2024-09-09 DIAGNOSIS — I48.19 PERSISTENT ATRIAL FIBRILLATION (HCC): Primary | ICD-10-CM

## 2024-09-09 NOTE — TELEPHONE ENCOUNTER
Received call from pt's sister Wendie.  She is requesting Xochitl Tapia PA-C call her to discuss cardioversion.  She has some questions as pt is considering scheduling it.

## 2024-09-09 NOTE — TELEPHONE ENCOUNTER
Called sister back. Nohemi would like to schedule cardioversion. I will place order and have it scheduled. She has been fully compliant with her Eliquis x 30 days.

## 2024-09-09 NOTE — PROGRESS NOTES
Outpatient Care Manager Note:  Chart notes reviewed and call made to patient and left detailed message. Encouraged her to call office for refills if she was instructed to continue lasix and potassium. Complex/CHF episode resolved and removed self from care team.

## 2024-09-10 DIAGNOSIS — I42.9 CARDIOMYOPATHY, UNSPECIFIED TYPE (HCC): ICD-10-CM

## 2024-09-10 RX ORDER — POTASSIUM CHLORIDE 750 MG/1
10 TABLET, EXTENDED RELEASE ORAL DAILY PRN
Qty: 30 TABLET | Refills: 5 | Status: SHIPPED | OUTPATIENT
Start: 2024-09-10 | End: 2024-09-12 | Stop reason: SDUPTHER

## 2024-09-10 NOTE — TELEPHONE ENCOUNTER
Pt asking for the Lasix to be 20 mg daily not 40 mg daily. Also the Potassium was given in the hospital please review if refill is appropriate.    Reason for call:   [x] Refill   [] Prior Auth  [] Other:     Office:   [x] PCP/Provider -   [] Specialty/Provider -     Medication: potassium chloride (Klor-Con M10) 10 mEq tablet   Take 1 tablet (10 mEq total) by mouth daily as needed (take with lasix)     furosemide (LASIX) 40 mg tablet Take 1 tablet (40 mg total) by mouth daily as needed (weight gain 3lbs in 24 hours and 5lbs in a week, shortness of breath and leg swelling),     Pharmacy: [facility] [address] [place] [address], [address]     Does the patient have enough for 3 days?   [x] Yes   [] No - Send as HP to POD

## 2024-09-12 ENCOUNTER — TELEPHONE (OUTPATIENT)
Age: 84
End: 2024-09-12

## 2024-09-12 ENCOUNTER — RA CDI HCC (OUTPATIENT)
Dept: OTHER | Facility: HOSPITAL | Age: 84
End: 2024-09-12

## 2024-09-12 DIAGNOSIS — R06.02 SHORTNESS OF BREATH: ICD-10-CM

## 2024-09-12 DIAGNOSIS — I42.9 CARDIOMYOPATHY, UNSPECIFIED TYPE (HCC): ICD-10-CM

## 2024-09-12 RX ORDER — FUROSEMIDE 40 MG
40 TABLET ORAL DAILY PRN
Qty: 30 TABLET | Refills: 0 | Status: SHIPPED | OUTPATIENT
Start: 2024-09-12 | End: 2024-10-12

## 2024-09-12 RX ORDER — POTASSIUM CHLORIDE 750 MG/1
10 TABLET, EXTENDED RELEASE ORAL DAILY PRN
Qty: 30 TABLET | Refills: 5 | Status: SHIPPED | OUTPATIENT
Start: 2024-09-12

## 2024-09-12 NOTE — TELEPHONE ENCOUNTER
Patient states that the cardiologist told her to take 20mg of Lasix with the potassium. Patient states that her pcp takes care of all of her scripts.   [facility]  JAYDON WRIGHT - [address] [place] [address], [address]

## 2024-09-18 ENCOUNTER — TELEPHONE (OUTPATIENT)
Age: 84
End: 2024-09-18

## 2024-09-18 NOTE — TELEPHONE ENCOUNTER
Spoke with patient's sister Wendie, states Song received a call to schedule the cardioversion on September 30th but this date is not possible. Wendie is the one who will be taking her to the procedure and she will be away until October 7th. Requesting the cardioversion be scheduled after October 8th if possible.    Please contact Wendie at [phone] before Friday if possible since she will be travelling, to schedule the procedure.

## 2024-09-27 ENCOUNTER — OFFICE VISIT (OUTPATIENT)
Dept: FAMILY MEDICINE CLINIC | Facility: CLINIC | Age: 84
End: 2024-09-27
Payer: MEDICARE

## 2024-09-27 VITALS
HEIGHT: 61 IN | TEMPERATURE: 97.5 F | WEIGHT: 118.6 LBS | OXYGEN SATURATION: 97 % | HEART RATE: 96 BPM | BODY MASS INDEX: 22.39 KG/M2

## 2024-09-27 DIAGNOSIS — I48.91 ATRIAL FIBRILLATION, CONTROLLED (HCC): ICD-10-CM

## 2024-09-27 DIAGNOSIS — E78.5 HYPERLIPIDEMIA, UNSPECIFIED HYPERLIPIDEMIA TYPE: ICD-10-CM

## 2024-09-27 DIAGNOSIS — Z11.59 ENCOUNTER FOR HEPATITIS C SCREENING TEST FOR LOW RISK PATIENT: ICD-10-CM

## 2024-09-27 DIAGNOSIS — Z00.00 MEDICARE ANNUAL WELLNESS VISIT, SUBSEQUENT: Primary | ICD-10-CM

## 2024-09-27 PROCEDURE — G0439 PPPS, SUBSEQ VISIT: HCPCS | Performed by: INTERNAL MEDICINE

## 2024-09-27 NOTE — PROGRESS NOTES
Ambulatory Visit  Name: Nohemi Arcos      : 1940      MRN: [id number]  Encounter Provider: Gissell Graham DO  Encounter Date: 2024   Encounter department: [facility]    Assessment & Plan  Medicare annual wellness visit, subsequent  Pt stable and independent with ADLS again  Fluid restriction   Fall precautions        Atrial fibrillation, controlled (HCC)    Orders:  •  Lipid panel; Future  •  Comprehensive metabolic panel; Future  Stable and has upcoming Cardio followup Continue current rx Pt feeling well today   Hyperlipidemia, unspecified hyperlipidemia type    Orders:  •  Lipid panel; Future  •  Comprehensive metabolic panel; Future    Rto 6months     Depression Screening and Follow-up Plan: Patient was screened for depression during today's encounter. They screened negative with a PHQ-2 score of 0.    Preventive health issues were discussed with patient, and age appropriate screening tests were ordered as noted in patient's After Visit Summary. Personalized health advice and appropriate referrals for health education or preventive services given if needed, as noted in patient's After Visit Summary.    History of Present Illness     HPI   Pt feeling better She is in her home again on her own and doing he own adls without issue No chest pain or palpitations She is eating regularly Lo sodium diet and monitoring fluid intake   Patient Care Team:  Gissell Graham DO as PCP - General  DO Valente Carcamo MD Bankim Bhatt, MD    Review of Systems   Constitutional:  Negative for chills and fever.   HENT: Negative.     Eyes:  Negative for visual disturbance.   Respiratory:  Negative for cough and shortness of breath.    Cardiovascular: Negative.  Negative for chest pain, palpitations and leg swelling.   Gastrointestinal: Negative.    Genitourinary: Negative.    Musculoskeletal: Negative.    Neurological:  Negative for dizziness, light-headedness and headaches.    Psychiatric/Behavioral:  Negative for sleep disturbance. The patient is not nervous/anxious.    Medical History Reviewed by provider this encounter:  Tobacco  Allergies  Meds  Problems  Med Hx  Surg Hx  Fam Hx       Annual Wellness Visit Questionnaire   Nohemi is here for her Subsequent Wellness visit. Last Medicare Wellness visit information reviewed, patient interviewed, no change since last AWV.     Health Risk Assessment:   Patient rates overall health as fair. Patient feels that their physical health rating is slightly worse. Patient is satisfied with their life. Eyesight was rated as same. Hearing was rated as same. Patient feels that their emotional and mental health rating is same. Patients states they are never, rarely angry. Patient states they are sometimes unusually tired/fatigued. Pain experienced in the last 7 days has been none. Patient states that she has experienced no weight loss or gain in last 6 months.     Depression Screening:   PHQ-2 Score: 0      Fall Risk Screening:   In the past year, patient has experienced: no history of falling in past year      Urinary Incontinence Screening:   Patient has not leaked urine accidently in the last six months.     Home Safety:  Patient has trouble with stairs inside or outside of their home. Patient has working smoke alarms and has working carbon monoxide detector. Home safety hazards include: none.     Nutrition:   Current diet is Regular and No Added Salt.     Medications:   Patient is currently taking over-the-counter supplements. OTC medications include: see medication list. Patient is able to manage medications.     Activities of Daily Living (ADLs)/Instrumental Activities of Daily Living (IADLs):   Walk and transfer into and out of bed and chair?: Yes  Dress and groom yourself?: Yes    Bathe or shower yourself?: Yes    Feed yourself? Yes  Do your laundry/housekeeping?: Yes  Manage your money, pay your bills and track your expenses?: Yes  Make  your own meals?: Yes    Do your own shopping?: Yes    Previous Hospitalizations:   Any hospitalizations or ED visits within the last 12 months?: Yes    How many hospitalizations have you had in the last year?: more than 4    Advance Care Planning:   Living will: Yes    Durable POA for healthcare: Yes    Advanced directive: Yes    End of Life Decisions reviewed with patient: Yes    Provider agrees with end of life decisions: Yes      Cognitive Screening:   Provider or family/friend/caregiver concerned regarding cognition?: No    PREVENTIVE SCREENINGS      Cardiovascular Screening:    General: History Lipid Disorder and Screening Current      Diabetes Screening:     General: Screening Current      Colorectal Cancer Screening:     General: Screening Current      Breast Cancer Screening:     General: Screening Not Indicated      Cervical Cancer Screening:    General: Screening Not Indicated      Osteoporosis Screening:    General: Screening Current      Abdominal Aortic Aneurysm (AAA) Screening:        General: Screening Current      Lung Cancer Screening:     General: Screening Not Indicated      Hepatitis C Screening:    General: Risks and Benefits Discussed    Hep C Screening Accepted: Yes      Screening, Brief Intervention, and Referral to Treatment (SBIRT)    Screening  Typical number of drinks in a day: 0  Typical number of drinks in a week: 0  Interpretation: Low risk drinking behavior.    AUDIT-C Screenin) How often did you have a drink containing alcohol in the past year? never  2) How many drinks did you have on a typical day when you were drinking in the past year? 0  3) How often did you have 6 or more drinks on one occasion in the past year? never    AUDIT-C Score: 0  Interpretation: Score 0-2 (female): Negative screen for alcohol misuse    Single Item Drug Screening:  How often have you used an illegal drug (including marijuana) or a prescription medication for non-medical reasons in the past year?  "never    Single Item Drug Screen Score: 0  Interpretation: Negative screen for possible drug use disorder    Brief Intervention  Alcohol & drug use screenings were reviewed. No concerns regarding substance use disorder identified.     Other Counseling Topics:   Regular weightbearing exercise and calcium and vitamin D intake.     Social Determinants of Health     Financial Resource Strain: Low Risk  (7/10/2023)    Overall Financial Resource Strain (CARDIA)    • Difficulty of Paying Living Expenses: Not very hard   Food Insecurity: No Food Insecurity (8/6/2024)    Hunger Vital Sign    • Worried About Running Out of Food in the Last Year: Never true    • Ran Out of Food in the Last Year: Never true   Transportation Needs: No Transportation Needs (8/6/2024)    PRAPARE - Transportation    • Lack of Transportation (Medical): No    • Lack of Transportation (Non-Medical): No   Housing Stability: Low Risk  (8/6/2024)    Housing Stability Vital Sign    • Unable to Pay for Housing in the Last Year: No    • Number of Times Moved in the Last Year: 0    • Homeless in the Last Year: No   Utilities: Not At Risk (8/6/2024)    [facility] Utilities    • Threatened with loss of utilities: No     No results found.    Objective     Pulse 96   Temp 97.5 °F (36.4 °C) (Temporal)   Ht 5' 1\" (1.549 m)   Wt 53.8 kg (118 lb 9.6 oz)   SpO2 97%   BMI 22.41 kg/m²     Physical Exam  Vitals and nursing note reviewed.   Constitutional:       General: She is not in acute distress.     Appearance: Normal appearance. She is not ill-appearing, toxic-appearing or diaphoretic.   HENT:      Head: Normocephalic and atraumatic.      Right Ear: External ear normal.      Left Ear: External ear normal.      Nose: Nose normal.      Mouth/Throat:      Mouth: Mucous membranes are moist.   Eyes:      General: No scleral icterus.     Extraocular Movements: Extraocular movements intact.      Conjunctiva/sclera: Conjunctivae normal.      Pupils: Pupils are equal, round, " and reactive to light.   Cardiovascular:      Rate and Rhythm: Normal rate and regular rhythm.      Pulses: Normal pulses.   Pulmonary:      Effort: Pulmonary effort is normal. No respiratory distress.      Breath sounds: Normal breath sounds. No wheezing.   Abdominal:      General: Bowel sounds are normal. There is no distension.      Palpations: Abdomen is soft.      Tenderness: There is no abdominal tenderness.   Musculoskeletal:      Cervical back: Normal range of motion and neck supple.      Right lower leg: No edema.      Left lower leg: No edema.   Lymphadenopathy:      Cervical: No cervical adenopathy.   Skin:     General: Skin is warm and dry.      Coloration: Skin is not jaundiced or pale.   Neurological:      General: No focal deficit present.      Mental Status: She is alert and oriented to person, place, and time. Mental status is at baseline.      Cranial Nerves: No cranial nerve deficit.   Psychiatric:         Mood and Affect: Mood normal.         Behavior: Behavior normal.         Thought Content: Thought content normal.         Judgment: Judgment normal.

## 2024-09-27 NOTE — ASSESSMENT & PLAN NOTE
Orders:    Lipid panel; Future    Comprehensive metabolic panel; Future  Stable and has upcoming Cardio followup Continue current rx Pt feeling well today

## 2024-10-12 DIAGNOSIS — R10.9 ABDOMINAL PAIN, UNSPECIFIED ABDOMINAL LOCATION: ICD-10-CM

## 2024-10-14 RX ORDER — HYOSCYAMINE SULFATE 0.125 MG
125 TABLET ORAL EVERY MORNING
Qty: 90 TABLET | Refills: 0 | Status: SHIPPED | OUTPATIENT
Start: 2024-10-14 | End: 2024-10-16 | Stop reason: SDUPTHER

## 2024-10-15 ENCOUNTER — TELEPHONE (OUTPATIENT)
Dept: FAMILY MEDICINE CLINIC | Facility: CLINIC | Age: 84
End: 2024-10-15

## 2024-10-15 NOTE — TELEPHONE ENCOUNTER
Please see scanned prior auth for Hyoscyamine Sulfate received from [employer] via [employer] on 10/14/24. Thank you.

## 2024-10-16 DIAGNOSIS — R10.9 ABDOMINAL PAIN, UNSPECIFIED ABDOMINAL LOCATION: ICD-10-CM

## 2024-10-16 RX ORDER — HYOSCYAMINE SULFATE 0.125 MG
125 TABLET ORAL EVERY MORNING
Qty: 90 TABLET | Refills: 3 | Status: SHIPPED | OUTPATIENT
Start: 2024-10-16

## 2024-10-16 RX ORDER — HYOSCYAMINE SULFATE 0.125 MG
125 TABLET ORAL EVERY MORNING
Qty: 90 TABLET | Refills: 1 | OUTPATIENT
Start: 2024-10-16

## 2024-10-16 NOTE — TELEPHONE ENCOUNTER
Patient's insurance does not cover this medication. She has been paying out of pocket for years. Please resend to the pharmacy .     Reason for call:   [x] Refill   [] Prior Auth  [] Other:     Office:   [x] PCP/Provider -   [] Specialty/Provider -     Medication: Hyoscyamine 0.125 mg, take 1 tablet by mouth in the morning.       Pharmacy: [facility] [place]     Does the patient have enough for 3 days?   [] Yes   [x] No - Send as HP to POD

## 2024-10-16 NOTE — TELEPHONE ENCOUNTER
Prescription sent to [facility].    E-Prescribing Status: Receipt confirmed by pharmacy (10/14/2024  3:00 PM EDT)

## 2024-10-24 DIAGNOSIS — E78.5 HYPERLIPIDEMIA, UNSPECIFIED HYPERLIPIDEMIA TYPE: ICD-10-CM

## 2024-10-24 RX ORDER — FENOFIBRATE 134 MG/1
CAPSULE ORAL
Qty: 90 CAPSULE | Refills: 0 | Status: SHIPPED | OUTPATIENT
Start: 2024-10-24

## 2024-10-27 PROBLEM — Z00.00 MEDICARE ANNUAL WELLNESS VISIT, SUBSEQUENT: Status: RESOLVED | Noted: 2018-06-12 | Resolved: 2024-10-27

## 2024-10-30 ENCOUNTER — HOSPITAL ENCOUNTER (OUTPATIENT)
Dept: NON INVASIVE DIAGNOSTICS | Facility: HOSPITAL | Age: 84
Discharge: HOME | End: 2024-10-30
Payer: MEDICARE

## 2024-10-30 ENCOUNTER — ANESTHESIA EVENT (OUTPATIENT)
Dept: NON INVASIVE DIAGNOSTICS | Facility: HOSPITAL | Age: 84
End: 2024-10-30
Payer: MEDICARE

## 2024-10-30 ENCOUNTER — DOCUMENTATION (OUTPATIENT)
Dept: CARDIOLOGY CLINIC | Facility: CLINIC | Age: 84
End: 2024-10-30

## 2024-10-30 ENCOUNTER — ANESTHESIA (OUTPATIENT)
Dept: NON INVASIVE DIAGNOSTICS | Facility: HOSPITAL | Age: 84
End: 2024-10-30
Payer: MEDICARE

## 2024-10-30 VITALS
OXYGEN SATURATION: 96 % | HEART RATE: 88 BPM | SYSTOLIC BLOOD PRESSURE: 139 MMHG | WEIGHT: 118 LBS | RESPIRATION RATE: 16 BRPM | BODY MASS INDEX: 22.28 KG/M2 | DIASTOLIC BLOOD PRESSURE: 69 MMHG | TEMPERATURE: 96.8 F | HEIGHT: 61 IN

## 2024-10-30 DIAGNOSIS — I48.19 PERSISTENT ATRIAL FIBRILLATION (HCC): ICD-10-CM

## 2024-10-30 LAB
ATRIAL RATE: 357 BPM
ATRIAL RATE: 82 BPM
P AXIS: 73 DEGREES
PR INTERVAL: 172 MS
QRS AXIS: 11 DEGREES
QRS AXIS: 86 DEGREES
QRSD INTERVAL: 136 MS
QRSD INTERVAL: 138 MS
QT INTERVAL: 424 MS
QT INTERVAL: 452 MS
QTC INTERVAL: 501 MS
QTC INTERVAL: 528 MS
T WAVE AXIS: 108 DEGREES
T WAVE AXIS: 73 DEGREES
VENTRICULAR RATE: 82 BPM
VENTRICULAR RATE: 84 BPM

## 2024-10-30 PROCEDURE — 93005 ELECTROCARDIOGRAM TRACING: CPT

## 2024-10-30 PROCEDURE — 93010 ELECTROCARDIOGRAM REPORT: CPT | Performed by: INTERNAL MEDICINE

## 2024-10-30 PROCEDURE — 92960 CARDIOVERSION ELECTRIC EXT: CPT

## 2024-10-30 PROCEDURE — 92960 CARDIOVERSION ELECTRIC EXT: CPT | Performed by: INTERNAL MEDICINE

## 2024-10-30 RX ORDER — PROPOFOL 10 MG/ML
INJECTION, EMULSION INTRAVENOUS AS NEEDED
Status: DISCONTINUED | OUTPATIENT
Start: 2024-10-30 | End: 2024-10-30

## 2024-10-30 RX ORDER — SODIUM CHLORIDE, SODIUM LACTATE, POTASSIUM CHLORIDE, CALCIUM CHLORIDE 600; 310; 30; 20 MG/100ML; MG/100ML; MG/100ML; MG/100ML
INJECTION, SOLUTION INTRAVENOUS CONTINUOUS PRN
Status: DISCONTINUED | OUTPATIENT
Start: 2024-10-30 | End: 2024-10-30

## 2024-10-30 RX ORDER — SODIUM CHLORIDE, SODIUM LACTATE, POTASSIUM CHLORIDE, CALCIUM CHLORIDE 600; 310; 30; 20 MG/100ML; MG/100ML; MG/100ML; MG/100ML
20 INJECTION, SOLUTION INTRAVENOUS CONTINUOUS
Status: DISCONTINUED | OUTPATIENT
Start: 2024-10-30 | End: 2024-10-31 | Stop reason: HOSPADM

## 2024-10-30 RX ADMIN — SODIUM CHLORIDE, SODIUM LACTATE, POTASSIUM CHLORIDE, AND CALCIUM CHLORIDE: .6; .31; .03; .02 INJECTION, SOLUTION INTRAVENOUS at 08:00

## 2024-10-30 RX ADMIN — PROPOFOL 50 MG: 10 INJECTION, EMULSION INTRAVENOUS at 08:14

## 2024-10-30 NOTE — PROGRESS NOTES
Patient was seen and examined. I agree with CV.  and harrison were d/w patient and she is agreeable. She is on AC.                Persistent atrial fibrillation ([facility])  Holter monitor        2. Atrial flutter, unspecified type ([facility])  Holter monitor       3. Chronic heart failure with preserved ejection fraction ([facility])  Basic metabolic panel       4. LBBB (left bundle branch block)          5. Primary hypertension          6. Atrial fibrillation with rapid ventricular response ([facility])  Ambulatory referral to Cardiology             Discussion/Summary:  Persistent atrial fibrillation/flutter   Recent diagnosis in July 2024. Patient has had a total of 4 visits to the hospital (2 admissions, 2 ER visits) in the past month.   Most recently she was seen yesterday and again her heart rate was elevated in the 120's. Her Cardizem was increased from 180 mg daily to 300 mg daily.  Heart rate is 95 bpm in office today.  Discussed option of cardioversion with her, especially in the setting of low-normal ejection fraction and recurrent volume overload. She wishes to contemplate this.  I will have her wear a 48 hour holter monitor to assess rate control, especially if she defers a cardioversion.   Continue anticoagulation with Eliquis 2.5 mg BID given her age and weight.      Chronic heart failure with preserved ejection fraction   Prescribed lasix 40 mg prn only for weight gain.   She required IV diuretics during her hospitalizations and also received lasix 40 mg IV yesterday.   I have recommended that she take lasix 40 mg daily with potassium supplementation as she will likely need daily diuretic dosing to keep up with volume overload in the setting of rapid heart rate.  Check BMP next week.  Continue daily weights.     Hypertension   Mildly elevated but will accept this given the current situation.      LBBB  Chronic and noted.      Timing of follow up will be arranged based on if she decides on a cardioversion.     Interval  History:   Nohemi Arcos is a 84 y.o. female with recently diagnosed persistent atrial fibrillation, low normal ejection fraction at 50%, hypertension, dyslipidemia, CKD stage III who presents to the office today for hospital follow up.     She had 4 admissions (ER or full admissions) in the recent past. She initially presented on 7/29 to the ER with shortness of breath. She was found to be in atrial fibrillation with RVR which was a new diagnosis for her. She was placed on diltiazem. Subsequently she presented back 2 days later with shortness of breath.  Again she was found to be in atrial fibrillation with RVR. Cardizem was continued and Eliquis was initiated. Imaging also suggested volume overload. She was given IV diuretics. She then presented back 3 days later with shortness of breath. This time her atrial fibrillation appeared to be rate controlled but she was volume overloaded with bilateral pleural effusions. She did undergo right sided thoracentesis yielding 700 cc of fluid. She also was given additional diuretics. With rising creatinine, she was asked to just take lasix 40 mg prn at discharge.      Lastly, she was seen in the ER yesterday. EKG showed rapid atrial flutter. A decision was made to increase her cardizem to 300 mg daily. Imaging suggested mild pulmonary vascular congestion and a dose of IV lasix 40 mg.     She presents today with her family member.  The patient states she feels well today.  She denies any shortness of breath at this current time.  She states when she does it short of breath it comes on fairly suddenly.  It can occur anytime, with rest or with exertion.  She denies any palpitations  of any kind.  She does report a sensation of heaviness in her chest when she feels short of breath.  She denies lightheadedness or dizziness.  She has been weighing herself regularly at home and states it is about 121 pounds.  She questions if she should be taking the Lasix regularly as she does  not seem to have weight gain as a sign of fluid retention.  She denies any lower extremity edema or abdominal bloating.     Medical Problems         Problem List         Hypercholesteremia     Primary hypertension     Nontoxic single thyroid nodule     Osteopenia     Primary localized osteoarthritis of left knee     Vitamin D deficiency     Stage 3b chronic kidney disease (HCC)           Lab Results   Component Value Date     EGFR 48 08/20/2024     EGFR 47 08/11/2024     EGFR 45 08/08/2024     CREATININE 1.07 08/20/2024     CREATININE 1.09 08/11/2024     CREATININE 1.12 08/08/2024            Elevated TSH     Multiple renal cysts     Cholelithiasis     Colitis     Acute diverticulitis     Adrenal hyperplasia (HCC)     LBBB (left bundle branch block)     Atrial fibrillation with rapid ventricular response (HCC)     Hypoxia     Acute on chronic diastolic (congestive) heart failure (HCC)         Wt Readings from Last 3 Encounters:   08/21/24 55.8 kg (123 lb)   08/16/24 56.1 kg (123 lb 9.6 oz)   08/11/24 55.8 kg (123 lb)                        Cardiomyopathy (HCC)     Constipation     SIRS (systemic inflammatory response syndrome) (HCC)     Bilateral pleural effusion     Atrial fibrillation, controlled (HCC)     Urinary frequency         Medical History        Past Medical History:   Diagnosis Date    Arteriosclerotic heart disease       LAST ASSESSED: 09SEP2015    Atrial fibrillation (HCC)      Esophageal reflux       LAST ASSESSED: 09JAN2018    GERD (gastroesophageal reflux disease)      Hyperlipidemia       LAST ASSESSED: 09JAN2018    Hypertension       LAST ASSESSED: 09JAN2018    Nontoxic single thyroid nodule       LAST ASSESSED: 09MAY2014    Osteopenia       LAST ASSESSED: 11MAY2016         Social History               Socioeconomic History    Marital status:        Spouse name: Not on file    Number of children: Not on file    Years of education: Not on file    Highest education level: Not on file    Occupational History    Not on file   Tobacco Use    Smoking status: Never    Smokeless tobacco: Never   Vaping Use    Vaping status: Never Used   Substance and Sexual Activity    Alcohol use: Never    Drug use: No    Sexual activity: Not Currently   Other Topics Concern    Not on file   Social History Narrative     Always uses seat belt     Dental care, regularly      Social Determinants of Health           Financial Resource Strain: Low Risk  (7/10/2023)     Overall Financial Resource Strain (CARDIA)      Difficulty of Paying Living Expenses: Not very hard   Food Insecurity: No Food Insecurity (8/6/2024)     Hunger Vital Sign      Worried About Running Out of Food in the Last Year: Never true      Ran Out of Food in the Last Year: Never true   Transportation Needs: No Transportation Needs (8/6/2024)     PRAPARE - Transportation      Lack of Transportation (Medical): No      Lack of Transportation (Non-Medical): No   Physical Activity: Not on file   Stress: Not on file   Social Connections: Not on file   Intimate Partner Violence: Not on file   Housing Stability: Low Risk  (8/6/2024)     Housing Stability Vital Sign      Unable to Pay for Housing in the Last Year: No      Number of Times Moved in the Last Year: 0      Homeless in the Last Year: No         Family History         Family History   Problem Relation Age of Onset    Alzheimer's disease Mother      Hypertension Father      Stroke Father           SYNDROME    Colon cancer Brother           Surgical History         Past Surgical History:   Procedure Laterality Date    CYSTOSCOPY   12/04/2013     DIAGNOSTIC    ESOPHAGOGASTRODUODENOSCOPY        IR THORACENTESIS   8/5/2024    MASTECTOMY Bilateral       cancer    OOPHORECTOMY        SALPINGOOPHORECTOMY Bilateral             Current Medications      Current Outpatient Medications:     apixaban (Eliquis) 2.5 mg, Take 1 tablet (2.5 mg total) by mouth 2 (two) times a day, Disp: 60 tablet, Rfl: 0    aspirin 81 MG  tablet, Take 81 mg by mouth daily, Disp: , Rfl:     atorvastatin (LIPITOR) 20 mg tablet, Take 1 tablet by mouth once daily, Disp: 100 tablet, Rfl: 1    diltiazem (CARDIZEM CD) 300 mg 24 hr capsule, Take 1 capsule (300 mg total) by mouth daily for 15 doses Do not start before August 21, 2024., Disp: 15 capsule, Rfl: 0    furosemide (LASIX) 40 mg tablet, Take 1 tablet (40 mg total) by mouth daily as needed (weight gain 3lbs in 24 hours and 5lbs in a week, shortness of breath and leg swelling), Disp: 30 tablet, Rfl: 0    omeprazole (PriLOSEC) 20 mg delayed release capsule, Take 1 capsule by mouth once daily, Disp: 100 capsule, Rfl: 1    polyethylene glycol (MIRALAX) 17 g packet, Take 17 g by mouth daily for 20 days, Disp: 340 g, Rfl: 0    potassium chloride (Klor-Con M10) 10 mEq tablet, Take 1 tablet (10 mEq total) by mouth daily as needed (take with lasix), Disp: 30 tablet, Rfl: 0    senna (SENOKOT) 8.6 mg, Take 2 tablets (17.2 mg total) by mouth daily at bedtime, Disp: 60 tablet, Rfl: 0  No current facility-administered medications for this visit.           Allergies   Allergen Reactions    Ciprofloxacin Other (See Comments)       Muscle pain    Doxycycline      Nitrofurantoin           Labs:     Chemistry               Component Value Date/Time      09/06/2015 0818     K 4.1 08/20/2024 1108     K 4.1 09/06/2015 0818      08/20/2024 1108      09/06/2015 0818     CO2 24 08/20/2024 1108     CO2 30.6 09/06/2015 0818     BUN 20 08/20/2024 1108     BUN 18 09/06/2015 0818     CREATININE 1.07 08/20/2024 1108     CREATININE 1.01 09/06/2015 0818              Component Value Date/Time     CALCIUM 9.7 08/20/2024 1108     CALCIUM 9.4 09/06/2015 0818     ALKPHOS 31 (L) 08/20/2024 1108     ALKPHOS 51 09/06/2015 0818     AST 25 08/20/2024 1108     AST 29 09/06/2015 0818     ALT 17 08/20/2024 1108     ALT 29 09/06/2015 0818     BILITOT 0.52 09/06/2015 0818                     Lab Results   Component Value Date      "CHOL 192 09/06/2015     CHOL 203 01/11/2015     CHOL 198 06/22/2014            Lab Results   Component Value Date     HDL 52 01/03/2024     HDL 56 07/06/2022     HDL 53 06/23/2021            Lab Results   Component Value Date     LDLCALC 107 (H) 01/03/2024     LDLCALC 112 (H) 07/06/2022     LDLCALC 119 (H) 06/23/2021            Lab Results   Component Value Date     TRIG 150 (H) 01/03/2024     TRIG 120 07/06/2022     TRIG 157 (H) 06/23/2021      No results found for: \"CHOLHDL\"     Imaging: CT head without contrast     Result Date: 8/11/2024  Narrative: CT BRAIN - WITHOUT CONTRAST INDICATION:   Headache pressure with visual disturbance.. COMPARISON:  None. TECHNIQUE:  CT examination of the brain was performed.  Multiplanar 2D reformatted images were created from the source data. Radiation dose length product (DLP) for this visit:  859.45 mGy-cm .  This examination, like all CT scans performed in the [facility], was performed utilizing techniques to minimize radiation dose exposure, including the use of iterative  reconstruction and automated exposure control. IMAGE QUALITY:  Diagnostic. FINDINGS: PARENCHYMA:  No intracranial mass, mass effect or midline shift. No CT signs of acute infarction.  No acute parenchymal hemorrhage. There is moderate periventricular white matter low attenuation which is nonspecific and most likely related to chronic small vessel ischemic changes. There are old bilateral basal ganglia lacunar infarcts. VENTRICLES AND EXTRA-AXIAL SPACES:  Normal for the patient's age. VISUALIZED ORBITS: Normal visualized orbits. PARANASAL SINUSES: Normal visualized paranasal sinuses. CALVARIUM AND EXTRACRANIAL SOFT TISSUES:  Normal.      Impression: No acute intracranial abnormality. Moderate chronic small vessel ischemic changes. Workstation performed: [id number]      IR IN-Patient Thoracentesis     Result Date: 8/6/2024  Narrative: EXAMINATION: Thoracentesis with sonographic guidance. " "HISTORY: Pleural effusion, dyspnea. Bilateral effusions. Right larger TECHNIQUE: The patient was brought to Radiology.  Informed consent was obtained.  The right lower back was prepped and draped in the usual sterile fashion.  Timeout was performed. Lidocaine was given as local anesthesia.  Using sonographic guidance, a 5 Maltese centesis catheter was advanced into the fluid.  The catheter was connected to a vacuum bottle.  The fluid was drained in its entirety.  A total volume of 700 cc of Transparent appearing, Yellow colored fluid was removed.  The catheter was removed. Specimens were not sent to the laboratory for evaluation.      Impression: Technically successful ultrasound-guided thoracentesis. This is the end of the clinically relevant portion of this report.  Subsequent information is for compliance, documentation, and coding purposes. In the process of informed consent, information was communicated, verbally, to the patient regarding the procedure.  The patient was informed of; the name of the procedure, nature of the procedure, nature of the condition, risks, benefits, alternatives, and potential complications, as well as the consequences of not having the procedure.  All the patient's questions were answered.  Informed consent was signed.  Quoted risks include bleeding, and pneumothorax, including axial vacuo pneumothorax. Chlorhexidine and alcohol was used for cleansing and sterile preparation of the skin.  This was allowed to dry prior to the application of sterile draping. Timeout was performed, with all team members present, and in agreement.  Confirmation of patient, procedure, laterally, allergies, and all needed equipment was performed. PROCEDURE: Thoracentesis PREPROCEDURE DIAGNOSIS: Please see \"history \", above POSTPROCEDURE DIAGNOSIS: Same ANTIBIOTICS: None SPECIMEN: Pleural fluid ESTIMATED BLOOD LOSS: None ANESTHESIA: Local FINAL DISPOSITION OF CATHETER: Out of patient's body Ultrasound was " used to evaluate the pleural fluid as a potential access site.  Static and real-time images of needle entry were obtained, and archived. Workstation performed: [id number]      CTA ED chest PE study     Result Date: 8/5/2024  Narrative: CTA - CHEST WITH IV CONTRAST - PULMONARY ANGIOGRAM INDICATION: shortness of breath. COMPARISON: CT chest dated 9/29/2011. TECHNIQUE: CTA examination of the chest was performed using angiographic technique according to a protocol specifically tailored to evaluate for pulmonary embolism. Multiplanar 2D reformatted images were created from the source data. In addition, coronal  3D MIP postprocessing was performed on the acquisition scanner. Radiation dose length product (DLP) for this visit: 187.16 mGy-cm . This examination, like all CT scans performed in the [facility], was performed utilizing techniques to minimize radiation dose exposure, including the use of iterative reconstruction and automated exposure control. IV Contrast: 85 mL of iohexol (OMNIPAQUE) FINDINGS: PULMONARY ARTERIAL TREE:  No pulmonary embolus. LUNGS: No focal airspace consolidation. There is no tracheal or endobronchial lesion. PLEURA: Moderate sized bilateral pleural effusions with subjacent compressive atelectasis. HEART/GREAT VESSELS: Heart is unremarkable for patient's age. No thoracic aortic aneurysm. MEDIASTINUM AND EVERETTE: Unremarkable. CHEST WALL AND LOWER NECK: Unremarkable. VISUALIZED STRUCTURES IN THE UPPER ABDOMEN: Unremarkable. OSSEOUS STRUCTURES: No acute fracture or destructive osseous lesion.      Impression: No evidence for pulmonary embolism. Moderate sized bilateral pleural effusions with subjacent compressive atelectasis. Workstation performed: [id number]      Echo complete w/ contrast if indicated     Result Date: 7/31/2024  Narrative:   Left Ventricle: Left ventricular cavity size is normal. Wall thickness is mildly increased. The left ventricular ejection fraction is 50%.  Systolic function is low normal.   IVS: There is abnormal septal motion with left bundle branch block.   Left Atrium: The atrium is mildly dilated.   Mitral Valve: There is moderate regurgitation.   Tricuspid Valve: There is mild regurgitation.      X-ray chest 1 view portable     Result Date: 7/29/2024  Narrative: XR CHEST PORTABLE INDICATION: chest pain. COMPARISON: Rib radiograph 12/15/2016 FINDINGS: Monitoring leads and clips project over the chest. Diffuse interstitial prominence suggesting mild fluid overload. Small bilateral pleural effusions. Normal cardiomediastinal silhouette. Degenerative changes in the shoulders and spine. Normal upper abdomen.      Impression: Mild vascular congestion. Small bilateral pleural effusions. Resident: Julio C Mata I, the attending radiologist, have reviewed the images and agree with the final report above. Workstation performed: [id number]      XR abdomen 1 view kub     Result Date: 7/26/2024  Narrative: XR ABDOMEN 1 VIEW KUB INDICATION: consipation. COMPARISON: CT from 3/22/2023 FINDINGS: Nonobstructive bowel gas pattern. A segment of the left colon demonstrates loss of haustration. There was left colonic inflammation on the previous CT from 2023. No evidence of pneumoperitoneum on this supine study. Upright or left lateral decubitus imaging is more sensitive to detect subtle free air in the appropriate setting. No pathologic calcification or soft tissue mass. Clear lung bases. There are degenerative changes of the hips and spine.      Impression: Loss of haustration in the left colon which can be seen in the setting of colitis. Note that this correlates with segment of colitis seen on prior CT from 2023. Inflammatory bowel disease is not excluded. The study was marked in EPIC for immediate notification. Workstation performed: [id number]         Review of Systems   All other systems reviewed and are negative.            Vitals:     08/21/24 1346   BP: 146/82   Pulse:  "95   SpO2: 94%          Vitals:     08/21/24 1346   Weight: 55.8 kg (123 lb)      Height: 5' 1\" (154.9 cm)   Body mass index is 23.24 kg/m².     Physical Exam:  Physical Exam  Vitals reviewed.   Constitutional:       General: She is not in acute distress.     Appearance: She is not diaphoretic.   HENT:      Head: Normocephalic and atraumatic.   Eyes:      Pupils: Pupils are equal, round, and reactive to light.   Neck:      Vascular: No carotid bruit.   Cardiovascular:      Rate and Rhythm: Normal rate. Rhythm irregularly irregular.      Pulses:           Radial pulses are 2+ on the right side and 2+ on the left side.      Heart sounds: S1 normal and S2 normal. No murmur heard.  Pulmonary:      Effort: Pulmonary effort is normal. No respiratory distress.      Breath sounds: Decreased breath sounds present. No wheezing or rales.   Abdominal:      General: There is no distension.      Palpations: Abdomen is soft.      Tenderness: There is no abdominal tenderness.   Musculoskeletal:         General: No deformity. Normal range of motion.      Cervical back: Normal range of motion.      Right lower leg: Edema (trace edema bilaterally) present.      Left lower leg: Edema present.   Skin:     General: Skin is warm and dry.      Findings: No erythema.   Neurological:      General: No focal deficit present.      Mental Status: She is alert and oriented to person, place, and time.      Gait: Gait normal.   Psychiatric:         Mood and Affect: Mood normal.         Behavior: Behavior normal.                      Electronically signed by Xochitl Tapia PA-C at 8/21/2024  2:51 PM         Office Visit on 8/21/2024          Note shared with patient  Additional Documentation        "

## 2024-10-30 NOTE — ANESTHESIA POSTPROCEDURE EVALUATION
Patient presents today for blood pressure check.  Result was 112/76.   Post-Op Assessment Note    CV Status:  Stable  Pain Score: 0    Pain management: adequate       Mental Status:  Alert and awake   Hydration Status:  Euvolemic   PONV Controlled:  Controlled   Airway Patency:  Patent     Post Op Vitals Reviewed: Yes    No anethesia notable event occurred.    Staff: CRNA           Last Filed PACU Vitals:  Vitals Value Taken Time   Temp 97.6    Pulse 80    /60    Resp 14    SpO2 97        Modified Edwina:  No data recorded

## 2024-10-30 NOTE — ANESTHESIA POSTPROCEDURE EVALUATION
Post-Op Assessment Note    CV Status:  Stable    Pain management: adequate       Mental Status:  Awake   Hydration Status:  Euvolemic   PONV Controlled:  Controlled   Airway Patency:  Patent     Post Op Vitals Reviewed: Yes    No anethesia notable event occurred.    Staff: Anesthesiologist           Last Filed PACU Vitals:  Vitals Value Taken Time   Temp 97.6 °F (36.4 °C) 10/30/24 0821   Pulse 82 10/30/24 0838   /62 10/30/24 0833   Resp 23 10/30/24 0838   SpO2 95 % 10/30/24 0838   Vitals shown include unfiled device data.    Modified Edwina:  Activity: 2 (10/30/2024  8:30 AM)  Respiration: 2 (10/30/2024  8:30 AM)  Circulation: 2 (10/30/2024  8:30 AM)  Consciousness: 1 (10/30/2024  8:30 AM)  Oxygen Saturation: 2 (10/30/2024  8:30 AM)  Modified Edwina Score: 9 (10/30/2024  8:30 AM)

## 2024-10-30 NOTE — ANESTHESIA PREPROCEDURE EVALUATION
Procedure:  CARDIOVERSION ([place] ONLY)    Relevant Problems   CARDIO   (+) Atrial fibrillation with rapid ventricular response (HCC)   (+) Atrial fibrillation, controlled (HCC)   (+) Hypercholesteremia   (+) LBBB (left bundle branch block)   (+) Primary hypertension      /RENAL   (+) Multiple renal cysts   (+) Stage 3b chronic kidney disease (HCC)      MUSCULOSKELETAL   (+) Primary localized osteoarthritis of left knee      PULMONARY   (+) Bilateral pleural effusion        Physical Exam    Airway    Mallampati score: II  TM Distance: >3 FB  Neck ROM: full     Dental    lower dentures and upper dentures,     Cardiovascular  Rhythm: irregular, Rate: normal    Pulmonary   Breath sounds clear to auscultation    Other Findings  Intercisor Distance > 3cm    post-pubertal.      Anesthesia Plan  ASA Score- 3     Anesthesia Type- IV sedation with anesthesia with ASA Monitors.         Additional Monitors:     Airway Plan:     Comment: NPO appropriate. Discussed benefits/risks of monitored anesthetic care which involves providing a dynamic level of mild to deep sedation. Complications include awareness and/or airway obstruction/aspiration which may necessitate conversion to general anesthesia. All questions answered. Patient understands and wishes to proceed. .       Plan Factors-Exercise tolerance (METS): >4 METS.    Chart reviewed. EKG reviewed.  Existing labs reviewed.                   Induction-     Postoperative Plan- Plan for postoperative opioid use.         Informed Consent- Anesthetic plan and risks discussed with patient.  I personally reviewed this patient with the CRNA. Discussed and agreed on the Anesthesia Plan with the CRNA..

## 2024-11-15 ENCOUNTER — OFFICE VISIT (OUTPATIENT)
Dept: CARDIOLOGY CLINIC | Facility: HOSPITAL | Age: 84
End: 2024-11-15
Payer: MEDICARE

## 2024-11-15 VITALS
BODY MASS INDEX: 22.16 KG/M2 | HEIGHT: 61 IN | WEIGHT: 117.4 LBS | SYSTOLIC BLOOD PRESSURE: 118 MMHG | HEART RATE: 100 BPM | DIASTOLIC BLOOD PRESSURE: 80 MMHG

## 2024-11-15 DIAGNOSIS — I48.0 PAROXYSMAL ATRIAL FIBRILLATION (HCC): Primary | ICD-10-CM

## 2024-11-15 DIAGNOSIS — I10 PRIMARY HYPERTENSION: ICD-10-CM

## 2024-11-15 DIAGNOSIS — I34.0 NONRHEUMATIC MITRAL VALVE REGURGITATION: ICD-10-CM

## 2024-11-15 DIAGNOSIS — I50.32 CHRONIC HEART FAILURE WITH PRESERVED EJECTION FRACTION (HCC): ICD-10-CM

## 2024-11-15 DIAGNOSIS — I44.7 LBBB (LEFT BUNDLE BRANCH BLOCK): ICD-10-CM

## 2024-11-15 PROCEDURE — 93000 ELECTROCARDIOGRAM COMPLETE: CPT | Performed by: PHYSICIAN ASSISTANT

## 2024-11-15 PROCEDURE — 99214 OFFICE O/P EST MOD 30 MIN: CPT | Performed by: PHYSICIAN ASSISTANT

## 2024-11-15 NOTE — PROGRESS NOTES
Cardiology Follow Up    Nohemi Arcos  1940  [id number]  [facility] [address] [id number] [phone]    1. Paroxysmal atrial fibrillation (HCC)  POCT ECG    Echo follow up/limited w/ contrast if indicated      2. Chronic heart failure with preserved ejection fraction (HCC)  Echo follow up/limited w/ contrast if indicated      3. Primary hypertension        4. LBBB (left bundle branch block)        5. Nonrheumatic mitral valve regurgitation  Echo follow up/limited w/ contrast if indicated          Discussion/Summary:  Persistent atrial fibrillation/flutter   Recent diagnosis in July 2024 which prompted multiple ER visits/hospital stays.  Underwent cardioversion 10/30/24  EKG today does show that she is back in atrial fibrillation with heart rate 100 bpm. Patient is asymptomatic and feels well.  At this time we will continue a rate control strategy. Continue diltiazem 300 mg daily.  We will continue to follow her ejection fraction which was 50% in July. If she maintains an EF of 50% or greater and is asymptomatic and rate controlled, we will continue same management.  If she develops a cardiomyopathy from her atrial fibrillation will need to consider repeat cardioversion with rhythm control.   Continue anticoagulation with Eliquis 2.5 mg BID given her age and weight.      Chronic heart failure with preserved ejection fraction   On lasix 20 mg daily and examines euvolemic.  Patient will continue to monitor weight daily and follow a low sodium diet.     Hypertension   Controlled      LBBB  Chronic and noted.      She will RTO in 3 months with Dr. Peña or sooner if necessary. She will call the office with any concerns in the interim.     Interval History:   Nohemi Arcos is a 84 y.o. female with paroxysmal atrial fibrillation s/p cardioversion 10/30/24, low normal ejection fraction at  50%, hypertension, dyslipidemia, CKD stage III who presents to the office today for follow up    After her last office visit she underwent cardioversion on 10/30/24 for persistent atrial fibrillation.    She reports feeling overall well.  She denies any dyspnea on exertion, chest pain/pressure/discomfort, lightheadedness, dizziness, or palpitations.  She denies lower extremity edema.  She has been taking Lasix 20 mg daily.  She states she is lost about 5 pounds without any specific reason.  She does follow a low-sodium diet.  She has been compliant with her medications.    Medical Problems       Problem List       Hypercholesteremia    Primary hypertension    Nontoxic single thyroid nodule    Osteopenia    Primary localized osteoarthritis of left knee    Vitamin D deficiency    Stage 3b chronic kidney disease (HCC)    Lab Results   Component Value Date    EGFR 41 09/05/2024    EGFR 36 08/29/2024    EGFR 48 08/20/2024    CREATININE 1.22 09/05/2024    CREATININE 1.36 (H) 08/29/2024    CREATININE 1.07 08/20/2024         Elevated TSH    Multiple renal cysts    Cholelithiasis    Colitis    Acute diverticulitis    Adrenal hyperplasia (HCC)    LBBB (left bundle branch block)    Atrial fibrillation with rapid ventricular response (HCC)    Hypoxia    Acute on chronic diastolic (congestive) heart failure (HCC)    Wt Readings from Last 3 Encounters:   11/15/24 53.3 kg (117 lb 6.4 oz)   10/30/24 53.5 kg (118 lb)   09/27/24 53.8 kg (118 lb 9.6 oz)                 Cardiomyopathy (HCC)    Constipation    SIRS (systemic inflammatory response syndrome) (HCC)    Bilateral pleural effusion    Atrial fibrillation, controlled (HCC)    Urinary frequency        Past Medical History:   Diagnosis Date    Arteriosclerotic heart disease     LAST ASSESSED: 09SEP2015    Atrial fibrillation (HCC)     Esophageal reflux     LAST ASSESSED: 09JAN2018    GERD (gastroesophageal reflux disease)     Hyperlipidemia     LAST ASSESSED: 09JAN2018     Hypertension     LAST ASSESSED: 09JAN2018    Nontoxic single thyroid nodule     LAST ASSESSED: 09MAY2014    Osteopenia     LAST ASSESSED: 11MAY2016     Social History     Socioeconomic History    Marital status:      Spouse name: Not on file    Number of children: Not on file    Years of education: Not on file    Highest education level: Not on file   Occupational History    Not on file   Tobacco Use    Smoking status: Never    Smokeless tobacco: Never   Vaping Use    Vaping status: Never Used   Substance and Sexual Activity    Alcohol use: Never    Drug use: No    Sexual activity: Not Currently   Other Topics Concern    Not on file   Social History Narrative    Always uses seat belt    Dental care, regularly     Social Drivers of Health     Financial Resource Strain: Low Risk  (7/10/2023)    Overall Financial Resource Strain (CARDIA)     Difficulty of Paying Living Expenses: Not very hard   Food Insecurity: No Food Insecurity (9/27/2024)    Nursing - Inadequate Food Risk Classification     Worried About Running Out of Food in the Last Year: Never true     Ran Out of Food in the Last Year: Never true     Ran Out of Food in the Last Year: Not on file   Transportation Needs: No Transportation Needs (9/27/2024)    PRAPARE - Transportation     Lack of Transportation (Medical): No     Lack of Transportation (Non-Medical): No   Physical Activity: Not on file   Stress: Not on file   Social Connections: Not on file   Intimate Partner Violence: Not on file   Housing Stability: Low Risk  (9/27/2024)    Housing Stability Vital Sign     Unable to Pay for Housing in the Last Year: No     Number of Times Moved in the Last Year: 1     Homeless in the Last Year: No      Family History   Problem Relation Age of Onset    Alzheimer's disease Mother     Hypertension Father     Stroke Father         SYNDROME    Colon cancer Brother      Past Surgical History:   Procedure Laterality Date    CYSTOSCOPY  12/04/2013    DIAGNOSTIC     ESOPHAGOGASTRODUODENOSCOPY      IR THORACENTESIS  8/5/2024    MASTECTOMY Bilateral     cancer    OOPHORECTOMY      SALPINGOOPHORECTOMY Bilateral        Current Outpatient Medications:     apixaban (Eliquis) 2.5 mg, Take 1 tablet (2.5 mg total) by mouth 2 (two) times a day, Disp: 60 tablet, Rfl: 3    aspirin 81 MG tablet, Take 81 mg by mouth daily, Disp: , Rfl:     atorvastatin (LIPITOR) 20 mg tablet, Take 1 tablet by mouth once daily, Disp: 100 tablet, Rfl: 1    diltiazem (CARDIZEM CD) 300 mg 24 hr capsule, Take 1 capsule (300 mg total) by mouth daily, Disp: 30 capsule, Rfl: 3    fenofibrate micronized (LOFIBRA) 134 MG capsule, TAKE 1 CAPSULE BY MOUTH ONCE DAILY WITH BREAKFAST, Disp: 90 capsule, Rfl: 0    furosemide (LASIX) 40 mg tablet, Take 1 tablet (40 mg total) by mouth daily as needed (weight gain 3lbs in 24 hours and 5lbs in a week, shortness of breath and leg swelling), Disp: 30 tablet, Rfl: 0    hyoscyamine (ANASPAZ,LEVSIN) 0.125 MG tablet, Take 1 tablet (125 mcg total) by mouth every morning, Disp: 90 tablet, Rfl: 3    omeprazole (PriLOSEC) 20 mg delayed release capsule, Take 1 capsule by mouth once daily, Disp: 100 capsule, Rfl: 1    polyethylene glycol (MIRALAX) 17 g packet, Take 17 g by mouth daily for 20 days, Disp: 340 g, Rfl: 0    potassium chloride (Klor-Con M10) 10 mEq tablet, Take 1 tablet (10 mEq total) by mouth daily as needed (take with lasix), Disp: 30 tablet, Rfl: 5    senna (SENOKOT) 8.6 mg, Take 2 tablets (17.2 mg total) by mouth daily at bedtime, Disp: 60 tablet, Rfl: 0  Allergies   Allergen Reactions    Ciprofloxacin Other (See Comments)     Muscle pain    Doxycycline     Nitrofurantoin        Labs:     Chemistry        Component Value Date/Time     09/06/2015 0818    K 3.6 09/05/2024 0645    K 4.1 09/06/2015 0818     09/05/2024 0645     09/06/2015 0818    CO2 26 09/05/2024 0645    CO2 30.6 09/06/2015 0818    BUN 20 09/05/2024 0645    BUN 18 09/06/2015 0818    CREATININE  "1.22 09/05/2024 0645    CREATININE 1.01 09/06/2015 0818        Component Value Date/Time    CALCIUM 9.6 09/05/2024 0645    CALCIUM 9.4 09/06/2015 0818    ALKPHOS 31 (L) 08/20/2024 1108    ALKPHOS 51 09/06/2015 0818    AST 25 08/20/2024 1108    AST 29 09/06/2015 0818    ALT 17 08/20/2024 1108    ALT 29 09/06/2015 0818    BILITOT 0.52 09/06/2015 0818            Lab Results   Component Value Date    CHOL 192 09/06/2015    CHOL 203 01/11/2015    CHOL 198 06/22/2014     Lab Results   Component Value Date    HDL 52 01/03/2024    HDL 56 07/06/2022    HDL 53 06/23/2021     Lab Results   Component Value Date    LDLCALC 107 (H) 01/03/2024    LDLCALC 112 (H) 07/06/2022    LDLCALC 119 (H) 06/23/2021     Lab Results   Component Value Date    TRIG 150 (H) 01/03/2024    TRIG 120 07/06/2022    TRIG 157 (H) 06/23/2021     No results found for: \"CHOLHDL\"    Imaging: Cardioversion (CA/[place] ONLY)  Result Date: 10/30/2024  Narrative: Cardioversion Nohemi Arcos is a 84 y.o. female who follows with [facility]. Indication for procedure: Cardioversion ([place] ONLY)    Atrial Fibrillation / Atrial Flutter Requesting physician: Anticoagulation: yes - Eliquis 2.5 mg BID The patient was identified in the OR.  A time out procedure was performed. The patient was sedated by the anesthesia service with conscious sedation. The patient was cardioverted to sinus rhythm with a 250 J biphasic shock.  There were no complications. The patient was monitored for the appropriate time interval in the holding area, and was transferred back to the medical floor in stable condition.      ECG:  atrial fibrillation  LBBB      Review of Systems   All other systems reviewed and are negative.      Vitals:    11/15/24 1249   BP: 118/80   Pulse: 100     Vitals:    11/15/24 1249   Weight: 53.3 kg (117 lb 6.4 oz)     Height: 5' 1\" (154.9 cm)   Body mass index is 22.18 kg/m².    Physical Exam:  Physical Exam  Vitals reviewed.   Constitutional:       General: She is not in " acute distress.     Appearance: She is not diaphoretic.   HENT:      Head: Normocephalic and atraumatic.   Eyes:      Pupils: Pupils are equal, round, and reactive to light.   Neck:      Vascular: No carotid bruit.   Cardiovascular:      Rate and Rhythm: Normal rate. Rhythm irregularly irregular.      Pulses:           Radial pulses are 2+ on the right side and 2+ on the left side.      Heart sounds: S1 normal and S2 normal. No murmur heard.  Pulmonary:      Effort: Pulmonary effort is normal. No respiratory distress.      Breath sounds: Normal breath sounds. No wheezing or rales.   Abdominal:      General: There is no distension.      Palpations: Abdomen is soft.      Tenderness: There is no abdominal tenderness.   Musculoskeletal:         General: No deformity. Normal range of motion.      Cervical back: Normal range of motion.      Right lower leg: No edema.      Left lower leg: No edema.   Skin:     General: Skin is warm and dry.      Findings: No erythema.   Neurological:      General: No focal deficit present.      Mental Status: She is alert and oriented to person, place, and time.      Gait: Gait normal.   Psychiatric:         Mood and Affect: Mood normal.         Behavior: Behavior normal.

## 2024-11-26 ENCOUNTER — OFFICE VISIT (OUTPATIENT)
Dept: GASTROENTEROLOGY | Facility: CLINIC | Age: 84
End: 2024-11-26
Payer: MEDICARE

## 2024-11-26 VITALS
TEMPERATURE: 96.8 F | SYSTOLIC BLOOD PRESSURE: 110 MMHG | HEIGHT: 61 IN | DIASTOLIC BLOOD PRESSURE: 68 MMHG | BODY MASS INDEX: 22.09 KG/M2 | HEART RATE: 80 BPM | WEIGHT: 117 LBS | OXYGEN SATURATION: 98 %

## 2024-11-26 DIAGNOSIS — R10.9 ABDOMINAL CRAMPING: ICD-10-CM

## 2024-11-26 DIAGNOSIS — R63.4 WEIGHT LOSS, ABNORMAL: ICD-10-CM

## 2024-11-26 DIAGNOSIS — Z87.19 HISTORY OF COLITIS: ICD-10-CM

## 2024-11-26 DIAGNOSIS — K59.00 CONSTIPATION, UNSPECIFIED CONSTIPATION TYPE: ICD-10-CM

## 2024-11-26 DIAGNOSIS — R19.4 CHANGE IN BOWEL HABITS: Primary | ICD-10-CM

## 2024-11-26 PROCEDURE — 99204 OFFICE O/P NEW MOD 45 MIN: CPT | Performed by: PHYSICIAN ASSISTANT

## 2024-11-26 NOTE — PATIENT INSTRUCTIONS
It is not exactly clear as to why you have developed abdominal discomfort and a change in bowel habits.  You had a colonoscopy last year which was negative for any growths or cancer.  We will check blood work and a CAT scan now.  I would like you to start on Benefiber powder every single day.  I would like you to start on MiraLAX 1/2 packet or one half dose in 3 to 6 ounces of liquid every single day.  After a couple of days you can increase this to a full dose if you need.  The maximum dose we recommend daily for MiraLAX is 2 packets a day.  It is up to you as to whether you want to continue the stool softener, if you do not feel it is helping you can discontinue this.

## 2024-11-26 NOTE — PROGRESS NOTES
[facility] - Outpatient Consultation  Nohemi Arcos 84 y.o. female MRN: [id number]  Encounter: [id number]    ASSESSMENT AND PLAN:      1. Change in bowel habits (Primary)  2. Constipation, unspecified constipation type  3. Abdominal cramping    Pt with several months of treatment refractory constipation of unclear etiology.  History of colitis back in 2023.  Subsequent colonoscopy with no evidence of persistent inflammation, biopsies negative for IBD or microscopic colitis.  Check thyroid serologies now for completeness.  Check CT scan given lower abdominal pain and weight loss.    Recommend initiation of daily fiber supplementation and working on improving her hydration.  Recommend initiation of one half dose of MiraLAX daily in 4-8 ounces of liquid.  Can titrate MiraLAX as needed.  Reviewed questionable efficacy of stool softeners and that if she does not find them helpful she complains discontinued this medication.    No plans for colonoscopy at this time as she recently had 1 within the past 1.5 years.    - CT abdomen pelvis wo contrast; Future  - Comprehensive metabolic panel; Future  - CBC and differential; Future  - TSH     4. Weight loss, abnormal    5-6 lb weight loss over past 4 months.   Etiol unclear.   Does have cardiac hx and diuretic usage for weight gain.   Appears euvolemic on examination.   Check CT now given weight loss and abdominal pain.     5. History of colitis    Hx of such, back in 03/2023 dx with this and tx with cefpodoxime and flagyl.   Colonoscopy in 04/2023 with some descending colon erythema though bx negative for any active inflammation or chronic colitis.  Interestingly, x-ray completed during hospitalization in 07/2024 commented on loss of haustration in the left colon.  Significance is unclear. Investigating with repeat CT, although holding IV contrast given renal dysfunction.     We will follow up in 6-8 weeks to reassess this pt's symptoms. ER  precautions reviewed.   ______________________________________________________________________    HPI: Patient is a 84 y.o. female who presents today for a consultation regarding change in bowel habits. Pmhx sig for atrial fibrillation on Eliquis, HTN, LBBB, acute on chronic diastolic CHF, CKD 3, osteopenia, HLD.    Patient is new to clinic.  She is here today with her sister.  She shares that she has been dealing with a change in bowel habits to constipation which started in July 2024.  She cannot recall any obvious triggers for the bowel habit change.  No change in diet, medications, supplements, illness, antibiotics, change in stressors, etc.    At baseline, she would have bowel movement just about every morning, upwards of 2-3 times in the morning.  She is now having about 1 bowel movement daily which is typically a [place] 2-3.  She is needing to strain at times and feeling incompletely evacuated.  She experiences abdominal cramping in the lower abdomen prior to defecation which tends to improve following defecation.  She has lost 5 to 6 pounds since the onset of her symptoms.  No BRBPR or melena.  No nocturnal BMs.  she has been taking stool softeners though her constipation persists.  She tried MiraLAX though developed diarrhea from the dose.    She shares her brother and sister had colon cancer.  She is up-to-date on colonoscopy.    She denies any heartburn, indigestion, nausea, vomiting, dysphagia or odynophagia.    During her hospitalization in 07/2024 she had an x-ray completed which demonstrated loss of haustration in left colon.    NSAIDs: none  Etoh: none  Tobacco: none      03/2023: CT A/P: Thickening of the descending colon and sigmoid colon wall which may represent colitis. Colonic diverticulosis with minimal inflammatory changes around the sigmoid colon which may represent acute diverticulitis.  No drainable fluid collection.  08/2024: Hb 14.2, MCV 92, Plt 298, BUN 20, Cr 1.07, AST 25, ALT 17,  albumin 3.8, t bili 0.52     Endoscopic history:   EGD:   Colon: 04/2023: Diverticulosis in the sigmoid colon, minimal erythema in the descending colon  A. DESCENDING COLON: No significant histopathologic abnormalities. No evidence of active inflammation, granulomas, or dysplasia. Diagnostic features of microscopic colitis and collagenous colitis are not identified     Review of Systems   Otherwise Per HPI    Historical Information   Past Medical History:   Diagnosis Date    Arteriosclerotic heart disease     LAST ASSESSED: 09SEP2015    Atrial fibrillation (HCC)     Esophageal reflux     LAST ASSESSED: 09JAN2018    GERD (gastroesophageal reflux disease)     Hyperlipidemia     LAST ASSESSED: 09JAN2018    Hypertension     LAST ASSESSED: 09JAN2018    Nontoxic single thyroid nodule     LAST ASSESSED: 09MAY2014    Osteopenia     LAST ASSESSED: 11MAY2016     Past Surgical History:   Procedure Laterality Date    CYSTOSCOPY  12/04/2013    DIAGNOSTIC    ESOPHAGOGASTRODUODENOSCOPY      IR THORACENTESIS  8/5/2024    MASTECTOMY Bilateral     cancer    OOPHORECTOMY      SALPINGOOPHORECTOMY Bilateral      Social History   Social History     Substance and Sexual Activity   Alcohol Use Never     Social History     Substance and Sexual Activity   Drug Use No     Social History     Tobacco Use   Smoking Status Never   Smokeless Tobacco Never     Family History   Problem Relation Age of Onset    Alzheimer's disease Mother     Hypertension Father     Stroke Father         SYNDROME    Colon cancer Brother        Meds/Allergies       Current Outpatient Medications:     apixaban (Eliquis) 2.5 mg    aspirin 81 MG tablet    atorvastatin (LIPITOR) 20 mg tablet    diltiazem (CARDIZEM CD) 300 mg 24 hr capsule    fenofibrate micronized (LOFIBRA) 134 MG capsule    furosemide (LASIX) 40 mg tablet    hyoscyamine (ANASPAZ,LEVSIN) 0.125 MG tablet    omeprazole (PriLOSEC) 20 mg delayed release capsule    potassium chloride (Klor-Con M10) 10 mEq  "tablet    senna (SENOKOT) 8.6 mg    Allergies   Allergen Reactions    Ciprofloxacin Other (See Comments)     Muscle pain    Doxycycline     Nitrofurantoin        Objective     Blood pressure 110/68, pulse 80, temperature (!) 96.8 °F (36 °C), temperature source Temporal, height 5' 1\" (1.549 m), weight 53.1 kg (117 lb), SpO2 98%. Body mass index is 22.11 kg/m².    Physical Exam  Vitals and nursing note reviewed.   Constitutional:       General: She is not in acute distress.     Appearance: She is well-developed.   HENT:      Head: Normocephalic and atraumatic.   Eyes:      General: No scleral icterus.     Conjunctiva/sclera: Conjunctivae normal.   Cardiovascular:      Rate and Rhythm: Rhythm irregular.   Pulmonary:      Effort: Pulmonary effort is normal. No respiratory distress.   Abdominal:      General: Bowel sounds are normal. There is no distension.      Palpations: Abdomen is soft.      Tenderness: There is no abdominal tenderness. There is no guarding or rebound.   Skin:     General: Skin is warm and dry.      Capillary Refill: Capillary refill takes less than 2 seconds.      Coloration: Skin is not jaundiced.   Neurological:      General: No focal deficit present.      Mental Status: She is alert.   Psychiatric:         Mood and Affect: Mood normal.         Behavior: Behavior normal.        Lab Results:   No visits with results within 1 Day(s) from this visit.   Latest known visit with results is:   Hospital Outpatient Visit on 10/30/2024   Component Date Value    Ventricular Rate 10/30/2024 82     Atrial Rate 10/30/2024 82     IL Interval 10/30/2024 172     QRSD Interval 10/30/2024 138     QT Interval 10/30/2024 452     QTC Interval 10/30/2024 528     P Axis 10/30/2024 73     QRS [place] 10/30/2024 86     T Wave Axis 10/30/2024 73        Radiology Results:   Cardioversion (CA/[place] ONLY)  Result Date: 10/30/2024  Narrative: Cardioversion Nohemi Arcos is a 84 y.o. female who follows with [facility]. Indication for " procedure: Cardioversion ([place] ONLY)    Atrial Fibrillation / Atrial Flutter Requesting physician: Anticoagulation: yes - Eliquis 2.5 mg BID The patient was identified in the OR.  A time out procedure was performed. The patient was sedated by the anesthesia service with conscious sedation. The patient was cardioverted to sinus rhythm with a 250 J biphasic shock.  There were no complications. The patient was monitored for the appropriate time interval in the holding area, and was transferred back to the medical floor in stable condition.      Pamela Ugalde PA-C    **Please note:  Dictation voice to text software may have been used in the creation of this record.  Occasional wrong word or “sound alike” substitutions may have occurred due to the inherent limitations of voice recognition software.  Read the chart carefully and recognize, using context, where substitutions have occurred.**

## 2024-12-02 ENCOUNTER — HOSPITAL ENCOUNTER (OUTPATIENT)
Dept: CT IMAGING | Facility: HOSPITAL | Age: 84
Discharge: HOME | End: 2024-12-02
Payer: MEDICARE

## 2024-12-02 DIAGNOSIS — R19.4 CHANGE IN BOWEL HABITS: ICD-10-CM

## 2024-12-02 DIAGNOSIS — R10.9 ABDOMINAL CRAMPING: ICD-10-CM

## 2024-12-02 PROCEDURE — 74176 CT ABD & PELVIS W/O CONTRAST: CPT

## 2024-12-03 ENCOUNTER — TELEPHONE (OUTPATIENT)
Age: 84
End: 2024-12-03

## 2024-12-03 ENCOUNTER — RESULTS FOLLOW-UP (OUTPATIENT)
Dept: GASTROENTEROLOGY | Facility: CLINIC | Age: 84
End: 2024-12-03

## 2024-12-03 NOTE — TELEPHONE ENCOUNTER
----- Message from Pamela Ugalde PA-C sent at 12/3/2024 10:22 AM EST -----  Please inform Nohemi that CT does NOT show any colitis or other infection/inflammation. She does have gallstones (we don't typically do anything about this if a pt is not having RUQ pain after eating, etc). How is she feeling on the miralax? She may need to increase to higher dose such as 1-2 capfuls daily to adequately evacuate her bowels.

## 2024-12-03 NOTE — TELEPHONE ENCOUNTER
Pt returning call to office. Reviewed results and recommendations and pt verbalizes understanding.

## 2024-12-03 NOTE — TELEPHONE ENCOUNTER
Patients GI provider:  Pamela Ugalde PA-C    Number to return call: [phone]    Reason for call: Pt sister Talita would like a call back from Pamela Ugalde to go over her sister's results. Pt sister request a call back from Pamela and not the nurse.    Scheduled procedure/appointment date if applicable: N/A

## 2024-12-04 NOTE — TELEPHONE ENCOUNTER
I spoke to the patient and her sister.  Patient is still having some constipation and we reviewed increasing MiraLAX to daily dosing and continuing on the Benefiber supplement.  I did review the findings of cholelithiasis, dating back to 2018 and it actually appears there was sludge and a stone in the gallbladder.  I reviewed that her symptoms at present do not sound consistent with a biliary colic and therefore we will continue to monitor.  I reviewed signs and symptoms which would warrant additional investigation.

## 2024-12-10 DIAGNOSIS — E78.5 HYPERLIPIDEMIA, UNSPECIFIED HYPERLIPIDEMIA TYPE: ICD-10-CM

## 2024-12-10 RX ORDER — ATORVASTATIN CALCIUM 20 MG/1
20 TABLET, FILM COATED ORAL DAILY
Qty: 100 TABLET | Refills: 1 | Status: SHIPPED | OUTPATIENT
Start: 2024-12-10

## 2024-12-15 ENCOUNTER — OFFICE VISIT (OUTPATIENT)
Dept: URGENT CARE | Facility: MEDICAL CENTER | Age: 84
End: 2024-12-15
Payer: MEDICARE

## 2024-12-15 VITALS
BODY MASS INDEX: 22.28 KG/M2 | RESPIRATION RATE: 18 BRPM | HEART RATE: 88 BPM | HEIGHT: 61 IN | SYSTOLIC BLOOD PRESSURE: 110 MMHG | OXYGEN SATURATION: 98 % | DIASTOLIC BLOOD PRESSURE: 70 MMHG | TEMPERATURE: 98.8 F | WEIGHT: 118 LBS

## 2024-12-15 DIAGNOSIS — H66.92 LEFT OTITIS MEDIA, UNSPECIFIED OTITIS MEDIA TYPE: Primary | ICD-10-CM

## 2024-12-15 PROCEDURE — G0463 HOSPITAL OUTPT CLINIC VISIT: HCPCS

## 2024-12-15 PROCEDURE — 99213 OFFICE O/P EST LOW 20 MIN: CPT

## 2024-12-15 RX ORDER — AMOXICILLIN 500 MG/1
500 CAPSULE ORAL EVERY 8 HOURS SCHEDULED
Qty: 21 CAPSULE | Refills: 0 | Status: SHIPPED | OUTPATIENT
Start: 2024-12-15 | End: 2024-12-22

## 2024-12-15 RX ORDER — LORATADINE 10 MG/1
10 TABLET ORAL DAILY
Qty: 30 TABLET | Refills: 0 | Status: SHIPPED | OUTPATIENT
Start: 2024-12-15

## 2024-12-15 NOTE — PROGRESS NOTES
[facility]        NAME: Nohemi Arcos is a 84 y.o. female  : 1940    MRN: [id number]  DATE: December 15, 2024  TIME: 4:30 PM    Assessment and Plan   Left otitis media, unspecified otitis media type [H66.92]  1. Left otitis media, unspecified otitis media type  amoxicillin (AMOXIL) 500 mg capsule    loratadine (CLARITIN) 10 mg tablet      No visible cerumen   TM visible on left with some mild erythema      Patient Instructions   Take claritin as directed   Take amoxicillin as directed  Eat yogurt with live and active cultures and/or take a probiotic at least 3 hours before or after antibiotic dose. Monitor stool for diarrhea and/or blood. If this occurs, contact primary care doctor ASAP.    Note that ear discomfort from fluid may persist for up to one month  Fluids and rest  Tylenol/Ibuprofen for discomfort     Over the counter decongestants as needed     Follow up with PCP in 3-5 days.  Proceed to  ER if symptoms worsen.    If tests are performed, our office will contact you with results only if changes need to made to the care plan discussed with you at the visit. You can review your full results on [facility].    Chief Complaint     Chief Complaint   Patient presents with    Ear Fullness     Left ear fullness and hearing loss that started yesterday `         History of Present Illness       One day history of not being able to hear out of left ear and sensation of feeling full in left ear.         Review of Systems   Review of Systems   HENT:  Positive for ear pain and hearing loss.    All other systems reviewed and are negative.        Current Medications       Current Outpatient Medications:     amoxicillin (AMOXIL) 500 mg capsule, Take 1 capsule (500 mg total) by mouth every 8 (eight) hours for 7 days, Disp: 21 capsule, Rfl: 0    atorvastatin (LIPITOR) 20 mg tablet, Take 1 tablet (20 mg total) by mouth daily, Disp: 100 tablet, Rfl: 1    diltiazem (CARDIZEM CD) 300 mg 24 hr capsule,  Take 1 capsule (300 mg total) by mouth daily, Disp: 30 capsule, Rfl: 3    fenofibrate micronized (LOFIBRA) 134 MG capsule, TAKE 1 CAPSULE BY MOUTH ONCE DAILY WITH BREAKFAST, Disp: 90 capsule, Rfl: 0    hyoscyamine (ANASPAZ,LEVSIN) 0.125 MG tablet, Take 1 tablet (125 mcg total) by mouth every morning, Disp: 90 tablet, Rfl: 3    loratadine (CLARITIN) 10 mg tablet, Take 1 tablet (10 mg total) by mouth daily, Disp: 30 tablet, Rfl: 0    omeprazole (PriLOSEC) 20 mg delayed release capsule, Take 1 capsule by mouth once daily, Disp: 100 capsule, Rfl: 1    potassium chloride (Klor-Con M10) 10 mEq tablet, Take 1 tablet (10 mEq total) by mouth daily as needed (take with lasix), Disp: 30 tablet, Rfl: 5    apixaban (Eliquis) 2.5 mg, Take 1 tablet (2.5 mg total) by mouth 2 (two) times a day, Disp: 60 tablet, Rfl: 3    aspirin 81 MG tablet, Take 81 mg by mouth daily, Disp: , Rfl:     furosemide (LASIX) 40 mg tablet, Take 1 tablet (40 mg total) by mouth daily as needed (weight gain 3lbs in 24 hours and 5lbs in a week, shortness of breath and leg swelling) (Patient taking differently: Take 20 mg by mouth daily), Disp: 30 tablet, Rfl: 0    senna (SENOKOT) 8.6 mg, Take 2 tablets (17.2 mg total) by mouth daily at bedtime, Disp: 60 tablet, Rfl: 0    Current Allergies     Allergies as of 12/15/2024 - Reviewed 12/15/2024   Allergen Reaction Noted    Ciprofloxacin Other (See Comments) 12/16/2016    Doxycycline  01/23/2017    Nitrofurantoin  01/23/2017            The following portions of the patient's history were reviewed and updated as appropriate: allergies, current medications, past family history, past medical history, past social history, past surgical history and problem list.     Past Medical History:   Diagnosis Date    Arteriosclerotic heart disease     LAST ASSESSED: 09SEP2015    Atrial fibrillation (HCC)     Esophageal reflux     LAST ASSESSED: 09JAN2018    GERD (gastroesophageal reflux disease)     Hyperlipidemia     LAST  "ASSESSED: 09JAN2018    Hypertension     LAST ASSESSED: 09JAN2018    Nontoxic single thyroid nodule     LAST ASSESSED: 09MAY2014    Osteopenia     LAST ASSESSED: 11MAY2016       Past Surgical History:   Procedure Laterality Date    CYSTOSCOPY  12/04/2013    DIAGNOSTIC    ESOPHAGOGASTRODUODENOSCOPY      IR THORACENTESIS  8/5/2024    MASTECTOMY Bilateral     cancer    OOPHORECTOMY      SALPINGOOPHORECTOMY Bilateral        Family History   Problem Relation Age of Onset    Alzheimer's disease Mother     Hypertension Father     Stroke Father         SYNDROME    Colon cancer Brother          Medications have been verified.        Objective   /70   Pulse 88   Temp 98.8 °F (37.1 °C)   Resp 18   Ht 5' 1\" (1.549 m)   Wt 53.5 kg (118 lb)   SpO2 98%   BMI 22.30 kg/m²        Physical Exam     Physical Exam  Vitals and nursing note reviewed.   Constitutional:       General: She is not in acute distress.  HENT:      Head: Normocephalic.      Right Ear: Tympanic membrane and ear canal normal.      Left Ear: Tympanic membrane is erythematous.      Nose: No congestion.      Mouth/Throat:      Mouth: Mucous membranes are moist.      Pharynx: No pharyngeal swelling, oropharyngeal exudate or posterior oropharyngeal erythema.      Tonsils: No tonsillar exudate.   Eyes:      Conjunctiva/sclera: Conjunctivae normal.      Pupils: Pupils are equal, round, and reactive to light.   Cardiovascular:      Rate and Rhythm: Normal rate and regular rhythm.      Heart sounds: No murmur heard.  Pulmonary:      Effort: Pulmonary effort is normal. No respiratory distress.      Breath sounds: No wheezing, rhonchi or rales.   Lymphadenopathy:      Cervical: No cervical adenopathy.   Skin:     General: Skin is warm and dry.   Neurological:      Mental Status: She is alert.                   "

## 2024-12-15 NOTE — PATIENT INSTRUCTIONS
Take claritin as directed   Take amoxicillin as directed  Eat yogurt with live and active cultures and/or take a probiotic at least 3 hours before or after antibiotic dose. Monitor stool for diarrhea and/or blood. If this occurs, contact primary care doctor ASAP.    Note that ear discomfort from fluid may persist for up to one month  Fluids and rest  Tylenol/Ibuprofen for discomfort     Over the counter decongestants as needed     Follow up with PCP in 3-5 days.  Proceed to  ER if symptoms worsen.    If tests are performed, our office will contact you with results only if changes need to made to the care plan discussed with you at the visit. You can review your full results on [facility].

## 2024-12-19 ENCOUNTER — OFFICE VISIT (OUTPATIENT)
Dept: FAMILY MEDICINE CLINIC | Facility: CLINIC | Age: 84
End: 2024-12-19
Payer: MEDICARE

## 2024-12-19 VITALS
HEIGHT: 61 IN | BODY MASS INDEX: 22.31 KG/M2 | WEIGHT: 118.2 LBS | TEMPERATURE: 97.4 F | OXYGEN SATURATION: 97 % | DIASTOLIC BLOOD PRESSURE: 54 MMHG | HEART RATE: 59 BPM | SYSTOLIC BLOOD PRESSURE: 136 MMHG

## 2024-12-19 DIAGNOSIS — H69.92 DYSFUNCTION OF LEFT EUSTACHIAN TUBE: ICD-10-CM

## 2024-12-19 DIAGNOSIS — H91.92 HEARING LOSS ASSOCIATED WITH SYNDROME OF LEFT EAR: Primary | ICD-10-CM

## 2024-12-19 PROCEDURE — G2211 COMPLEX E/M VISIT ADD ON: HCPCS | Performed by: PHYSICIAN ASSISTANT

## 2024-12-19 PROCEDURE — 99213 OFFICE O/P EST LOW 20 MIN: CPT | Performed by: PHYSICIAN ASSISTANT

## 2024-12-19 RX ORDER — METHYLPREDNISOLONE 4 MG/1
TABLET ORAL
Qty: 21 EACH | Refills: 0 | Status: SHIPPED | OUTPATIENT
Start: 2024-12-19

## 2024-12-19 RX ORDER — FLUTICASONE PROPIONATE 50 MCG
2 SPRAY, SUSPENSION (ML) NASAL 2 TIMES DAILY
Qty: 9.9 ML | Refills: 1 | Status: SHIPPED | OUTPATIENT
Start: 2024-12-19

## 2024-12-19 NOTE — PROGRESS NOTES
Name: Nohemi Arcos      : 1940      MRN: [id number]  Encounter Provider: Vale Pillai PA-C  Encounter Date: 2024   Encounter department: [facility]  :  Assessment & Plan  Hearing loss associated with syndrome of left ear  Possibly secondary to recent ear infection.  Likely also eustachian tube dysfunction.  Recommended that patient finish amoxicillin as directed. Will place on Medrol Dosepak and Flonase nasal spray.  Referral placed to ENT for further evaluation.  Orders:    Ambulatory Referral to Otolaryngology; Future    Dysfunction of left eustachian tube  Possibly secondary to recent ear infection.  Likely also eustachian tube dysfunction.  Recommended that patient finish amoxicillin as directed.  Will place on Medrol Dosepak and Flonase nasal spray.  Referral placed to ENT for further evaluation.  Orders:    methylPREDNISolone 4 MG tablet therapy pack; Use as directed on package    fluticasone (FLONASE) 50 mcg/act nasal spray; 2 sprays into each nostril 2 (two) times a day    Ambulatory Referral to Otolaryngology; Future           History of Present Illness     Nohemi is a pleasant 84-year-old female who is here today accompanied by her friend complaining of left hearing loss.  She was evaluated at urgent care on 12/15/2024.  She was diagnosed with left otitis media.  She was prescribed Claritin and amoxicillin.  She denies any improvement in the hearing loss.  She denies any ear drainage, ear pain, congestion, fevers, or chills.  She has a history of hearing loss in her right ear, but has never had problems with her left ear.      Review of Systems   Constitutional:  Negative for chills, diaphoresis, fatigue and fever.   HENT:  Positive for hearing loss. Negative for congestion, ear discharge, ear pain, postnasal drip, rhinorrhea, sneezing, sore throat and trouble swallowing.    Eyes:  Negative for pain and visual disturbance.   Respiratory:  Negative for apnea, cough, shortness of  "breath and wheezing.    Cardiovascular:  Negative for chest pain and palpitations.   Gastrointestinal:  Negative for abdominal pain, constipation, diarrhea, nausea and vomiting.   Genitourinary:  Negative for dysuria and hematuria.   Musculoskeletal:  Negative for arthralgias, gait problem and myalgias.   Neurological:  Negative for dizziness, syncope, weakness, light-headedness, numbness and headaches.   Psychiatric/Behavioral:  Negative for suicidal ideas. The patient is not nervous/anxious.        Objective   /54   Pulse 59   Temp (!) 97.4 °F (36.3 °C)   Ht 5' 1\" (1.549 m)   Wt 53.6 kg (118 lb 3.2 oz)   SpO2 97%   BMI 22.33 kg/m²      Physical Exam  Vitals and nursing note reviewed.   Constitutional:       General: She is not in acute distress.     Appearance: She is well-developed. She is not diaphoretic.   HENT:      Head: Normocephalic and atraumatic.      Right Ear: Hearing, tympanic membrane, ear canal and external ear normal. There is no impacted cerumen.      Left Ear: Hearing, tympanic membrane, ear canal and external ear normal. There is no impacted cerumen.      Nose: Nose normal. No mucosal edema or rhinorrhea.      Mouth/Throat:      Pharynx: No oropharyngeal exudate or posterior oropharyngeal erythema.   Eyes:      Extraocular Movements: Extraocular movements intact.   Pulmonary:      Effort: Pulmonary effort is normal. No respiratory distress.   Musculoskeletal:         General: Normal range of motion.      Cervical back: Normal range of motion and neck supple.   Skin:     General: Skin is warm and dry.      Findings: No rash.   Neurological:      Mental Status: She is alert and oriented to person, place, and time.   Psychiatric:         Behavior: Behavior normal.         Thought Content: Thought content normal.         Judgment: Judgment normal.         "

## 2024-12-19 NOTE — PATIENT INSTRUCTIONS
"  Patient Education     Eustachian tube problems   The Basics   Written by the doctors and editors at [facility]   What is the Eustachian tube? -- This tube connects the middle ear (the part of the ear behind the eardrum) to the back of the nose and throat (figure 1).  Normally, the Eustachian tube opens and closes. This helps keep the air pressure inside the middle ear the same as the air pressure outside the middle ear. If there is a problem with the tube opening, the air pressure inside the middle ear won't be the same as the air pressure outside it. This can cause ear pain, hearing loss, and other symptoms. \"Ear barotrauma\" is the medical term for when people have symptoms or damage in the middle ear because of air pressure differences.  In some people, the Eustachian tube does not close normally, but stays open all of the time. This can also cause symptoms like hearing the sound of your own voice and breathing.  Most Eustachian tube problems last only a short time and get better on their own. But they can sometimes lead to more serious conditions, such as:   A middle ear infection   A torn eardrum   Hearing loss  In children, long-term hearing loss from Eustachian tube problems can also lead to language or speech problems.  What causes Eustachian tube problems? -- Common causes are:   Illnesses or conditions that make the Eustachian tubes swollen or inflamed - These include colds, allergies, ear infections, or sinus infections. The sinuses are hollow areas in the bones of the face.   Sudden air pressure changes - These can happen when people fly in an airplane, scuba dive, or drive up to the mountains.   Growths that block the Eustachian tube   Being born with an abnormal Eustachian tube  What are the symptoms of a Eustachian tube problem? -- Common symptoms include:   Ear pain   Feeling pressure or fullness in the ear   Trouble hearing   Ringing in the ear   Feeling dizzy   Loud sounds of your own voice or your " "own breathing  Should I see a doctor or nurse? -- See your doctor or nurse if your symptoms are severe, get worse, or don't go away after a few days.  Will I need tests? -- Probably not. Your doctor or nurse should be able to tell if you have a Eustachian tube problem by learning about your symptoms and doing an exam.  If your symptoms are severe, last for a long time, or only affect 1 ear, your doctor or nurse might:   Have you see a special kind of doctor called an ear, nose, and throat (\"ENT\") doctor   Do tests to check your hearing   Do an imaging test - These create pictures of the inside of the body.  How are Eustachian tube problems treated? -- Treatment depends on what's causing the problem. Depending on your individual situation, your doctor might treat you with 1 or more of the following:   Nose sprays   Antihistamines - These medicines are usually used to treat allergies. They help stop itching, sneezing, and runny nose symptoms.   Decongestants - These medicines can help with stuffy nose symptoms.   Instructions to avoid dehydration - Drink a lot of fluids, especially before doing physical activity or being in the heat.   Surgery - Most people do not need surgery for Eustachian tube problems. But people might need surgery if their symptoms don't get better with medicines or they have severe or long-term symptoms.  Antibiotics are not needed to treat Eustachian tube problems. But they might be needed if a person has an ear infection.  All topics are updated as new evidence becomes available and our peer review process is complete.  This topic retrieved from [employer] on: May 19, 2024.  Topic 39163 Version 17.0  Release: 32.4.3 - C32.138  © 2024 UpToDate, Inc. and/or its affiliates. All rights reserved.  figure 1: Eustachian tube     The Eustachian tube is a tube that connects the middle ear (the part of the ear behind the eardrum) to the back of the nose and throat.  Graphic 36094 Version 2.0  Consumer " Information Use and Disclaimer   Disclaimer: This generalized information is a limited summary of diagnosis, treatment, and/or medication information. It is not meant to be comprehensive and should be used as a tool to help the user understand and/or assess potential diagnostic and treatment options. It does NOT include all information about conditions, treatments, medications, side effects, or risks that may apply to a specific patient. It is not intended to be medical advice or a substitute for the medical advice, diagnosis, or treatment of a health care provider based on the health care provider's examination and assessment of a patient's specific and unique circumstances. Patients must speak with a health care provider for complete information about their health, medical questions, and treatment options, including any risks or benefits regarding use of medications. This information does not endorse any treatments or medications as safe, effective, or approved for treating a specific patient. UpToDate, Inc. and its affiliates disclaim any warranty or liability relating to this information or the use thereof.The use of this information is governed by the Terms of Use, available at https://www.woltersVivactauwer.com/en/know/clinical-effectiveness-terms. 2024© UpToDate, Inc. and its affiliates and/or licensors. All rights reserved.  Copyright   © 2024 UpToDate, Inc. and/or its affiliates. All rights reserved.

## 2024-12-24 DIAGNOSIS — I48.92 ATRIAL FLUTTER (HCC): ICD-10-CM

## 2024-12-24 DIAGNOSIS — I48.91 ATRIAL FIBRILLATION WITH RVR (HCC): ICD-10-CM

## 2024-12-24 RX ORDER — DILTIAZEM HYDROCHLORIDE 300 MG/1
300 CAPSULE, COATED, EXTENDED RELEASE ORAL DAILY
Qty: 30 CAPSULE | Refills: 5 | Status: SHIPPED | OUTPATIENT
Start: 2024-12-24

## 2024-12-24 NOTE — TELEPHONE ENCOUNTER
Reason for call:   [x] Refill   [] Prior Auth  [] Other:     Office:   [] PCP/Provider -   [x] Specialty/Provider - Cardiology    Medication:     Eliquis 2.5 mg      CARDIZEM 300 mg 24 hr     Pharmacy: [id number] - [address]     Does the patient have enough for 3 days?   [x] Yes   [] No - Send as HP to POD

## 2025-01-09 DIAGNOSIS — E78.5 HYPERLIPIDEMIA, UNSPECIFIED HYPERLIPIDEMIA TYPE: ICD-10-CM

## 2025-01-10 RX ORDER — FENOFIBRATE 134 MG/1
CAPSULE ORAL
Qty: 90 CAPSULE | Refills: 1 | Status: SHIPPED | OUTPATIENT
Start: 2025-01-10

## 2025-01-13 DIAGNOSIS — K21.9 GASTROESOPHAGEAL REFLUX DISEASE: ICD-10-CM

## 2025-01-24 ENCOUNTER — HOSPITAL ENCOUNTER (OUTPATIENT)
Dept: CT IMAGING | Facility: HOSPITAL | Age: 85
Discharge: HOME | End: 2025-01-24
Payer: MEDICARE

## 2025-01-24 DIAGNOSIS — H90.A31 MIXED CONDUCTIVE AND SENSORINEURAL HEARING LOSS OF RIGHT EAR WITH RESTRICTED HEARING OF LEFT EAR: ICD-10-CM

## 2025-01-24 PROCEDURE — 70480 CT ORBIT/EAR/FOSSA W/O DYE: CPT

## 2025-01-27 DIAGNOSIS — R06.02 SHORTNESS OF BREATH: ICD-10-CM

## 2025-01-28 RX ORDER — FUROSEMIDE 40 MG/1
TABLET ORAL
Qty: 30 TABLET | Refills: 2 | Status: SHIPPED | OUTPATIENT
Start: 2025-01-28

## 2025-02-11 ENCOUNTER — RESULTS FOLLOW-UP (OUTPATIENT)
Dept: CARDIOLOGY CLINIC | Facility: HOSPITAL | Age: 85
End: 2025-02-11

## 2025-02-11 ENCOUNTER — HOSPITAL ENCOUNTER (OUTPATIENT)
Dept: NON INVASIVE DIAGNOSTICS | Facility: HOSPITAL | Age: 85
Discharge: HOME | End: 2025-02-11
Payer: MEDICARE

## 2025-02-11 VITALS
WEIGHT: 118 LBS | BODY MASS INDEX: 22.28 KG/M2 | DIASTOLIC BLOOD PRESSURE: 70 MMHG | HEART RATE: 89 BPM | HEIGHT: 61 IN | SYSTOLIC BLOOD PRESSURE: 138 MMHG

## 2025-02-11 DIAGNOSIS — I50.32 CHRONIC HEART FAILURE WITH PRESERVED EJECTION FRACTION (HCC): ICD-10-CM

## 2025-02-11 DIAGNOSIS — I34.0 NONRHEUMATIC MITRAL VALVE REGURGITATION: ICD-10-CM

## 2025-02-11 DIAGNOSIS — I48.0 PAROXYSMAL ATRIAL FIBRILLATION (HCC): ICD-10-CM

## 2025-02-11 LAB
AORTIC ROOT: 3.2 CM
ASCENDING AORTA: 2.8 CM
BSA FOR ECHO PROCEDURE: 1.51 M2
FRACTIONAL SHORTENING: 25 (ref 28–44)
INTERVENTRICULAR SEPTUM IN DIASTOLE (PARASTERNAL SHORT AXIS VIEW): 1.3 CM
INTERVENTRICULAR SEPTUM: 1.3 CM (ref 0.6–1.1)
LEFT ATRIUM SIZE: 3.9 CM
LEFT INTERNAL DIMENSION IN SYSTOLE: 3.3 CM (ref 2.1–4)
LEFT VENTRICLE DIASTOLIC VOLUME (MOD BIPLANE): 80 ML
LEFT VENTRICLE DIASTOLIC VOLUME INDEX (MOD BIPLANE): 53 ML/M2
LEFT VENTRICLE SYSTOLIC VOLUME (MOD BIPLANE): 58 ML
LEFT VENTRICLE SYSTOLIC VOLUME INDEX (MOD BIPLANE): 38.4 ML/M2
LEFT VENTRICULAR INTERNAL DIMENSION IN DIASTOLE: 4.4 CM (ref 3.5–6)
LEFT VENTRICULAR POSTERIOR WALL IN END DIASTOLE: 1 CM
LEFT VENTRICULAR STROKE VOLUME: 44 ML
LV EF BIPLANE MOD: 27 %
LV EF US.2D.A4C+ESTIMATED: 29 %
LVSV (TEICH): 44 ML
MITRAL REGURGITATION PEAK VELOCITY: 5.2 M/S
MITRAL VALVE MEAN INFLOW VELOCITY: 4.18 M/S
MITRAL VALVE REGURGITANT PEAK GRADIENT: 108 MMHG
RA PRESSURE ESTIMATED: 3 MMHG
RV PSP: 33 MMHG
SL CV DOP CALC MV VTI RETROGRADE: 173.4 CM
SL CV LV EF: 40
SL CV MV MEAN GRADIENT RETROGRADE: 76 MMHG
SL CV PED ECHO LEFT VENTRICLE DIASTOLIC VOLUME (MOD BIPLANE) 2D: 89 ML
SL CV PED ECHO LEFT VENTRICLE SYSTOLIC VOLUME (MOD BIPLANE) 2D: 45 ML
TR MAX PG: 30 MMHG
TR PEAK VELOCITY: 2.7 M/S
TRICUSPID ANNULAR PLANE SYSTOLIC EXCURSION: 1.9 CM
TRICUSPID VALVE PEAK REGURGITATION VELOCITY: 2.74 M/S

## 2025-02-11 PROCEDURE — 93308 TTE F-UP OR LMTD: CPT

## 2025-02-11 PROCEDURE — 93325 DOPPLER ECHO COLOR FLOW MAPG: CPT

## 2025-02-11 PROCEDURE — 93321 DOPPLER ECHO F-UP/LMTD STD: CPT | Performed by: INTERNAL MEDICINE

## 2025-02-11 PROCEDURE — 93308 TTE F-UP OR LMTD: CPT | Performed by: INTERNAL MEDICINE

## 2025-02-11 PROCEDURE — 93321 DOPPLER ECHO F-UP/LMTD STD: CPT

## 2025-02-11 PROCEDURE — 93325 DOPPLER ECHO COLOR FLOW MAPG: CPT | Performed by: INTERNAL MEDICINE

## 2025-02-20 ENCOUNTER — OFFICE VISIT (OUTPATIENT)
Dept: CARDIOLOGY CLINIC | Facility: HOSPITAL | Age: 85
End: 2025-02-20
Payer: MEDICARE

## 2025-02-20 VITALS
HEART RATE: 75 BPM | WEIGHT: 118 LBS | BODY MASS INDEX: 22.28 KG/M2 | OXYGEN SATURATION: 90 % | HEIGHT: 61 IN | DIASTOLIC BLOOD PRESSURE: 74 MMHG | SYSTOLIC BLOOD PRESSURE: 142 MMHG

## 2025-02-20 DIAGNOSIS — I42.9 CARDIOMYOPATHY, UNSPECIFIED TYPE (HCC): ICD-10-CM

## 2025-02-20 DIAGNOSIS — I48.91 ATRIAL FIBRILLATION, CONTROLLED (HCC): ICD-10-CM

## 2025-02-20 DIAGNOSIS — I44.7 LBBB (LEFT BUNDLE BRANCH BLOCK): Primary | ICD-10-CM

## 2025-02-20 DIAGNOSIS — I10 PRIMARY HYPERTENSION: ICD-10-CM

## 2025-02-20 PROBLEM — I50.33 ACUTE ON CHRONIC DIASTOLIC (CONGESTIVE) HEART FAILURE (HCC): Status: RESOLVED | Noted: 2024-07-31 | Resolved: 2025-02-20

## 2025-02-20 PROCEDURE — 99214 OFFICE O/P EST MOD 30 MIN: CPT | Performed by: INTERNAL MEDICINE

## 2025-02-21 NOTE — PROGRESS NOTES
Cardiology Follow Up    Nohemi Arcos  1940  [id number]  [facility]  [address]    [facility] [address] [phone]    1. LBBB (left bundle branch block)        2. Atrial fibrillation, controlled (HCC)  Ambulatory Referral to Cardiology      3. Cardiomyopathy, unspecified type (HCC)        4. Primary hypertension              Discussion/Summary: Will continue with present medications. I advised her to take and extra 20 mg Lasix and 10 meq of K+  in the afternoon if am weight on her scale is ever 118 lbs or higher.  If she develops CHF again, consider complete AV node ablation and biV pacemaker placement.    RTO 3 months.      Interval History: She remains in AF with a controlled HR. Her weight has remained very stable.  Wt at home 114 - 117 lbs. She is on Lasix 20 mg daily and K+ 10 meq daily.    She is active and denies CP, SOB, LE edema.    She did undergo cardioversion on 10/30/2024 but it did not hold.    /74    ECHO 2/11/2025 - EF 40%, LBBB    Patient Active Problem List   Diagnosis    Hypercholesteremia    Primary hypertension    Nontoxic single thyroid nodule    Osteopenia    Primary localized osteoarthritis of left knee    Vitamin D deficiency    Stage 3b chronic kidney disease (HCC)    Elevated TSH    Multiple renal cysts    Cholelithiasis    Colitis    Acute diverticulitis    Adrenal hyperplasia (HCC)    LBBB (left bundle branch block)    Hypoxia    Cardiomyopathy (HCC)    Constipation    SIRS (systemic inflammatory response syndrome) (HCC)    Bilateral pleural effusion    Atrial fibrillation, controlled (HCC)    Urinary frequency     Past Medical History:   Diagnosis Date    Arteriosclerotic heart disease     LAST ASSESSED: 09SEP2015    Atrial fibrillation (HCC)     Esophageal reflux     LAST ASSESSED: 09JAN2018    GERD (gastroesophageal reflux disease)     Hyperlipidemia     LAST ASSESSED:  09JAN2018    Hypertension     LAST ASSESSED: 09JAN2018    Nontoxic single thyroid nodule     LAST ASSESSED: 09MAY2014    Osteopenia     LAST ASSESSED: 11MAY2016     Social History     Socioeconomic History    Marital status:      Spouse name: Not on file    Number of children: Not on file    Years of education: Not on file    Highest education level: Not on file   Occupational History    Not on file   Tobacco Use    Smoking status: Never    Smokeless tobacco: Never   Vaping Use    Vaping status: Never Used   Substance and Sexual Activity    Alcohol use: Never    Drug use: No    Sexual activity: Not Currently   Other Topics Concern    Not on file   Social History Narrative    Always uses seat belt    Dental care, regularly     Social Drivers of Health     Financial Resource Strain: Low Risk  (7/10/2023)    Overall Financial Resource Strain (CARDIA)     Difficulty of Paying Living Expenses: Not very hard   Food Insecurity: No Food Insecurity (9/27/2024)    Nursing - Inadequate Food Risk Classification     Worried About Running Out of Food in the Last Year: Never true     Ran Out of Food in the Last Year: Never true     Ran Out of Food in the Last Year: Not on file   Transportation Needs: No Transportation Needs (9/27/2024)    PRAPARE - Transportation     Lack of Transportation (Medical): No     Lack of Transportation (Non-Medical): No   Physical Activity: Not on file   Stress: Not on file   Social Connections: Not on file   Intimate Partner Violence: Not on file   Housing Stability: Low Risk  (9/27/2024)    Housing Stability Vital Sign     Unable to Pay for Housing in the Last Year: No     Number of Times Moved in the Last Year: 1     Homeless in the Last Year: No      Family History   Problem Relation Age of Onset    Alzheimer's disease Mother     Hypertension Father     Stroke Father         SYNDROME    Colon cancer Brother      Past Surgical History:   Procedure Laterality Date    CYSTOSCOPY  12/04/2013     DIAGNOSTIC    ESOPHAGOGASTRODUODENOSCOPY      IR THORACENTESIS  8/5/2024    MASTECTOMY Bilateral     cancer    OOPHORECTOMY      SALPINGOOPHORECTOMY Bilateral        Current Outpatient Medications:     apixaban (Eliquis) 2.5 mg, Take 1 tablet (2.5 mg total) by mouth 2 (two) times a day, Disp: 60 tablet, Rfl: 5    aspirin 81 MG tablet, Take 81 mg by mouth daily, Disp: , Rfl:     atorvastatin (LIPITOR) 20 mg tablet, Take 1 tablet (20 mg total) by mouth daily, Disp: 100 tablet, Rfl: 1    diltiazem (CARDIZEM CD) 300 mg 24 hr capsule, Take 1 capsule (300 mg total) by mouth daily, Disp: 30 capsule, Rfl: 5    fenofibrate micronized (LOFIBRA) 134 MG capsule, TAKE 1 CAPSULE BY MOUTH ONCE DAILY WITH BREAKFAST, Disp: 90 capsule, Rfl: 1    fluticasone (FLONASE) 50 mcg/act nasal spray, 2 sprays into each nostril 2 (two) times a day, Disp: 9.9 mL, Rfl: 1    furosemide (LASIX) 40 mg tablet, TAKE 1 TABLET BY MOUTH ONCE DAILY AS NEEDED FOR A WEIGHT GAIN OF 3 LBS IN 24 HOURS AND 5 LBS IN A WEEK, SHORTNESS OF BREATH AND LEG SWELLING, Disp: 30 tablet, Rfl: 2    hyoscyamine (ANASPAZ,LEVSIN) 0.125 MG tablet, Take 1 tablet (125 mcg total) by mouth every morning, Disp: 90 tablet, Rfl: 3    loratadine (CLARITIN) 10 mg tablet, Take 1 tablet (10 mg total) by mouth daily, Disp: 30 tablet, Rfl: 0    omeprazole (PriLOSEC) 20 mg delayed release capsule, Take 1 capsule (20 mg total) by mouth daily, Disp: 100 capsule, Rfl: 1    potassium chloride (Klor-Con M10) 10 mEq tablet, Take 1 tablet (10 mEq total) by mouth daily as needed (take with lasix), Disp: 30 tablet, Rfl: 5    senna (SENOKOT) 8.6 mg, Take 2 tablets (17.2 mg total) by mouth daily at bedtime, Disp: 60 tablet, Rfl: 0    methylPREDNISolone 4 MG tablet therapy pack, Use as directed on package (Patient not taking: Reported on 1/14/2025), Disp: 21 each, Rfl: 0  Allergies   Allergen Reactions    Ciprofloxacin Other (See Comments)     Muscle pain    Doxycycline     Nitrofurantoin      Vitals:  "   02/20/25 1448   BP: 142/74   Pulse: 75   SpO2: 90%   Weight: 53.5 kg (118 lb)   Height: 5' 1\" (1.549 m)     Weight (last 2 days)       Date/Time Weight    02/20/25 1448 53.5 (118)           Blood pressure 142/74, pulse 75, height 5' 1\" (1.549 m), weight 53.5 kg (118 lb), SpO2 90%., Body mass index is 22.3 kg/m².    Labs:not applicable  Imaging: Echo follow up/limited w/ contrast if indicated  Result Date: 2/11/2025  Narrative:   Left Ventricle: Left ventricular cavity size is normal. Wall thickness is mildly increased. There is mild concentric hypertrophy. The left ventricular ejection fraction is 40%. Systolic function is moderately reduced. There is moderate global hypokinesis.   IVS: There is abnormal septal motion consistent with left bundle branch block.   Right Ventricle: Right ventricular cavity size is normal. Systolic function is normal.   Left Atrium: The atrium is dilated.   Mitral Valve: There is mild to moderate regurgitation with a centrally directed jet.   Tricuspid Valve: There is mild regurgitation.     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 COMPARISON:  None. TECHNIQUE: Using a multi-detector scanner, 0.625 mm axial scans of the temporal bone were acquired using a high-resolution bone technique.  Targeted reconstructions were obtained of each side, including axial, coronal, and oblique views.  All reconstructed images were reviewed. Soft tissue reconstructions were performed as well. Radiation dose length product (DLP) for this visit:  913.53 mGy-cm .  This examination, like all CT scans performed in the [facility] beds at the [facility], was performed utilizing techniques to minimize radiation dose exposure, including the use of iterative  reconstruction and automated exposure " control. IV Contrast: Not administered IMAGE QUALITY:  Diagnostic. FINDINGS: RIGHT TEMPORAL BONE: PERIAURICULAR SOFT TISSUES:  Normal. EXTERNAL AUDITORY CANAL: Normal. MIDDLE EAR: Normal. OSSICLES:Normal. MASTOID AIR CELLS: Normal. COCHLEA: Normal. OTIC CAPSULE: Normal mineralization. VESTIBULE: Normal. VESTIBULAR AND COCHLEAR AQUEDUCT: Normal SEMICIRCULAR CANALS: Normal. INTERNAL AUDITORY CANAL: Normal. FACIAL NERVE CANAL: Normal. CAROTID CANAL: Normal. JUGULAR FORAMEN: Normal. LEFT TEMPORAL BONE: PERIAURICULAR SOFT TISSUES:  Normal. EXTERNAL AUDITORY CANAL: Normal. MIDDLE EAR: Normal. OSSICLES:Normal. MASTOID AIR CELLS: Normal. COCHLEA: Normal. OTIC CAPSULE: Normal mineralization. VESTIBULE: Normal. 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 INTERNAL AUDITORY CANAL: Normal. FACIAL NERVE CANAL: Normal. CAROTID CANAL: Normal. JUGULAR FORAMEN: Normal. OTHER FINDINGS: Atherosclerotic calcifications of the cavernous segment of the internal carotid artery are mild.     Impression: 1. 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 2. Well-pneumatized middle ear cavities and mastoid air cells bilaterally. 3. Normal-appearing right-sided temporal bone. Workstation performed: [id number]       Review of Systems:  Review of Systems   Constitutional:  Negative for diaphoresis, fatigue, fever and unexpected weight change.   HENT: Negative.     Respiratory:  Negative for cough, shortness of breath and wheezing.    Cardiovascular:  Negative for chest pain, palpitations and leg swelling.   Gastrointestinal:  Negative for abdominal pain, diarrhea and nausea.   Musculoskeletal:  Negative for gait problem and myalgias.   Skin:  Negative for rash.   Neurological:  Negative for dizziness and numbness.   Psychiatric/Behavioral: Negative.         Physical Exam:  Physical  Exam  Constitutional:       Appearance: She is well-developed.   HENT:      Head: Normocephalic and atraumatic.   Eyes:      Pupils: Pupils are equal, round, and reactive to light.   Neck:      Vascular: No JVD.   Cardiovascular:      Rate and Rhythm: Rhythm irregular.      Pulses: Normal pulses.           Carotid pulses are 2+ on the right side and 2+ on the left side.     Heart sounds: S1 normal and S2 normal.   Pulmonary:      Effort: Pulmonary effort is normal.      Breath sounds: Normal breath sounds. No wheezing or rales.   Abdominal:      General: Bowel sounds are normal.      Palpations: Abdomen is soft.   Musculoskeletal:         General: No tenderness. Normal range of motion.      Cervical back: Normal range of motion and neck supple.   Skin:     General: Skin is warm.   Neurological:      Mental Status: She is alert and oriented to person, place, and time.      Cranial Nerves: No cranial nerve deficit.      Deep Tendon Reflexes: Reflexes are normal and symmetric.

## 2025-03-21 ENCOUNTER — OFFICE VISIT (OUTPATIENT)
Dept: GASTROENTEROLOGY | Facility: CLINIC | Age: 85
End: 2025-03-21

## 2025-03-21 VITALS
HEIGHT: 61 IN | BODY MASS INDEX: 22.69 KG/M2 | DIASTOLIC BLOOD PRESSURE: 72 MMHG | OXYGEN SATURATION: 99 % | TEMPERATURE: 98.3 F | RESPIRATION RATE: 18 BRPM | HEART RATE: 75 BPM | WEIGHT: 120.2 LBS | SYSTOLIC BLOOD PRESSURE: 132 MMHG

## 2025-03-21 DIAGNOSIS — K80.20 CALCULUS OF GALLBLADDER WITHOUT CHOLECYSTITIS WITHOUT OBSTRUCTION: ICD-10-CM

## 2025-03-21 DIAGNOSIS — K59.00 CONSTIPATION, UNSPECIFIED CONSTIPATION TYPE: Primary | ICD-10-CM

## 2025-03-21 DIAGNOSIS — R10.9 ABDOMINAL CRAMPING: ICD-10-CM

## 2025-03-21 DIAGNOSIS — K42.9 UMBILICAL HERNIA WITHOUT OBSTRUCTION AND WITHOUT GANGRENE: ICD-10-CM

## 2025-03-21 DIAGNOSIS — R63.4 WEIGHT LOSS, ABNORMAL: ICD-10-CM

## 2025-03-21 DIAGNOSIS — Z80.0 FAMILY HISTORY OF COLON CANCER: ICD-10-CM

## 2025-03-21 NOTE — PATIENT INSTRUCTIONS
Stay on omeprazole daily.     Stay on fiber supplement daily.   Stay on miralax daily.     If you have any worsening abdominal pain, bloody diarrhea, pencil thin stools, please tell us immediately.

## 2025-03-21 NOTE — PROGRESS NOTES
Name: Nohemi Arcos      : 1940      MRN: [id number]  Encounter Provider: Pamela Ugalde PA-C  Encounter Date: 3/21/2025   Encounter department: [facility]  :  Assessment & Plan  Constipation, unspecified constipation type  Pt with hx of change in bowel habit to constipation when she was last evaluated in clinic in 2024.   Last colonoscopy in  with diverticulosis and some erythema in the descending colon.   CT in 2024 with no acute pathology.   She has had marked improvement and resolution in symptoms since initiation of miralax and benefiber.   No alarm features to the GI tract at this time.   We will continue on current regimen at present.        Abdominal cramping  Pt had initially endorsed abdominal cramping related to constipation, though abd cramping has resolved since initiating bowel regimen and treating constipation.   Continue to monitor.        Weight loss, abnormal  At time of last OV, pt had been dealing with abnormal weight loss.   UTD on colonoscopy, updated CT largely unremarkable.   Weight has since stabilized with no additional loss and some gain since last OV.   Continue to monitor at this time.   Should weight loss progress, would recommend EGD for completeness at that time.        Family history of colon cancer  Pt had brother and sister with CRC.   Last colonoscopy in  with no polyps identified.   No additional screening colonoscopies recommended for this pt unless alarm features should arise.        Calculus of gallbladder without cholecystitis without obstruction  Incidental finding on CT from 2024.   Pt is not having any symptoms consistent with biliary colic at this time.   Recommend continuing to monitor.       Umbilical hernia without obstruction and without gangrene  Incidental finding on CT scan.   Asymptomatic at this time.   Continue to monitor.          We will follow up as needed for development of new GI  symptoms.     History of Present Illness   HPI  Nohemi Arcos is a 85 y.o. female who presents for f/u for constipation. Pmhx sig for atrial fibrillation on Eliquis, HTN, LBBB, acute on chronic diastolic CHF, CKD 3, osteopenia, HLD.     History obtained from: patient    Patient was initially evaluated in 11/2024.  She has been dealing with a change in bowel habits toward constipation starting in 07/2024 with no obvious precipitants.  She had a CT completed which was largely unremarkable and she was instructed to start on MiraLAX daily.    03/21/25:     Pt is here today with her sister. She is on a regimen of miralax 1/2 daily and benefiber daily. On this regimen she is having daily brown BM, typically a bristol 4. No BRBPR or melena. No additional weight loss. No sig abd cramping or pain.    Pt denies upper GI symptoms. No heartburn, indigestion, nausea, emesis, dysphagia or odynophagia. No early satiety.     NSAIDs: none  Etoh: none  Tobacco: none       03/2023: CT A/P: Thickening of the descending colon and sigmoid colon wall which may represent colitis. Colonic diverticulosis with minimal inflammatory changes around the sigmoid colon which may represent acute diverticulitis.  No drainable fluid collection.  08/2024: Hb 14.2, MCV 92, Plt 298, BUN 20, Cr 1.07, AST 25, ALT 17, albumin 3.8, t bili 0.52   12/2024: CT A/P: Normal caliber bowel loops. Normal terminal ileum. No enteritis or colitis. Sigmoid diverticulosis without diverticulitis.No intra-abdominal mass or lymphadenopathy. Pancreas is normal in size without ductal dilation or parenchymal atrophy. Cholelithiasis; Small fat-containing umbilical hernia; No pelvic mass. 11 mm simple left ovarian cyst. Uterus is atrophic     Endoscopic history:   EGD:   Colon: 04/2023: Diverticulosis in the sigmoid colon, minimal erythema in the descending colon  A. DESCENDING COLON: No significant histopathologic abnormalities. No evidence of active inflammation,  granulomas, or dysplasia. Diagnostic features of microscopic colitis and collagenous colitis are not identified    Review of Systems  Per HPI    Past Medical History   Past Medical History:   Diagnosis Date    Arteriosclerotic heart disease     LAST ASSESSED: 09SEP2015    Atrial fibrillation (HCC)     Esophageal reflux     LAST ASSESSED: 09JAN2018    GERD (gastroesophageal reflux disease)     Hyperlipidemia     LAST ASSESSED: 09JAN2018    Hypertension     LAST ASSESSED: 09JAN2018    Nontoxic single thyroid nodule     LAST ASSESSED: 09MAY2014    Osteopenia     LAST ASSESSED: 11MAY2016     Past Surgical History:   Procedure Laterality Date    CYSTOSCOPY  12/04/2013    DIAGNOSTIC    ESOPHAGOGASTRODUODENOSCOPY      IR THORACENTESIS  8/5/2024    MASTECTOMY Bilateral     cancer    OOPHORECTOMY      SALPINGOOPHORECTOMY Bilateral      Family History   Problem Relation Age of Onset    Alzheimer's disease Mother     Hypertension Father     Stroke Father         SYNDROME    Colon cancer Brother       reports that she has never smoked. She has never used smokeless tobacco. She reports that she does not drink alcohol and does not use drugs.  Current Outpatient Medications   Medication Instructions    apixaban (ELIQUIS) 2.5 mg, Oral, 2 times daily    aspirin 81 mg, Daily    atorvastatin (LIPITOR) 20 mg, Oral, Daily    diltiazem (CARDIZEM CD) 300 mg, Oral, Daily    fenofibrate micronized (LOFIBRA) 134 MG capsule TAKE 1 CAPSULE BY MOUTH ONCE DAILY WITH BREAKFAST    fluticasone (FLONASE) 50 mcg/act nasal spray 2 sprays, Nasal, 2 times daily    furosemide (LASIX) 40 mg tablet TAKE 1 TABLET BY MOUTH ONCE DAILY AS NEEDED FOR A WEIGHT GAIN OF 3 LBS IN 24 HOURS AND 5 LBS IN A WEEK, SHORTNESS OF BREATH AND LEG SWELLING    hyoscyamine (ANASPAZ,LEVSIN) 125 mcg, Oral, Every morning    loratadine (CLARITIN) 10 mg, Oral, Daily    omeprazole (PRILOSEC) 20 mg, Oral, Daily    potassium chloride (Klor-Con M10) 10 mEq tablet 10 mEq, Oral, Daily PRN     senna (SENOKOT) 17.2 mg, Oral, Daily at bedtime     Allergies   Allergen Reactions    Ciprofloxacin Other (See Comments)     Muscle pain    Doxycycline     Nitrofurantoin          Objective   There were no vitals taken for this visit.     Physical Exam  Vitals and nursing note reviewed.   Constitutional:       General: She is not in acute distress.     Appearance: She is well-developed.   HENT:      Head: Normocephalic and atraumatic.   Eyes:      General: No scleral icterus.     Conjunctiva/sclera: Conjunctivae normal.   Cardiovascular:      Rate and Rhythm: Normal rate.      Heart sounds: No murmur heard.  Pulmonary:      Effort: Pulmonary effort is normal. No respiratory distress.      Breath sounds: Normal breath sounds.   Abdominal:      General: A surgical scar is present. Bowel sounds are normal. There is no distension.      Palpations: Abdomen is soft.      Tenderness: There is no abdominal tenderness. There is no guarding or rebound.   Musculoskeletal:         General: No swelling.   Skin:     General: Skin is warm and dry.      Coloration: Skin is not jaundiced.   Neurological:      General: No focal deficit present.      Mental Status: She is alert.   Psychiatric:         Mood and Affect: Mood normal.         Behavior: Behavior normal.       **Please note:  Dictation voice to text software may have been used in the creation of this record.  Occasional wrong word or “sound alike” substitutions may have occurred due to the inherent limitations of voice recognition software.  Read the chart carefully and recognize, using context, where substitutions have occurred.**

## 2025-03-22 NOTE — ASSESSMENT & PLAN NOTE
Pt with hx of change in bowel habit to constipation when she was last evaluated in clinic in 11/2024.   Last colonoscopy in 2023 with diverticulosis and some erythema in the descending colon.   CT in 12/2024 with no acute pathology.   She has had marked improvement and resolution in symptoms since initiation of miralax and benefiber.   No alarm features to the GI tract at this time.   We will continue on current regimen at present.

## 2025-03-22 NOTE — ASSESSMENT & PLAN NOTE
Incidental finding on CT from 12/2024.   Pt is not having any symptoms consistent with biliary colic at this time.   Recommend continuing to monitor.

## 2025-03-28 ENCOUNTER — OFFICE VISIT (OUTPATIENT)
Dept: FAMILY MEDICINE CLINIC | Facility: CLINIC | Age: 85
End: 2025-03-28
Payer: MEDICARE

## 2025-03-28 VITALS
HEART RATE: 69 BPM | BODY MASS INDEX: 22.62 KG/M2 | TEMPERATURE: 97.8 F | OXYGEN SATURATION: 98 % | RESPIRATION RATE: 18 BRPM | SYSTOLIC BLOOD PRESSURE: 126 MMHG | WEIGHT: 119.8 LBS | DIASTOLIC BLOOD PRESSURE: 78 MMHG | HEIGHT: 61 IN

## 2025-03-28 DIAGNOSIS — N18.32 CHRONIC KIDNEY DISEASE, STAGE 3B (HCC): ICD-10-CM

## 2025-03-28 DIAGNOSIS — I48.91 ATRIAL FIBRILLATION, CONTROLLED (HCC): Primary | ICD-10-CM

## 2025-03-28 DIAGNOSIS — E78.5 HYPERLIPIDEMIA, UNSPECIFIED HYPERLIPIDEMIA TYPE: ICD-10-CM

## 2025-03-28 PROCEDURE — 99214 OFFICE O/P EST MOD 30 MIN: CPT | Performed by: INTERNAL MEDICINE

## 2025-03-28 PROCEDURE — G2211 COMPLEX E/M VISIT ADD ON: HCPCS | Performed by: INTERNAL MEDICINE

## 2025-03-28 NOTE — PROGRESS NOTES
Name: Nohemi Arcos      : 1940      MRN: [id number]  Encounter Provider: Gissell Graham DO  Encounter Date: 3/28/2025   Encounter department: [facility]  :  Assessment & Plan  Atrial fibrillation, controlled (HCC)  Sxs managed on current rx She saw cardiology recently and stable on current regimen        Chronic kidney disease, stage 3b (HCC)  Lab Results   Component Value Date    EGFR 41 2024    EGFR 36 2024    EGFR 48 2024    CREATININE 1.22 2024    CREATININE 1.36 (H) 2024    CREATININE 1.07 2024     Stay hydrated Avoid nsaids Monitor GFR/stable        Hyperlipidemia, unspecified hyperlipidemia type    Orders:    Lipid panel; Future  Lo fat diet She will be more active as the weather improves         Depression Screening and Follow-up Plan: Patient was screened for depression during today's encounter. They screened negative with a PHQ-2 score of 0.      Rto 6months  History of Present Illness   HPI  Pt doing generally well Glad the winter is ending No acute issues No falls She is managing her home on her own She hopes to cut the grass again this year No chest pain or sob She saw cardiology recently and no changes She is stable on current meds She is trying to drink more water Appetite normal   Review of Systems   Constitutional:  Negative for chills and fever.   HENT: Negative.     Eyes:  Negative for visual disturbance.   Respiratory:  Negative for cough and shortness of breath.    Cardiovascular: Negative.    Gastrointestinal: Negative.    Genitourinary: Negative.    Musculoskeletal: Negative.    Neurological:  Negative for dizziness, light-headedness and headaches.   Psychiatric/Behavioral:  Negative for sleep disturbance. The patient is not nervous/anxious.      Past Medical History:   Diagnosis Date    Arteriosclerotic heart disease     LAST ASSESSED: 2015    Atrial fibrillation (HCC)     Esophageal reflux     LAST ASSESSED: 2018     GERD (gastroesophageal reflux disease)     Hyperlipidemia     LAST ASSESSED: 09JAN2018    Hypertension     LAST ASSESSED: 09JAN2018    Nontoxic single thyroid nodule     LAST ASSESSED: 09MAY2014    Osteopenia     LAST ASSESSED: 11MAY2016     Past Surgical History:   Procedure Laterality Date    CYSTOSCOPY  12/04/2013    DIAGNOSTIC    ESOPHAGOGASTRODUODENOSCOPY      IR THORACENTESIS  8/5/2024    MASTECTOMY Bilateral     cancer    OOPHORECTOMY      SALPINGOOPHORECTOMY Bilateral      Social History     Socioeconomic History    Marital status:      Spouse name: Not on file    Number of children: Not on file    Years of education: Not on file    Highest education level: Not on file   Occupational History    Not on file   Tobacco Use    Smoking status: Never    Smokeless tobacco: Never   Vaping Use    Vaping status: Never Used   Substance and Sexual Activity    Alcohol use: Never    Drug use: No    Sexual activity: Not Currently   Other Topics Concern    Not on file   Social History Narrative    Always uses seat belt    Dental care, regularly     Social Drivers of Health     Financial Resource Strain: Low Risk  (7/10/2023)    Overall Financial Resource Strain (CARDIA)     Difficulty of Paying Living Expenses: Not very hard   Food Insecurity: No Food Insecurity (9/27/2024)    Nursing - Inadequate Food Risk Classification     Worried About Running Out of Food in the Last Year: Never true     Ran Out of Food in the Last Year: Never true     Ran Out of Food in the Last Year: Not on file   Transportation Needs: No Transportation Needs (9/27/2024)    PRAPARE - Transportation     Lack of Transportation (Medical): No     Lack of Transportation (Non-Medical): No   Physical Activity: Not on file   Stress: Not on file   Social Connections: Not on file   Intimate Partner Violence: Not on file   Housing Stability: Low Risk  (9/27/2024)    Housing Stability Vital Sign     Unable to Pay for Housing in the Last Year: No     Number  "of Times Moved in the Last Year: 1     Homeless in the Last Year: No     Allergies   Allergen Reactions    Ciprofloxacin Other (See Comments)     Muscle pain    Doxycycline     Nitrofurantoin        Objective   /78   Pulse 69   Temp 97.8 °F (36.6 °C) (Temporal)   Resp 18   Ht 5' 1\" (1.549 m)   Wt 54.3 kg (119 lb 12.8 oz)   SpO2 98%   BMI 22.64 kg/m²      Physical Exam  Vitals and nursing note reviewed.   Constitutional:       General: She is not in acute distress.     Appearance: Normal appearance. She is not ill-appearing, toxic-appearing or diaphoretic.   HENT:      Head: Normocephalic and atraumatic.      Right Ear: External ear normal.      Left Ear: External ear normal.      Nose: Nose normal.      Mouth/Throat:      Mouth: Mucous membranes are moist.   Eyes:      General: No scleral icterus.  Cardiovascular:      Pulses: Normal pulses.      Heart sounds: Normal heart sounds.   Pulmonary:      Effort: Pulmonary effort is normal. No respiratory distress.      Breath sounds: Normal breath sounds. No wheezing.   Abdominal:      General: Bowel sounds are normal. There is no distension.      Palpations: Abdomen is soft.      Tenderness: There is no abdominal tenderness.   Musculoskeletal:      Cervical back: Normal range of motion and neck supple.      Right lower leg: No edema.      Left lower leg: No edema.   Lymphadenopathy:      Cervical: No cervical adenopathy.   Skin:     General: Skin is warm and dry.      Coloration: Skin is not jaundiced or pale.   Neurological:      General: No focal deficit present.      Mental Status: She is alert and oriented to person, place, and time. Mental status is at baseline.      Cranial Nerves: No cranial nerve deficit.      Sensory: No sensory deficit.   Psychiatric:         Mood and Affect: Mood normal.         Behavior: Behavior normal.         Thought Content: Thought content normal.         Judgment: Judgment normal.         "

## 2025-04-06 ENCOUNTER — APPOINTMENT (OUTPATIENT)
Dept: LAB | Facility: HOSPITAL | Age: 85
End: 2025-04-06
Payer: MEDICARE

## 2025-04-06 DIAGNOSIS — R19.4 CHANGE IN BOWEL HABITS: Primary | ICD-10-CM

## 2025-04-06 DIAGNOSIS — R10.9 ABDOMINAL CRAMPING: ICD-10-CM

## 2025-04-06 DIAGNOSIS — E78.5 HYPERLIPIDEMIA, UNSPECIFIED HYPERLIPIDEMIA TYPE: ICD-10-CM

## 2025-04-06 LAB
ALBUMIN SERPL BCG-MCNC: 4.1 G/DL (ref 3.5–5)
ALP SERPL-CCNC: 44 U/L (ref 34–104)
ALT SERPL W P-5'-P-CCNC: 14 U/L (ref 7–52)
ANION GAP SERPL CALCULATED.3IONS-SCNC: 7 MMOL/L (ref 4–13)
AST SERPL W P-5'-P-CCNC: 24 U/L (ref 13–39)
BASOPHILS # BLD AUTO: 0.06 THOUSANDS/ÂΜL (ref 0–0.1)
BASOPHILS NFR BLD AUTO: 1 % (ref 0–1)
BILIRUB SERPL-MCNC: 0.74 MG/DL (ref 0.2–1)
BUN SERPL-MCNC: 29 MG/DL (ref 5–25)
CALCIUM SERPL-MCNC: 9.7 MG/DL (ref 8.4–10.2)
CHLORIDE SERPL-SCNC: 102 MMOL/L (ref 96–108)
CHOLEST SERPL-MCNC: 177 MG/DL (ref ?–200)
CO2 SERPL-SCNC: 29 MMOL/L (ref 21–32)
CREAT SERPL-MCNC: 1.16 MG/DL (ref 0.6–1.3)
EOSINOPHIL # BLD AUTO: 0.34 THOUSAND/ÂΜL (ref 0–0.61)
EOSINOPHIL NFR BLD AUTO: 3 % (ref 0–6)
ERYTHROCYTE [DISTWIDTH] IN BLOOD BY AUTOMATED COUNT: 13.6 % (ref 11.6–15.1)
GFR SERPL CREATININE-BSD FRML MDRD: 43 ML/MIN/1.73SQ M
GLUCOSE P FAST SERPL-MCNC: 107 MG/DL (ref 65–99)
HCT VFR BLD AUTO: 50.5 % (ref 34.8–46.1)
HDLC SERPL-MCNC: 56 MG/DL
HGB BLD-MCNC: 16 G/DL (ref 11.5–15.4)
IMM GRANULOCYTES # BLD AUTO: 0.16 THOUSAND/UL (ref 0–0.2)
IMM GRANULOCYTES NFR BLD AUTO: 2 % (ref 0–2)
LDLC SERPL CALC-MCNC: 97 MG/DL (ref 0–100)
LYMPHOCYTES # BLD AUTO: 1.67 THOUSANDS/ÂΜL (ref 0.6–4.47)
LYMPHOCYTES NFR BLD AUTO: 17 % (ref 14–44)
MCH RBC QN AUTO: 29.5 PG (ref 26.8–34.3)
MCHC RBC AUTO-ENTMCNC: 31.7 G/DL (ref 31.4–37.4)
MCV RBC AUTO: 93 FL (ref 82–98)
MONOCYTES # BLD AUTO: 0.97 THOUSAND/ÂΜL (ref 0.17–1.22)
MONOCYTES NFR BLD AUTO: 10 % (ref 4–12)
NEUTROPHILS # BLD AUTO: 6.73 THOUSANDS/ÂΜL (ref 1.85–7.62)
NEUTS SEG NFR BLD AUTO: 67 % (ref 43–75)
NONHDLC SERPL-MCNC: 121 MG/DL
NRBC BLD AUTO-RTO: 0 /100 WBCS
PLATELET # BLD AUTO: 275 THOUSANDS/UL (ref 149–390)
PMV BLD AUTO: 8.8 FL (ref 8.9–12.7)
POTASSIUM SERPL-SCNC: 4.5 MMOL/L (ref 3.5–5.3)
PROT SERPL-MCNC: 6.6 G/DL (ref 6.4–8.4)
RBC # BLD AUTO: 5.42 MILLION/UL (ref 3.81–5.12)
SODIUM SERPL-SCNC: 138 MMOL/L (ref 135–147)
T4 FREE SERPL-MCNC: 0.83 NG/DL (ref 0.61–1.12)
TRIGL SERPL-MCNC: 119 MG/DL (ref ?–150)
TSH SERPL DL<=0.05 MIU/L-ACNC: 5.86 UIU/ML (ref 0.45–4.5)
WBC # BLD AUTO: 9.93 THOUSAND/UL (ref 4.31–10.16)

## 2025-04-06 PROCEDURE — 80061 LIPID PANEL: CPT

## 2025-04-06 PROCEDURE — 36415 COLL VENOUS BLD VENIPUNCTURE: CPT

## 2025-04-06 PROCEDURE — 84439 ASSAY OF FREE THYROXINE: CPT

## 2025-04-06 PROCEDURE — 84443 ASSAY THYROID STIM HORMONE: CPT

## 2025-04-06 PROCEDURE — 85025 COMPLETE CBC W/AUTO DIFF WBC: CPT

## 2025-04-06 PROCEDURE — 80053 COMPREHEN METABOLIC PANEL: CPT

## 2025-04-11 ENCOUNTER — TELEPHONE (OUTPATIENT)
Dept: CARDIOLOGY CLINIC | Facility: HOSPITAL | Age: 85
End: 2025-04-11

## 2025-04-11 NOTE — TELEPHONE ENCOUNTER
Lvm for pt to call back and schedule with Brennan lynch PA-C from recall and delete recall once scheduled.

## 2025-06-06 DIAGNOSIS — E78.5 HYPERLIPIDEMIA, UNSPECIFIED HYPERLIPIDEMIA TYPE: ICD-10-CM

## 2025-06-06 RX ORDER — ATORVASTATIN CALCIUM 20 MG/1
20 TABLET, FILM COATED ORAL DAILY
Qty: 100 TABLET | Refills: 1 | Status: SHIPPED | OUTPATIENT
Start: 2025-06-06

## 2025-06-16 DIAGNOSIS — I48.91 ATRIAL FIBRILLATION WITH RVR (HCC): ICD-10-CM

## 2025-06-16 DIAGNOSIS — I48.92 ATRIAL FLUTTER (HCC): ICD-10-CM

## 2025-06-16 RX ORDER — APIXABAN 2.5 MG/1
2.5 TABLET, FILM COATED ORAL 2 TIMES DAILY
Qty: 60 TABLET | Refills: 5 | Status: SHIPPED | OUTPATIENT
Start: 2025-06-16

## 2025-06-16 RX ORDER — DILTIAZEM HYDROCHLORIDE 300 MG/1
300 CAPSULE, EXTENDED RELEASE ORAL DAILY
Qty: 30 CAPSULE | Refills: 5 | Status: SHIPPED | OUTPATIENT
Start: 2025-06-16

## 2025-06-22 ENCOUNTER — HOSPITAL ENCOUNTER (EMERGENCY)
Facility: HOSPITAL | Age: 85
Discharge: HOME | End: 2025-06-22
Attending: EMERGENCY MEDICINE | Admitting: EMERGENCY MEDICINE
Payer: MEDICARE

## 2025-06-22 VITALS
TEMPERATURE: 98.3 F | DIASTOLIC BLOOD PRESSURE: 99 MMHG | RESPIRATION RATE: 18 BRPM | HEART RATE: 100 BPM | OXYGEN SATURATION: 99 % | WEIGHT: 124.56 LBS | SYSTOLIC BLOOD PRESSURE: 172 MMHG | BODY MASS INDEX: 23.54 KG/M2

## 2025-06-22 DIAGNOSIS — H91.90 HEARING LOSS: Primary | ICD-10-CM

## 2025-06-22 PROCEDURE — 99284 EMERGENCY DEPT VISIT MOD MDM: CPT | Performed by: PHYSICIAN ASSISTANT

## 2025-06-22 PROCEDURE — 99283 EMERGENCY DEPT VISIT LOW MDM: CPT

## 2025-06-22 NOTE — DISCHARGE INSTRUCTIONS
You need to seen ENT for hearing test.  They are willing to see you tomorrow or the next day - please give office a call to schedule.  Return to ER as needed.

## 2025-06-22 NOTE — ED PROVIDER NOTES
"Time reflects when diagnosis was documented in both MDM as applicable and the Disposition within this note       Time User Action Codes Description Comment    6/22/2025 12:34 PM Ruth Ann Ortizly Add [H91.90] Hearing loss           ED Disposition       ED Disposition   Discharge    Condition   Stable    Date/Time   Sun Jun 22, 2025 12:34 PM    Comment   Nohemi Norrishart discharge to home/self care.                   Assessment & Plan       Medical Decision Making  86 yo female presenting for evaluation of hearing loss.  Prior records reviewed.  Was previously seen by ENT.  Noted diagnosis in chart of hearing loss.  Saw ENT on 1/14/25 and consult reviewed.  Per documentation had several weeks of hearing loss.  Per documentation \"hearing loss per patient. She was previously treated Dec 15 for left OM with Medrol dose pack, amoxicillin and Flonase.   Today's examination is negative for cerumen, otitis externa, otitis media and middle ear effusion.  Tympanograms are Type A.  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  There is no evidence of cerumen nor effusion. Will obtain CT temporal bones for further evaluation.\" Had a CT temporal bones/IACs/mastoids performed 1/24/25 - \"IMPRESSION:  1. 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  2. Well-pneumatized middle ear cavities and mastoid air cells bilaterally.  3. Normal-appearing right-sided temporal bone.\"    Was advised follow up with audiology for hearing aids.  There is phone note in chart where they tried to talk to her but she couldn't hear.  With this past history noted, this appears to be an acute on chronic problem.  As far as exam here, no noted cerumen impaction, OM or OE.  Non focal exam.  No history of trauma.    Reached out to on call ENT to discuss.  They would like to evaluate in office with hearing tests prior to " treatment.  This was reviewed with pt and sister.  Sister will call and speak to office about appt.    Reviewed symptomatic management.  OTC meds reviewed.  Anticipatory guidance.  Advised recheck with ENT or return to ER as needed.  Strict return precautions outlined.  Patient and sister voiced understanding and had no further questions.    Please refer to above ER course for further details/discussion.        Problems Addressed:  Hearing loss: acute illness or injury    Amount and/or Complexity of Data Reviewed  External Data Reviewed: labs, radiology and notes.  Discussion of management or test interpretation with external provider(s): Attending, ENT    Risk  OTC drugs.  Prescription drug management.        ED Course as of 06/22/25 1313   Sun Jun 22, 2025   1217 Epic secure chat to on call ENT to review, Dr. Ziegler.   1225 Per discussion with ENT, advises evaluation in office tomorrow or day after for hearing test and evaluation.  Defers oral steroids.  May do steroid injections after this if needed.  Will have his office call for appointment time.   1226 Pt and sister updated.  Pt's sister notes she will call office tomorrow for appt.       Medications - No data to display    ED Risk Strat Scores                    No data recorded        SBIRT 20yo+      Flowsheet Row Most Recent Value   Initial Alcohol Screen: US AUDIT-C     1. How often do you have a drink containing alcohol? 0 Filed at: 06/22/2025 1150   2. How many drinks containing alcohol do you have on a typical day you are drinking?  0 Filed at: 06/22/2025 1150   3a. Male UNDER 65: How often do you have five or more drinks on one occasion? 0 Filed at: 06/22/2025 1150   3b. FEMALE Any Age, or MALE 65+: How often do you have 4 or more drinks on one occassion? 0 Filed at: 06/22/2025 1150   Audit-C Score 0 Filed at: 06/22/2025 1150   RONNY: How many times in the past year have you...    Used an illegal drug or used a prescription medication for  non-medical reasons? Never Filed at: 06/22/2025 1150                            History of Present Illness       Chief Complaint   Patient presents with    Hearing Loss     Patient states she lost all her hearing last night. Denies pain/denies any injury        Past Medical History[1]   Past Surgical History[2]   Family History[3]   Social History[4]   E-Cigarette/Vaping    E-Cigarette Use Never User       E-Cigarette/Vaping Substances    Nicotine No     THC No     CBD No     Flavoring No     Other No     Unknown No       I have reviewed and agree with the history as documented.     85 year old female with PMH HTN, HLD, osteopenia, GERD, afib presenting with sister for evaluation of hearing loss.  Sister helps provide history as pt unable to hear.  History obtained from patient through reading and writing.  According to sister, pt lives alone.  Did talk to her yesterday on the phone without reported issues.  Today she notes Nohemi called her and said she couldn't hear.  Sister states she drove to her house to check on her.  She otherwise feels well with no other reported complaints.  She denies any ear pain, headaches, visual change, dizziness, lightheadedness.  Denies fever, chills, cough, congestion.  Denies chest pain, SOB, N/V/D, abdominal pain.  Denies numbness, tingling, weakness.  No difficultly with speech or gait.  No reported falls or recent head injury.  No reported aggravating or alleviating factors.  Sister notes pt has had some issues with hearing in the past few months and has been seen by ENT.  Is scheduled for a follow up appt sometime in the near future.  No reported changes in health or medications.      History provided by:  Medical records, patient and relative  History limited by: hearing loss.   used: No        Review of Systems   Constitutional: Negative.  Negative for chills, fatigue and fever.   HENT:  Positive for hearing loss. Negative for congestion, ear pain,  rhinorrhea and sore throat.    Eyes: Negative.  Negative for visual disturbance.   Respiratory: Negative.  Negative for cough, shortness of breath and wheezing.    Cardiovascular: Negative.  Negative for chest pain.   Gastrointestinal: Negative.  Negative for abdominal pain, diarrhea, nausea and vomiting.   Genitourinary: Negative.  Negative for dysuria and frequency.   Musculoskeletal: Negative.  Negative for back pain, myalgias and neck pain.   Skin: Negative.  Negative for rash.   Neurological: Negative.  Negative for dizziness, syncope, facial asymmetry, speech difficulty, weakness, light-headedness, numbness and headaches.   Psychiatric/Behavioral: Negative.  Negative for confusion.    All other systems reviewed and are negative.          Objective       ED Triage Vitals [06/22/25 1149]   Temperature Pulse Blood Pressure Respirations SpO2 Patient Position - Orthostatic VS   98.3 °F (36.8 °C) 100 (!) 172/99 18 99 % Sitting      Temp Source Heart Rate Source BP Location FiO2 (%) Pain Score    Temporal Monitor Right arm -- No Pain      Vitals      Date and Time Temp Pulse SpO2 Resp BP Pain Score FACES Pain Rating User   06/22/25 1149 98.3 °F (36.8 °C) 100 99 % 18 172/99 No Pain -- CLS            Physical Exam  Vitals and nursing note reviewed.   Constitutional:       General: She is awake. She is not in acute distress.     Appearance: She is well-developed. She is not toxic-appearing or diaphoretic.   HENT:      Head: Normocephalic and atraumatic.      Right Ear: Tympanic membrane, ear canal and external ear normal. Decreased hearing noted.      Left Ear: Tympanic membrane, ear canal and external ear normal. Decreased hearing noted.      Nose: Nose normal.      Mouth/Throat:      Mouth: Mucous membranes are moist.      Tongue: Tongue does not deviate from midline.      Pharynx: Oropharynx is clear. Uvula midline.     Eyes:      General: Lids are normal. Gaze aligned appropriately. No scleral icterus.      Conjunctiva/sclera: Conjunctivae normal.      Pupils: Pupils are equal, round, and reactive to light.     Neck:      Trachea: Trachea and phonation normal. No tracheal deviation.     Cardiovascular:      Rate and Rhythm: Normal rate and regular rhythm.      Pulses: Normal pulses.           Radial pulses are 2+ on the right side and 2+ on the left side.      Heart sounds: Normal heart sounds, S1 normal and S2 normal.   Pulmonary:      Effort: Pulmonary effort is normal. No tachypnea or respiratory distress.      Breath sounds: Normal breath sounds. No wheezing, rhonchi or rales.   Abdominal:      General: Bowel sounds are normal. There is no distension.      Palpations: Abdomen is soft.      Tenderness: There is no abdominal tenderness. There is no guarding or rebound.     Musculoskeletal:      Cervical back: Normal range of motion and neck supple.      Right lower leg: No edema.      Left lower leg: No edema.     Skin:     General: Skin is warm and dry.      Capillary Refill: Capillary refill takes less than 2 seconds.      Findings: No rash.     Neurological:      General: No focal deficit present.      Mental Status: She is alert and oriented to person, place, and time.      GCS: GCS eye subscore is 4. GCS verbal subscore is 5. GCS motor subscore is 6.      Cranial Nerves: Cranial nerves 2-12 are intact. No cranial nerve deficit.      Sensory: Sensation is intact. No sensory deficit.      Motor: Motor function is intact. No weakness or abnormal muscle tone.      Coordination: Coordination is intact.      Gait: Gait normal.     Psychiatric:         Mood and Affect: Mood normal.         Speech: Speech normal.         Behavior: Behavior normal. Behavior is cooperative.         Results Reviewed       None            No orders to display       Procedures    ED Medication and Procedure Management   Prior to Admission Medications   Prescriptions Last Dose Informant Patient Reported? Taking?   apixaban (Eliquis) 2.5 mg  2025  No Yes   Sig: Take 1 tablet by mouth twice daily   aspirin 81 MG tablet 2025 Self Yes Yes   Sig: Take 81 mg by mouth in the morning.   atorvastatin (LIPITOR) 20 mg tablet 2025  No Yes   Sig: Take 1 tablet (20 mg total) by mouth daily   diltiazem (Cartia XT) 300 mg 24 hr capsule 2025  No Yes   Sig: Take 1 capsule by mouth once daily   fenofibrate micronized (LOFIBRA) 134 MG capsule 2025 Self No Yes   Sig: TAKE 1 CAPSULE BY MOUTH ONCE DAILY WITH BREAKFAST   fluticasone (FLONASE) 50 mcg/act nasal spray  Self No No   Si sprays into each nostril 2 (two) times a day   furosemide (LASIX) 40 mg tablet 2025  No Yes   Sig: TAKE 1 TABLET BY MOUTH ONCE DAILY AS NEEDED FOR A WEIGHT GAIN OF 3 LBS IN 24 HOURS AND 5 LBS IN A WEEK, SHORTNESS OF BREATH AND LEG SWELLING   Patient taking differently: Take 20 mg by mouth   hyoscyamine (ANASPAZ,LEVSIN) 0.125 MG tablet 2025 Self No Yes   Sig: Take 1 tablet (125 mcg total) by mouth every morning   loratadine (CLARITIN) 10 mg tablet 2025 Self No Yes   Sig: Take 1 tablet (10 mg total) by mouth daily   omeprazole (PriLOSEC) 20 mg delayed release capsule 2025 Self No Yes   Sig: Take 1 capsule (20 mg total) by mouth daily   potassium chloride (Klor-Con M10) 10 mEq tablet 2025 Self No Yes   Sig: Take 1 tablet (10 mEq total) by mouth daily as needed (take with lasix)   senna (SENOKOT) 8.6 mg   No No   Sig: Take 2 tablets (17.2 mg total) by mouth daily at bedtime      Facility-Administered Medications: None     Discharge Medication List as of 2025 12:45 PM        CONTINUE these medications which have NOT CHANGED    Details   apixaban (Eliquis) 2.5 mg Take 1 tablet by mouth twice daily, Starting Mon 2025, Normal      aspirin 81 MG tablet Take 81 mg by mouth in the morning., Historical Med      atorvastatin (LIPITOR) 20 mg tablet Take 1 tablet (20 mg total) by mouth daily, Starting 2025, Normal      diltiazem (Cartia XT)  300 mg 24 hr capsule Take 1 capsule by mouth once daily, Starting Mon 6/16/2025, Normal      fenofibrate micronized (LOFIBRA) 134 MG capsule TAKE 1 CAPSULE BY MOUTH ONCE DAILY WITH BREAKFAST, Normal      furosemide (LASIX) 40 mg tablet TAKE 1 TABLET BY MOUTH ONCE DAILY AS NEEDED FOR A WEIGHT GAIN OF 3 LBS IN 24 HOURS AND 5 LBS IN A WEEK, SHORTNESS OF BREATH AND LEG SWELLING, Normal      hyoscyamine (ANASPAZ,LEVSIN) 0.125 MG tablet Take 1 tablet (125 mcg total) by mouth every morning, Starting Wed 10/16/2024, Normal      loratadine (CLARITIN) 10 mg tablet Take 1 tablet (10 mg total) by mouth daily, Starting Sun 12/15/2024, Normal      omeprazole (PriLOSEC) 20 mg delayed release capsule Take 1 capsule (20 mg total) by mouth daily, Starting Mon 1/13/2025, Normal      potassium chloride (Klor-Con M10) 10 mEq tablet Take 1 tablet (10 mEq total) by mouth daily as needed (take with lasix), Starting Thu 9/12/2024, Normal      fluticasone (FLONASE) 50 mcg/act nasal spray 2 sprays into each nostril 2 (two) times a day, Starting Thu 12/19/2024, Normal      senna (SENOKOT) 8.6 mg Take 2 tablets (17.2 mg total) by mouth daily at bedtime, Starting Thu 8/8/2024, Until Fri 3/28/2025, Normal           No discharge procedures on file.  ED SEPSIS DOCUMENTATION   Time reflects when diagnosis was documented in both MDM as applicable and the Disposition within this note       Time User Action Codes Description Comment    6/22/2025 12:34 PM Lisa Ortiz Add [H91.90] Hearing loss                      [1]   Past Medical History:  Diagnosis Date    Arteriosclerotic heart disease     LAST ASSESSED: 09SEP2015    Atrial fibrillation (HCC)     Esophageal reflux     LAST ASSESSED: 09JAN2018    GERD (gastroesophageal reflux disease)     Hyperlipidemia     LAST ASSESSED: 09JAN2018    Hypertension     LAST ASSESSED: 09JAN2018    Nontoxic single thyroid nodule     LAST ASSESSED: 09MAY2014    Osteopenia     LAST ASSESSED: 11MAY2016   [2]   Past  Surgical History:  Procedure Laterality Date    CYSTOSCOPY  12/04/2013    DIAGNOSTIC    ESOPHAGOGASTRODUODENOSCOPY      IR THORACENTESIS  8/5/2024    MASTECTOMY Bilateral     cancer    OOPHORECTOMY      SALPINGOOPHORECTOMY Bilateral    [3]   Family History  Problem Relation Name Age of Onset    Alzheimer's disease Mother      Hypertension Father      Stroke Father          SYNDROME    Colon cancer Brother     [4]   Social History  Tobacco Use    Smoking status: Never    Smokeless tobacco: Never   Vaping Use    Vaping status: Never Used   Substance Use Topics    Alcohol use: Never    Drug use: No        Lisa Ortiz PA-C  06/22/25 1313

## 2025-06-23 ENCOUNTER — VBI (OUTPATIENT)
Dept: FAMILY MEDICINE CLINIC | Facility: CLINIC | Age: 85
End: 2025-06-23

## 2025-06-23 NOTE — TELEPHONE ENCOUNTER
06/23/25 12:11 PM    Patient contacted post ED visit, first outreach attempt made. Message was left for patient to return a call to the [facility] Department at [facility]: Phone [phone].    Thank you.  [facility]le Warner  [facility]

## 2025-06-24 ENCOUNTER — TELEPHONE (OUTPATIENT)
Age: 85
End: 2025-06-24

## 2025-06-24 NOTE — TELEPHONE ENCOUNTER
Please call patient's sister and let her know that the patient may take prednisone and Eliquis.  Thank you

## 2025-06-24 NOTE — TELEPHONE ENCOUNTER
Pt's sister Wendie called in stating that pt was prescribed prednisone and she wants to be sure that it is okay if patient takes this medication with eliquis.     Please advise

## 2025-06-24 NOTE — TELEPHONE ENCOUNTER
06/24/25 10:47 AM    Patient contacted post ED visit, second outreach attempt made. Message was left for patient to return a call to the [facility] Department at [facility]: Phone [phone].    Thank you.  [facility] Warner  [facility]

## 2025-06-24 NOTE — TELEPHONE ENCOUNTER
06/24/25 11:20 AM    Patient contacted post ED visit, [facility] department spoke with patient/caregiver and outreach was successful.    Thank you.  [facility]  [facility]

## 2025-06-28 ENCOUNTER — APPOINTMENT (OUTPATIENT)
Dept: LAB | Facility: HOSPITAL | Age: 85
End: 2025-06-28
Payer: MEDICARE

## 2025-06-28 DIAGNOSIS — H91.20 SUDDEN HEARING LOSS, UNSPECIFIED LATERALITY: ICD-10-CM

## 2025-06-28 DIAGNOSIS — H91.20 SUDDEN-ONSET SENSORINEURAL HEARING LOSS: ICD-10-CM

## 2025-06-28 DIAGNOSIS — I50.33 ACUTE ON CHRONIC DIASTOLIC (CONGESTIVE) HEART FAILURE (HCC): ICD-10-CM

## 2025-06-28 LAB
ANION GAP SERPL CALCULATED.3IONS-SCNC: 7 MMOL/L (ref 4–13)
BUN SERPL-MCNC: 28 MG/DL (ref 5–25)
CALCIUM SERPL-MCNC: 10 MG/DL (ref 8.4–10.2)
CHLORIDE SERPL-SCNC: 100 MMOL/L (ref 96–108)
CO2 SERPL-SCNC: 31 MMOL/L (ref 21–32)
CREAT SERPL-MCNC: 0.99 MG/DL (ref 0.6–1.3)
GFR SERPL CREATININE-BSD FRML MDRD: 52 ML/MIN/1.73SQ M
GLUCOSE P FAST SERPL-MCNC: 99 MG/DL (ref 65–99)
POTASSIUM SERPL-SCNC: 4 MMOL/L (ref 3.5–5.3)
SODIUM SERPL-SCNC: 138 MMOL/L (ref 135–147)
TSH SERPL DL<=0.05 MIU/L-ACNC: 2.81 UIU/ML (ref 0.45–4.5)

## 2025-06-28 PROCEDURE — 86780 TREPONEMA PALLIDUM: CPT

## 2025-06-28 PROCEDURE — 80048 BASIC METABOLIC PNL TOTAL CA: CPT

## 2025-06-28 PROCEDURE — 86225 DNA ANTIBODY NATIVE: CPT

## 2025-06-28 PROCEDURE — 36415 COLL VENOUS BLD VENIPUNCTURE: CPT

## 2025-06-28 PROCEDURE — 86618 LYME DISEASE ANTIBODY: CPT

## 2025-06-28 PROCEDURE — 86038 ANTINUCLEAR ANTIBODIES: CPT

## 2025-06-28 PROCEDURE — 84443 ASSAY THYROID STIM HORMONE: CPT

## 2025-06-29 LAB — B BURGDOR IGG+IGM SER QL IA: NEGATIVE

## 2025-07-01 LAB — SL AMB TREPONEMA PALLIDUM ANTIBODIES: NON REACTIVE

## 2025-07-03 ENCOUNTER — HOSPITAL ENCOUNTER (OUTPATIENT)
Dept: MRI IMAGING | Facility: HOSPITAL | Age: 85
Discharge: HOME | End: 2025-07-03
Attending: PHYSICIAN ASSISTANT
Payer: MEDICARE

## 2025-07-03 DIAGNOSIS — H91.20 SUDDEN-ONSET SENSORINEURAL HEARING LOSS: ICD-10-CM

## 2025-07-03 PROCEDURE — 70553 MRI BRAIN STEM W/O & W/DYE: CPT

## 2025-07-03 PROCEDURE — A9585 GADOBUTROL INJECTION: HCPCS | Performed by: PHYSICIAN ASSISTANT

## 2025-07-03 RX ORDER — GADOBUTROL 604.72 MG/ML
5 INJECTION INTRAVENOUS
Status: COMPLETED | OUTPATIENT
Start: 2025-07-03 | End: 2025-07-03

## 2025-07-03 RX ADMIN — GADOBUTROL 5 ML: 604.72 INJECTION INTRAVENOUS at 11:51

## 2025-07-06 DIAGNOSIS — E78.5 HYPERLIPIDEMIA, UNSPECIFIED HYPERLIPIDEMIA TYPE: ICD-10-CM

## 2025-07-08 RX ORDER — FENOFIBRATE 134 MG/1
CAPSULE ORAL
Qty: 90 CAPSULE | Refills: 1 | Status: SHIPPED | OUTPATIENT
Start: 2025-07-08

## 2025-07-12 LAB
DSDNA IGG SERPL IA-ACNC: 1.5 IU/ML (ref ?–15)
NUCLEAR IGG SER IA-RTO: <0.09 RATIO (ref ?–1)

## 2025-08-08 ENCOUNTER — OFFICE VISIT (OUTPATIENT)
Dept: FAMILY MEDICINE CLINIC | Facility: CLINIC | Age: 85
End: 2025-08-08
Payer: MEDICARE

## 2025-08-08 VITALS
WEIGHT: 124.8 LBS | SYSTOLIC BLOOD PRESSURE: 124 MMHG | HEART RATE: 84 BPM | DIASTOLIC BLOOD PRESSURE: 78 MMHG | TEMPERATURE: 97 F | RESPIRATION RATE: 18 BRPM | OXYGEN SATURATION: 98 % | BODY MASS INDEX: 23.56 KG/M2 | HEIGHT: 61 IN

## 2025-08-08 DIAGNOSIS — L23.9 ALLERGIC CONTACT DERMATITIS, UNSPECIFIED TRIGGER: Primary | ICD-10-CM

## 2025-08-08 PROCEDURE — 90471 IMMUNIZATION ADMIN: CPT

## 2025-08-08 PROCEDURE — 99213 OFFICE O/P EST LOW 20 MIN: CPT | Performed by: INTERNAL MEDICINE

## 2025-08-08 RX ORDER — POLYETHYLENE GLYCOL 3350 17 G/17G
17 POWDER, FOR SOLUTION ORAL DAILY
COMMUNITY

## 2025-08-08 RX ORDER — WHEAT DEXTRIN 3 G/3.8 G
1 POWDER (GRAM) ORAL
COMMUNITY

## 2025-08-08 RX ORDER — PREDNISONE 20 MG/1
20 TABLET ORAL DAILY
Qty: 5 TABLET | Refills: 0 | Status: SHIPPED | OUTPATIENT
Start: 2025-08-08

## 2025-08-20 ENCOUNTER — OFFICE VISIT (OUTPATIENT)
Dept: CARDIOLOGY CLINIC | Facility: HOSPITAL | Age: 85
End: 2025-08-20
Payer: MEDICARE

## 2025-08-20 VITALS
WEIGHT: 125.6 LBS | SYSTOLIC BLOOD PRESSURE: 124 MMHG | BODY MASS INDEX: 23.71 KG/M2 | DIASTOLIC BLOOD PRESSURE: 80 MMHG | OXYGEN SATURATION: 96 % | HEART RATE: 97 BPM | HEIGHT: 61 IN

## 2025-08-20 DIAGNOSIS — I48.19 PERSISTENT ATRIAL FIBRILLATION (HCC): Primary | ICD-10-CM

## 2025-08-20 DIAGNOSIS — R06.02 SHORTNESS OF BREATH: ICD-10-CM

## 2025-08-20 DIAGNOSIS — I10 PRIMARY HYPERTENSION: ICD-10-CM

## 2025-08-20 DIAGNOSIS — I44.7 LBBB (LEFT BUNDLE BRANCH BLOCK): ICD-10-CM

## 2025-08-20 DIAGNOSIS — I42.9 CARDIOMYOPATHY, UNSPECIFIED TYPE (HCC): ICD-10-CM

## 2025-08-20 PROCEDURE — 99214 OFFICE O/P EST MOD 30 MIN: CPT | Performed by: PHYSICIAN ASSISTANT

## 2025-08-20 PROCEDURE — 93000 ELECTROCARDIOGRAM COMPLETE: CPT | Performed by: PHYSICIAN ASSISTANT

## 2025-08-20 RX ORDER — METOPROLOL SUCCINATE 100 MG/1
100 TABLET, EXTENDED RELEASE ORAL 2 TIMES DAILY
Qty: 180 TABLET | Refills: 3 | Status: SHIPPED | OUTPATIENT
Start: 2025-08-20

## 2025-08-20 RX ORDER — FUROSEMIDE 20 MG/1
20 TABLET ORAL DAILY
Qty: 120 TABLET | Refills: 3 | Status: SHIPPED | OUTPATIENT
Start: 2025-08-20